# Patient Record
Sex: FEMALE | Race: BLACK OR AFRICAN AMERICAN | NOT HISPANIC OR LATINO | Employment: FULL TIME | URBAN - METROPOLITAN AREA
[De-identification: names, ages, dates, MRNs, and addresses within clinical notes are randomized per-mention and may not be internally consistent; named-entity substitution may affect disease eponyms.]

---

## 2017-06-01 ENCOUNTER — CONVERSION ENCOUNTER (OUTPATIENT)
Dept: OTHER | Facility: HOSPITAL | Age: 67
End: 2017-06-01

## 2018-01-12 ENCOUNTER — OFFICE VISIT CONVERTED (OUTPATIENT)
Dept: FAMILY MEDICINE CLINIC | Age: 68
End: 2018-01-12
Attending: FAMILY MEDICINE

## 2018-01-13 LAB
ALBUMIN SERPL-MCNC: 4.4 G/DL
ALBUMIN/GLOB SERPL: 1.5 {RATIO}
ALP SERPL-CCNC: 120 IU/L
ALT SERPL-CCNC: 22 IU/L
AST SERPL-CCNC: 24 IU/L
BILIRUB SERPL-MCNC: 0.4 MG/DL
BUN SERPL-MCNC: 18 MG/DL
BUN/CREAT SERPL: 18
CALCIUM SERPL-MCNC: 10 MG/DL
CHLORIDE SERPL-SCNC: 104 MMOL/L
CHOLEST SERPL-MCNC: 196 MG/DL
CO2 SERPL-SCNC: 25 MMOL/L
CONV TOTAL PROTEIN: 7.3 G/DL
CREAT UR-MCNC: 0.98 MG/DL
ERYTHROCYTE [DISTWIDTH] IN BLOOD BY AUTOMATED COUNT: 17.3 %
GLOBULIN UR ELPH-MCNC: 2.9 G/DL
GLUCOSE SERPL-MCNC: 98 MG/DL
HCT VFR BLD AUTO: 43.3 %
HDLC SERPL-MCNC: 79 MG/DL
HGB BLD-MCNC: 14.4 G/DL
LDLC SERPL CALC-MCNC: 98 MG/DL
MCH RBC QN AUTO: 29.8 PG
MCHC RBC AUTO-ENTMCNC: 33.3 G/DL
MCV RBC AUTO: 90 FL
PLATELET # BLD AUTO: 287 X10E3/UL
POTASSIUM SERPL-SCNC: 4.1 MMOL/L
RBC # BLD AUTO: 4.84 X10E6/UL
SODIUM SERPL-SCNC: 146 MMOL/L
TRIGL SERPL-MCNC: 93 MG/DL
TSH SERPL-ACNC: 1.31 UIU/ML
VIT B12 SERPL-MCNC: 743 PG/ML
VLDLC SERPL-MCNC: 19 MG/DL
WBC # BLD AUTO: 9.9 X10E3/UL

## 2018-02-05 ENCOUNTER — OFFICE VISIT CONVERTED (OUTPATIENT)
Dept: FAMILY MEDICINE CLINIC | Age: 68
End: 2018-02-05
Attending: FAMILY MEDICINE

## 2018-03-29 ENCOUNTER — OFFICE VISIT CONVERTED (OUTPATIENT)
Dept: FAMILY MEDICINE CLINIC | Age: 68
End: 2018-03-29
Attending: NURSE PRACTITIONER

## 2018-06-02 ENCOUNTER — OFFICE VISIT CONVERTED (OUTPATIENT)
Dept: FAMILY MEDICINE CLINIC | Age: 68
End: 2018-06-02
Attending: NURSE PRACTITIONER

## 2018-06-12 ENCOUNTER — OFFICE VISIT CONVERTED (OUTPATIENT)
Dept: FAMILY MEDICINE CLINIC | Age: 68
End: 2018-06-12
Attending: NURSE PRACTITIONER

## 2018-06-19 ENCOUNTER — CONVERSION ENCOUNTER (OUTPATIENT)
Dept: OTHER | Facility: HOSPITAL | Age: 68
End: 2018-06-19

## 2018-07-05 ENCOUNTER — OFFICE VISIT CONVERTED (OUTPATIENT)
Dept: FAMILY MEDICINE CLINIC | Age: 68
End: 2018-07-05
Attending: NURSE PRACTITIONER

## 2018-08-07 ENCOUNTER — OFFICE VISIT CONVERTED (OUTPATIENT)
Dept: FAMILY MEDICINE CLINIC | Age: 68
End: 2018-08-07
Attending: NURSE PRACTITIONER

## 2018-08-24 ENCOUNTER — OFFICE VISIT CONVERTED (OUTPATIENT)
Dept: FAMILY MEDICINE CLINIC | Age: 68
End: 2018-08-24
Attending: FAMILY MEDICINE

## 2019-02-07 ENCOUNTER — OFFICE VISIT CONVERTED (OUTPATIENT)
Dept: FAMILY MEDICINE CLINIC | Age: 69
End: 2019-02-07
Attending: NURSE PRACTITIONER

## 2019-02-21 ENCOUNTER — OFFICE VISIT CONVERTED (OUTPATIENT)
Dept: FAMILY MEDICINE CLINIC | Age: 69
End: 2019-02-21
Attending: FAMILY MEDICINE

## 2019-02-21 ENCOUNTER — HOSPITAL ENCOUNTER (OUTPATIENT)
Dept: OTHER | Facility: HOSPITAL | Age: 69
Discharge: HOME | End: 2019-02-21
Attending: FAMILY MEDICINE

## 2019-02-21 LAB
ALBUMIN SERPL-MCNC: 4 G/DL (ref 3.5–5)
ALBUMIN/GLOB SERPL: 1.3 {RATIO} (ref 1.4–2.6)
ALP SERPL-CCNC: 143 U/L (ref 43–160)
ALT SERPL-CCNC: 7 U/L (ref 10–40)
ANION GAP SERPL CALC-SCNC: 17 MMOL/L (ref 8–19)
AST SERPL-CCNC: 12 U/L (ref 15–50)
BILIRUB SERPL-MCNC: 0.19 MG/DL (ref 0.2–1.3)
BUN SERPL-MCNC: 13 MG/DL (ref 5–25)
BUN/CREAT SERPL: 15 {RATIO} (ref 6–20)
CALCIUM SERPL-MCNC: 9.9 MG/DL (ref 8.7–10.4)
CHLORIDE SERPL-SCNC: 107 MMOL/L (ref 99–111)
CONV CO2: 25 MMOL/L (ref 22–32)
CONV TOTAL PROTEIN: 7.2 G/DL (ref 6.3–8.2)
CREAT UR-MCNC: 0.84 MG/DL (ref 0.5–0.9)
ERYTHROCYTE [DISTWIDTH] IN BLOOD BY AUTOMATED COUNT: 19 % (ref 11.5–14.5)
GFR SERPLBLD BASED ON 1.73 SQ M-ARVRAT: >60 ML/MIN/{1.73_M2}
GLOBULIN UR ELPH-MCNC: 3.2 G/DL (ref 2–3.5)
GLUCOSE SERPL-MCNC: 86 MG/DL (ref 65–99)
HBA1C MFR BLD: 10.6 G/DL (ref 12–16)
HCT VFR BLD AUTO: 34.3 % (ref 37–47)
MCH RBC QN AUTO: 24.3 PG (ref 27–31)
MCHC RBC AUTO-ENTMCNC: 30.9 G/DL (ref 33–37)
MCV RBC AUTO: 78.5 FL (ref 81–99)
OSMOLALITY SERPL CALC.SUM OF ELEC: 299 MOSM/KG (ref 273–304)
PLATELET # BLD AUTO: 428 10*3/UL (ref 130–400)
PMV BLD AUTO: 10.7 FL (ref 7.4–10.4)
POTASSIUM SERPL-SCNC: 3.6 MMOL/L (ref 3.5–5.3)
RBC # BLD AUTO: 4.37 10*6/UL (ref 4.2–5.4)
SODIUM SERPL-SCNC: 145 MMOL/L (ref 135–147)
TSH SERPL-ACNC: 0.67 M[IU]/L (ref 0.27–4.2)
WBC # BLD AUTO: 11.75 10*3/UL (ref 4.8–10.8)

## 2019-02-22 LAB
FERRITIN SERPL-MCNC: 22 NG/ML (ref 10–200)
FOLATE SERPL-MCNC: >20 NG/ML (ref 4.8–20)
IRON SERPL-MCNC: 17 UG/DL (ref 60–170)
VIT B12 SERPL-MCNC: 700 PG/ML (ref 211–911)

## 2019-05-01 ENCOUNTER — HOSPITAL ENCOUNTER (OUTPATIENT)
Dept: OTHER | Facility: HOSPITAL | Age: 69
Discharge: HOME | End: 2019-05-01
Attending: FAMILY MEDICINE

## 2019-05-01 LAB
ERYTHROCYTE [DISTWIDTH] IN BLOOD BY AUTOMATED COUNT: 22.3 % (ref 11.7–14.4)
HBA1C MFR BLD: 14 G/DL (ref 12–16)
HCT VFR BLD AUTO: 45.2 % (ref 37–47)
MCH RBC QN AUTO: 26.1 PG (ref 27–31)
MCHC RBC AUTO-ENTMCNC: 31 G/DL (ref 33–37)
MCV RBC AUTO: 84.3 FL (ref 81–99)
PLATELET # BLD AUTO: 298 10*3/UL (ref 130–400)
PMV BLD AUTO: 10.8 FL (ref 9.4–12.3)
RBC # BLD AUTO: 5.36 10*6/UL (ref 4.2–5.4)
WBC # BLD AUTO: 11.47 10*3/UL (ref 4.8–10.8)

## 2019-05-02 LAB
FERRITIN SERPL-MCNC: 39 NG/ML (ref 10–200)
IRON SERPL-MCNC: 135 UG/DL (ref 60–170)

## 2019-06-28 ENCOUNTER — HOSPITAL ENCOUNTER (OUTPATIENT)
Dept: OTHER | Facility: HOSPITAL | Age: 69
Discharge: HOME | End: 2019-06-28
Attending: FAMILY MEDICINE

## 2019-08-20 ENCOUNTER — OFFICE VISIT CONVERTED (OUTPATIENT)
Dept: FAMILY MEDICINE CLINIC | Age: 69
End: 2019-08-20
Attending: FAMILY MEDICINE

## 2019-10-25 ENCOUNTER — OFFICE VISIT CONVERTED (OUTPATIENT)
Dept: FAMILY MEDICINE CLINIC | Age: 69
End: 2019-10-25
Attending: FAMILY MEDICINE

## 2019-10-25 ENCOUNTER — HOSPITAL ENCOUNTER (OUTPATIENT)
Dept: OTHER | Facility: HOSPITAL | Age: 69
Discharge: HOME | End: 2019-10-25
Attending: FAMILY MEDICINE

## 2019-11-14 ENCOUNTER — HOSPITAL ENCOUNTER (OUTPATIENT)
Dept: OTHER | Facility: HOSPITAL | Age: 69
Discharge: HOME | End: 2019-11-14
Attending: FAMILY MEDICINE

## 2019-11-14 ENCOUNTER — OFFICE VISIT CONVERTED (OUTPATIENT)
Dept: FAMILY MEDICINE CLINIC | Age: 69
End: 2019-11-14
Attending: FAMILY MEDICINE

## 2019-11-14 LAB
ALBUMIN SERPL-MCNC: 4.1 G/DL (ref 3.5–5)
ALBUMIN/GLOB SERPL: 1.1 {RATIO} (ref 1.4–2.6)
ALP SERPL-CCNC: 122 U/L (ref 43–160)
ALT SERPL-CCNC: 12 U/L (ref 10–40)
ANION GAP SERPL CALC-SCNC: 17 MMOL/L (ref 8–19)
AST SERPL-CCNC: 17 U/L (ref 15–50)
BILIRUB SERPL-MCNC: 0.16 MG/DL (ref 0.2–1.3)
BUN SERPL-MCNC: 24 MG/DL (ref 5–25)
BUN/CREAT SERPL: 23 {RATIO} (ref 6–20)
CALCIUM SERPL-MCNC: 10.2 MG/DL (ref 8.7–10.4)
CHLORIDE SERPL-SCNC: 105 MMOL/L (ref 99–111)
CHOLEST SERPL-MCNC: 200 MG/DL (ref 107–200)
CHOLEST/HDLC SERPL: 4 {RATIO} (ref 3–6)
CONV CO2: 26 MMOL/L (ref 22–32)
CONV TOTAL PROTEIN: 7.7 G/DL (ref 6.3–8.2)
CREAT UR-MCNC: 1.03 MG/DL (ref 0.5–0.9)
ERYTHROCYTE [DISTWIDTH] IN BLOOD BY AUTOMATED COUNT: 16.6 % (ref 11.7–14.4)
GFR SERPLBLD BASED ON 1.73 SQ M-ARVRAT: >60 ML/MIN/{1.73_M2}
GLOBULIN UR ELPH-MCNC: 3.6 G/DL (ref 2–3.5)
GLUCOSE SERPL-MCNC: 78 MG/DL (ref 65–99)
HCT VFR BLD AUTO: 43.4 % (ref 37–47)
HDLC SERPL-MCNC: 50 MG/DL (ref 40–60)
HGB BLD-MCNC: 13.7 G/DL (ref 12–16)
LDLC SERPL CALC-MCNC: 116 MG/DL (ref 70–100)
MCH RBC QN AUTO: 27.8 PG (ref 27–31)
MCHC RBC AUTO-ENTMCNC: 31.6 G/DL (ref 33–37)
MCV RBC AUTO: 88 FL (ref 81–99)
OSMOLALITY SERPL CALC.SUM OF ELEC: 301 MOSM/KG (ref 273–304)
PLATELET # BLD AUTO: 337 10*3/UL (ref 130–400)
PMV BLD AUTO: 11.4 FL (ref 9.4–12.3)
POTASSIUM SERPL-SCNC: 4 MMOL/L (ref 3.5–5.3)
RBC # BLD AUTO: 4.93 10*6/UL (ref 4.2–5.4)
SODIUM SERPL-SCNC: 144 MMOL/L (ref 135–147)
TRIGL SERPL-MCNC: 170 MG/DL (ref 40–150)
TSH SERPL-ACNC: 1.49 M[IU]/L (ref 0.27–4.2)
VLDLC SERPL-MCNC: 34 MG/DL (ref 5–37)
WBC # BLD AUTO: 9.81 10*3/UL (ref 4.8–10.8)

## 2019-11-17 LAB
ASO AB SERPL-ACNC: 155 [IU]/ML (ref 0–200)
CONV RHEUMATOID FACTOR IGM: <10 [IU]/ML (ref 0–14)
CRP SERPL-MCNC: 11.8 MG/L (ref 0–5)
DSDNA AB SER-ACNC: NEGATIVE [IU]/ML
ENA AB SER IA-ACNC: NEGATIVE {RATIO}
ERYTHROCYTE [SEDIMENTATION RATE] IN BLOOD: 16 MM/H (ref 0–30)
PHOSPHATE SERPL-MCNC: 3.9 MG/DL (ref 2.4–4.5)
URATE SERPL-MCNC: 6.7 MG/DL (ref 2.5–7.5)

## 2019-11-18 LAB — CCP IGA+IGG SERPL IA-ACNC: 8 UNITS (ref 0–19)

## 2020-01-09 ENCOUNTER — OFFICE VISIT CONVERTED (OUTPATIENT)
Dept: PODIATRY | Facility: CLINIC | Age: 70
End: 2020-01-09
Attending: PODIATRIST

## 2020-01-29 ENCOUNTER — OFFICE VISIT CONVERTED (OUTPATIENT)
Dept: FAMILY MEDICINE CLINIC | Age: 70
End: 2020-01-29
Attending: NURSE PRACTITIONER

## 2020-03-31 ENCOUNTER — OFFICE VISIT CONVERTED (OUTPATIENT)
Dept: FAMILY MEDICINE CLINIC | Age: 70
End: 2020-03-31
Attending: FAMILY MEDICINE

## 2020-04-30 ENCOUNTER — OFFICE VISIT CONVERTED (OUTPATIENT)
Dept: FAMILY MEDICINE CLINIC | Age: 70
End: 2020-04-30
Attending: NURSE PRACTITIONER

## 2020-05-01 ENCOUNTER — OFFICE VISIT CONVERTED (OUTPATIENT)
Dept: FAMILY MEDICINE CLINIC | Age: 70
End: 2020-05-01
Attending: NURSE PRACTITIONER

## 2020-05-01 ENCOUNTER — HOSPITAL ENCOUNTER (OUTPATIENT)
Dept: OTHER | Facility: HOSPITAL | Age: 70
Discharge: HOME | End: 2020-05-01
Attending: NURSE PRACTITIONER

## 2020-05-01 LAB
ALBUMIN SERPL-MCNC: 4.4 G/DL (ref 3.5–5)
ALBUMIN/GLOB SERPL: 1.3 {RATIO} (ref 1.4–2.6)
ALP SERPL-CCNC: 137 U/L (ref 43–160)
ALT SERPL-CCNC: 10 U/L (ref 10–40)
ANION GAP SERPL CALC-SCNC: 16 MMOL/L (ref 8–19)
AST SERPL-CCNC: 14 U/L (ref 15–50)
BASOPHILS # BLD MANUAL: 0.05 10*3/UL (ref 0–0.2)
BASOPHILS NFR BLD MANUAL: 0.5 % (ref 0–3)
BILIRUB SERPL-MCNC: 0.31 MG/DL (ref 0.2–1.3)
BUN SERPL-MCNC: 14 MG/DL (ref 5–25)
BUN/CREAT SERPL: 16 {RATIO} (ref 6–20)
CALCIUM SERPL-MCNC: 10 MG/DL (ref 8.7–10.4)
CHLORIDE SERPL-SCNC: 104 MMOL/L (ref 99–111)
CONV CO2: 26 MMOL/L (ref 22–32)
CONV TOTAL PROTEIN: 7.7 G/DL (ref 6.3–8.2)
CREAT UR-MCNC: 0.86 MG/DL (ref 0.5–0.9)
DEPRECATED RDW RBC AUTO: 50.1 FL
EOSINOPHIL # BLD MANUAL: 0.12 10*3/UL (ref 0–0.7)
EOSINOPHIL NFR BLD MANUAL: 1.1 % (ref 0–7)
ERYTHROCYTE [DISTWIDTH] IN BLOOD BY AUTOMATED COUNT: 16.1 % (ref 11.5–14.5)
GFR SERPLBLD BASED ON 1.73 SQ M-ARVRAT: >60 ML/MIN/{1.73_M2}
GLOBULIN UR ELPH-MCNC: 3.3 G/DL (ref 2–3.5)
GLUCOSE SERPL-MCNC: 71 MG/DL (ref 65–99)
GRANS (ABSOLUTE): 6.8 10*3/UL (ref 2–8)
GRANS: 61.2 % (ref 30–85)
HBA1C MFR BLD: 14.5 G/DL (ref 12–16)
HCT VFR BLD AUTO: 45.2 % (ref 37–47)
IMM GRANULOCYTES # BLD: 0.01 10*3/UL (ref 0–0.54)
IMM GRANULOCYTES NFR BLD: 0.1 % (ref 0–0.43)
LYMPHOCYTES # BLD MANUAL: 3.2 10*3/UL (ref 1–5)
LYMPHOCYTES NFR BLD MANUAL: 8.3 % (ref 3–10)
MAGNESIUM SERPL-MCNC: 2.01 MG/DL (ref 1.6–2.3)
MCH RBC QN AUTO: 27.4 PG (ref 27–31)
MCHC RBC AUTO-ENTMCNC: 32.1 G/DL (ref 33–37)
MCV RBC AUTO: 85.3 FL (ref 81–99)
MONOCYTES # BLD AUTO: 0.92 10*3/UL (ref 0.2–1.2)
OSMOLALITY SERPL CALC.SUM OF ELEC: 293 MOSM/KG (ref 273–304)
PLATELET # BLD AUTO: 313 10*3/UL (ref 130–400)
PMV BLD AUTO: 10.2 FL (ref 7.4–10.4)
POTASSIUM SERPL-SCNC: 3.7 MMOL/L (ref 3.5–5.3)
RBC # BLD AUTO: 5.3 10*6/UL (ref 4.2–5.4)
SODIUM SERPL-SCNC: 142 MMOL/L (ref 135–147)
TSH SERPL-ACNC: 0.84 M[IU]/L (ref 0.27–4.2)
VARIANT LYMPHS NFR BLD MANUAL: 28.8 % (ref 20–45)
WBC # BLD AUTO: 11.1 10*3/UL (ref 4.8–10.8)

## 2020-05-06 ENCOUNTER — CONVERSION ENCOUNTER (OUTPATIENT)
Dept: CARDIOLOGY | Facility: CLINIC | Age: 70
End: 2020-05-06

## 2020-05-06 ENCOUNTER — OFFICE VISIT CONVERTED (OUTPATIENT)
Dept: CARDIOLOGY | Facility: CLINIC | Age: 70
End: 2020-05-06
Attending: INTERNAL MEDICINE

## 2020-05-20 ENCOUNTER — CONVERSION ENCOUNTER (OUTPATIENT)
Dept: CARDIOLOGY | Facility: CLINIC | Age: 70
End: 2020-05-20
Attending: INTERNAL MEDICINE

## 2020-05-20 ENCOUNTER — OFFICE VISIT CONVERTED (OUTPATIENT)
Dept: CARDIOLOGY | Facility: CLINIC | Age: 70
End: 2020-05-20
Attending: INTERNAL MEDICINE

## 2020-06-25 ENCOUNTER — OFFICE VISIT CONVERTED (OUTPATIENT)
Dept: FAMILY MEDICINE CLINIC | Age: 70
End: 2020-06-25
Attending: FAMILY MEDICINE

## 2020-07-06 ENCOUNTER — HOSPITAL ENCOUNTER (OUTPATIENT)
Dept: OTHER | Facility: HOSPITAL | Age: 70
Discharge: HOME | End: 2020-07-06
Attending: FAMILY MEDICINE

## 2020-07-06 ENCOUNTER — CONVERSION ENCOUNTER (OUTPATIENT)
Dept: FAMILY MEDICINE CLINIC | Age: 70
End: 2020-07-06

## 2020-07-09 LAB — SARS-COV-2 RNA SPEC QL NAA+PROBE: NOT DETECTED

## 2020-08-25 ENCOUNTER — HOSPITAL ENCOUNTER (OUTPATIENT)
Dept: OTHER | Facility: HOSPITAL | Age: 70
Discharge: HOME | End: 2020-08-25
Attending: FAMILY MEDICINE

## 2021-01-08 ENCOUNTER — OFFICE VISIT CONVERTED (OUTPATIENT)
Dept: FAMILY MEDICINE CLINIC | Age: 71
End: 2021-01-08
Attending: FAMILY MEDICINE

## 2021-01-08 ENCOUNTER — HOSPITAL ENCOUNTER (OUTPATIENT)
Dept: OTHER | Facility: HOSPITAL | Age: 71
Discharge: HOME | End: 2021-01-08
Attending: FAMILY MEDICINE

## 2021-01-08 LAB
BASOPHILS # BLD MANUAL: 0.04 10*3/UL (ref 0–0.2)
BASOPHILS NFR BLD MANUAL: 0.4 % (ref 0–3)
DEPRECATED RDW RBC AUTO: 53.4 FL
EOSINOPHIL # BLD MANUAL: 0.16 10*3/UL (ref 0–0.7)
EOSINOPHIL NFR BLD MANUAL: 1.6 % (ref 0–7)
ERYTHROCYTE [DISTWIDTH] IN BLOOD BY AUTOMATED COUNT: 17.6 % (ref 11.5–14.5)
FERRITIN SERPL-MCNC: 121 NG/ML (ref 10–200)
GRANS (ABSOLUTE): 5.76 10*3/UL (ref 2–8)
GRANS: 58.9 % (ref 30–85)
HBA1C MFR BLD: 16.2 G/DL (ref 12–16)
HCT VFR BLD AUTO: 50.2 % (ref 37–47)
IMM GRANULOCYTES # BLD: 0.04 10*3/UL (ref 0–0.54)
IMM GRANULOCYTES NFR BLD: 0.4 % (ref 0–0.43)
IRON SATN MFR SERPL: 30 % (ref 20–55)
IRON SERPL-MCNC: 109 UG/DL (ref 60–170)
LYMPHOCYTES # BLD MANUAL: 3.04 10*3/UL (ref 1–5)
LYMPHOCYTES NFR BLD MANUAL: 7.6 % (ref 3–10)
MCH RBC QN AUTO: 27.6 PG (ref 27–31)
MCHC RBC AUTO-ENTMCNC: 32.3 G/DL (ref 33–37)
MCV RBC AUTO: 85.7 FL (ref 81–99)
MONOCYTES # BLD AUTO: 0.74 10*3/UL (ref 0.2–1.2)
PLATELET # BLD AUTO: 301 10*3/UL (ref 130–400)
PMV BLD AUTO: 10 FL (ref 7.4–10.4)
RBC # BLD AUTO: 5.86 10*6/UL (ref 4.2–5.4)
TIBC SERPL-MCNC: 358 UG/DL (ref 245–450)
TRANSFERRIN SERPL-MCNC: 250 MG/DL (ref 250–380)
VARIANT LYMPHS NFR BLD MANUAL: 31.1 % (ref 20–45)
WBC # BLD AUTO: 9.78 10*3/UL (ref 4.8–10.8)

## 2021-01-09 LAB
ALBUMIN SERPL-MCNC: 4.5 G/DL (ref 3.5–5)
ALBUMIN/GLOB SERPL: 1.3 {RATIO} (ref 1.4–2.6)
ALP SERPL-CCNC: 151 U/L (ref 43–160)
ALT SERPL-CCNC: 10 U/L (ref 10–40)
ANION GAP SERPL CALC-SCNC: 17 MMOL/L (ref 8–19)
AST SERPL-CCNC: 17 U/L (ref 15–50)
BILIRUB SERPL-MCNC: 0.54 MG/DL (ref 0.2–1.3)
BUN SERPL-MCNC: 14 MG/DL (ref 5–25)
BUN/CREAT SERPL: 15 {RATIO} (ref 6–20)
CALCIUM SERPL-MCNC: 10.4 MG/DL (ref 8.7–10.4)
CHLORIDE SERPL-SCNC: 103 MMOL/L (ref 99–111)
CONV CO2: 25 MMOL/L (ref 22–32)
CONV TOTAL PROTEIN: 8 G/DL (ref 6.3–8.2)
CREAT UR-MCNC: 0.94 MG/DL (ref 0.5–0.9)
GFR SERPLBLD BASED ON 1.73 SQ M-ARVRAT: >60 ML/MIN/{1.73_M2}
GLOBULIN UR ELPH-MCNC: 3.5 G/DL (ref 2–3.5)
GLUCOSE SERPL-MCNC: 68 MG/DL (ref 65–99)
OSMOLALITY SERPL CALC.SUM OF ELEC: 291 MOSM/KG (ref 273–304)
POTASSIUM SERPL-SCNC: 4.2 MMOL/L (ref 3.5–5.3)
SODIUM SERPL-SCNC: 141 MMOL/L (ref 135–147)

## 2021-02-05 ENCOUNTER — OFFICE VISIT CONVERTED (OUTPATIENT)
Dept: FAMILY MEDICINE CLINIC | Age: 71
End: 2021-02-05
Attending: FAMILY MEDICINE

## 2021-05-10 NOTE — H&P
History and Physical      Patient Name: Eileen Kaufman   Patient ID: [id number]   Sex: Female   YOB: 1950    Primary Care Provider: Malaika JARA   Referring Provider: Malaika JARA    Visit Date: May 6, 2020    Provider: Simon Mao MD   Location: [facility]   Location Address: [address] [place], [place] [address]  [address] [place] [address]  [id number]   Location Phone: [phone]          Chief Complaint  · REASON FOR CONSULTATION: Dizziness       History Of Present Illness  Consult requested by: Malaika JARA   Eileen Kaufman is a 70-year-old female with hypertension, who was referred because of multiple complaints. 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 A week back, while she was winding up an issue, she suddenly felt a 'pop in the chest.' She denies having any palpitations but reported some minor chest pain at that time. She went back and her vitals were checked, and she was told to have high blood pressure. Since then, the patient is feeling very weak and tired. Her other problem is feeling dizzy at work and sometimes while sitting; this is going on for a few days to weeks. No syncopal episodes. She has no history of any cardiac illness, and because of the new symptoms, she was referred for further workup. Her anti-hypertensive medications were adjusted recently, and the dose of Amlodipine was increased.   PAST MEDICAL HISTORY: (1) Hypertension. (2) Negative for diabetes mellitus or coronary artery disease.   PSYCHOSOCIAL HISTORY: Current smoker who smokes 1/2 pack of cigarettes per day. She drinks alcohol rarely. No recreational drug usage. No mood changes or depression.   FAMILY HISTORY: was reviewed. No family history of premature coronary artery disease.   CURRENT MEDICATIONS: include Amlodipine 2.5 mg daily; HCTZ 25 mg daily; Metoprolol 50 mg daily; iron; Percocet; Pregabalin 75 mg b.i.d.;  "Mobic 15 mg p.r.n. The dosage and frequency of the medications were reviewed with the patient.   ALLERGIES: Sulfa and aspirin.       Review of Systems  · Constitutional  o Admits  o : fatigue, good general health lately  o Denies  o : recent weight changes   · Eyes  o Denies  o : double vision  · HENT  o Denies  o : hearing loss or ringing, chronic sinus problem, swollen glands in neck  · Cardiovascular  o Denies  o : chest pain, palpitations (fast, fluttering, or skipping beats), swelling (feet, ankles, hands), shortness of breath while walking or lying flat  · Respiratory  o Admits  o : asthma or wheezing, COPD  o Denies  o : chronic or frequent cough  · Gastrointestinal  o Denies  o : ulcers, nausea or vomiting  · Neurologic  o Denies  o : lightheaded or dizzy, stroke, headaches  · Musculoskeletal  o Admits  o : joint pain, back pain  · Endocrine  o Denies  o : thyroid disease, diabetes, heat or cold intolerance, excessive thirst or urination  · Heme-Lymph  o Denies  o : bleeding or bruising tendency, anemia      Vitals  Date Time BP Position Site L\R Cuff Size HR RR TEMP (F) WT  HT  BMI kg/m2 BSA m2 O2 Sat HC       05/06/2020 02:10 /90 Sitting    83 - R   165lbs 16oz 5'  4\" 28.49 1.84           Physical Examination  · Constitutional  o Appearance  o : Awake, alert, in no acute distress.  · Head and Face  o HEENT  o : No pallor, anicteric. Eyes normal. Moist mucous membranes.  · Neck  o Inspection/Palpation  o : Supple. No hepatosplenomegaly.  o Jugular Veins  o : No JVD. No carotid bruits.  · Respiratory  o Auscultation of Lungs  o : Clear to auscultation bilaterally. No crackles or wheezing.  · Cardiovascular  o Heart  o : S1, S2 is normally heard. No S3. No murmur, rubs, or gallops.  · Gastrointestinal  o Abdominal Examination  o : Soft, non-distended. No palpable hepatosplenomegaly. Bowel sounds heard in all four quadrants.  · Musculoskeletal  o General  o : Normal muscle tone and strength.  · Skin and " Subcutaneous Tissue  o General Inspection  o : No skin rashes.  · Extremities  o Extremities  o : Warm and well perfused. Distal pulses present. No pitting pedal edema.     Labs done on 05/01/2020 show hemoglobin of 14.5, hematocrit 45.2, WBC 11.1, platelet count 313.  TSH is 0.84.  Sodium 142, potassium 3.7, BUN 16, creatinine 0.86.  Magnesium is 2.01.  AST 14, ALT 10.    On 11/14/2019:  , , .    EKG done on 05/01/2020 was reviewed.  EKG showed normal sinus rhythm, normal axis, no significant ST-T changes.  Essentially normal EKG.           Assessment     ASSESSMENT AND PLAN:    1.  Dizziness/chest discomfort/palpitations:  Patient has multiple non-specific symptoms, recently noted to have       high blood pressure.  Significant arrhythmia needs to be ruled out.  Will also need evaluation of left        ventricular function and any valvular abnormalities.  Will proceed with a 24-hour Holter Monitor study to rule       out arrhythmias and an echocardiogram to assess the left ventricular function and valvular issues.  Exact       etiology of symptoms is unclear.  2.  Hypertension:  Well controlled.  Continue current regimen.  3.  Follow up with echocardiogram and Holter reports.    MD DANTE Méndez/michael           This note was transcribed by Lynette Durna.  michael/DANTE  The above service was transcribed by Lynette Duran, and I attest to the accuracy of the note.  DANTE               Electronically Signed by: Lynette Duran-, -Author on May 12, 2020 11:58:00 AM  Electronically Co-signed by: Simon Mao MD -Reviewer on May 14, 2020 10:40:42 AM

## 2021-05-10 NOTE — PROCEDURES
"   Procedure Note      Patient Name: Eileen Kaufman   Patient ID: [id number]   Sex: Female   YOB: 1950    Primary Care Provider: Malaika JARA   Referring Provider: Malaika JARA    Visit Date: May 20, 2020    Provider: Simon Mao MD   Location: [facility]   Location Address: [address] [place] [address]  [address]  [place], [place]  [id number]   Location Phone: [phone]          FINAL REPORT   TRANSTHORACIC ECHOCARDIOGRAM REPORT    Diagnosis: Palpitations, dizziness.   Height: 5'4\" Weight: 166 B/P: 132/90 BSA: 1.8   Tech: JTW   MEASUREMENTS:  RVID (Diastole) : RVID. (NORMAL: 0.7 to 2.4 cm max)   LVID (Systole): 2.5 cm (Diastole): 3.8 cm . (NORMAL: 3.7 - 5.4 cm)   Posterior Wall Thickness (Diastole): 1.0 cm. (NORMAL: 0.8 - 1.1 cm)   Septal Thickness (Diastole): 1.0 cm. (NORMAL: 0.7 - 1.2 cm)   LAID (Systole): 2.8 cm. (NORMAL: 1.9 - 3.8 cm)   Aortic Root Diameter (Diastole): 3.5 cm. (NORMAL: 2.0 - 3.7 cm)   COMMENTS:  The patient underwent 2-D, M-Mode, and Doppler examination, including pulse-wave, continuous-wave, and color-flow Doppler analysis; the study is technically adequate. The following findings were noted:   FINDINGS:  MITRAL VALVE: Normal in appearance, opens well. No evidence of mitral valve prolapse. No mitral stenosis. Trace mitral regurgitation.   AORTIC VALVE: Normal trileaflet aortic valve, opens well. No evidence of aortic stenosis or regurgitation on Doppler examination.   TRICUSPID VALVE: Normal in appearance, opens Mild tricuspid regurgitation. Calculated pulmonary artery systolic pressure is 36 to 51 mmHg. Findings are consistent with mid pulmonary artery hypertension.   PULMONIC VALVE: Grossly normal.   AORTIC ROOT: Normal in size with adequate motion.   LEFT ATRIUM: Normal in size. No intracavitary masses or clots seen. LA volume index is 14 mL/m2.   LEFT VENTRICLE: The left ventricular chamber size is normal. The left ventricular wall thickness " is normal. Left ventricular systolic function is normal. Estimated LV ejection fraction is 55 to 60%. There are no regional wall motion abnormalities. There is grade 1 diastolic dysfunction, impaired relaxation of the left ventricle.   RIGHT VENTRICLE: Normal size and function.   RIGHT ATRIUM: Normal in size.   PERICARDIUM: No evidence of pericardial effusion.   INFERIOR VENA CAVA: Measures 1.1 cm in diameter and there is more than 50% collapse during inspiration.   DOPPLER: E/A ratio is 0.7. DT= 225 msec. IVRT is 90 msec. E/E' is 10.   Faxed: 05/26/2020      CONCLUSION:  1.  Normal left ventricular systolic function with estimated LV ejection fraction of 55 to 60%.   2.  Grade 1 diastolic dysfunction of the left ventricle.   3.  Mild tricuspid regurgitation with evidence of mild pulmonary artery hypertension.       MD DANTE Méndez/dung    This note was transcribed by Malinda Rothman.  dung/dante  The above service was transcribed by Malinda Rothman, and I attest to the accuracy of the note.  DANTE                 Electronically Signed by: Preeti Rothman-, Other -Author on May 26, 2020 09:22:27 AM  Electronically Co-signed by: Simon Mao MD -Reviewer on May 26, 2020 09:44:56 AM

## 2021-05-15 VITALS
HEIGHT: 64 IN | HEART RATE: 83 BPM | DIASTOLIC BLOOD PRESSURE: 90 MMHG | WEIGHT: 166 LBS | SYSTOLIC BLOOD PRESSURE: 132 MMHG | BODY MASS INDEX: 28.34 KG/M2

## 2021-05-15 VITALS
HEIGHT: 64 IN | BODY MASS INDEX: 30.9 KG/M2 | OXYGEN SATURATION: 96 % | SYSTOLIC BLOOD PRESSURE: 140 MMHG | HEART RATE: 107 BPM | WEIGHT: 181 LBS | DIASTOLIC BLOOD PRESSURE: 90 MMHG

## 2021-05-18 NOTE — PROGRESS NOTES
MandeepEileen 1950     Office/Outpatient Visit    Visit Date: Thu, Feb 21, 2019 10:39 am    Provider: Artur Quevedo MD (Assistant: Kailey Gustafson MA)    Location: [facility]        Electronically signed by Artur Quevedo MD on  02/22/2019 07:03:04 PM                             SUBJECTIVE:        CC: pain, blood pressure, cholesterol, GERD         HPI:     Eileen Alvarez is in today for follow up on chronic lower back and neck pain from disc issues.  She sees pain mgmt and remains on narcotic therapy.  Her Kevyn is reviewed.  She also takes meloxicam for the back and does see some benefit from it.  She continues to take Lyrica for pain related to fibromyalgia and these issues.  She is not aware of side effects from Lyrica.  Kevyn is reviewed.         In regard to the hypercholesterolemia, current treatment includes Lipitor and diet.  Compliance with treatment has been good; she takes her medication as directed and follows up as directed.  She denies experiencing any hypercholesterolemia related symptoms.          In regard to the hypertension, her current cardiac medication regimen includes a diuretic ( Hydrodiuril ), a beta-blocker ( Toprol-XL ), and a calcium channel blocker ( Norvasc ).  She is tolerating the medication well without side effects.      ROS:     CONSTITUTIONAL:  Negative for chills and fever.      CARDIOVASCULAR:  Negative for chest pain and palpitations.      RESPIRATORY:  Negative for recent cough and dyspnea.      GASTROINTESTINAL:  Negative for abdominal pain, nausea and vomiting.          PMH/FMH/SH:     Last Reviewed on 2/21/2019 11:04 AM by Artur Quevedo    Past Medical History:             PREVENTIVE HEALTH MAINTENANCE             BONE DENSITY: was last done 7/16/18 with the following abnormality noted-- Osteopenia     COLORECTAL CANCER SCREENING: Up to date (colonoscopy q10y; sigmoidoscopy q5y; Cologuard q3y) was last done 08/2018, Results are in chart; colonoscopy  "with normal results     MAMMOGRAM: Done within last 2 years and results in are chart was last done 2018 with normal results         PAST MEDICAL HISTORY         Positive for    Coronary Artery Disease: mild; and    Hypertension;         GYNECOLOGICAL HISTORY:             CURRENT MEDICAL PROVIDERS:    Cardiologist: Dr. Virgen    Neurologist: Dr. Corbin    Pain Management: [facility]    Pulmonologist: Dr. Flores    Urologist: Dr. Ramires             ADVANCED DIRECTIVES: None - She says that her son, Mitch Guidry, would make decisions for her if needed.         Surgical History:         Arthroscopy: left knee;     Cholecystectomy    Hysterectomy: 80\"s Total;      CTS;    Neck 2015;    cardiac cath 12;         Family History:     Father:  at age 76; Cause of death was MI     Mother:  at age 60's; Cause of death was cerebral aneurysm         Social History:     Occupation: Disabled (due to depression)     Marital Status: Single     Children: 2 children         Tobacco/Alcohol/Supplements:     Last Reviewed on 2019 11:04 AM by Artur Quevedo    Tobacco: Current Smoker: She currently smokes every day, 1/2 pack per day.   She currently uses smokeless tobacco, vapor cans/pouches daily..          Alcohol: Frequency: Socially         Substance Abuse History:     Last Reviewed on 2019 11:04 AM by Artur Quevedo    None         Mental Health History:     Last Reviewed on 2019 11:04 AM by Artur Quevedo        Communicable Diseases (eg STDs):     Last Reviewed on 2019 11:04 AM by Artur Quevedo            Current Problems:     Last Reviewed on 2019 11:04 AM by Artur Quevedo    Unexplained weight loss     Leukocytosis, unspecified     Vitamin D deficiency, unspecified     Fatigue     Back pain     Bullous emphysema     Lung nodule     Clubbing of fingers     Moderate depression     Degenerative disc disease     Cervical " spinal stenosis     Depression     Hypercholesterolemia     CAD     GERD     Hypertension     Acute bronchitis     Pruritis         Immunizations:     Prevnar (Pneumococcal PCV 7) 7/5/2018     zzFluzone pf-quadrivalent 3 and up 11/5/2015     Fluarix pf 2/13/2017     Fluzone (3 + years dose) 1/5/2012     Fluzone pf (3+ years dose) 9/20/2013     Pneomovax 3/25/2013     Influenza Virus Vaccine pf (adult) 9/1/2017     PNEUMOVAX 23 (Pneumococcal PPV23) 2/13/2017         Allergies:     Last Reviewed on 2/21/2019 11:04 AM by Artur Quevedo    Aspirin:    Sulfas:        Current Medications:     Last Reviewed on 2/21/2019 11:04 AM by Artur Quevedo    Lyrica 75mg Capsules Take 1 capsule(s) by mouth bid     Remeron 30mg Tablet Take 1 tablet(s) by mouth at bedtime     Cetirizine HCl 10mg Tablet 1 tab daily     Ranitidine 150mg Tablet Take 1 tablet(s) by mouth bid     Amlodipine  5mg Tablet 1 tab daily     Atorvastatin Calcium 20mg Tablet 1 tab daily     Hydrochlorothiazide (HCTZ) 25mg Tablet Take 1 tablet(s) by mouth daily     Breo Ellipta 100mcg/25mcg Inhalation Powder Take 1 inhalation(s) by mouth daily     Fluticasone Propionate 50mcg/1actuation Nasal Spray 1 spray each nostil daily     Meloxicam 15mg Tablet Take 1 tablet(s) by mouth daily     Toprol XL 50mg Tablets, Extended Release Take 1 tablet(s) by mouth daily     Ventolin HFA 90mcg/1actuation Oral Inhaler Inhale 2 puff(s) by mouth q 4 to 6 hr prn     Percocet  7.5mg/325mg Tablet One PO TID PRN     Multivitamins     Cyclobenzaprine HCl 10mg Tablet 1 tablet AT HS prn         OBJECTIVE:        Vitals:         Current: 2/21/2019 10:43:57 AM    Ht:  5 ft, 4.5 in;  Wt: 170.2 lbs;  BMI: 28.8    T: 98.2 F (oral);  BP: 150/87 mm Hg (left arm, sitting);  P: 103 bpm (left arm (BP Cuff), sitting);  sCr: 0.82 mg/dL;  GFR: 71.31        Exams:     PHYSICAL EXAM:     GENERAL: vital signs recorded - well developed, well nourished;  no apparent distress;     NECK: range  of motion is normal; thyroid is non-palpable;     RESPIRATORY: normal respiratory rate and pattern with no distress; normal breath sounds with no rales, rhonchi, wheezes or rubs;     CARDIOVASCULAR: normal rate; rhythm is regular;  no systolic murmur; no edema;     GASTROINTESTINAL: nontender; normal bowel sounds; no organomegaly;     LYMPHATIC: no enlargement of cervical or facial nodes; no supraclavicular nodes;     BREAST/INTEGUMENT: SKIN: no significant rashes or lesions; no suspicious moles;     NEUROLOGIC: mental status: alert and oriented x 3; cranial nerves II-XII grossly intact;     PSYCHIATRIC: appropriate affect and demeanor; normal psychomotor function;         Procedures:     Influenza vaccination     1. Influenza high dose 0.5 ml unit dose, AS given IM in the left upper arm; administered by AS;  lot number [id number]; expires 06/09/2019 Regarding contraindications to an Influenza vaccine: Denies moderate/severe illness with/without fever; serious reaction to eggs, egg proteins, gentamicin, gelatin, arginine, neomycin or polymixin; serious reaction after recieving previous influenza vaccines; and history of Guillain-Durango Syndrome.              ASSESSMENT           722.6   M51.36  Degenerative disc disease              DDx:     272.0   E78.2  Hypercholesterolemia              DDx:     401.1   I10  Hypertension              DDx:     V58.69   Z79.899  Use of high risk medications              DDx:     V04.81   Z23  Influenza vaccination              DDx:         ORDERS:         Meds Prescribed:       Refill of: Meloxicam 15mg Tablet Take 1 tablet(s) by mouth daily  #90 (Ninety) tablet(s) Refills: 1         Radiology/Test Orders:       3014F  Screening mammography results documented and reviewed (PV)1  (In-House)         3017F  Colorectal CA screen results documented and reviewed (PV)  (In-House)           Lab Orders:       56879  COMP - [facility] Comp. Metabolic Panel  (Send-Out)         64766  TSH - [facility] TSH   (Send-Out)         32311  [facility] - [facility] CBC w/o diff  (Send-Out)           Procedures Ordered:       44884  Fluzone High Dose  (In-House)           Other Orders:       4004F  Pt scrnd tobacco use rcvd tobacco cessation talk  (In-House)           Calculated BMI above the upper parameter and a follow-up plan was documented in the medical record  (In-House)           Administration of influenza virus vaccine (x1)                 PLAN:          Degenerative disc disease         RECOMMENDATIONS given include: Today, we have reviewed Eileen Alvarez's care.  She is doing well overall.  I'm going to recommend no changes in her medications for now.  The Lyrica does help her and will be continued..  MIPS Smoking cessation encouraged. Counseling for less than 3 minutes.      MAMMOGRAM: Done within last 2 years and results in are chart     COLORECTAL CANCER SCREENING: Results are in chart     BMI Elevated - Follow-Up Plan: She was provided education on weight loss strategies           Prescriptions:       Refill of: Meloxicam 15mg Tablet Take 1 tablet(s) by mouth daily  #90 (Ninety) tablet(s) Refills: 1           Orders:       4004F  Pt scrnd tobacco use rcvd tobacco cessation talk  (In-House)         3014F  Screening mammography results documented and reviewed (PV)1  (In-House)         3017F  Colorectal CA screen results documented and reviewed (PV)  (In-House)           Calculated BMI above the upper parameter and a follow-up plan was documented in the medical record  (In-House)             Patient Education Handouts:       [facility] Medication Compliance           Hypercholesterolemia     LABORATORY:  Labs ordered to be performed today include Comprehensive metabolic panel and TSH.            Orders:       17321  COMP - [facility] Comp. Metabolic Panel  (Send-Out)         68109  TSH - [facility] TSH  (Send-Out)            Hypertension As above.          Use of high risk medications     LABORATORY:  Labs ordered to be performed today  include CBC W/O DIFF.            Orders:       81339  BDCB2 - [facility] CBC w/o diff  (Send-Out)            Influenza vaccination           Orders:       79957  Fluzone High Dose  (In-House)                     Administration of influenza virus vaccine (x1)             CHARGE CAPTURE           **Please note: ICD descriptions below are intended for billing purposes only and may not represent clinical diagnoses**        Primary Diagnosis:         722.6 Degenerative disc disease            M51.36    Other intervertebral disc degeneration, lumbar region              Orders:          48014   Office/outpatient visit; established patient, level 4  (In-House)             4004F   Pt scrnd tobacco use rcvd tobacco cessation talk  (In-House)             3014F   Screening mammography results documented and reviewed (PV)1  (In-House)             3017F   Colorectal CA screen results documented and reviewed (PV)  (In-House)                Calculated BMI above the upper parameter and a follow-up plan was documented in the medical record  (In-House)           272.0 Hypercholesterolemia            E78.2    Mixed hyperlipidemia    401.1 Hypertension            I10    Essential (primary) hypertension    V58.69 Use of high risk medications            Z79.899    Other long term (current) drug therapy    V04.81 Influenza vaccination            Z23    Encounter for immunization              Orders:          78672   Fluzone High Dose  (In-House)                                           Administration of influenza virus vaccine (x1)

## 2021-05-18 NOTE — PROGRESS NOTES
Eileen Kaufman Ann  1950     Office/Outpatient Visit    Visit Date: Fri, May 1, 2020 01:28 pm    Provider: Malaika Goodrich N.P. (Assistant: Murray Meyer, )    Location: [facility]        Electronically signed by Malaika Goodrich N.P. on  05/01/2020 04:14:46 PM                             Subjective:        CC: Ms. Kaufman is a 70 year old  female.  heart beating irregularly, making her feel weak;         HPI: 70 year old female presenting to clinic f/u from yesterday's telehealth visit with ESTEFANI Nguyen. Pt states she was working as a  yesterday at [employer], was bending over and when she stood up, she felt pressure in her chest and became very dizzy. She says that she has to wear a mask while at work. She has had a cardiac cath several years ago that did not require stents. No hx of MI or chf. Pt has smoked since she was a teenager. Today she does not have any chest pain/pressure, soa, dizziness. She states that her main complaint today is fatigue. She was told to go to ED yesterday for further evaluation, but pt declined.    ROS:     CONSTITUTIONAL:  Negative for chills, fatigue, fever and night sweats.      EYES:  Positive for blurred vision ( when dizzy yesterday, but has subsided ).      E/N/T:  Negative for nasal congestion and sinus pressure.      CARDIOVASCULAR:  Negative for palpitations, pedal edema and tachycardia.      RESPIRATORY:  Negative for recent cough and dyspnea.      GASTROINTESTINAL:  Negative for abdominal pain, diarrhea, nausea and vomiting.      MUSCULOSKELETAL:  Negative for myalgias.      INTEGUMENTARY/BREAST:  Negative for rash.      NEUROLOGICAL:  Negative for headaches, paresthesias, vertigo and weakness.          Past Medical History / Family History / Social History:         Last Reviewed on 5/01/2020 01:44 PM by Malaika Goodrich    Past Medical History:             PREVENTIVE HEALTH MAINTENANCE             BONE DENSITY: was last done  "18 with the following abnormality noted-- Osteopenia     COLORECTAL CANCER SCREENING: Up to date (colonoscopy q10y; sigmoidoscopy q5y; Cologuard q3y) was last done 2018, Results are in chart; colonoscopy with normal results     MAMMOGRAM: Done within last 2 years and results in are chart was last done 19 with normal results         PAST MEDICAL HISTORY         Positive for    Coronary Artery Disease: mild; and    Hypertension;         GYNECOLOGICAL HISTORY:             CURRENT MEDICAL PROVIDERS:    Cardiologist: Dr. Virgen    Neurologist: Dr. Corbin    Pain Management: [facility] pain [facility]    Pulmonologist: Dr. Flores    Urologist: Dr. Ramires             ADVANCED DIRECTIVES: None - She says that her son, Mitch Guidry, would make decisions for her if needed.         Surgical History:         Arthroscopy: left knee;     Cholecystectomy    Hysterectomy: 80\"s Total;     CTS;    Neck 2015;    cardiac cath 12;         Family History:     Father:  at age 76; Cause of death was MI     Mother:  at age 60's; Cause of death was cerebral aneurysm         Social History:     Occupation: Disabled (due to depression)     Marital Status: Single     Children: 2 children         Tobacco/Alcohol/Supplements: Eileen Alvarez was 23 when she started smoking.  She did stop smoking for some time - about 13 years.  She has been smoking a total of approximately 33 years.  She has smoked less than a pack daily during this time.     Last Reviewed on 2020 01:45 PM by Malaika Goodrich    Tobacco: Current Smoker: She currently smokes every day, 1/2 pack per day.   She currently uses smokeless tobacco, vapor cans/pouches daily..          Alcohol: Frequency: Socially         Substance Abuse History:     Last Reviewed on 2020 01:45 PM by Malaika Goodrich         Mental Health History:     Last Reviewed on 2019 03:15 PM by Artur Quevedo        Communicable Diseases (eg STDs):    "  Last Reviewed on 11/14/2019 03:15 PM by Artur Quevedo        Immunizations:     Influenza, high dose seasonal 11/5/2019    Prevnar (Pneumococcal PCV 7) 7/5/2018    zzFluzone pf-quadrivalent 3 and up 11/5/2015    Fluarix pf 2/13/2017    Fluzone (3 + years dose) 1/5/2012    Fluzone pf (3+ years dose) 9/20/2013    Pneomovax 3/25/2013    Fluzone High-Dose pf (>=65 yr) 2/21/2019    Influenza Virus Vaccine pf (adult) 9/1/2017    PNEUMOVAX 23 (Pneumococcal PPV23) 2/13/2017        Allergies:     Last Reviewed on 5/01/2020 01:44 PM by Malaika Goodrich    Aspirin:      Sulfas:          Current Medications:     Last Reviewed on 5/01/2020 01:44 PM by Malaika Goodrich    Remeron 30mg Tablet [Take 1 tablet(s) by mouth at bedtime]    hydroCHLOROthiazide 25 mg oral tablet [Take 1 tablet(s) by mouth daily]    meloxicam 15 mg oral tablet [TAKE 1 TABLET BY MOUTH DAILY]    Toprol XL 50mg Tablets, Extended Release [Take 1 tablet(s) by mouth daily]    amLODIPine 2.5 mg oral tablet [TAKE 1 TABLET BY MOUTH DAILY]    amLODIPine 5 mg oral tablet [TAKE 1 TABLET BY MOUTH DAILY]    pregabalin 75 mg oral capsule [take 1 capsule (75 mg) by oral route 2 times per day]    Multivitamins     Percocet  7.5-325 mg oral tablet [One PO TID PRN ]    atorvastatin 20 mg oral tablet [1 tab daily]    Ventolin HFA 90mcg/1actuation Oral Inhaler [Inhale 2 puff(s) by mouth q 4 to 6 hr prn]    Fluticasone Propionate 50mcg/1actuation Nasal Spray [1 spray each nostil daily]    cetirizine 10 mg oral tablet [1 tab daily]    Breo Ellipta 100mcg/25mcg Inhalation Powder [Take 1 inhalation(s) by mouth daily]    cyclobenzaprine 10 mg oral tablet [ 1 tablet AT HS prn]    ferrous sulfate 325 mg (65 mg iron) oral tablet [TAKE 1 TABLET BY MOUTH EVERY DAY]    Singulair 10 mg oral tablet [Take 1 tablet(s) by mouth each evening]        Objective:        Vitals:         Current: 5/1/2020 1:30:50 PM    Ht:  5 ft, 4.5 in;  Wt: 167.8 lbs;  BMI: 28.4T: 99.7 F (oral);  BP:  "136/80 mm Hg (right arm, sitting);  P: 92 bpm (right arm (BP Cuff), sitting);  sCr: 1.03 mg/dL;  GFR: 54.96O2 Sat: 97 % (room air)        Repeat:     2:14:16 PM  BP:   127/81mm Hg (left arm, lying, pulse-83) 2:16:4 PM  BP:   132/84mm Hg (left arm, sitting, pulse-84) 2:18:15 PM  BP:   118/85mm Hg (left arm, standing, pulse-88 \"felt a little weak upon standing\")     Exams:     PHYSICAL EXAM:     GENERAL: vital signs recorded - well developed, well nourished;  well groomed;  no apparent distress;     EYES: extraocular movements intact; conjunctiva and cornea are normal; PERRLA;     E/N/T: OROPHARYNX:  normal mucosa, dentition, gingiva, and posterior pharynx;     NECK: range of motion is normal; thyroid exam reveals not enlarged;     RESPIRATORY: normal respiratory rate and pattern with no distress; normal breath sounds with no rales, rhonchi, wheezes or rubs;     CARDIOVASCULAR: normal rate; rhythm is regular;  no systolic murmur; no edema;     LYMPHATIC: no enlargement of cervical or facial nodes;     MUSCULOSKELETAL: normal gait; normal overall tone     NEUROLOGIC: mental status: alert and oriented x 3;         Lab/Test Results:         LABORATORY RESULTS: EKG performed by tls         Assessment:         I95.1   Orthostatic hypotension       R42   Dizziness and giddiness           ORDERS:         Radiology/Test Orders:       [id number]  Electrocardiogram, routine with at least 12 leads; with interpretation and report  (In-House)              Lab Orders:       [id number]  [facility] with 3 part diff  (Send-Out)            73907  COMP - [facility] Comp. Metabolic Panel  (Send-Out)            36175  TSH - [facility] TSH  (Send-Out)            ORTHO  Orthostatic blood pressure  (In-House)            91445  MG - [facility] Magnesium, Serum  (Send-Out)              Procedures Ordered:       REFER  Referral to Specialist or Other Facility  (Send-Out)              Other Orders:       [id number]  Noninvasive ear or pulse oximetry for oxygen saturation; " single determination  (In-House)                      Plan:         Orthostatic hypotensionIncrease fluid intake. It is hard to remember to drink as much as you normally do when wearing a mask. Pt to notify clinic with any acute concerns or issues. she is to go to ED with any chest pain, soa, etc. She will be notified of lab results. EKG was normal sinus. Discussed case with Dr. Peace. Will refer to cardio for stress test.         Dizziness and giddinesssee above    LABORATORY:  Labs ordered to be performed today include CBC, Comprehensive metabolic panel, Magnesium level, and TSH.      TESTS/PROCEDURES:  Will proceed with an ECG to be performed/scheduled now.      REFERRALS:  Referral initiated to a cardiologist ( Dr. Simon Mao, [facility] ).            Orders:       [id number]  BDCBC - [facility] CBC with 3 part diff  (Send-Out)            64209  COMP - [facility] Comp. Metabolic Panel  (Send-Out)            16113  TSH - [facility] TSH  (Send-Out)            ORTHO  Orthostatic blood pressure  (In-House)            19582  Noninvasive ear or pulse oximetry for oxygen saturation; single determination  (In-House)            29804  Electrocardiogram, routine with at least 12 leads; with interpretation and report  (In-House)            02227  MG - [facility] Magnesium, Serum  (Send-Out)            REFER  Referral to Specialist or Other Facility  (Send-Out)                  Charge Capture:         Primary Diagnosis:     I95.1  Orthostatic hypotension           Orders:      26008  Office/outpatient visit; established patient, level 4  (In-House)              R42  Dizziness and giddiness           Orders:      ORTHO  Orthostatic blood pressure  (In-House)            93161  Noninvasive ear or pulse oximetry for oxygen saturation; single determination  (In-House)            77697  Electrocardiogram, routine with at least 12 leads; with interpretation and report  (In-House)

## 2021-05-18 NOTE — PROGRESS NOTES
Eileen Kaufman. 1950     Office/Outpatient Visit    Visit Date: Fri, Aug 24, 2018 08:19 am    Provider: Artur Quevedo MD (Assistant: Linda Chris MA)    Location: [facility]        Electronically signed by Artur Quevedo MD on  08/24/2018 05:45:48 PM                             SUBJECTIVE:        CC: physical exam, follow up on pain         HPI:         Ms. Kaufman is here for a Medicare wellness visit.  Individual and family medical history was reviewed and updated A list of current providers and suppliers reviewed and updated A current height, weight, BMI, blood pressure, and pulse were recorded in the vitals section of the note and have been reviewed A review of cognitive impairment was performed and was negative.  A review of functional ability and level of safety was performed and was negative She denies having trouble hearing the TV/radio when others do not, having to strain to hear or understand conversations and wearing hearing aid(s).  Concerning home safety, she reports that at home she DOES have throw rugs, grab bars in the bath and functioning smoke alarms, but not poor lighting or a slippery bath or shower.      Physical Activity: Exercises at least 3 times per week; Has not had any falls or only one fall without injury in the past year.  Advance Care Directive updated today in PMH         Eileen Alvarez also has chronic lower back and neck pain from disc issues.  She sees pain mgmt and remains on narcotic therapy.  Her Kevyn is reviewed.  She also takes meloxicam for the back and does see some benefit from it.  She also takes Lyrica for pain related to fibromyalgia and these issues.  She is not aware of side effects from Lyrica.  She has been off Lyrica for the last bit due to running out of this.          Eileen Alvarez expressed some concern about weight loss.  This has been an ongoing issue for her.  She is not sure what has caused this.  She did have recent colonoscopy.  She also is working and  is busy there - always moving.  She denies swallowing difficulty.  She is not having pain with eating, but she does feel full fairly quickly.  Recent blood work has been noted.         Concerning hypercholesterolemia, current treatment includes Lipitor and diet.  Compliance with treatment has been good; she takes her medication as directed and follows up as directed.  She denies experiencing any hypercholesterolemia related symptoms.          Additionally, she presents with history of hypertension.  her current cardiac medication regimen includes a diuretic ( Hydrodiuril ), a beta-blocker ( Toprol-XL ), and a calcium channel blocker ( Norvasc ).  She is tolerating the medication well without side effects.      ROS:     CONSTITUTIONAL:  Negative for chills and fever.      CARDIOVASCULAR:  Negative for chest pain and palpitations.      RESPIRATORY:  Negative for recent cough and dyspnea.      GASTROINTESTINAL:  Negative for abdominal pain, nausea and vomiting.          PMH/FMH/SH:     Last Reviewed on 2018 08:37 AM by Artur Quevedo    Past Medical History:             PREVENTIVE HEALTH MAINTENANCE             BONE DENSITY: was last done 18 with the following abnormality noted-- Osteopenia     COLORECTAL CANCER SCREENING: Up to date (colonoscopy q10y; sigmoidoscopy q5y; Cologuard q3y) was last done 2018, Results are in chart; colonoscopy with normal results     MAMMOGRAM: Done within last 2 years and results in are chart was last done 2018 with normal results         PAST MEDICAL HISTORY         Positive for    Coronary Artery Disease: mild; and    Hypertension;         GYNECOLOGICAL HISTORY:             CURRENT MEDICAL PROVIDERS:    Cardiologist: Dr. Virgen    Neurologist: Dr. Corbin    Pain Management: [facility] pain [facility]    Pulmonologist: Dr. Flores    Urologist: Dr. Ramires             ADVANCED DIRECTIVES: None - She says that her son, Mitch Guidry, would make decisions for her  "if needed.         Surgical History:         Arthroscopy: left knee;     Cholecystectomy    Hysterectomy: 80\"s Total;      CTS;    Neck 2015;    cardiac cath 12;         Family History:     Father:  at age 76; Cause of death was MI     Mother:  at age 60's; Cause of death was cerebral aneurysm         Social History:     Occupation: Disabled (due to depression)     Marital Status: Single     Children: 2 children         Tobacco/Alcohol/Supplements:     Last Reviewed on 2018 08:37 AM by Artur Quevedo    Tobacco: She has a past history of cigarette smoking; quit date:  2018.   She currently uses smokeless tobacco, vapor cans/pouches daily..          Alcohol: Frequency: Socially         Substance Abuse History:     Last Reviewed on 2018 08:37 AM by Artur Quevedo    None         Mental Health History:     Last Reviewed on 2018 08:37 AM by Artur Quevedo        Communicable Diseases (eg STDs):     Last Reviewed on 2018 08:37 AM by Artur Quevedo            Current Problems:     Last Reviewed on 2018 08:37 AM by Artur Quevedo    Leukocytosis, unspecified     Vitamin D deficiency, unspecified     Fatigue     Back pain     Bullous emphysema     Lung nodule     Clubbing of fingers     Moderate depression     Degenerative disc disease     Cervical spinal stenosis     Depression     Hypercholesterolemia     CAD     GERD     Hypertension     Cerumen impaction     Acute sinusitis, other     Screening for hypothyroidism     Screening for osteoporosis     Screening test for viral diseases - other     Tobacco use disorder     Screening for colon cancer         Immunizations:     Prevnar (Pneumococcal PCV 7) 2018     zzFluzone pf-quadrivalent 3 and up 2015     Fluarix pf 2017     Fluzone (3 + years dose) 2012     Fluzone pf (3+ years dose) 2013     Pneomovax 3/25/2013     Influenza Virus Vaccine pf (adult) 2017     " PNEUMOVAX 23 (Pneumococcal PPV23) 2/13/2017         Allergies:     Last Reviewed on 8/24/2018 08:37 AM by Artur Quevedo    Aspirin:    Sulfas:        Current Medications:     Last Reviewed on 8/24/2018 08:37 AM by Artur Quevedo    Robaxin 500mg Tablets 1 tab HS PRN     Breo Ellipta 100mcg/25mcg Inhalation Powder Take 1 inhalation(s) by mouth daily     Amlodipine  5mg Tablet 1 tab daily     Atorvastatin Calcium 20mg Tablet 1 tab daily     Cetirizine HCl 10mg Tablet 1 tab daily     Fluticasone Propionate 50mcg/1actuation Nasal Spray 1 spray each nostil daily     Hydrochlorothiazide (HCTZ) 25mg Tablet Take 1 tablet(s) by mouth daily     Lyrica 75mg Capsules Take 1 capsule(s) by mouth bid     Meloxicam 15mg Tablet Take 1 tablet(s) by mouth daily     Remeron 30mg Tablet Take 1 tablet(s) by mouth at bedtime     Toprol XL 50mg Tablets, Extended Release Take 1 tablet(s) by mouth daily     Ventolin HFA 90mcg/1actuation Oral Inhaler Inhale 2 puff(s) by mouth q 4 to 6 hr prn     Percocet  7.5mg/325mg Tablet One PO TID PRN     Multivitamins     Augmentin 875mg/125mg Tablet 1 tab bid X 10 days     Bromfed-DM 2mg/10mg/30mg per 5ml Syrup 1  to 2  tsp po every 4-6 hrs prn cough/congestion.     Cyclobenzaprine HCl 10mg Tablet 1 tablet AT HS prn         OBJECTIVE:        Vitals:         Current: 8/24/2018 8:23:45 AM    Ht:  5 ft, 4.5 in;  Wt: 154.4 lbs;  BMI: 26.1    T: 99 F (oral);  BP: 142/81 mm Hg (left arm, sitting);  P: 107 bpm (left arm (BP Cuff), sitting);  sCr: 0.82 mg/dL;  GFR: 68.42        Exams:     PHYSICAL EXAM:     GENERAL: vital signs recorded - well developed, well nourished;  no apparent distress;     EYES: extraocular movements intact; conjunctiva and cornea are normal; PERRLA; limited screening of vision/hearing is intact     E/N/T:  normal EACs, TMs, nasal/oral mucosa, teeth, gingiva, and oropharynx;     NECK: range of motion is normal; thyroid is non-palpable;     RESPIRATORY: normal respiratory  rate and pattern with no distress; normal breath sounds with no rales, rhonchi, wheezes or rubs;     CARDIOVASCULAR: normal rate; rhythm is regular;  no systolic murmur; no edema;     GASTROINTESTINAL: nontender; normal bowel sounds; no organomegaly;     LYMPHATIC: no enlargement of cervical or facial nodes; no supraclavicular nodes;     BREAST/INTEGUMENT: SKIN: no significant rashes or lesions; no suspicious moles;     MUSCULOSKELETAL: there is no focal acute finding in the neck or lower back - no focal tenderness is noted;     NEUROLOGIC: mental status: alert and oriented x 3; cranial nerves II-XII grossly intact;     PSYCHIATRIC:  appropriate affect and demeanor; normal speech pattern; grossly normal memory;         ASSESSMENT           V70.0   Z00.01  Yearly physical exam              DDx:     722.6   M51.36  Degenerative disc disease              DDx:     783.21   R63.4  Unexplained weight loss              DDx:     296.22   F32.1  Moderate depression              DDx:     272.0   E78.4  Hypercholesterolemia              DDx:     401.1   I10  Hypertension              DDx:         ORDERS:         Meds Prescribed:       Refill of: Amlodipine  5mg Tablet 1 tab daily  #90 (Ninety) tablet(s) Refills: 1       Refill of: Atorvastatin Calcium 20mg Tablet 1 tab daily  #90 (Ninety) tablet(s) Refills: 1       Refill of: Hydrochlorothiazide (HCTZ) 25mg Tablet Take 1 tablet(s) by mouth daily  #90 (Ninety) tablet(s) Refills: 1       Refill of: Lyrica (Pregabalin) 75mg Capsules Take 1 capsule(s) by mouth bid  #180 (One [place] and Eighty) capsule(s) Refills: 1       Refill of: Remeron (Mirtazapine) 30mg Tablet Take 1 tablet(s) by mouth at bedtime  #90 (Ninety) tablet(s) Refills: 1       Ranitidine 150mg Tablet Take 1 tablet(s) by mouth bid  #60 (Sixty) tablet(s) Refills: 2         Radiology/Test Orders:       3014F  Screening mammography results documented and reviewed (PV)1  (In-House)         3017F  Colorectal CA screen  results documented and reviewed (PV)  (In-House)           Other Orders:         Depression screen negative  (In-House)         4004F  Pt scrnd tobacco use rcvd tobacco cessation talk  (In-House)           Negative EtOH screen  (In-House)           Calculated BMI above the upper parameter and a follow-up plan was documented in the medical record  (In-House)           Subsequent Annual Well Visit Medicare (x1)                 PLAN:          Yearly physical exam         RECOMMENDATIONS given include: Today, we have reviewed Eileen Alvarez's care.  See attached Medicare paperwork.  I'm going to refill her medications as noted.  Otherwise, her recent care has been updated.  We will continue to follow closely..  MIPS PHQ-9 Depression Screening: Completed form scanned and in chart; Total Score 10   Smoking cessation encouraged. Counseling for less than 3 minutes.  Negative alcohol screen     MAMMOGRAM: Done within last 2 years and results in are chart     COLORECTAL CANCER SCREENING: Results are in chart     BMI Elevated - Follow-Up Plan: She was provided education on weight loss strategies           Orders:         Depression screen negative  (In-House)         4004F  Pt scrnd tobacco use rcvd tobacco cessation talk  (In-House)           Negative EtOH screen  (In-House)         3014F  Screening mammography results documented and reviewed (PV)1  (In-House)         3017F  Colorectal CA screen results documented and reviewed (PV)  (In-House)           Calculated BMI above the upper parameter and a follow-up plan was documented in the medical record  (In-House)             Patient Education Handouts:       Physical Exam 60+ year, Female           Degenerative disc disease As above.           Prescriptions:       Refill of: Lyrica (Pregabalin) 75mg Capsules Take 1 capsule(s) by mouth bid  #180 (One [place] and Eighty) capsule(s) Refills: 1          Unexplained weight loss As above.  No obviously  concerning issue.  Giving some ranitidine as a precaution.  Consider repeat EGD if not improving.           Prescriptions:       Ranitidine 150mg Tablet Take 1 tablet(s) by mouth bid  #60 (Sixty) tablet(s) Refills: 2          Moderate depression           Prescriptions:       Refill of: Remeron (Mirtazapine) 30mg Tablet Take 1 tablet(s) by mouth at bedtime  #90 (Ninety) tablet(s) Refills: 1          Hypercholesterolemia           Prescriptions:       Refill of: Atorvastatin Calcium 20mg Tablet 1 tab daily  #90 (Ninety) tablet(s) Refills: 1          Hypertension           Prescriptions:       Refill of: Amlodipine  5mg Tablet 1 tab daily  #90 (Ninety) tablet(s) Refills: 1       Refill of: Hydrochlorothiazide (HCTZ) 25mg Tablet Take 1 tablet(s) by mouth daily  #90 (Ninety) tablet(s) Refills: 1             CHARGE CAPTURE           **Please note: ICD descriptions below are intended for billing purposes only and may not represent clinical diagnoses**        Primary Diagnosis:         V70.0 Yearly physical exam            Z00.01    Encounter for general adult medical exam w abnormal findings              Orders:          10973   Preventive medicine, established patient, age 65+ years  (In-House)                                           Subsequent Annual Well Visit Medicare (x1)              Depression screen negative  (In-House)             4004F   Pt scrnd tobacco use rcvd tobacco cessation talk  (In-House)                Negative EtOH screen  (In-House)             3014F   Screening mammography results documented and reviewed (PV)1  (In-House)             3017F   Colorectal CA screen results documented and reviewed (PV)  (In-House)                Calculated BMI above the upper parameter and a follow-up plan was documented in the medical record  (In-House)           722.6 Degenerative disc disease            M51.36    Other intervertebral disc degeneration, lumbar region              Orders:           51845 -25  Office/outpatient visit; established patient, level 4  (In-House)           783.21 Unexplained weight loss            R63.4    Abnormal weight loss    296.22 Moderate depression            F32.1    Major depressive disorder, single episode, moderate    272.0 Hypercholesterolemia            E78.4    Other hyperlipidemia    401.1 Hypertension            I10    Essential (primary) hypertension

## 2021-05-18 NOTE — PROGRESS NOTES
"Eileen Kaufman 1950     Office/Outpatient Visit    Visit Date: Thu, Feb 7, 2019 03:35 pm    Provider: Heena Aldana N.P. (Assistant: Alba Quinn)    Location: [facility]        Electronically signed by Heena Aldana N.P. on  02/08/2019 12:33:39 PM                             SUBJECTIVE:        CC:     Ms. Kaufman is a 68 year old  female.  cough, congestion, yellow sputum x 1 week, sinus pressure, itching all over, \"feels like something is under the skin\";         HPI:         Patient to be evaluated for uRI.  These have been present for the past 4 weeks.  The symptoms include productive cough, nasal congestion and purulent nasal discharge.  She denies fever.  She denies exposure to ill contacts.  She has already tried to relieve the symptoms with mixed cold/sinus preparations.          generalized itching without rash started at same time as URI symptoms.  denies new soap, detergent, medication.      ROS:     CONSTITUTIONAL:  Positive for fatigue.   Negative for chills or fever.      EYES:  Negative for blurred vision, eye pain, and photophobia.      E/N/T:  Positive for nasal congestion and frequent rhinorrhea.   Negative for ear pain or sore throat.      CARDIOVASCULAR:  Negative for chest pain, palpitations, tachycardia, orthopnea, and edema.      RESPIRATORY:  Positive for persistent cough ( with scant amounts of purulent sputum ).      GASTROINTESTINAL:  Negative for abdominal pain, heartburn, constipation, diarrhea, and stool changes.      GENITOURINARY:  Negative for genital lesions, hematuria, menstrual problems, polyuria, abnormal vaginal bleeding, and vaginal discharge.      INTEGUMENTARY/BREAST:  Positive for pruritis.   Negative for rash.      NEUROLOGICAL:  Positive for headaches ( \"sinus\" ).          PMH/FMH/SH:     Last Reviewed on 8/24/2018 08:37 AM by Artur Quevedo    Past Medical History:             PREVENTIVE HEALTH MAINTENANCE             BONE DENSITY: " "was last done 18 with the following abnormality noted-- Osteopenia     COLORECTAL CANCER SCREENING: Up to date (colonoscopy q10y; sigmoidoscopy q5y; Cologuard q3y) was last done 2018, Results are in chart; colonoscopy with normal results     MAMMOGRAM: Done within last 2 years and results in are chart was last done 2018 with normal results         PAST MEDICAL HISTORY         Positive for    Coronary Artery Disease: mild; and    Hypertension;         GYNECOLOGICAL HISTORY:             CURRENT MEDICAL PROVIDERS:    Cardiologist: Dr. Virgen    Neurologist: Dr. Corbin    Pain Management: [facility] pain management    Pulmonologist: Dr. Flores    Urologist: Dr. Ramires             ADVANCED DIRECTIVES: None - She says that her son, Mitch Guidry, would make decisions for her if needed.         Surgical History:         Arthroscopy: left knee;     Cholecystectomy    Hysterectomy: 80\"s Total;      CTS;    Neck 2015;    cardiac cath 12;         Family History:     Father:  at age 76; Cause of death was MI     Mother:  at age 60's; Cause of death was cerebral aneurysm         Social History:     Occupation: Disabled (due to depression)     Marital Status: Single     Children: 2 children         Tobacco/Alcohol/Supplements:     Last Reviewed on 2018 08:37 AM by Artur Quevedo    Tobacco: She has a past history of cigarette smoking; quit date:  2018.   She currently uses smokeless tobacco, vapor cans/pouches daily..          Alcohol: Frequency: Socially         Substance Abuse History:     Last Reviewed on 2018 08:37 AM by Artur Quevedo    None         Mental Health History:     Last Reviewed on 2018 08:37 AM by Artur Quevedo        Communicable Diseases (eg STDs):     Last Reviewed on 2018 08:37 AM by Artur Quevedo            Current Problems:     Last Reviewed on 2018 08:37 AM by Artur Quevedo    Unexplained weight loss "     Leukocytosis, unspecified     Vitamin D deficiency, unspecified     Fatigue     Back pain     Bullous emphysema     Lung nodule     Clubbing of fingers     Moderate depression     Degenerative disc disease     Cervical spinal stenosis     Depression     Hypercholesterolemia     CAD     GERD     Hypertension         Immunizations:     Prevnar (Pneumococcal PCV 7) 7/5/2018     zzFluzone pf-quadrivalent 3 and up 11/5/2015     Fluarix pf 2/13/2017     Fluzone (3 + years dose) 1/5/2012     Fluzone pf (3+ years dose) 9/20/2013     Pneomovax 3/25/2013     Influenza Virus Vaccine pf (adult) 9/1/2017     PNEUMOVAX 23 (Pneumococcal PPV23) 2/13/2017         Allergies:     Last Reviewed on 8/24/2018 08:37 AM by Artur Quevedo    Aspirin:    Sulfas:        Current Medications:     Last Reviewed on 2/07/2019 03:36 PM by lAba Quinn    Ranitidine 150mg Tablet Take 1 tablet(s) by mouth bid     Amlodipine  5mg Tablet 1 tab daily     Atorvastatin Calcium 20mg Tablet 1 tab daily     Hydrochlorothiazide (HCTZ) 25mg Tablet Take 1 tablet(s) by mouth daily     Lyrica 75mg Capsules Take 1 capsule(s) by mouth bid     Remeron 30mg Tablet Take 1 tablet(s) by mouth at bedtime     Breo Ellipta 100mcg/25mcg Inhalation Powder Take 1 inhalation(s) by mouth daily     Cetirizine HCl 10mg Tablet 1 tab daily     Fluticasone Propionate 50mcg/1actuation Nasal Spray 1 spray each nostil daily     Meloxicam 15mg Tablet Take 1 tablet(s) by mouth daily     Toprol XL 50mg Tablets, Extended Release Take 1 tablet(s) by mouth daily     Ventolin HFA 90mcg/1actuation Oral Inhaler Inhale 2 puff(s) by mouth q 4 to 6 hr prn     Percocet  7.5mg/325mg Tablet One PO TID PRN     Multivitamins     Cyclobenzaprine HCl 10mg Tablet 1 tablet AT HS prn         OBJECTIVE:        Vitals:         Historical:     08/24/2018  BP:   142/81 mm Hg ( (left arm, , sitting, );)     08/24/2018  Wt:   154.4lbs    08/07/2018  Wt:   160.4lbs    07/05/2018  Wt:   168.1lbs         Current: 2/7/2019 3:43:54 PM    Ht:  5 ft, 4.5 in;  Wt: 174.6 lbs;  BMI: 29.5    T: 98.1 F (oral);  BP: 97/69 mm Hg (left arm, sitting);  P: 85 bpm (left arm (BP Cuff), sitting);  sCr: 0.82 mg/dL;  GFR: 72.09        Exams:     PHYSICAL EXAM:     GENERAL: tired-appearing;     E/N/T: EARS: both TMs are have fluid behind them;  NOSE: nasal mucosa is erythematous;  OROPHARYNX: posterior pharynx, including tonsils, tongue, and uvula are normal;     RESPIRATORY: normal respiratory rate and pattern with no distress; expiratory wheezes in the faint, diffuse;     CARDIOVASCULAR: normal rate; rhythm is regular;     LYMPHATIC: no enlargement of cervical or facial nodes;     BREAST/INTEGUMENT: SKIN: no significant rashes or lesions; no suspicious moles;     MUSCULOSKELETAL:  Normal range of motion, strength and tone;     NEUROLOGIC: mental status: alert and oriented x 3; GROSSLY INTACT     PSYCHIATRIC:  appropriate affect and demeanor; normal speech pattern; grossly normal memory;         ASSESSMENT           466.0   J20.9  Acute bronchitis              DDx:     698.9   L29.9  Pruritis              DDx:         ORDERS:         Meds Prescribed:       Doxycycline Monohydrate 100mg Tablet Take 1 tablet(s) by mouth bid  #14 (Fourteen) tablet(s) Refills: 0       Medrol (Methylprednisolone) 4mg Dosepak Take as directed  #21 (Twenty One) tablet(s) Refills: 0       Refill of: Cetirizine HCl 10mg Tablet 1 tab daily  #90 (Ninety) tablet(s) Refills: 0                 PLAN:          Acute bronchitis         RECOMMENDATIONS given include: rest, increase oral fluid intake, and follow up if not improving in 3-5 days.  use inhaled medications as rx..            Prescriptions:       Doxycycline Monohydrate 100mg Tablet Take 1 tablet(s) by mouth bid  #14 (Fourteen) tablet(s) Refills: 0       Medrol (Methylprednisolone) 4mg Dosepak Take as directed  #21 (Twenty One) tablet(s) Refills: 0          Pruritis         RECOMMENDATIONS given include:  follow up if not improving.  medrol as above should help.  continue to observe for contributing factors..            Prescriptions:       Refill of: Cetirizine HCl 10mg Tablet 1 tab daily  #90 (Ninety) tablet(s) Refills: 0             CHARGE CAPTURE           **Please note: ICD descriptions below are intended for billing purposes only and may not represent clinical diagnoses**        Primary Diagnosis:         466.0 Acute bronchitis            J20.9    Acute bronchitis, unspecified              Orders:          [id number]   Office/outpatient visit; established patient, level 4  (In-House)           698.9 Pruritis            L29.9    Pruritus, unspecified

## 2021-05-18 NOTE — PROGRESS NOTES
Eileen Kaufman 1950     Office/Outpatient Visit    Visit Date: Tue, Aug 7, 2018 03:46 pm    Provider: Preeti Bunn N.P. (Assistant: Linda Chris MA)    Location: [facility]        Electronically signed by Preeti Bunn N.P. on  2018 01:41:34 PM                             SUBJECTIVE:        CC:     Ms. Kaufman is a 68 year old  female.  Patient is here for sore throat, headache and swollen neck/throat.;         HPI:         She complains of a sore throat.  States sore throat x approx 5 days. Symptoms have progressed to headache, sinus pressure, and ear pain. Taking otc meds without much relief.      ROS:     CONSTITUTIONAL:  Negative for chills, fatigue, fever, and weight change.      EYES:  Negative for blurred vision.      CARDIOVASCULAR:  Negative for chest pain, orthopnea, paroxysmal nocturnal dyspnea and pedal edema.      RESPIRATORY:  Negative for dyspnea.      GASTROINTESTINAL:  Negative for abdominal pain, constipation, diarrhea, nausea and vomiting.      NEUROLOGICAL:  Negative for dizziness, headaches, paresthesias, and weakness.      PSYCHIATRIC:  Negative for anxiety, depression, and sleep disturbances.          PMH/FMH/SH:     Last Reviewed on 2018 04:24 PM by Preeti Bunn    Past Medical History:             PREVENTIVE HEALTH MAINTENANCE             BONE DENSITY: was last done 18 with the following abnormality noted-- Osteopenia     COLORECTAL CANCER SCREENING: Up to date (colonoscopy q10y; sigmoidoscopy q5y; Cologuard q3y) was last done 2018, Results are in chart; colonoscopy with normal results     MAMMOGRAM: Done within last 2 years and results in are chart was last done 2018 with normal results         PAST MEDICAL HISTORY         Positive for    Coronary Artery Disease: mild; and    Hypertension;         GYNECOLOGICAL HISTORY:             CURRENT MEDICAL PROVIDERS:    Cardiologist: Dr. Virgen    Neurologist: Dr. Corbin     "Pain Management: [facility] pain management    Pulmonologist: Dr. Flores    Urologist: Dr. Ramires             ADVANCED DIRECTIVES: None         Surgical History:         Arthroscopy: left knee;     Cholecystectomy    Hysterectomy: 80\"s Total;      CTS;    Neck 2015;    cardiac cath 12;         Family History:     Father:  at age 76; Cause of death was MI     Mother:  at age 60's; Cause of death was cerebral aneurysm         Social History:     Occupation: Disabled (due to depression)     Marital Status: Single     Children: 2 children         Tobacco/Alcohol/Supplements:     Last Reviewed on 2018 04:24 PM by Preeti Bunn    Tobacco: She has a past history of cigarette smoking; quit date:  2018.   She currently uses smokeless tobacco, vapor cans/pouches daily..          Substance Abuse History:     Last Reviewed on 2018 04:24 PM by Pretei Bunn         Mental Health History:     Last Reviewed on 2018 04:24 PM by Preeit Bunn        Communicable Diseases (eg STDs):     Last Reviewed on 2018 04:24 PM by Preeti Bunn            Current Problems:     Last Reviewed on 2018 04:24 PM by Preeti Bunn    Leukocytosis, unspecified     Vitamin D deficiency, unspecified     Fatigue     Back pain     Bullous emphysema     Lung nodule     Clubbing of fingers     Moderate depression     Degenerative disc disease     Cervical spinal stenosis     Depression     Hypercholesterolemia     CAD     GERD     Hypertension     Sore throat     Screening for hypothyroidism     Screening for osteoporosis     Screening test for viral diseases - other     Tobacco use disorder     Screening for colon cancer         Immunizations:     Prevnar (Pneumococcal PCV 7) 2018     zzFluzone pf-quadrivalent 3 and up 2015     Fluarix pf 2017     Fluzone (3 + years dose) 2012     Fluzone pf (3+ years dose) 2013     Pneomovax 3/25/2013     Influenza Virus " Vaccine pf (adult) 9/1/2017     PNEUMOVAX 23 (Pneumococcal PPV23) 2/13/2017         Allergies:     Last Reviewed on 8/07/2018 04:24 PM by Preeti Bunn    Aspirin:    Sulfas:        Current Medications:     Last Reviewed on 8/07/2018 04:24 PM by Preeti Bunn    Robaxin 500mg Tablets 1 tab HS PRN     Breo Ellipta 100mcg/25mcg Inhalation Powder Take 1 inhalation(s) by mouth daily     Amlodipine  5mg Tablet 1 tab daily     Atorvastatin Calcium 20mg Tablet 1 tab daily     Cetirizine HCl 10mg Tablet 1 tab daily     Fluticasone Propionate 50mcg/1actuation Nasal Spray 1 spray each nostil daily     Hydrochlorothiazide (HCTZ) 25mg Tablet Take 1 tablet(s) by mouth daily     Lyrica 75mg Capsules Take 1 capsule(s) by mouth bid     Meloxicam 15mg Tablet Take 1 tablet(s) by mouth daily     Remeron 30mg Tablet Take 1 tablet(s) by mouth at bedtime     Toprol XL 50mg Tablets, Extended Release Take 1 tablet(s) by mouth daily     Ventolin HFA 90mcg/1actuation Oral Inhaler Inhale 2 puff(s) by mouth q 4 to 6 hr prn     Percocet  7.5mg/325mg Tablet One PO TID PRN     Multivitamins     Cyclobenzaprine HCl 10mg Tablet 1 tablet AT HS prn         OBJECTIVE:        Vitals:         Current: 8/7/2018 3:50:20 PM    Ht:  5 ft, 4.5 in;  Wt: 160.4 lbs;  BMI: 27.1    T: 99 F (oral);  BP: 150/96 mm Hg (left arm, sitting);  P: 112 bpm (left arm (BP Cuff), sitting);  sCr: 0.82 mg/dL;  GFR: 69.54        Exams:     PHYSICAL EXAM:     GENERAL: vital signs recorded - well developed, well nourished;  no apparent distress;     EYES: extraocular movements intact; conjunctiva and cornea are normal; PERRLA;     E/N/T: EARS: external auditory canal occluded by cerumen bilaterally and;  EAC's clear after irrigation.; NOSE: bilateral maxillary and bilateral frontal sinus tenderness present; OROPHARYNX: oral mucosa reveals erythema;     NECK: range of motion is normal; thyroid is non-palpable;     RESPIRATORY: normal respiratory rate and pattern with no  distress; normal breath sounds with no rales, rhonchi, wheezes or rubs;     CARDIOVASCULAR: normal rate; rhythm is regular;  no systolic murmur; no edema;     NEUROLOGICAL:  cranial nerves, motor and sensory function, reflexes, gait and coordination are all intact;     PSYCHIATRIC:  appropriate affect and demeanor; normal speech pattern; grossly normal memory;         Lab/Test Results:             Rapid Strep Screen:  Negative (08/07/2018),     Performed by::  marquita (08/07/2018),             ASSESSMENT:           461.8   J01.90  Acute sinusitis, other              DDx:     380.4   H61.23  Cerumen impaction              DDx:         ORDERS:         Meds Prescribed:       Augmentin (Amoxicillin/Clavulanate) 875mg/125mg Tablet 1 tab bid X 10 days  #20 (Twenty) tablet(s) Refills: 0       Bromfed-DM (Brompheniramine/Dextromethorphan/Pseudoephedrine) Syrup 1  to 2  tsp po every 4-6 hrs prn cough/congestion.  #240 ([employer]) ml Refills: 0         Lab Orders:       [id number]  Group A Streptococcus detection by immunoassay with direct optical observation  (In-House)         [id number]  [facility] Throat culture, strep  (Send-Out)           Procedures Ordered:       [id number]  Removal impacted cerumen, one or both ears  (In-House)                   PLAN:          Acute sinusitis, other           Prescriptions:       Augmentin (Amoxicillin/Clavulanate) 875mg/125mg Tablet 1 tab bid X 10 days  #20 (Twenty) tablet(s) Refills: 0       Bromfed-DM (Brompheniramine/Dextromethorphan/Pseudoephedrine) Syrup 1  to 2  tsp po every 4-6 hrs prn cough/congestion.  #240 ([employer]) ml Refills: 0           Orders:       [id number]  Group A Streptococcus detection by immunoassay with direct optical observation  (In-House)         [id number]  [facility] Throat culture, strep  (Send-Out)            Cerumen impaction         TESTS/PROCEDURES:  Will proceed with Cerumen Removal--by Provider: Both ears, to be performed/scheduled now.             Orders:       [id number]  Removal impacted cerumen, one or both ears  (In-House)               CHARGE CAPTURE:           Primary Diagnosis:     461.8 Acute sinusitis, other            J01.90    Acute sinusitis, unspecified              Orders:          [id number]   Office/outpatient visit; established patient, level 3  (In-House)             [id number]   Group A Streptococcus detection by immunoassay with direct optical observation  (In-House)           380.4 Cerumen impaction            H61.23    Impacted cerumen, bilateral              Orders:          [id number]   Removal impacted cerumen, one or both ears  (In-House)

## 2021-05-18 NOTE — PROGRESS NOTES
Eileen Kaufman  1950     Office/Outpatient Visit    Visit Date: Tue, Mar 31, 2020 12:02 pm    Provider: Atrur Quevedo MD (Assistant: Bettina Steele, )    Location: [facility]        Electronically signed by Artur Quevedo MD on  04/01/2020 11:31:10 AM                             Subjective:        CC: sinus issues        TELEHEALTH VISIT:    - Eileen Alvarez consented to telehealth visit.    - Persons on the call include:  Eileen Alvarez - patient, Dr. Quevedo    - This visit is being conducted over [employer] with audio and video.    HPI:           Ms. Kaufman presents with acute maxillary sinusitis, unspecified.  This has been a problem for the past 4 days.  This is an acute problem without chronic or recurrent episodes.  Her primary symptoms include facial pressure, nasal congestion and post-nasal drip.  She denies fever or headache.  She has already tried a decongestant.  Eileen Alvarez did have some mild cough this morning with some overnight drainage.  She does continue to smoke.  She does work at [employer].  She did call in today because of not feeling well.    ROS:     CONSTITUTIONAL:  Negative for chills and fever.      CARDIOVASCULAR:  Negative for chest pain and palpitations.      RESPIRATORY:  Positive for recent cough.   Negative for dyspnea.      GASTROINTESTINAL:  Negative for abdominal pain, nausea and vomiting.      INTEGUMENTARY/BREAST:  Negative for atypical mole(s) and rash.          Past Medical History / Family History / Social History:         Last Reviewed on 11/14/2019 03:15 PM by Artur Quevedo    Past Medical History:             PREVENTIVE HEALTH MAINTENANCE             BONE DENSITY: was last done 7/16/18 with the following abnormality noted-- Osteopenia     COLORECTAL CANCER SCREENING: Up to date (colonoscopy q10y; sigmoidoscopy q5y; Cologuard q3y) was last done 08/2018, Results are in chart; colonoscopy with normal results     MAMMOGRAM: Done within  "last 2 years and results in are chart was last done 19 with normal results         PAST MEDICAL HISTORY         Positive for    Coronary Artery Disease: mild; and    Hypertension;         GYNECOLOGICAL HISTORY:             CURRENT MEDICAL PROVIDERS:    Cardiologist: Dr. Virgen    Neurologist: Dr. Corbin    Pain Management: [facility] pain management    Pulmonologist: Dr. Flores    Urologist: Dr. Ramires             ADVANCED DIRECTIVES: None - She says that her son, Mitch Guidry, would make decisions for her if needed.         Surgical History:         Arthroscopy: left knee;     Cholecystectomy    Hysterectomy: 80\"s Total;     CTS;    Neck 2015;    cardiac cath 12;         Family History:     Father:  at age 76; Cause of death was MI     Mother:  at age 60's; Cause of death was cerebral aneurysm         Social History:     Occupation: Disabled (due to depression)     Marital Status: Single     Children: 2 children         Tobacco/Alcohol/Supplements: Eileen Alvarez was 23 when she started smoking.  She did stop smoking for some time - about 13 years.  She has been smoking a total of approximately 33 years.  She has smoked less than a pack daily during this time.     Last Reviewed on 3/31/2020 11:35 AM by Bettina Steele    Tobacco: Current Smoker: She currently smokes every day, 1/2 pack per day.   She currently uses smokeless tobacco, vapor cans/pouches daily..          Alcohol: Frequency: Socially         Substance Abuse History:     Last Reviewed on 2019 03:15 PM by Artur Quevedo    None         Mental Health History:     Last Reviewed on 2019 03:15 PM by Artur Quevedo        Communicable Diseases (eg STDs):     Last Reviewed on 2019 03:15 PM by Artur Quevedo        Current Problems:     Last Reviewed on 2019 03:15 PM by Artur Quevedo    Mixed hyperlipidemia    Other intervertebral disc degeneration, lumbar region    Major " depressive disorder, single episode, moderate    Clubbing of fingers    Solitary pulmonary nodule    Other emphysema    Other fatigue    Dorsalgia, unspecified    Vitamin D deficiency, unspecified    Elevated white blood cell count, unspecified    Abnormal weight loss    Other long term (current) drug therapy    Anemia, unspecified    Chronic obstructive pulmonary disease with (acute) exacerbation    Pain in right foot    Acute sinusitis, unspecified    Encounter for screening for depression        Immunizations:     Influenza, high dose seasonal 11/5/2019    Prevnar (Pneumococcal PCV 7) 7/5/2018    zzFluzone pf-quadrivalent 3 and up 11/5/2015    Fluarix pf 2/13/2017    Fluzone (3 + years dose) 1/5/2012    Fluzone pf (3+ years dose) 9/20/2013    Pneomovax 3/25/2013    Fluzone High-Dose pf (>=65 yr) 2/21/2019    Influenza Virus Vaccine pf (adult) 9/1/2017    PNEUMOVAX 23 (Pneumococcal PPV23) 2/13/2017        Allergies:     Last Reviewed on 3/31/2020 11:35 AM by Bettina Steele    Aspirin:      Sulfas:          Current Medications:     Last Reviewed on 3/31/2020 11:35 AM by Bettina Steele    Remeron 30mg Tablet [Take 1 tablet(s) by mouth at bedtime]    hydroCHLOROthiazide 25 mg oral tablet [Take 1 tablet(s) by mouth daily]    raNITIdine HCl 150 mg oral tablet [Take 1 tablet(s) by mouth bid]    meloxicam 15 mg oral tablet [TAKE 1 TABLET BY MOUTH DAILY]    Toprol XL 50mg Tablets, Extended Release [Take 1 tablet(s) by mouth daily]    amLODIPine 2.5 mg oral tablet [TAKE 1 TABLET BY MOUTH DAILY]    amLODIPine 5 mg oral tablet [TAKE 1 TABLET BY MOUTH DAILY]    pregabalin 75 mg oral capsule [take 1 capsule (75 mg) by oral route 2 times per day]    Multivitamins     Percocet  7.5-325 mg oral tablet [One PO TID PRN ]    atorvastatin 20 mg oral tablet [1 tab daily]    Ventolin HFA 90mcg/1actuation Oral Inhaler [Inhale 2 puff(s) by mouth q 4 to 6 hr prn]    Fluticasone Propionate 50mcg/1actuation Nasal Spray [1 spray  each nostil daily]    cetirizine 10 mg oral tablet [1 tab daily]    Breo Ellipta 100mcg/25mcg Inhalation Powder [Take 1 inhalation(s) by mouth daily]    cyclobenzaprine 10 mg oral tablet [ 1 tablet AT HS prn]    ferrous sulfate 325 mg (65 mg iron) oral tablet [TAKE 1 TABLET BY MOUTH EVERY DAY]    Zithromax 250 mg oral tablet [take 2 tablets (500 mg) by oral route once daily for 1 day then 1 tablet (250 mg) by oral route once daily for 4 days]        Objective:        Exams:     PHYSICAL EXAM:     GENERAL: vital signs recorded - well developed, well nourished;  no apparent distress;     EYES: extraocular movements intact; conjunctiva and cornea are normal;     RESPIRATORY: normal respiratory rate and pattern with no distress;     NEUROLOGIC: mental status: alert and oriented x 3;     PSYCHIATRIC: appropriate affect and demeanor; normal psychomotor function;         Assessment:         J01.00   Acute maxillary sinusitis, unspecified           ORDERS:         Meds Prescribed:       [New Rx] Singulair 10 mg oral tablet [Take 1 tablet(s) by mouth each evening], #30 (thirty) tablets, Refills: 1 (one)       [New Rx] amoxicillin 500 mg oral tablet [take 1 tablet (500 mg) by oral route 3 times per day], #30 (thirty) tablets, Refills: 0 (zero)                 Plan:         Acute maxillary sinusitis, unspecified        RECOMMENDATIONS given include: Today, we have reviewed Eileen Alvarez's care.  I'm going to cover her for presumed allergy and sinus issues.  We will go ahead and advise she not work this week with tentative return to work on Monday, April 4, 2020.  We will contact her on Friday to see how she is feeling.  No other changes for now..            Prescriptions:       [New Rx] Singulair 10 mg oral tablet [Take 1 tablet(s) by mouth each evening], #30 (thirty) tablets, Refills: 1 (one)       [New Rx] amoxicillin 500 mg oral tablet [take 1 tablet (500 mg) by oral route 3 times per day], #30 (thirty) tablets, Refills: 0 (zero)              Charge Capture:         Primary Diagnosis:     J01.00  Acute maxillary sinusitis, unspecified           Orders:      [id number]  Office/outpatient visit; established patient, level 3  (In-House)

## 2021-05-18 NOTE — PROGRESS NOTES
Eileen Kaufman 1950     Office/Outpatient Visit    Visit Date: Fri, Oct 25, 2019 11:04 am    Provider: Tyler Ring MD (Assistant: Martine Walsh LPN)    Location: [facility]        Electronically signed by Tyler Ring MD on  10/30/2019 03:17:28 PM                             SUBJECTIVE:        CC:     Ms. Kaufman is a 69 year old  female.  Possible URI;         HPI:         Ms. Kaufman presents with uRI.  These have been present since yesterday.  The symptoms include chest congestion, chills, productive cough, nasal congestion, sinus pain/pressure and wheezing.  She denies hemoptysis or fever.  She denies exposure to ill contacts.  Patient has a known history of COPD for which she take flonase, zyrtec, bero ellipta and albuterol prn.     ROS:     CONSTITUTIONAL:  Positive for chills.   Negative for fatigue or fever.      EYES:  Negative for blurred vision.      E/N/T:  Positive for nasal congestion and sinus pressure.   Negative for ear pain, tinnitus, frequent rhinorrhea or sore throat.      CARDIOVASCULAR:  Negative for chest pain, dizziness, palpitations and edema.      RESPIRATORY:  Positive for frequent wheezing and cough.   Negative for dyspnea.      GASTROINTESTINAL:  Negative for diarrhea, nausea and vomiting.      NEUROLOGICAL:  Negative for headaches, paresthesias and weakness.          PMH/FMH/SH:     Last Reviewed on 10/30/2019 03:17 PM by Tyler Ring    Past Medical History:             PREVENTIVE HEALTH MAINTENANCE             BONE DENSITY: was last done 7/16/18 with the following abnormality noted-- Osteopenia     COLORECTAL CANCER SCREENING: Up to date (colonoscopy q10y; sigmoidoscopy q5y; Cologuard q3y) was last done 08/2018, Results are in chart; colonoscopy with normal results     MAMMOGRAM: Done within last 2 years and results in are chart was last done 6/28/19 with normal results         PAST MEDICAL HISTORY         Positive for    Coronary Artery Disease:  "mild; and    Hypertension;         GYNECOLOGICAL HISTORY:             CURRENT MEDICAL PROVIDERS:    Cardiologist: Dr. Virgen    Neurologist: Dr. Corbin    Pain Management: [facility] pain [facility]    Pulmonologist: Dr. Flores    Urologist: Dr. Ramires             ADVANCED DIRECTIVES: None - She says that her son, Mitch Guidry, would make decisions for her if needed.         Surgical History:         Arthroscopy: left knee;     Cholecystectomy    Hysterectomy: 80\"s Total;      CTS;    Neck 2015;    cardiac cath 12;         Family History:     Father:  at age 76; Cause of death was MI     Mother:  at age 60's; Cause of death was cerebral aneurysm         Social History:     Occupation: Disabled (due to depression)     Marital Status: Single     Children: 2 children         Tobacco/Alcohol/Supplements: Eileen Alvarez was 23 when she started smoking.  She did stop smoking for some time - about 13 years.  She has been smoking a total of approximately 33 years.  She has smoked less than a pack daily during this time.     Last Reviewed on 10/30/2019 03:17 PM by Tyler Ring    Tobacco: Current Smoker: She currently smokes every day, 1/2 pack per day.   She currently uses smokeless tobacco, vapor cans/pouches daily..          Alcohol: Frequency: Socially         Substance Abuse History:     Last Reviewed on 10/30/2019 03:17 PM by Tyler Ring         Mental Health History:     Last Reviewed on 10/30/2019 03:17 PM by Tyler Ring        Communicable Diseases (eg STDs):     Last Reviewed on 10/30/2019 03:17 PM by Tyler Ring            Current Problems:     Last Reviewed on 10/30/2019 03:17 PM by Tyler Ring    Acute exacerbation of chronic obstructive pulmonary disease (COPD)     Unspecified anemia     Use of high risk medications     Unexplained weight loss     Leukocytosis, unspecified     Vitamin D deficiency, unspecified     Fatigue     Back pain     Bullous emphysema     Lung nodule    "  Clubbing of fingers     Moderate depression     Degenerative disc disease     Cervical spinal stenosis     Depression     Hypercholesterolemia     CAD     GERD     Hypertension         Immunizations:     Prevnar (Pneumococcal PCV 7) 7/5/2018     zzFluzone pf-quadrivalent 3 and up 11/5/2015     Fluarix pf 2/13/2017     Fluzone (3 + years dose) 1/5/2012     Fluzone pf (3+ years dose) 9/20/2013     Pneomovax 3/25/2013     Fluzone High-Dose pf (>=65 yr) 2/21/2019     Influenza Virus Vaccine pf (adult) 9/1/2017     PNEUMOVAX 23 (Pneumococcal PPV23) 2/13/2017         Allergies:     Last Reviewed on 10/30/2019 03:17 PM by Tyler Ring    Aspirin:    Sulfas:        Current Medications:     Last Reviewed on 10/30/2019 03:17 PM by Tyler Ring    Ferrous Sulfate 325mg Tablets Take 1 tablet(s) by mouth daily     Amlodipine  2.5mg Tablet Take 1 tablet(s) by mouth daily     Amlodipine  5mg Tablet Take 1 tablet(s) by mouth daily     Atorvastatin Calcium 20mg Tablet 1 tab daily     Cetirizine HCl 10mg Tablet 1 tab daily     Hydrochlorothiazide (HCTZ) 25mg Tablet Take 1 tablet(s) by mouth daily     Lyrica 75mg Capsules Take 1 capsule(s) by mouth bid     Meloxicam 15mg Tablet Take 1 tablet(s) by mouth daily     Ranitidine 150mg Tablet Take 1 tablet(s) by mouth bid     Remeron 30mg Tablet Take 1 tablet(s) by mouth at bedtime     Toprol XL 50mg Tablets, Extended Release Take 1 tablet(s) by mouth daily     Breo Ellipta 100mcg/25mcg Inhalation Powder Take 1 inhalation(s) by mouth daily     Fluticasone Propionate 50mcg/1actuation Nasal Spray 1 spray each nostil daily     Ventolin HFA 90mcg/1actuation Oral Inhaler Inhale 2 puff(s) by mouth q 4 to 6 hr prn     Percocet  7.5mg/325mg Tablet One PO TID PRN     Multivitamins     Cyclobenzaprine HCl 10mg Tablet 1 tablet AT HS prn         OBJECTIVE:        Vitals:         Current: 10/25/2019 11:10:54 AM    Ht:  5 ft, 4.5 in;  Wt: 177.4 lbs;  BMI: 30.0    T: 99.5 F (oral);  BP: 133/80 mm Hg  "(right arm, sitting);  P: 94 bpm (right arm (BP Cuff), sitting);  sCr: 0.84 mg/dL;  GFR: 69.93    O2 Sat: 93 %        Exams:     PHYSICAL EXAM:     GENERAL: vital signs recorded - Chronically ill-appearing;  no apparent distress;     EYES: conjunctiva and cornea are normal;     E/N/T:  normal EACs, TMs, nasal/oral mucosa, teeth, gingiva, and oropharynx;     NECK: trachea is midline; thyroid is non-palpable;     RESPIRATORY: Coarse lung sounds bilaterally with diffuse expiratory wheezing; no rales (\"crackles\") present; no rhonchi;     CARDIOVASCULAR: normal rate; rhythm is regular;  No murmurs. clicks, gallops or rubs appreciated; no edema;     LYMPHATIC: no enlargement of cervical or facial nodes; no supraclavicular nodes;     BREAST/INTEGUMENT: No significant rashes, lesions or suspicious moles within limits of examination;     NEUROLOGIC: Grossly intact; mental status: alert and oriented x 3;     PSYCHIATRIC: appropriate affect and demeanor; normal speech pattern; Normal behavior;         ASSESSMENT           491.21   J44.1  Acute exacerbation of chronic obstructive pulmonary disease (COPD)              DDx:         ORDERS:         Meds Prescribed:       Prednisone 20mg Tablet 2 PO daily x 5 days  #10 (Ten) tablet(s) Refills: 0       Doxycycline  Hyclate 100mg Tablet 1 tab PO BID x 7 days  #14 (Fourteen) tablet(s) Refills: 0         Radiology/Test Orders:       [id number]  Radiologic exam chest 2 views  (Send-Out)                   PLAN:          Acute exacerbation of chronic obstructive pulmonary disease (COPD) - Will order chest x-ray to rule out community acquired pneumonia.  Will start doxycycline 100 mg twice daily x7 days and prednisone 40 mg daily x5 days for COPD exacerbation. Continue Brio Ellipta daily, Flonase daily and albuterol prn.  ED/return precautions given.         RADIOLOGY:  I have ordered a chest x-ray (PA and lateral) to be done today.            Prescriptions:       Prednisone 20mg Tablet 2 PO " daily x 5 days  #10 (Ten) tablet(s) Refills: 0       Doxycycline  Hyclate 100mg Tablet 1 tab PO BID x 7 days  #14 (Fourteen) tablet(s) Refills: 0           Orders:       [id number]  Radiologic exam chest 2 views  (Send-Out)               CHARGE CAPTURE           **Please note: ICD descriptions below are intended for billing purposes only and may not represent clinical diagnoses**        Primary Diagnosis:         491.21 Acute exacerbation of chronic obstructive pulmonary disease (COPD)            J44.1    Chronic obstructive pulmonary disease with (acute) exacerbation              Orders:          [id number]   Office/outpatient visit; established patient, level 3  (In-House)

## 2021-05-18 NOTE — PROGRESS NOTES
Eileen Kaufman ANDREA 1950     Office/Outpatient Visit    Visit Date: Tue, Aug 20, 2019 03:44 pm    Provider: Artur Quevedo MD (Assistant: Letty Alberts RN)    Location: [facility]        Electronically signed by Artur Quevedo MD on  08/22/2019 08:45:34 AM                             SUBJECTIVE:        CC: blood pressure, cholesterol, pain issues         HPI:         Patient to be evaluated for hypercholesterolemia.  Current treatment includes Lipitor and diet.  Compliance with treatment has been fair.  She denies experiencing any hypercholesterolemia related symptoms.          With regard to the hypertension, her current cardiac medication regimen includes a diuretic ( Hydrodiuril ), a beta-blocker ( Toprol-XL ), and a calcium channel blocker ( Norvasc ).  She is tolerating the medication well without side effects.          Eileen Alvarez is also in today for follow up on chronic lower back and neck pain from disc issues.  She sees pain mgmt and remains on narcotic therapy.  Kevyn is consistent.  She takes meloxicam for the back and does see some benefit from it.  She continues to take Lyrica for pain related to fibromyalgia and these issues.  She is not aware of side effects from Lyrica.     ROS:     CONSTITUTIONAL:  Negative for chills and fever.      CARDIOVASCULAR:  Negative for chest pain and palpitations.      RESPIRATORY:  Negative for recent cough and dyspnea.      GASTROINTESTINAL:  Negative for abdominal pain, nausea and vomiting.      INTEGUMENTARY/BREAST:  Negative for atypical mole(s) and rash.          PMH/FMH/SH:     Last Reviewed on 8/20/2019 04:08 PM by Artur Quevedo    Past Medical History:             PREVENTIVE HEALTH MAINTENANCE             BONE DENSITY: was last done 7/16/18 with the following abnormality noted-- Osteopenia     COLORECTAL CANCER SCREENING: Up to date (colonoscopy q10y; sigmoidoscopy q5y; Cologuard q3y) was last done 08/2018, Results are in chart; colonoscopy  "with normal results     MAMMOGRAM: Done within last 2 years and results in are chart was last done 19 with normal results         PAST MEDICAL HISTORY         Positive for    Coronary Artery Disease: mild; and    Hypertension;         GYNECOLOGICAL HISTORY:             CURRENT MEDICAL PROVIDERS:    Cardiologist: Dr. Virgen    Neurologist: Dr. Corbin    Pain Management: [facility]    Pulmonologist: Dr. Flores    Urologist: Dr. Ramires             ADVANCED DIRECTIVES: None - She says that her son, Mitch Guidry, would make decisions for her if needed.         Surgical History:         Arthroscopy: left knee;     Cholecystectomy    Hysterectomy: 80\"s Total;      CTS;    Neck 2015;    cardiac cath 12;         Family History:     Father:  at age 76; Cause of death was MI     Mother:  at age 60's; Cause of death was cerebral aneurysm         Social History:     Occupation: Disabled (due to depression)     Marital Status: Single     Children: 2 children         Tobacco/Alcohol/Supplements:     Last Reviewed on 2019 04:08 PM by Artur Quevedo    Tobacco: Current Smoker: She currently smokes every day, 1/2 pack per day.   She currently uses smokeless tobacco, vapor cans/pouches daily..          Alcohol: Frequency: Socially         Substance Abuse History:     Last Reviewed on 2019 04:08 PM by Artur Quevedo    None         Mental Health History:     Last Reviewed on 2019 04:08 PM by Artur Quevedo        Communicable Diseases (eg STDs):     Last Reviewed on 2019 04:08 PM by Artur Quevedo            Current Problems:     Last Reviewed on 2019 04:08 PM by Artur Quevedo    Unspecified anemia     Use of high risk medications     Unexplained weight loss     Leukocytosis, unspecified     Vitamin D deficiency, unspecified     Fatigue     Back pain     Bullous emphysema     Lung nodule     Clubbing of fingers     Moderate " depression     Degenerative disc disease     Cervical spinal stenosis     Depression     Hypercholesterolemia     CAD     GERD     Hypertension         Immunizations:     Prevnar (Pneumococcal PCV 7) 7/5/2018     zzFluzone pf-quadrivalent 3 and up 11/5/2015     Fluarix pf 2/13/2017     Fluzone (3 + years dose) 1/5/2012     Fluzone pf (3+ years dose) 9/20/2013     Pneomovax 3/25/2013     Fluzone High-Dose pf (>=65 yr) 2/21/2019     Influenza Virus Vaccine pf (adult) 9/1/2017     PNEUMOVAX 23 (Pneumococcal PPV23) 2/13/2017         Allergies:     Last Reviewed on 8/20/2019 04:08 PM by Artur Quevedo    Aspirin:    Sulfas:        Current Medications:     Last Reviewed on 8/20/2019 04:08 PM by Artur Quevedo    Amlodipine  2.5mg Tablet Take 1 tablet(s) by mouth daily     Amlodipine  5mg Tablet 1 tab daily     Ferrous Sulfate 325mg Tablets Take 1 tablet(s) by mouth daily     Meloxicam 15mg Tablet Take 1 tablet(s) by mouth daily     Ranitidine 150mg Tablet Take 1 tablet(s) by mouth bid     Lyrica 75mg Capsules Take 1 capsule(s) by mouth bid     Remeron 30mg Tablet Take 1 tablet(s) by mouth at bedtime     Cetirizine HCl 10mg Tablet 1 tab daily     Atorvastatin Calcium 20mg Tablet 1 tab daily     Hydrochlorothiazide (HCTZ) 25mg Tablet Take 1 tablet(s) by mouth daily     Breo Ellipta 100mcg/25mcg Inhalation Powder Take 1 inhalation(s) by mouth daily     Fluticasone Propionate 50mcg/1actuation Nasal Spray 1 spray each nostil daily     Toprol XL 50mg Tablets, Extended Release Take 1 tablet(s) by mouth daily     Ventolin HFA 90mcg/1actuation Oral Inhaler Inhale 2 puff(s) by mouth q 4 to 6 hr prn     Percocet  7.5mg/325mg Tablet One PO TID PRN     Multivitamins     Cyclobenzaprine HCl 10mg Tablet 1 tablet AT HS prn         OBJECTIVE:        Vitals:         Current: 8/20/2019 3:49:06 PM    Ht:  5 ft, 4.5 in;  Wt: 177.4 lbs;  BMI: 30.0    T: 98.5 F (oral);  BP: 126/75 mm Hg (right arm, sitting);  P: 83 bpm (right  arm (BP Cuff), sitting);  sCr: 0.84 mg/dL;  GFR: 69.93        Exams:     PHYSICAL EXAM:     GENERAL: vital signs recorded - well developed, well nourished;  no apparent distress;     EYES: extraocular movements intact; conjunctiva and cornea are normal; PERRL;     E/N/T:  normal EACs, TMs, nasal/oral mucosa, teeth, gingiva, and oropharynx;     NECK: range of motion is normal; thyroid is non-palpable;     RESPIRATORY: normal respiratory rate and pattern with no distress; decreased breath sounds;     CARDIOVASCULAR: normal rate; rhythm is regular;  no systolic murmur; no edema;     GASTROINTESTINAL: nontender; normal bowel sounds; no organomegaly;     LYMPHATIC: no enlargement of cervical or facial nodes; no supraclavicular nodes;     BREAST/INTEGUMENT: SKIN: no significant rashes or lesions; no suspicious moles;     NEUROLOGIC: mental status: alert and oriented x 3; cranial nerves II-XII grossly intact;     PSYCHIATRIC: appropriate affect and demeanor; normal psychomotor function;         ASSESSMENT           272.0   E78.2  Hypercholesterolemia              DDx:     401.1   I10  Hypertension              DDx:     V58.69   Z79.899  Use of high risk medications              DDx:     722.6   M51.36  Degenerative disc disease              DDx:     285.9   D64.9  Unspecified anemia              DDx:         ORDERS:         Meds Prescribed:       Refill of: Amlodipine  2.5mg Tablet Take 1 tablet(s) by mouth daily  #90 (Ninety) tablet(s) Refills: 1       Refill of: Amlodipine  5mg Tablet Take 1 tablet(s) by mouth daily  #90 (Ninety) tablet(s) Refills: 1       Refill of: Meloxicam 15mg Tablet Take 1 tablet(s) by mouth daily  #90 (Ninety) tablet(s) Refills: 1       Refill of: Ranitidine 150mg Tablet Take 1 tablet(s) by mouth bid  #180 (One [place] and Eighty) tablet(s) Refills: 1       Refill of: Lyrica (Pregabalin) 75mg Capsules Take 1 capsule(s) by mouth bid  #180 (One [place] and Eighty) capsule(s) Refills: 1       Refill  of: Remeron (Mirtazapine) 30mg Tablet Take 1 tablet(s) by mouth at bedtime  #90 (Ninety) tablet(s) Refills: 1       Refill of: Cetirizine HCl 10mg Tablet 1 tab daily  #90 (Ninety) tablet(s) Refills: 1       Refill of: Atorvastatin Calcium 20mg Tablet 1 tab daily  #90 (Ninety) tablet(s) Refills: 1       Refill of: Hydrochlorothiazide (HCTZ) 25mg Tablet Take 1 tablet(s) by mouth daily  #90 (Ninety) tablet(s) Refills: 1       Refill of: Toprol XL (Metoprolol Succinate) 50mg Tablets, Extended Release Take 1 tablet(s) by mouth daily  #90 (Ninety) tablet(s) Refills: 1         Radiology/Test Orders:       3014F  Screening mammography results documented and reviewed (PV)1  (In-House)         3017F  Colorectal CA screen results documented and reviewed (PV)  (In-House)           Lab Orders:       [id number]  HTNLP - [facility] CMP AND LIPID: [id number], [id number]  (Send-Out)         09581  TSH - [facility] TSH  (Send-Out)         92993  BDCB2 - [facility] CBC w/o diff  (Send-Out)         49508  IRON - [facility] Iron, serum  (Send-Out)           Other Orders:         Depression screen negative  (In-House)         1101F  Pt screen for fall risk; document no falls in past year or only 1 fall w/o injury in past year (DAVID)  (In-House)           Negative EtOH screen  (In-House)                   PLAN:          Hypercholesterolemia     We will refill needed medications for Lisa and go from there.  No changes are anticipated.  We will arrange follow up labs.     LABORATORY:  Labs ordered to be performed today include HTN/Lipid Panel: CMP, Lipid and TSH.  MIPS PHQ-9 Depression Screening: Completed form scanned and in chart; Total Score 9; Negative Depression Screen   Smoking cessation encouraged. Counseling for less than 3 minutes.  Negative alcohol screen           Prescriptions:       Refill of: Atorvastatin Calcium 20mg Tablet 1 tab daily  #90 (Ninety) tablet(s) Refills: 1           Orders:       [id number]  HTNLP - [facility] CMP AND LIPID: [id number], [id number]  (Send-Out)          89801  TSH - [facility] TSH  (Send-Out)           Depression screen negative  (In-House)         1101F  Pt screen for fall risk; document no falls in past year or only 1 fall w/o injury in past year (DAVID)  (In-House)           Negative EtOH screen  (In-House)         3014F  Screening mammography results documented and reviewed (PV)1  (In-House)         3017F  Colorectal CA screen results documented and reviewed (PV)  (In-House)             Patient Education Handouts:       Hyperlipidemia (Hyperlipoproteinemia)           Hypertension           Prescriptions:       Refill of: Amlodipine  2.5mg Tablet Take 1 tablet(s) by mouth daily  #90 (Ninety) tablet(s) Refills: 1       Refill of: Amlodipine  5mg Tablet Take 1 tablet(s) by mouth daily  #90 (Ninety) tablet(s) Refills: 1       Refill of: Hydrochlorothiazide (HCTZ) 25mg Tablet Take 1 tablet(s) by mouth daily  #90 (Ninety) tablet(s) Refills: 1       Refill of: Toprol XL (Metoprolol Succinate) 50mg Tablets, Extended Release Take 1 tablet(s) by mouth daily  #90 (Ninety) tablet(s) Refills: 1          Use of high risk medications As above.          Degenerative disc disease           Prescriptions:       Refill of: Meloxicam 15mg Tablet Take 1 tablet(s) by mouth daily  #90 (Ninety) tablet(s) Refills: 1       Refill of: Lyrica (Pregabalin) 75mg Capsules Take 1 capsule(s) by mouth bid  #180 (One [place] and Eighty) capsule(s) Refills: 1          Unspecified anemia     LABORATORY:  Labs ordered to be performed today include CBC W/O DIFF and Iron Serum.            Orders:       [id number]  BDCB2 - [facility] CBC w/o diff  (Send-Out)         09401  IRON - [facility] Iron, serum  (Send-Out)               Other Prescriptions:       Refill of: Ranitidine 150mg Tablet Take 1 tablet(s) by mouth bid  #180 (One [place] and Eighty) tablet(s) Refills: 1       Refill of: Remeron (Mirtazapine) 30mg Tablet Take 1 tablet(s) by mouth at bedtime  #90 (Ninety) tablet(s) Refills: 1       Refill of: Cetirizine  HCl 10mg Tablet 1 tab daily  #90 (Ninety) tablet(s) Refills: 1         CHARGE CAPTURE           **Please note: ICD descriptions below are intended for billing purposes only and may not represent clinical diagnoses**        Primary Diagnosis:         272.0 Hypercholesterolemia            E78.2    Mixed hyperlipidemia              Orders:          [id number]   Office/outpatient visit; established patient, level 4  (In-House)                Depression screen negative  (In-House)             1101F   Pt screen for fall risk; document no falls in past year or only 1 fall w/o injury in past year (DAVID)  (In-House)                Negative EtOH screen  (In-House)             3014F   Screening mammography results documented and reviewed (PV)1  (In-House)             3017F   Colorectal CA screen results documented and reviewed (PV)  (In-House)           401.1 Hypertension            I10    Essential (primary) hypertension    V58.69 Use of high risk medications            Z79.899    Other long term (current) drug therapy    722.6 Degenerative disc disease            M51.36    Other intervertebral disc degeneration, lumbar region    285.9 Unspecified anemia            D64.9    Anemia, unspecified

## 2021-05-18 NOTE — PROGRESS NOTES
"Eileen Kaufman  1950     Office/Outpatient Visit    Visit Date: Thu, Apr 30, 2020 03:12 pm    Provider: Ana Corrales N.P. (Assistant: Letty Alberts, RN)    Location: [facility]        Electronically signed by Ana Corrales N.P. on  04/30/2020 04:38:56 PM                             Subjective:        CC: Ms. Kaufman is a 70 year old  female.  \"I felt my heart race and then i had to sit down on floor because i felt weak\";         HPI: Eileen Alvarez consented to this telemedicine consultation. Consent obtained by Letty Alberts RN and Ana Corrales APRN. Visit conducted by Assembla rodrigo audio and video.     Eileen Alvarez reports that earlier today around lunch time which was about 4 hours prior to this appointment, she was at work. She works at a  at [employer]. 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 She sat down and had a nurse check her vitals. Reports 140/90, HR 92, and oxygen saturations were initially in the 80s and went up to 94. She notes that this lasted a short time. She is feeling better now. Denies chest pain or shortness of breath. She had cardiac work up with cardiac cath in 2012 with mild CAD. Denies history of irregular heartbeat.    ROS:     CONSTITUTIONAL:  Negative for chills and fever.      EYES:  Negative for eye drainage and eye pain.      E/N/T:  Positive for sinus congestion.   Negative for ear pain or sore throat.      CARDIOVASCULAR:  Positive for dizziness and palpitations.   Negative for chest pain.      RESPIRATORY:  Negative for dyspnea and frequent wheezing.      NEUROLOGICAL:  Positive for headaches and weakness.   Negative for seizures.      PSYCHIATRIC:  Negative for depression and suicidal thoughts.          Past Medical History / Family History / Social History:         Last Reviewed on 11/14/2019 03:15 PM by Artur Quevedo    " "Past Medical History:             PREVENTIVE HEALTH MAINTENANCE             BONE DENSITY: was last done 18 with the following abnormality noted-- Osteopenia     COLORECTAL CANCER SCREENING: Up to date (colonoscopy q10y; sigmoidoscopy q5y; Cologuard q3y) was last done 2018, Results are in chart; colonoscopy with normal results     MAMMOGRAM: Done within last 2 years and results in are chart was last done 19 with normal results         PAST MEDICAL HISTORY         Positive for    Coronary Artery Disease: mild; and    Hypertension;         GYNECOLOGICAL HISTORY:             CURRENT MEDICAL PROVIDERS:    Cardiologist: Dr. Virgen    Neurologist: Dr. Corbin    Pain Management: [facility] pain [facility]    Pulmonologist: Dr. Flores    Urologist: Dr. Ramires             ADVANCED DIRECTIVES: None - She says that her son, Mitch Guidry, would make decisions for her if needed.         Surgical History:         Arthroscopy: left knee;     Cholecystectomy    Hysterectomy: 80\"s Total;     CTS;    Neck 2015;    cardiac cath 12;         Family History:     Father:  at age 76; Cause of death was MI     Mother:  at age 60's; Cause of death was cerebral aneurysm         Social History:     Occupation: Disabled (due to depression)     Marital Status: Single     Children: 2 children         Tobacco/Alcohol/Supplements: Eileen Alvarez was 23 when she started smoking.  She did stop smoking for some time - about 13 years.  She has been smoking a total of approximately 33 years.  She has smoked less than a pack daily during this time.     Last Reviewed on 3/31/2020 11:35 AM by Bettina Steele    Tobacco: Current Smoker: She currently smokes every day, 1/2 pack per day.   She currently uses smokeless tobacco, vapor cans/pouches daily..          Alcohol: Frequency: Socially         Substance Abuse History:     Last Reviewed on 2019 03:15 PM by Artur Quevedo    [facility]         Mental Health " History:     Last Reviewed on 11/14/2019 03:15 PM by Artur Quevedo        Communicable Diseases (eg STDs):     Last Reviewed on 11/14/2019 03:15 PM by Artur Quevedo        Current Problems:     Last Reviewed on 11/14/2019 03:15 PM by Artur Quevedo    Mixed hyperlipidemia    Other intervertebral disc degeneration, lumbar region    Clubbing of fingers    Major depressive disorder, single episode, moderate    Solitary pulmonary nodule    Other emphysema    Other fatigue    Dorsalgia, unspecified    Vitamin D deficiency, unspecified    Elevated white blood cell count, unspecified    Abnormal weight loss    Other long term (current) drug therapy    Anemia, unspecified    Chronic obstructive pulmonary disease with (acute) exacerbation    Pain in right foot    Encounter for screening for depression    Acute sinusitis, unspecified    Acute maxillary sinusitis, unspecified        Immunizations:     Influenza, high dose seasonal 11/5/2019    Prevnar (Pneumococcal PCV 7) 7/5/2018    zzFluzone pf-quadrivalent 3 and up 11/5/2015    Fluarix pf 2/13/2017    Fluzone (3 + years dose) 1/5/2012    Fluzone pf (3+ years dose) 9/20/2013    Pneomovax 3/25/2013    Fluzone High-Dose pf (>=65 yr) 2/21/2019    Influenza Virus Vaccine pf (adult) 9/1/2017    PNEUMOVAX 23 (Pneumococcal PPV23) 2/13/2017        Allergies:     Last Reviewed on 4/30/2020 03:13 PM by Letty Alberts    Aspirin:      Sulfas:          Current Medications:     Last Reviewed on 4/30/2020 03:14 PM by Letty Alberts    Remeron 30mg Tablet [Take 1 tablet(s) by mouth at bedtime]    hydroCHLOROthiazide 25 mg oral tablet [Take 1 tablet(s) by mouth daily]    meloxicam 15 mg oral tablet [TAKE 1 TABLET BY MOUTH DAILY]    Toprol XL 50mg Tablets, Extended Release [Take 1 tablet(s) by mouth daily]    amLODIPine 2.5 mg oral tablet [TAKE 1 TABLET BY MOUTH DAILY]    amLODIPine 5 mg oral tablet [TAKE 1 TABLET BY MOUTH DAILY]    pregabalin 75 mg oral capsule  [take 1 capsule (75 mg) by oral route 2 times per day]    Multivitamins     Percocet  7.5-325 mg oral tablet [One PO TID PRN ]    atorvastatin 20 mg oral tablet [1 tab daily]    Ventolin HFA 90mcg/1actuation Oral Inhaler [Inhale 2 puff(s) by mouth q 4 to 6 hr prn]    Fluticasone Propionate 50mcg/1actuation Nasal Spray [1 spray each nostil daily]    cetirizine 10 mg oral tablet [1 tab daily]    Breo Ellipta 100mcg/25mcg Inhalation Powder [Take 1 inhalation(s) by mouth daily]    cyclobenzaprine 10 mg oral tablet [ 1 tablet AT HS prn]    ferrous sulfate 325 mg (65 mg iron) oral tablet [TAKE 1 TABLET BY MOUTH EVERY DAY]    Singulair 10 mg oral tablet [Take 1 tablet(s) by mouth each evening]        Objective:        Vitals:         Current: 4/30/2020 3:14:51 PM    Ht:  5 ft, 4.5 inBP: 140/90 mm Hg (left arm, sitting);  P: 92 bpm (left arm (BP Cuff), sitting);  sCr: 1.03 mg/dL;  GFR: 52.08O2 Sat: 94 % (room air)        Exams:     PHYSICAL EXAM:     GENERAL: well developed, well nourished;  no apparent distress;     EYES: PERRL, EOMI     NECK: range of motion is normal; trachea is midline;     RESPIRATORY: normal appearance and symmetric expansion of chest wall; normal respiratory rate and pattern with no distress;     NEUROLOGIC: mental status: alert and oriented x 3;     PSYCHIATRIC: appropriate affect and demeanor; normal speech pattern;         Assessment:         R00.2   Palpitations           Plan:         PalpitationsDiscussed with Betzaida that with her symptoms and history, I recommend that she be evaluated in person. Since office is closing shortly, recommend that she be seen in the ER this evening. She declines to go to ER as she is scared of the coronavirus. I then recommended that she be seen in the office tomorrow and she has been scheduled. I again instructed her that if she experiences reoccurence with chest pain, palpitations, shortness of breath, that she should proceed to the ER this evening to be  evaluated.             Charge Capture:         Primary Diagnosis:     R00.2  Palpitations           Orders:      [id number]  Office/outpatient visit; established patient, level 3  (In-House)

## 2021-05-18 NOTE — PROGRESS NOTES
"Eileen Kaufman 1950     Office/Outpatient Visit    Visit Date: Thu, Jul 5, 2018 10:04 am    Provider: Preeti Bunn N.P. (Assistant: Johana Antonio MA)    Location: [facility]        Electronically signed by Preeti Bunn N.P. on  07/09/2018 08:13:22 AM                             SUBJECTIVE:        CC:     Ms. Kaufman is a 68 year old  female.  follow up on blood work;         HPI:         Ms. Kaufman presents with health checkup.  Her last physical exam was 1 year ago.  Her last menstrual period was 80\"s.  She is not currently using any form of contraception.  She performs breast self-exams monthly.    Her last Pap smear was 3 years ago and was normal.   Her last mammogram was in 6-2018.   She's had vision screening done <1 year ago.   She has never had an ECG. She has never had a chest x-ray. Preventative Health updated today.  She is current with her pneumococcal immunization.  Ms. Kaufman denies any history of abnormal Pap smears.  Tobacco: She has a past history of cigarette smoking; quit date:  6/2018.          Ms. Kaufman desires help with smoking cessation.  Pt has recently stopped smoking 2 weeks ago. She is having productive coughing episodes. States she is committed and ready to be quit.      ROS:     CONSTITUTIONAL:  Negative for chills, fatigue, fever, and weight change.      EYES:  Negative for blurred vision.      CARDIOVASCULAR:  Negative for chest pain, orthopnea, paroxysmal nocturnal dyspnea and pedal edema.      RESPIRATORY:  Positive for recent cough.   Negative for dyspnea.      GASTROINTESTINAL:  Negative for abdominal pain, constipation, diarrhea, nausea and vomiting.      NEUROLOGICAL:  Negative for dizziness, headaches, paresthesias, and weakness.      PSYCHIATRIC:  Negative for anxiety, depression, and sleep disturbances.          PMH/FMH/SH:     Last Reviewed on 7/05/2018 10:31 AM by Preeti Bunn    Past Medical History:             PREVENTIVE " HEALTH MAINTENANCE             BONE DENSITY: was last done 10/2/13 with the following abnormality noted-- Osteopenia     COLORECTAL CANCER SCREENING: Up to date (colonoscopy q10y; sigmoidoscopy q5y; Cologuard q3y) was last done 2013, Results are in chart; colonoscopy with normal results     MAMMOGRAM: Done within last 2 years and results in are chart was last done 2018 with normal results         PAST MEDICAL HISTORY         Positive for    Coronary Artery Disease: mild; and    Hypertension;         GYNECOLOGICAL HISTORY:             CURRENT MEDICAL PROVIDERS:    Cardiologist: Dr. Virgen    Neurologist: Dr. Corbin    Pain Management: [facility] pain management    Pulmonologist: Dr. Flores    Urologist: Dr. Ramires             ADVANCED DIRECTIVES: None         Surgical History:         Arthroscopy: left knee;     Cholecystectomy    Hysterectomy      CTS;    Neck 2015;    cardiac cath 12;         Family History:     Father:  at age 76; Cause of death was MI     Mother:  at age 60's; Cause of death was cerebral aneurysm         Social History:     Occupation: Disabled (due to depression)     Marital Status: Single     Children: 2 children         Tobacco/Alcohol/Supplements:     Last Reviewed on 2018 10:31 AM by Preeti Bunn    Tobacco: She has a past history of cigarette smoking; quit date:  2018.   She currently uses smokeless tobacco, vapor cans/pouches daily..          Substance Abuse History:     Last Reviewed on 2018 10:31 AM by Preeti Bunn         Mental Health History:     Last Reviewed on 2018 10:31 AM by Preeti Bunn        Communicable Diseases (eg STDs):     Last Reviewed on 2018 10:31 AM by Preeti Bunn            Current Problems:     Last Reviewed on 2018 10:31 AM by Preeti Bunn    Leukocytosis, unspecified     Vitamin D deficiency, unspecified     Fatigue     Back pain     Bullous emphysema     Lung nodule      Clubbing of fingers     Moderate depression     Degenerative disc disease     Cervical spinal stenosis     Depression     Hypercholesterolemia     CAD     GERD     Hypertension         Immunizations:     zzFluzone pf-quadrivalent 3 and up 11/5/2015     Fluarix pf 2/13/2017     Fluzone (3 + years dose) 1/5/2012     Fluzone pf (3+ years dose) 9/20/2013     Pneomovax 3/25/2013     Influenza Virus Vaccine pf (adult) 9/1/2017     PNEUMOVAX 23 (Pneumococcal PPV23) 2/13/2017         Allergies:     Last Reviewed on 7/05/2018 10:31 AM by Preeti Bunn    Aspirin:    Sulfas:        Current Medications:     Last Reviewed on 7/05/2018 10:31 AM by Preeti Bunn    Robaxin 500mg Tablets 1 tab HS PRN     Breo Ellipta 100mcg/25mcg Inhalation Powder Take 1 inhalation(s) by mouth daily     Amlodipine  5mg Tablet 1 tab daily     Atorvastatin Calcium 20mg Tablet 1 tab daily     Cetirizine HCl 10mg Tablet 1 tab daily     Fluticasone Propionate 50mcg/1actuation Nasal Spray 1 spray each nostil daily     Hydrochlorothiazide (HCTZ) 25mg Tablet Take 1 tablet(s) by mouth daily     Lyrica 75mg Capsules Take 1 capsule(s) by mouth bid     Meloxicam 15mg Tablet Take 1 tablet(s) by mouth daily     Remeron 30mg Tablet Take 1 tablet(s) by mouth at bedtime     Toprol XL 50mg Tablets, Extended Release Take 1 tablet(s) by mouth daily     Ventolin HFA 90mcg/1actuation Oral Inhaler Inhale 2 puff(s) by mouth q 4 to 6 hr prn     Percocet  7.5mg/325mg Tablet One PO TID PRN     Multivitamins     Cyclobenzaprine HCl 10mg Tablet 1 tablet AT HS prn         OBJECTIVE:        Vitals:         Current: 7/5/2018 10:07:36 AM    Ht:  5 ft, 4.5 in;  Wt: 168.1 lbs;  BMI: 28.4    T: 98.2 F (oral);  BP: 158/100 mm Hg (left arm, sitting);  P: 89 bpm (left arm (BP Cuff), sitting);  sCr: 0.72 mg/dL;  GFR: 80.79        Repeat:     10:13:37 AM     BP:   156/88mm Hg (left arm, sitting)         Exams:     PHYSICAL EXAM:     GENERAL: vital signs recorded - well developed,  well nourished;  no apparent distress;     EYES: extraocular movements intact; conjunctiva and cornea are normal; PERRLA;     E/N/T:  normal EACs, TMs, nasal/oral mucosa, teeth, gingiva, and oropharynx;     NECK: range of motion is normal; thyroid is non-palpable;     RESPIRATORY: normal respiratory rate and pattern with no distress; normal breath sounds with no rales, rhonchi, wheezes or rubs;     CARDIOVASCULAR: normal rate; rhythm is regular;  no systolic murmur; no edema;     GASTROINTESTINAL: nontender; normal bowel sounds; no organomegaly;     NEUROLOGICAL:  cranial nerves, motor and sensory function, reflexes, gait and coordination are all intact;     PSYCHIATRIC:  appropriate affect and demeanor; normal speech pattern; grossly normal memory;         Procedures:     Pneumococcal immunization     1. Prevnar 13 (pneumococcal PCV13): 0.5 ml unit dose given IM in the left upper arm; administered by mnp;  lot number [id number]; expires 02/20             ASSESSMENT:           V70.0   Z00.00  Health checkup              DDx:     V76.49   Z12.11  Screening for colon cancer              DDx:     V05.9   Z23  Pneumococcal immunization              DDx:     305.1   F17.200  Tobacco use disorder              DDx:     V73.89   Z11.59  Screening test for viral diseases - other              DDx:     V82.81   Z13.820  Screening for osteoporosis              DDx:     272.0   E78.4  Hypercholesterolemia              DDx:     401.1   I10  Hypertension              DDx:     V77.0   Z13.29  Screening for hypothyroidism              DDx:     288.60   D72.829  Leukocytosis, unspecified              DDx:         ORDERS:         Meds Prescribed:       Medrol (Methylprednisolone) 4mg Dosepak Take as directed with food  #1 (One) dose pack Refills: 0         Radiology/Test Orders:       3014F  Screening mammography results documented and reviewed (PV)1  (In-House)         3017F  Colorectal CA screen results documented and reviewed (PV)   (In-House)         15075  DXA, bone density study, 1 or more sites; axial skeleton (eg hips, pelvis, spine)  (Send-Out)           Lab Orders:       38989  COMP - [facility] Comp. Metabolic Panel  (Send-Out)         71207  LPDP [facility] Lipid Panel  (Send-Out)         11216  RIBBA - [facility] Hepatitis C antibody with reflex  (Send-Out)         03162  BDCBC [facility] CBC with 3 part diff  (Send-Out)         06806  TSH - [facility] TSH  (Send-Out)         03273  RAPII [facility] Arthritis Profile  (Send-Out)           Procedures Ordered:       REFER  Referral to Specialist or Other Facility  (Send-Out)         43149  Pneumococcal conjugate vaccine, 13 valent, for intramuscular use  (In-House)         50067  Immunization administration; one vaccine  (In-House)           Other Orders:         Smoking and Tobacco Cessation 3 to 10 minutes  (In-House)         1101F  Pt screen for fall risk; document no falls in past year or only 1 fall w/o injury in past year (DAVID)  (In-House)         4004F  Pt scrnd tobacco use rcvd tobacco cessation talk  (In-House)           Negative EtOH screen  (In-House)           Calculated BMI above the upper parameter and a follow-up plan was documented in the medical record  (In-House)                   PLAN:          Health checkup     LABORATORY:  Labs ordered to be performed today include Comprehensive metabolic panel and lipid panel.            Orders:       50951  COMP - [facility] Comp. Metabolic Panel  (Send-Out)         46234  LPDP [facility] Lipid Panel  (Send-Out)            Screening for colon cancer         REFERRALS:  Referral initiated to a general surgeon ( Dr. Shaji Hayes; a colonoscopy ).            Orders:       REFER  Referral to Specialist or Other Facility  (Send-Out)            Pneumococcal immunization         IMMUNIZATIONS given today: Prevnar 13.            Orders:       29331  Pneumococcal conjugate vaccine, 13 valent, for intramuscular use  (In-House)         08874  Immunization  administration; one vaccine  (In-House)            Tobacco use disorder         RECOMMENDATIONS given include: Counseled on smoking cessation and advised of the benefits to patient's health if she were to stop smoking. Counseling for 3 to 10 minutes.  MIPS PHQ-9 Depression Screening: Completed form scanned and in chart; Total Score 0   Smoking cessation encouraged. Counseling for less than 3 minutes.  Negative alcohol screen     MAMMOGRAM: Done within last 2 years and results in are chart     COLORECTAL CANCER SCREENING: Results are in chart     BMI Elevated - Follow-Up Plan: She was provided education on weight loss strategies           Prescriptions:       Medrol (Methylprednisolone) 4mg Dosepak Take as directed with food  #1 (One) dose pack Refills: 0           Orders:         Smoking and Tobacco Cessation 3 to 10 minutes  (In-House)         1101F  Pt screen for fall risk; document no falls in past year or only 1 fall w/o injury in past year (DAVID)  (In-House)         4004F  Pt scrnd tobacco use rcvd tobacco cessation talk  (In-House)           Negative EtOH screen  (In-House)         3014F  Screening mammography results documented and reviewed (PV)1  (In-House)         3017F  Colorectal CA screen results documented and reviewed (PV)  (In-House)           Calculated BMI above the upper parameter and a follow-up plan was documented in the medical record  (In-House)             Patient Education Handouts:       Smoking Cessation           Screening test for viral diseases - other     LABORATORY:  Labs ordered to be performed today include Hepatitis C antibody with reflex.            Orders:       39060  RIBBA - [facility] Hepatitis C antibody with reflex  (Send-Out)            Screening for osteoporosis         FOLLOW-UP:.   for change 8-24 Sae feldman to [facility]     FOLLOW-UP TESTING #1:    RADIOLOGY:  I have ordered Dexa Scan to be done today.            Orders:       35518  DXA, bone density study, 1 or more  sites; axial skeleton (eg hips, pelvis, spine)  (Send-Out)            Hypertension     LABORATORY:  Labs ordered to be performed today include CBC.            Orders:       25874  BDCBC - [facility] CBC with 3 part diff  (Send-Out)            Screening for hypothyroidism           Orders:       82635  TSH - [facility] TSH  (Send-Out)            Leukocytosis, unspecified     LABORATORY:  Labs ordered to be performed today include Arthritis Profile.            Orders:       95013  RAPII - [facility] Arthritis Profile  (Send-Out)               Patient Recommendations:        For  Tobacco use disorder:         Stop smoking.          For  Screening for osteoporosis:                     APPOINTMENT INFORMATION:        Monday Tuesday Wednesday Thursday Friday Saturday Sunday            Time:___________________AM  PM   Date:_____________________         For  Leukocytosis, unspecified:     I also recommend ^.              CHARGE CAPTURE:           Primary Diagnosis:     V70.0 Health checkup            Z00.00    Encntr for general adult medical exam w/o abnormal findings    V76.49 Screening for colon cancer            Z12.11    Encounter for screening for malignant neoplasm of colon              Orders:          55209 -25  Office/outpatient visit; established patient, level 3  (In-House)           V05.9 Pneumococcal immunization            Z23    Encounter for immunization              Orders:          76153   Pneumococcal conjugate vaccine, 13 valent, for intramuscular use  (In-House)             79906   Immunization administration; one vaccine  (In-House)           305.1 Tobacco use disorder            F17.200    Nicotine dependence, unspecified, uncomplicated              Orders:             Smoking and Tobacco Cessation 3 to 10 minutes  (In-House)             1101F   Pt screen for fall risk; document no falls in past year or only 1 fall w/o injury in past year (DAVID)  (In-House)             4004F   Pt scrnd tobacco use rcvd tobacco  cessation talk  (In-House)                Negative EtOH screen  (In-House)             3014F   Screening mammography results documented and reviewed (PV)1  (In-House)             3017F   Colorectal CA screen results documented and reviewed (PV)  (In-House)                Calculated BMI above the upper parameter and a follow-up plan was documented in the medical record  (In-House)           V73.89 Screening test for viral diseases - other            Z11.59    Encounter for screening for other viral diseases    V82.81 Screening for osteoporosis            Z13.820    Encounter for screening for osteoporosis    272.0 Hypercholesterolemia            E78.4    Other hyperlipidemia    401.1 Hypertension            I10    Essential (primary) hypertension    V77.0 Screening for hypothyroidism            Z13.29    Encounter for screening for other suspected endocrine disorder    288.60 Leukocytosis, unspecified            D72.829    Elevated white blood cell count, unspecified        ADDENDUMS:      ____________________________________    Date: 07/09/2018 10:08 AM    Author: Beulah Tomlin         Visit Note Faxed to:        Shaji Hayes  (General Surgery); Number [phone]            Addendum: 07/10/2018 01:54 PM - Preeti Bunn V70.0

## 2021-05-18 NOTE — PROGRESS NOTES
Eileen Kaufman  1950     Office/Outpatient Visit    Visit Date: Thu, Jun 25, 2020 04:03 pm    Provider: Artur Quevedo MD (Assistant: Letty Alberts RN)    Location: [facility]        Electronically signed by Artur Quevedo MD on  06/25/2020 09:25:23 PM                             Subjective:        CC: pain follow up, allergies, cholesterol, blood pressure    HPI:       Eileen Alvarez is in today for follow up.  She has chronic pain from disc and low back issues.  She does see pain mgmt regularly and takes oxycodone for this.  She also takes Lyrica that we are prescribing for her.  It helps her with some nerve damage that she has.  She denies obvious side effects.  She denies abuse, diversion, or doctor shopping.  Kevyn is okay.          With regard to the mixed hyperlipidemia, current treatment includes Lipitor and diet.  Compliance with treatment has been poor; she has not been taking her medications due to side effects.  She denies experiencing any hypercholesterolemia related symptoms.            In regard to the essential (primary) hypertension, her current cardiac medication regimen includes a diuretic ( Hydrodiuril ), a beta-blocker ( Toprol-XL ), and a calcium channel blocker ( Norvasc ).  She is tolerating the medication well without side effects.      ROS:     CONSTITUTIONAL:  Negative for chills and fever.      CARDIOVASCULAR:  Negative for chest pain and palpitations.      RESPIRATORY:  Negative for recent cough and dyspnea.      GASTROINTESTINAL:  Negative for abdominal pain, nausea and vomiting.      INTEGUMENTARY/BREAST:  Negative for atypical mole(s) and rash.          Past Medical History / Family History / Social History:         Last Reviewed on 6/25/2020 04:37 PM by Artur Quevedo    Past Medical History:             PREVENTIVE HEALTH MAINTENANCE             BONE DENSITY: was last done 7/16/18 with the following abnormality noted-- Osteopenia     COLORECTAL CANCER  "SCREENING: Up to date (colonoscopy q10y; sigmoidoscopy q5y; Cologuard q3y) was last done 2018, Results are in chart; colonoscopy with normal results     MAMMOGRAM: Done within last 2 years and results in are chart was last done 19 with normal results         PAST MEDICAL HISTORY         Positive for    Coronary Artery Disease: mild; and    Hypertension;         GYNECOLOGICAL HISTORY:             CURRENT MEDICAL PROVIDERS:    Cardiologist: Dr. Virgen    Neurologist: Dr. Corbin    Pain Management: [facility]    Pulmonologist: Dr. Flores    Urologist: Dr. Ramires             ADVANCED DIRECTIVES: None - She says that her son, Mitch Guidry, would make decisions for her if needed.         Surgical History:         Arthroscopy: left knee;     Cholecystectomy    Hysterectomy: 80\"s Total;     CTS;    Neck 2015;    cardiac cath 12;         Family History:     Father:  at age 76; Cause of death was MI     Mother:  at age 60's; Cause of death was cerebral aneurysm         Social History:     Occupation: Disabled (due to depression)     Marital Status: Single     Children: 2 children         Tobacco/Alcohol/Supplements: Eileen Alvarez was 23 when she started smoking.  She did stop smoking for some time - about 13 years.  She has been smoking a total of approximately 33 years.  She has smoked less than a pack daily during this time.     Last Reviewed on 2020 04:37 PM by Artur Quevedo    Tobacco: Current Smoker: She currently smokes every day, 1/2 pack per day.   She currently uses smokeless tobacco, vapor cans/pouches daily..          Alcohol: Frequency: Socially         Substance Abuse History:     Last Reviewed on 2020 04:37 PM by Artur Quevedo    None         Mental Health History:     Last Reviewed on 2020 04:37 PM by Artur Quevedo        Communicable Diseases (eg STDs):     Last Reviewed on 2020 04:37 PM by Artur Quevedo        " Current Problems:     Last Reviewed on 6/25/2020 04:37 PM by rAtur Quevedo    Mixed hyperlipidemia    Other intervertebral disc degeneration, lumbar region    Major depressive disorder, single episode, moderate    Clubbing of fingers    Solitary pulmonary nodule    Other emphysema    Other fatigue    Dorsalgia, unspecified    Vitamin D deficiency, unspecified    Elevated white blood cell count, unspecified    Abnormal weight loss    Other long term (current) drug therapy    Anemia, unspecified    Chronic obstructive pulmonary disease with (acute) exacerbation    Pain in right foot    Encounter for screening for depression    Acute sinusitis, unspecified    Acute maxillary sinusitis, unspecified    Palpitations    Dizziness and giddiness    Orthostatic hypotension    Essential (primary) hypertension        Immunizations:     Influenza, high dose seasonal 11/5/2019    Prevnar (Pneumococcal PCV 7) 7/5/2018    zzFluzone pf-quadrivalent 3 and up 11/5/2015    Fluarix pf 2/13/2017    Fluzone (3 + years dose) 1/5/2012    Fluzone pf (3+ years dose) 9/20/2013    Pneomovax 3/25/2013    Fluzone High-Dose pf (>=65 yr) 2/21/2019    Influenza Virus Vaccine pf (adult) 9/1/2017    PNEUMOVAX 23 (Pneumococcal PPV23) 2/13/2017        Allergies:     Last Reviewed on 6/25/2020 04:37 PM by Artur Quevedo    Aspirin:      Sulfas:          Current Medications:     Last Reviewed on 6/25/2020 04:37 PM by Artur Quevedo    mirtazapine 30 mg oral tablet [TAKE 1 TABLET BY MOUTH AT BEDTIME]    hydroCHLOROthiazide 25 mg oral tablet [Take 1 tablet(s) by mouth daily]    meloxicam 15 mg oral tablet [TAKE 1 TABLET BY MOUTH DAILY]    Toprol XL 50mg Tablets, Extended Release [Take 1 tablet(s) by mouth daily]    amLODIPine 2.5 mg oral tablet [TAKE 1 TABLET BY MOUTH DAILY]    amLODIPine 5 mg oral tablet [TAKE 1 TABLET BY MOUTH DAILY]    pregabalin 75 mg oral capsule [take 1 capsule (75 mg) by oral route 2 times per day]     Multivitamins     Percocet  7.5-325 mg oral tablet [One PO TID PRN ]    atorvastatin 20 mg oral tablet [1 tab daily]    Ventolin HFA 90mcg/1actuation Oral Inhaler [Inhale 2 puff(s) by mouth q 4 to 6 hr prn]    Fluticasone Propionate 50mcg/1actuation Nasal Spray [1 spray each nostil daily]    cetirizine 10 mg oral tablet [1 tab daily]    Breo Ellipta 100mcg/25mcg Inhalation Powder [Take 1 inhalation(s) by mouth daily]    cyclobenzaprine 10 mg oral tablet [ 1 tablet AT HS prn]    ferrous sulfate 325 mg (65 mg iron) oral tablet [TAKE 1 TABLET BY MOUTH EVERY DAY]    Singulair 10 mg oral tablet [Take 1 tablet(s) by mouth each evening]        Objective:        Vitals:         Current: 6/25/2020 4:07:49 PM    Ht:  5 ft, 4.5 in;  Wt: 176.2 lbs;  BMI: 29.8T: 98 F (temporal);  BP: 132/86 mm Hg (left arm, standing);  P: 105 bpm (right arm (BP Cuff), sitting);  sCr: 0.86 mg/dL;  GFR: 67.20        Exams:     PHYSICAL EXAM:     GENERAL: vital signs recorded - well developed, well nourished;  no apparent distress;     EYES: extraocular movements intact; conjunctiva and cornea are normal; PERRL;     NECK: range of motion is normal; thyroid is non-palpable;     RESPIRATORY: normal respiratory rate and pattern with no distress; normal breath sounds with no rales, rhonchi, wheezes or rubs;     CARDIOVASCULAR: normal rate; rhythm is regular;  no systolic murmur; no edema;     GASTROINTESTINAL: nontender; normal bowel sounds; no organomegaly;     LYMPHATIC: no enlargement of cervical or facial nodes; no supraclavicular nodes;     BREAST/INTEGUMENT: SKIN: no significant rashes or lesions; no suspicious moles;     MUSCULOSKELETAL: there is no acute finding on exam;     NEUROLOGIC: mental status: alert and oriented x 3; cranial nerves II-XII grossly intact;     PSYCHIATRIC: appropriate affect and demeanor; normal psychomotor function;         Assessment:         M51.36   Other intervertebral disc degeneration, lumbar region       E78.2    Mixed hyperlipidemia       F32.1   Major depressive disorder, single episode, moderate       I10   Essential (primary) hypertension           ORDERS:         Meds Prescribed:       [Refilled] Ventolin HFA 90 mcg/actuation Inhalation HFA Aerosol Inhaler [Inhale 2 puff(s) by mouth q 4 to 6 hr prn], #1 (one) applicator, Refills: 11 (eleven)       [Refilled] cetirizine 10 mg oral tablet [1 tab daily], #90 (ninety) tablets, Refills: 1 (one)       [Refilled] hydroCHLOROthiazide 25 mg oral tablet [Take 1 tablet(s) by mouth daily], #90 (ninety) tablets, Refills: 1 (one)       [Refilled] Toprol XL 50 mg oral Tablet, Extended Release 24 hr [Take 1 tablet(s) by mouth daily], #90 (ninety) tablets, Refills: 1 (one)       [Refilled] pregabalin 75 mg oral capsule [take 1 capsule (75 mg) by oral route 2 times per day], #60 (sixty) capsules, Refills: 2 (two)       [Refilled] Singulair 10 mg oral tablet [take 1 tablet (10 mg) by oral route once daily in the evening], #90 (ninety) tablets, Refills: 1 (one)       [Refilled] ferrous sulfate 325 mg (65 mg iron) oral tablet [TAKE 1 TABLET BY MOUTH EVERY DAY], #90 (ninety) tablets, Refills: 1 (one)       [Refilled] mirtazapine 30 mg oral tablet [TAKE 1 TABLET BY MOUTH AT BEDTIME], #90 (ninety) tablets, Refills: 1 (one)       [Refilled] amLODIPine 2.5 mg oral tablet [TAKE 1 TABLET BY MOUTH DAILY], #90 (ninety) tablets, Refills: 1 (one)       [Refilled] amLODIPine 5 mg oral tablet [TAKE 1 TABLET BY MOUTH DAILY], #90 (ninety) tablets, Refills: 1 (one)       [Refilled] meloxicam 15 mg oral tablet [TAKE 1 TABLET BY MOUTH DAILY], #90 (ninety) tablets, Refills: 1 (one)         Radiology/Test Orders:       3017F  Colorectal CA screen results documented and reviewed (PV)  (In-House)              Other Orders:       1101F  Pt screen for fall risk; document no falls in past year or only 1 fall w/o injury in past year (DAVID)  (In-House)              Screening mammogram results documented   (Send-Out)            1124F  Advance Care Planning discussed and doc in MR; no surrogate named or advance care plan provided  (Send-Out)                      Plan:         Other intervertebral disc degeneration, lumbar region        RECOMMENDATIONS given include: Today, we have reviewed Eileen Alvarez's care.  I'm going to refill her medications as noted.  She did have recent labs that looked good.  We may consider another trial of cholesterol lowering medication at some future point, but she is resistant to that..  JANESSA Has had no falls or only one fall without injury in the past year Vaccines Flu and Pneumonia updated in Shot record Screening mammomgram done within last 2 years and results in are chart Colorectal Cancer Screening is up to date and the results are in the chart Advance Directive/Surrogate Decision Maker discussed and pt declines to complete today  Smoking cessation encouraged. Counseling for less than 3 minutes.            Prescriptions:       [Refilled] Ventolin HFA 90 mcg/actuation Inhalation HFA Aerosol Inhaler [Inhale 2 puff(s) by mouth q 4 to 6 hr prn], #1 (one) applicator, Refills: 11 (eleven)       [Refilled] cetirizine 10 mg oral tablet [1 tab daily], #90 (ninety) tablets, Refills: 1 (one)       [Refilled] hydroCHLOROthiazide 25 mg oral tablet [Take 1 tablet(s) by mouth daily], #90 (ninety) tablets, Refills: 1 (one)       [Refilled] Toprol XL 50 mg oral Tablet, Extended Release 24 hr [Take 1 tablet(s) by mouth daily], #90 (ninety) tablets, Refills: 1 (one)       [Refilled] pregabalin 75 mg oral capsule [take 1 capsule (75 mg) by oral route 2 times per day], #60 (sixty) capsules, Refills: 2 (two)       [Refilled] Singulair 10 mg oral tablet [take 1 tablet (10 mg) by oral route once daily in the evening], #90 (ninety) tablets, Refills: 1 (one)       [Refilled] ferrous sulfate 325 mg (65 mg iron) oral tablet [TAKE 1 TABLET BY MOUTH EVERY DAY], #90 (ninety) tablets, Refills: 1 (one)       [Refilled]  mirtazapine 30 mg oral tablet [TAKE 1 TABLET BY MOUTH AT BEDTIME], #90 (ninety) tablets, Refills: 1 (one)       [Refilled] amLODIPine 2.5 mg oral tablet [TAKE 1 TABLET BY MOUTH DAILY], #90 (ninety) tablets, Refills: 1 (one)       [Refilled] amLODIPine 5 mg oral tablet [TAKE 1 TABLET BY MOUTH DAILY], #90 (ninety) tablets, Refills: 1 (one)       [Refilled] meloxicam 15 mg oral tablet [TAKE 1 TABLET BY MOUTH DAILY], #90 (ninety) tablets, Refills: 1 (one)           Orders:       1101F  Pt screen for fall risk; document no falls in past year or only 1 fall w/o injury in past year (DAVID)  (In-House)              Screening mammogram results documented  (Send-Out)            3017F  Colorectal CA screen results documented and reviewed (PV)  (In-House)            1124F  Advance Care Planning discussed and doc in MR; no surrogate named or advance care plan provided  (Send-Out)              Mixed hyperlipidemiaAs above.        Major depressive disorder, single episode, moderateAs above.        Essential (primary) hypertensionAs above.            Charge Capture:         Primary Diagnosis:     M51.36  Other intervertebral disc degeneration, lumbar region           Orders:      48295  Office/outpatient visit; established patient, level 4  (In-House)            1101F  Pt screen for fall risk; document no falls in past year or only 1 fall w/o injury in past year (DAVID)  (In-House)            3017F  Colorectal CA screen results documented and reviewed (PV)  (In-House)              E78.2  Mixed hyperlipidemia     F32.1  Major depressive disorder, single episode, moderate     I10  Essential (primary) hypertension

## 2021-05-18 NOTE — PROGRESS NOTES
Eileen Kaufman  1950     Office/Outpatient Visit    Visit Date: Thu, Nov 14, 2019 03:09 pm    Provider: Artur Quevedo MD     Location: [facility]        Electronically signed by Artur Quevedo MD on  11/16/2019 09:18:56 AM                             Subjective:        CC: 'my foot'    HPI:       Eileen Alvarez is in today for follow up on pain in the R foot that has been present for the last month.  She is not aware of any particular injury to the foot.  She says her pain is close to the ankle.  She has had some swelling of the foot and ankle.  She has not seen redness with this.  She is not aware of any recent change in her medications.  She is not aware of any gout history.  She has not had recent blood work.  She uses some alcohol.          Dx with essential (primary) hypertension; her current cardiac medication regimen includes a diuretic ( Hydrodiuril ), a beta-blocker ( Toprol-XL ), and a calcium channel blocker ( Norvasc ).  She is tolerating the medication well without side effects.            With regard to the mixed hyperlipidemia, current treatment includes Lipitor and diet.  Compliance with treatment has been fair.  She denies experiencing any hypercholesterolemia related symptoms.      ROS:     CONSTITUTIONAL:  Negative for chills and fever.      CARDIOVASCULAR:  Negative for chest pain and palpitations.      RESPIRATORY:  Negative for recent cough and dyspnea.      GASTROINTESTINAL:  Negative for abdominal pain, nausea and vomiting.      INTEGUMENTARY/BREAST:  Negative for atypical mole(s) and rash.          Past Medical History / Family History / Social History:         Last Reviewed on 11/14/2019 03:14 PM by Artur Quevedo    Past Medical History:             PREVENTIVE HEALTH MAINTENANCE             BONE DENSITY: was last done 7/16/18 with the following abnormality noted-- Osteopenia     COLORECTAL CANCER SCREENING: Up to date (colonoscopy q10y; sigmoidoscopy q5y;  "Cologuard q3y) was last done 2018, Results are in chart; colonoscopy with normal results     MAMMOGRAM: Done within last 2 years and results in are chart was last done 19 with normal results         PAST MEDICAL HISTORY         Positive for    Coronary Artery Disease: mild; and    Hypertension;         GYNECOLOGICAL HISTORY:             CURRENT MEDICAL PROVIDERS:    Cardiologist: Dr. Virgen    Neurologist: Dr. Corbin    Pain Management: [facility] pain management    Pulmonologist: Dr. Flores    Urologist: Dr. Ramires             ADVANCED DIRECTIVES: None - She says that her son, Mitch Guidry, would make decisions for her if needed.         Surgical History:         Arthroscopy: left knee;     Cholecystectomy    Hysterectomy: 80\"s Total;     CTS;    Neck 2015;    cardiac cath 12;         Family History:     Father:  at age 76; Cause of death was MI     Mother:  at age 60's; Cause of death was cerebral aneurysm         Social History:     Occupation: Disabled (due to depression)     Marital Status: Single     Children: 2 children         Tobacco/Alcohol/Supplements: Eileen Alvarez was 23 when she started smoking.  She did stop smoking for some time - about 13 years.  She has been smoking a total of approximately 33 years.  She has smoked less than a pack daily during this time.     Last Reviewed on 2019 03:14 PM by Artur Quevedo    Tobacco: Current Smoker: She currently smokes every day, 1/2 pack per day.   She currently uses smokeless tobacco, vapor cans/pouches daily..          Alcohol: Frequency: Socially         Substance Abuse History:     Last Reviewed on 2019 03:14 PM by Artur Quevedo    None         Mental Health History:     Last Reviewed on 2019 03:14 PM by Artur Quevedo        Communicable Diseases (eg STDs):     Last Reviewed on 2019 03:14 PM by Artur Quevedo        Current Problems:     Last Reviewed on 2019 03:14 " PM by Artur Quevedo    Mixed hyperlipidemia    Other intervertebral disc degeneration, lumbar region    Clubbing of fingers    Major depressive disorder, single episode, moderate    Solitary pulmonary nodule    Other emphysema    Other fatigue    Dorsalgia, unspecified    Vitamin D deficiency, unspecified    Elevated white blood cell count, unspecified    Abnormal weight loss    Other long term (current) drug therapy    Anemia, unspecified    Chronic obstructive pulmonary disease with (acute) exacerbation    Pain in right foot        Immunizations:     Prevnar (Pneumococcal PCV 7) 7/5/2018    zzFluzone pf-quadrivalent 3 and up 11/5/2015    Fluarix pf 2/13/2017    Fluzone (3 + years dose) 1/5/2012    Fluzone pf (3+ years dose) 9/20/2013    Pneomovax 3/25/2013    Fluzone High-Dose pf (>=65 yr) 2/21/2019    Influenza Virus Vaccine pf (adult) 9/1/2017    PNEUMOVAX 23 (Pneumococcal PPV23) 2/13/2017        Allergies:     Last Reviewed on 11/14/2019 03:14 PM by Artur Quevedo    Aspirin:      Sulfas:          Current Medications:     Last Reviewed on 11/14/2019 03:14 PM by Artur Quevedo    Remeron 30mg Tablet [Take 1 tablet(s) by mouth at bedtime]    hydroCHLOROthiazide 25 mg oral tablet [Take 1 tablet(s) by mouth daily]    Prednisone 20 mg oral tablet [2 PO daily x 5 days]    raNITIdine HCl 150 mg oral tablet [Take 1 tablet(s) by mouth bid]    Meloxicam 15 mg oral tablet [Take 1 tablet(s) by mouth daily  ]    Toprol XL 50mg Tablets, Extended Release [Take 1 tablet(s) by mouth daily]    Amlodipine  2.5 mg oral tablet [Take 1 tablet(s) by mouth daily]    Amlodipine  5 mg oral tablet [Take 1 tablet(s) by mouth daily]    Lyrica 75mg Capsules [Take 1 capsule(s) by mouth bid]    Multivitamins     Percocet  7.5-325 mg oral tablet [One PO TID PRN ]    atorvastatin 20 mg oral tablet [1 tab daily]    doxycycline hyclate 100 mg oral tablet [1 tab PO BID x 7 days]    Ventolin HFA 90mcg/1actuation Oral  Inhaler [Inhale 2 puff(s) by mouth q 4 to 6 hr prn]    Fluticasone Propionate 50mcg/1actuation Nasal Spray [1 spray each nostil daily]    cetirizine 10 mg oral tablet [1 tab daily]    Breo Ellipta 100mcg/25mcg Inhalation Powder [Take 1 inhalation(s) by mouth daily]    cyclobenzaprine 10 mg oral tablet [ 1 tablet AT HS prn]    ferrous sulfate 325 mg (65 mg iron) oral tablet [TAKE 1 TABLET BY MOUTH EVERY DAY]        Objective:        Vitals:         Current: 11/14/2019 3:39:49 PM    Ht:  5 ft, 4.5 in;  Wt: 175.4 lbs;  BMI: 29.6T: 99.1 F (oral);  BP: 147/85 mm Hg (right arm, sitting);  P: 95 bpm (right arm (BP Cuff), sitting);  sCr: 0.84 mg/dL;  GFR: 69.59        Exams:     PHYSICAL EXAM:     GENERAL: vital signs recorded - well developed, well nourished;  no apparent distress;     NECK: range of motion is normal; thyroid is non-palpable;     RESPIRATORY: normal respiratory rate and pattern with no distress; normal breath sounds with no rales, rhonchi, wheezes or rubs;     CARDIOVASCULAR: normal rate; rhythm is regular;  no systolic murmur; no edema;     GASTROINTESTINAL: nontender; normal bowel sounds; no organomegaly;     LYMPHATIC: no enlargement of cervical or facial nodes; no supraclavicular nodes;     BREAST/INTEGUMENT: SKIN: no significant rashes or lesions; no suspicious moles;     MUSCULOSKELETAL: there is some swelling and perhaps mild redness of the R ankle - there is no calf or lower leg tenderness noted - no palpable cord;     NEUROLOGIC: mental status: alert and oriented x 3; cranial nerves II-XII grossly intact;     PSYCHIATRIC: appropriate affect and demeanor; normal psychomotor function;         Assessment:         M79.671   Pain in right foot       I10   Essential (primary) hypertension       D64.9   Anemia, unspecified       E78.2   Mixed hyperlipidemia           ORDERS:         Radiology/Test Orders:       [id number]  Radiologic examination, right ankle complete minimum 3 views  (Send-Out)             [id number]  Right radiologic examination, foot; complete, minimum of three views  (Send-Out)            3014F  Screening mammography results documented and reviewed (PV)1  (In-House)            3017F  Colorectal CA screen results documented and reviewed (PV)  (In-House)              Lab Orders:       [id number]  HTNLP - H CMP AND LIPID: [id number], [id number]  (Send-Out)            57229  TSH - H TSH  (Send-Out)            63061  RAPII - [facility] Arthritis Profile  (Send-Out)            33727  BDCB2 - [facility] CBC w/o diff  (Send-Out)            38957  CCPA - [facility]- CCP Antibody  (Send-Out)              Other Orders:       1100F  Pt screen for fall risk; document 2+ falls in the past yr or any fall w/injury in past year (DAVID)  (In-House)                      Plan:         Pain in right foot    LABORATORY:  Labs ordered to be performed today include Arthritis Profile, CBC W/O DIFF, and CCP Antibody.      RADIOLOGY:  I have ordered a right ankle xray and a right foot x-ray to be done today.      RECOMMENDATIONS given include: Today, we have reviewed her care.  I am concerned there could be an inflammatory issue present.  We will evaluate her as noted below given the ongoing nature of the pain.  We will follow closely.  Labs for her other issues to be obtained..  MIPS Has fallen 2+ times in the past year or had one fall with an injury in the past year Vaccines Flu and Pneumonia updated in Shot record Screening mammomgram done within last 2 years and results in are chart Colorectal Cancer Screening is up to date and the results are in the chart  Smoking cessation encouraged. Counseling for less than 3 minutes.            Orders:       [id number]  RAPII - H Arthritis Profile  (Send-Out)            39222  BDCB2 - H CBC w/o diff  (Send-Out)            [id number]  Radiologic examination, right ankle complete minimum 3 views  (Send-Out)            [id number]  Right radiologic examination, foot; complete, minimum of three views  (Send-Out)            1100F   Pt screen for fall risk; document 2+ falls in the past yr or any fall w/injury in past year (DAVID)  (In-House)            3014F  Screening mammography results documented and reviewed (PV)1  (In-House)            3017F  Colorectal CA screen results documented and reviewed (PV)  (In-House)            05660  CCPA - [facility]- CCP Antibody  (Send-Out)              Essential (primary) hypertensionAs above.        Anemia, unspecifiedAs above.        Mixed hyperlipidemia    LABORATORY:  Labs ordered to be performed today include HTN/Lipid Panel: CMP, Lipid and TSH.            Orders:       [id number]  HTNLP - [facility] CMP AND LIPID: [id number], [id number]  (Send-Out)            68544  TSH - H TSH  (Send-Out)                  Patient Recommendations:        For  Pain in right foot:    I also recommend ^.  right ankle x-ray             Charge Capture:         Primary Diagnosis:     M79.671  Pain in right foot           Orders:      [id number]  Office/outpatient visit; established patient, level 4  (In-House)            1100F  Pt screen for fall risk; document 2+ falls in the past yr or any fall w/injury in past year (DAVID)  (In-House)            3014F  Screening mammography results documented and reviewed (PV)1  (In-House)            3017F  Colorectal CA screen results documented and reviewed (PV)  (In-House)              I10  Essential (primary) hypertension     D64.9  Anemia, unspecified     E78.2  Mixed hyperlipidemia         ADDENDUMS:      ____________________________________    Date: 11/29/2019 10:20 AM    Author: Viji Cruz         Visit Note Faxed to:        Chase Rogers  (Podiatry); Number [phone]            Date: 11/29/2019 10:20 AM    Author: Viji Cruz         Visit Note Faxed to:        Ch[facility] Rogers  (Podiatry); Number [phone]

## 2021-05-18 NOTE — PROGRESS NOTES
"Eileen Kaufman  1950     Office/Outpatient Visit    Visit Date: Fri, Feb 5, 2021 01:06 pm    Provider: Melissa Landry MD (Assistant: Letty Alberts RN)    Location: [facility]        Electronically signed by Melissa Landry MD on  02/05/2021 01:48:44 PM                             Subjective:        CC: Eileen Alvarez is a 70 year old  female.  \"my pain started in my right shoulder a few days ago and now my arm and hand are swollen and painful\";         HPI: Eileen Alvarez is here today with complaint of pain in the R hand.  Pain actually started in the R shoulder a few days ago.  Pain then started to move down the arm gradually all the way down to the hand and then across the upper R chest.  Starting on Wednesday (2 days ago), her lower arm swelled.  The arm feels warm to touch.        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  No other work on the RUE.  She is a current smoker.  No h/o blood clots.  Not on any blood thinners.    ROS:     CONSTITUTIONAL:  Negative for chills and fever.      CARDIOVASCULAR:  Negative for chest pain.      RESPIRATORY:  Negative for recent cough and dyspnea.      GASTROINTESTINAL:  Negative for nausea and vomiting.      MUSCULOSKELETAL:  Positive for arthralgias and (R shoulder, R hand) limb pain ( right upper extremity pain ).   Negative for myalgias.      INTEGUMENTARY/BREAST:  Negative for rash.      NEUROLOGICAL:  Negative for paresthesias and weakness.          Past Medical History / Family History / Social History:         Last Reviewed on 2/05/2021 01:20 PM by Melissa Landry    Past Medical History:             PREVENTIVE HEALTH MAINTENANCE             BONE DENSITY: was last done 8/25/20 with the following abnormality noted-- Osteopenia     COLORECTAL CANCER SCREENING: Up to date (colonoscopy q10y; sigmoidoscopy q5y; Cologuard q3y) was last done 08/2018, Results are in chart; colonoscopy with normal results     " "MAMMOGRAM: Done within last 2 years and results in are chart was last done 20 with normal results         PAST MEDICAL HISTORY         Positive for    Coronary Artery Disease: mild; and    Hypertension;         GYNECOLOGICAL HISTORY:             CURRENT MEDICAL PROVIDERS:    Cardiologist: Dr. Virgen    Neurologist: Dr. Corbin    Pain Management: [facility] pain management    Pulmonologist: Dr. Flores    Urologist: Dr. Ramires             ADVANCED DIRECTIVES: None - She says that her son, Mitch Guidry, would make decisions for her if needed.         Surgical History:         Arthroscopy: left knee;     Cholecystectomy    Hysterectomy: 80\"s Total;     CTS;    Neck 2015;    cardiac cath 12;         Family History:     Father:  at age 76; Cause of death was MI     Mother:  at age 60's; Cause of death was cerebral aneurysm         Social History:     Occupation: Disabled (due to depression)     Marital Status: Single     Children: 2 children         Tobacco/Alcohol/Supplements: Eileen Alvarez was 23 when she started smoking.  She did stop smoking for some time - about 13 years.  She has been smoking a total of approximately 33 years.  She has smoked less than a pack daily during this time.     Last Reviewed on 2021 01:20 PM by Melissa Landry    Tobacco: Current Smoker: She currently smokes every day, 1/2 pack per day.   She currently uses smokeless tobacco, vapor cans/pouches daily..          Alcohol: Frequency: Socially         Substance Abuse History:     Last Reviewed on 2021 01:20 PM by Melissa Landry    None         Mental Health History:     Last Reviewed on 2021 01:20 PM by Melissa Landry        Communicable Diseases (eg STDs):     Last Reviewed on 2021 01:20 PM by Melissa Landry        Current Problems:     Last Reviewed on 2021 01:58 PM by Artur Quevedo    Mixed hyperlipidemia    Other intervertebral disc degeneration, lumbar region    Major depressive " disorder, single episode, moderate    Clubbing of fingers    Solitary pulmonary nodule    Other emphysema    Other fatigue    Dorsalgia, unspecified    Vitamin D deficiency, unspecified    Elevated white blood cell count, unspecified    Abnormal weight loss    Other long term (current) drug therapy    Anemia, unspecified    Chronic obstructive pulmonary disease with (acute) exacerbation    Pain in right foot    Acute sinusitis, unspecified    Encounter for screening for depression    Acute maxillary sinusitis, unspecified    Palpitations    Dizziness and giddiness    Orthostatic hypotension    Essential (primary) hypertension    Contact with and (suspected) exposure to other viral communicable diseases    Encounter for other screening for malignant neoplasm of breast    Asymptomatic menopausal state    Nicotine dependence, unspecified, uncomplicated    Iron deficiency anemia, unspecified        Immunizations:     Influenza, high dose seasonal 11/5/2019    influenza, injectable, quadrivalent, preservative free 10/1/2020    Prevnar (Pneumococcal PCV 7) 7/5/2018    zzFluzone pf-quadrivalent 3 and up 11/5/2015    Fluarix pf 2/13/2017    Fluzone (3 + years dose) 1/5/2012    Fluzone pf (3+ years dose) 9/20/2013    Pneomovax 3/25/2013    Fluzone High-Dose pf (>=65 yr) 2/21/2019    Influenza Virus Vaccine pf (adult) 9/1/2017    PNEUMOVAX 23 (Pneumococcal PPV23) 2/13/2017        Allergies:     Last Reviewed on 2/05/2021 01:07 PM by Letty Alberts    Aspirin:      Sulfas:          Current Medications:     Last Reviewed on 2/05/2021 01:08 PM by Letty Alberts    mirtazapine 30 mg oral tablet [TAKE 1 TABLET BY MOUTH AT BEDTIME]    hydroCHLOROthiazide 25 mg oral tablet [Take 1 tablet(s) by mouth daily]    meloxicam 15 mg oral tablet [TAKE 1 TABLET BY MOUTH DAILY]    metoprolol succinate 50 mg oral Tablet, Extended Release 24 hr [TAKE 1 TABLET BY MOUTH DAILY]    amLODIPine 5 mg oral tablet [TAKE 1 TABLET BY MOUTH DAILY]     amLODIPine 2.5 mg oral tablet [TAKE 1 TABLET BY MOUTH DAILY]    pregabalin 75 mg oral capsule [TAKE 1 CAPSULE BY MOUTH TWICE DAILY]    Multivitamins     Percocet  7.5-325 mg oral tablet [One PO TID PRN ]    Ventolin HFA 90 mcg/actuation Inhalation HFA Aerosol Inhaler [Inhale 2 puff(s) by mouth q 4 to 6 hr prn]    Fluticasone Propionate 50mcg/1actuation Nasal Spray [1 spray each nostil daily]    cetirizine 10 mg oral tablet [TAKE 1 TABLET BY MOUTH EVERY DAY]    Breo Ellipta 100mcg/25mcg Inhalation Powder [Take 1 inhalation(s) by mouth daily]    cyclobenzaprine 10 mg oral tablet [ 1 tablet AT HS prn]    ferrous sulfate 325 mg (65 mg iron) oral tablet [TAKE 1 TABLET BY MOUTH EVERY DAY]    montelukast 10 mg oral tablet [TAKE 1 TABLET BY MOUTH EVERY EVENING]        Objective:        Vitals:         Current: 2/5/2021 1:13:09 PM    Ht:  5 ft, 4.5 in;  Wt: 182 lbs;  BMI: 30.8T: 96 F (temporal);  BP: 146/96 mm Hg (left arm, sitting);  P: 102 bpm (left arm (BP Cuff), sitting);  sCr: 0.94 mg/dL;  GFR: 62.33        Exams:     PHYSICAL EXAM:     GENERAL: vital signs recorded - well developed, well nourished;  well groomed;  no apparent distress;     EYES: extraocular movements intact; conjunctiva and cornea are normal; PERRLA;     RESPIRATORY: normal respiratory rate and pattern with no distress; normal breath sounds with no rales, rhonchi, wheezes or rubs;     CARDIOVASCULAR: normal rate; rhythm is regular;  no systolic murmur; no edema;     MUSCULOSKELETAL: normal gait; decreased range of motion noted in: right shoulder extension and abduction;  pain with range of motion in: right shoulder flexion;  normal overall tone RUE -- moderate edema of the R forearm and hand, no erythema or warmth currently;         Assessment:         R60.0   Localized edema       M25.511   Pain in right shoulder           ORDERS:         Meds Prescribed:       [New Rx] Voltaren 1 % Topical Gel [apply 2 gram to the affected area(s) by topical route  BID prn], #100 (one hundred) grams, Refills: 0 (zero)       [New Rx] Lidoderm 5 % Topical Adhesive Patch, Medicated [apply 1 patch by topical route once daily (May wear up to 12hours.)], #30 (thirty) patches, Refills: 0 (zero)         Radiology/Test Orders:       [id number]  Venous doppler right extremity  (Send-Out; Stat)    right UPPER extremity        [id number]  Right Radiologic exam, shoulder; comp, 2 views  (Send-Out)                      Plan:         Localized edemaHer swelling in the RUE is quite pronounced and very asymmetric (no swelling in the LUE at all).  She does not have any obvious risk factors for UE DVT other than that she does still smoke, but given the clinical presentation, I think we need to rule out DVT with a RUE Doppler.  She has a lot of pain in the R shoulder radiating down into the arm and the chest.  Does have some remote trauma to that shoulder, but that was years ago and it has not troubled her since.  Will check an XR of the shoulder with the Doppler.  Continue meloxicam and Lyrica that she takes at baseline.  Sending in Voltaren gel to use and she also requests Lidoderm patches as she has been using some given to her by a friend.  Apply ice to the area BID for 10-20 min.  Will contact her with all results once they are back.          Orders:       [id number]  Venous doppler right extremity  (Send-Out; Stat)    right UPPER extremity          Pain in right shoulder        RADIOLOGY:  I have ordered Shoulder x-ray: right shoulder x-ray to be done today.            Prescriptions:       [New Rx] Voltaren 1 % Topical Gel [apply 2 gram to the affected area(s) by topical route BID prn], #100 (one hundred) grams, Refills: 0 (zero)       [New Rx] Lidoderm 5 % Topical Adhesive Patch, Medicated [apply 1 patch by topical route once daily (May wear up to 12hours.)], #30 (thirty) patches, Refills: 0 (zero)           Orders:       [id number]  Right Radiologic exam, shoulder; comp, 2 views  (Send-Out)                   Charge Capture:         Primary Diagnosis:     R60.0  Localized edema           Orders:      [id number]  Office/outpatient visit; established patient, level 4  (In-House)              M25.511  Pain in right shoulder

## 2021-05-18 NOTE — PROGRESS NOTES
Eileen Kaufman. 1950     Office/Outpatient Visit    Visit Date: Fri, Jan 12, 2018 07:41 am    Provider: Artur Quevedo MD (Assistant: Maria Isabel Montoya MA)    Location: [facility]        Electronically signed by Artur Quevedo MD on  01/13/2018 08:06:56 AM                             SUBJECTIVE:        CC: follow up on pain and other issues         HPI:         Patient presents with hypercholesterolemia.  Current treatment includes Lipitor and diet.  Compliance with treatment has been good; she takes her medication as directed and follows up as directed.  She denies experiencing any hypercholesterolemia related symptoms.          Hypertension details; her current cardiac medication regimen includes a diuretic ( Hydrodiuril ), a beta-blocker ( Toprol-XL ), and a calcium channel blocker ( Norvasc ).  She is tolerating the medication well without side effects.  Her blood pressure is moderately elevated today.  However, it was in a better range on her most recent visit here.         Eileen Alvarez also has chronic lower back and neck pain from disc issues.  She does see pain mgmt locally and remains on narcotic therapy.  Her Kevyn is reviewed.  She also takes meloxicam for the back and does see some benefit from it.  She also takes Lyrica for pain related to fibromyalgia.          Eileen Alvarez says that she has been under a lot of stress lately.  She remains on Remeron for depression and for sleep and feels that it is of benefit.         ROS:     CONSTITUTIONAL:  Negative for chills and fever.      CARDIOVASCULAR:  Negative for chest pain and palpitations.      RESPIRATORY:  Positive for recent cough.   Negative for dyspnea.      GASTROINTESTINAL:  Negative for abdominal pain, nausea and vomiting.          PMH/FMH/SH:     Last Reviewed on 1/12/2018 08:00 AM by Artur Quevedo    Past Medical History:             PREVENTIVE HEALTH MAINTENANCE             BONE DENSITY: was last done 10/2/13 with the following  abnormality noted-- Osteopenia     COLONOSCOPY: Done within the last 10 years was last done 13 with normal results     MAMMOGRAM: was last done 16 with normal results     PNEUMOCOCCAL 23 VACCINE: was last done 3/25/13         PAST MEDICAL HISTORY         Positive for    Coronary Artery Disease: mild; and    Hypertension;         GYNECOLOGICAL HISTORY:             CURRENT MEDICAL PROVIDERS:    Cardiologist: Dr. Virgen    Neurologist: Dr. Corbin    Pain Management: [facility]    Pulmonologist: Dr. Flores    Urologist: Dr. Ramires             ADVANCED DIRECTIVES: None         Surgical History:         Arthroscopy: left knee;     Cholecystectomy    Hysterectomy      CTS;    Neck 2015;    cardiac cath 12;         Family History:     Father:  at age 76; Cause of death was MI     Mother:  at age 60's; Cause of death was cerebral aneurysm         Social History:     Occupation: Disabled (due to depression)     Marital Status: Single     Children: 2 children         Tobacco/Alcohol/Supplements:     Last Reviewed on 2018 08:00 AM by Artur Quevedo    Tobacco: Current Smoker: She currently smokes every day, 1/2 pack per day.          Substance Abuse History:     Last Reviewed on 2018 08:00 AM by Artur Quevedo    None         Mental Health History:     Last Reviewed on 2018 08:00 AM by Artur Quevedo        Communicable Diseases (eg STDs):     Last Reviewed on 2018 08:00 AM by Artur Quevedo            Current Problems:     Last Reviewed on 2018 08:00 AM by Artur Quevedo    Bullous emphysema     Lung nodule     Clubbing of fingers     Moderate depression     Degenerative disc disease     Cervical spinal stenosis     Depression     Hypercholesterolemia     CAD     GERD     Hypertension         Immunizations:     Fluzone pf-quadrivalent 3 and up 2015     Fluarix pf 2017     Fluzone (3 + years dose)  1/5/2012     Fluzone pf (3+ years dose) 9/20/2013     Pneomovax 3/25/2013     Influenza Virus Vaccine pf (adult) 9/1/2017     PNEUMOVAX 23 (Pneumococcal PPV23) 2/13/2017         Allergies:     Last Reviewed on 1/12/2018 08:00 AM by Artur Quevedo    Aspirin:    Sulfas:        Current Medications:     Last Reviewed on 1/12/2018 08:00 AM by Artur Quevedo    Fluticasone Propionate 50mcg/1actuation Nasal Spray 1 spray each nostil daily     Meloxicam 15mg Tablet Take 1 tablet(s) by mouth daily     Lyrica 75mg Capsules Take 1 capsule(s) by mouth bid     Amlodipine  2.5mg Tablet Take 1 tablet(s) by mouth daily     Atorvastatin Calcium 20mg Tablet 1 tab daily     Cetirizine HCl 10mg Tablet 1 tab daily     Hydrochlorothiazide (HCTZ) 25mg Tablet Take 1 tablet(s) by mouth daily     Remeron 30mg Tablet Take 1 tablet(s) by mouth at bedtime     Toprol XL 50mg Tablets, Extended Release Take 1 tablet(s) by mouth daily     Breo Ellipta 100mcg/25mcg Inhalation Powder Take 1 inhalation(s) by mouth daily     Ventolin HFA 90mcg/1actuation Oral Inhaler Inhale 2 puff(s) by mouth q 4 to 6 hr prn     Percocet  7.5mg/325mg Tablet One PO TID PRN     Multivitamins     Medrol 4mg Dosepak Take as directed         OBJECTIVE:        Vitals:         Current: 1/12/2018 7:43:56 AM    Ht:  5 ft, 4.5 in;  Wt: 171 lbs;  BMI: 28.9    T: 99.3 F (oral);  BP: 154/103 mm Hg (left arm, sitting);  P: 105 bpm (left arm (BP Cuff), sitting);  sCr: 0.78 mg/dL;  GFR: 76.10        Repeat:     8:14:29 AM     BP:   150/98mm Hg (left arm, sitting)         Exams:     PHYSICAL EXAM:     GENERAL: vital signs recorded - well developed, well nourished;  no apparent distress;     EYES: extraocular movements intact; conjunctiva and cornea are normal; PERRLA;     E/N/T:  normal EACs, TMs, nasal/oral mucosa, teeth, gingiva, and oropharynx;     NECK: range of motion is normal; thyroid is non-palpable;     RESPIRATORY: normal respiratory rate and pattern with no  distress; normal breath sounds with no rales, rhonchi, wheezes or rubs;     CARDIOVASCULAR: normal rate; rhythm is regular;  no systolic murmur; no edema;     GASTROINTESTINAL: nontender; normal bowel sounds; no organomegaly;     BREAST/INTEGUMENT: SKIN: no significant rashes or lesions; no suspicious moles;         ASSESSMENT           272.0   E78.4  Hypercholesterolemia              DDx:     401.1   I10  Hypertension              DDx:     722.6   M51.36  Degenerative disc disease              DDx:     296.22   F32.1  Moderate depression              DDx:     780.79   R53.83  Fatigue              DDx:         ORDERS:         Meds Prescribed:       Refill of: Fluticasone Propionate 50mcg/1actuation Nasal Spray 1 spray each nostil daily  #16 (Sixteen) gm Refills: 11       Refill of: Meloxicam 15mg Tablet Take 1 tablet(s) by mouth daily  #90 (Ninety) tablet(s) Refills: 1       Refill of: Lyrica (Pregabalin) 75mg Capsules Take 1 capsule(s) by mouth bid  #180 (One [place] and [place]) capsule(s) Refills: 1       Refill of: Amlodipine  2.5mg Tablet Take 1 tablet(s) by mouth daily  #90 (Ninety) tablet(s) Refills: 1       Refill of: Atorvastatin Calcium 20mg Tablet 1 tab daily  #90 (Ninety) tablet(s) Refills: 1       Refill of: Cetirizine HCl 10mg Tablet 1 tab daily  #90 (Ninety) tablet(s) Refills: 1       Refill of: Hydrochlorothiazide (HCTZ) 25mg Tablet Take 1 tablet(s) by mouth daily  #90 (Ninety) tablet(s) Refills: 1       Refill of: Remeron (Mirtazapine) 30mg Tablet Take 1 tablet(s) by mouth at bedtime  #90 (Ninety) tablet(s) Refills: 1       Refill of: Toprol XL (Metoprolol Succinate) 50mg Tablets, Extended Release Take 1 tablet(s) by mouth daily  #90 (Ninety) tablet(s) Refills: 1       Refill of: Ventolin HFA (Albuterol) 90mcg/1actuation Oral Inhaler Inhale 2 puff(s) by mouth q 4 to 6 hr prn  #1 (One) inhaler(s) Refills: 11         Lab Orders:       [id number]  [id number] [facility] CBC without diff  (Send-Out)         [id number]   [id number] [facility] TSH  (Send-Out)         [id number]  [id number] [facility] Vitamin B12  (Send-Out)         [id number]  [id number] [facility] Creatine Kinase, Total, Serum  (Send-Out)         [id number]  [id number] [facility] Comp Metabolic Panel (14)  (Send-Out)         [id number]  [id number] [facility] Lipid Panel (Excludes LDL/HDL ratio)  (Send-Out)           Other Orders:       4004F  Pt scrnd tobacco use rcvd tobacco cessation talk  (In-House)                   PLAN:          Hypercholesterolemia     LABORATORY:  Labs ordered to be performed today at [facility] include CK, total.  CMP Lipid panel     RECOMMENDATIONS given include: There is not concerning finding on exam today.  We will recheck her blood pressure and refill needed medications.  I may push up a little further on the amlodipine at this time.  Otherwise, we will continue current care.  I do think the Lyrica is of benefit to her, and it will be continued..  MIPS Smoking cessation encouraged. Counseling for less than 3 minutes.            Prescriptions:       Refill of: Atorvastatin Calcium 20mg Tablet 1 tab daily  #90 (Ninety) tablet(s) Refills: 1           Orders:       4004F  Pt scrnd tobacco use rcvd tobacco cessation talk  (In-House)         [id number]  [id number] [facility] Creatine Kinase, Total, Serum  (Send-Out)         [id number]  [id number] [facility] Comp Metabolic Panel (14)  (Send-Out)         [id number]  [id number] [facility] Lipid Panel (Excludes LDL/HDL ratio)  (Send-Out)             Patient Education Handouts:       [facility] Medication Compliance           Hypertension As above.           Prescriptions:       Refill of: Amlodipine  2.5mg Tablet Take 1 tablet(s) by mouth daily  #90 (Ninety) tablet(s) Refills: 1       Refill of: Hydrochlorothiazide (HCTZ) 25mg Tablet Take 1 tablet(s) by mouth daily  #90 (Ninety) tablet(s) Refills: 1       Refill of: Toprol XL (Metoprolol Succinate) 50mg Tablets, Extended Release Take 1 tablet(s) by mouth daily  #90 (Ninety) tablet(s) Refills: 1          Degenerative disc disease As above.            Prescriptions:       Refill of: Meloxicam 15mg Tablet Take 1 tablet(s) by mouth daily  #90 (Ninety) tablet(s) Refills: 1       Refill of: Lyrica (Pregabalin) 75mg Capsules Take 1 capsule(s) by mouth bid  #180 (One [place] and Eighty) capsule(s) Refills: 1          Moderate depression As above.           Prescriptions:       Refill of: Remeron (Mirtazapine) 30mg Tablet Take 1 tablet(s) by mouth at bedtime  #90 (Ninety) tablet(s) Refills: 1          Fatigue     LABORATORY:  Labs ordered to be performed today at [facility] include CBC W/O DIFF, and Vitamin B12.  TSH           Orders:       [id number]  [id number] [facility] without diff  (Send-Out)         [id number]  [id number] [facility] TSH  (Send-Out)         [id number]  [id number] [facility] Vitamin B12  (Send-Out)               Other Prescriptions:       Refill of: Fluticasone Propionate 50mcg/1actuation Nasal Spray 1 spray each nostil daily  #16 (Sixteen) gm Refills: 11       Refill of: Cetirizine HCl 10mg Tablet 1 tab daily  #90 (Ninety) tablet(s) Refills: 1       Refill of: Ventolin HFA (Albuterol) 90mcg/1actuation Oral Inhaler Inhale 2 puff(s) by mouth q 4 to 6 hr prn  #1 (One) inhaler(s) Refills: 11         CHARGE CAPTURE           **Please note: ICD descriptions below are intended for billing purposes only and may not represent clinical diagnoses**        Primary Diagnosis:         272.0 Hypercholesterolemia            E78.4    Other hyperlipidemia              Orders:          [id number]   Office/outpatient visit; established patient, level 4  (In-House)             4004F   Pt scrnd tobacco use rcvd tobacco cessation talk  (In-House)           401.1 Hypertension            I10    Essential (primary) hypertension    722.6 Degenerative disc disease            M51.36    Other intervertebral disc degeneration, lumbar region    296.22 Moderate depression            F32.1    Major depressive disorder, single episode, moderate    780.79 Fatigue            R53.83    Other fatigue

## 2021-05-18 NOTE — PROGRESS NOTES
Eileen Kaufman Ann  1950     Office/Outpatient Visit    Visit Date: Wed, Jan 29, 2020 12:39 pm    Provider: nAa Corrales N.P. (Assistant: Letty Alberts RN)    Location: [facility]        Electronically signed by Ana Corrales N.P. on  01/29/2020 01:04:20 PM                             Subjective:        CC: Ms. Kaufman is a 69 year old  female.  Sinus pressure;         HPI:           Patient to be evaluated for acute upper respiratory infection, unspecified.  These have been present for the past one week.  The symptoms include cough, headache, sinus pain/pressure and post nasal drip.  She denies fever.  She has already tried to relieve the symptoms with mixed cold/sinus preparations.            PHQ-9 Depression Screening: Completed form scanned and in chart; Total Score 0     ROS:     CONSTITUTIONAL:  Negative for chills and fever.      EYES:  Negative for eye drainage and eye pain.      E/N/T:  Positive for sinus pressure and post  nasal drip.   Negative for ear pain or sore throat.      CARDIOVASCULAR:  Negative for chest pain and palpitations.      RESPIRATORY:  Negative for dyspnea and frequent wheezing.      PSYCHIATRIC:  Negative for depression and suicidal thoughts.          Past Medical History / Family History / Social History:         Last Reviewed on 11/14/2019 03:15 PM by Artur Quevedo    Past Medical History:             PREVENTIVE HEALTH MAINTENANCE             BONE DENSITY: was last done 7/16/18 with the following abnormality noted-- Osteopenia     COLORECTAL CANCER SCREENING: Up to date (colonoscopy q10y; sigmoidoscopy q5y; Cologuard q3y) was last done 08/2018, Results are in chart; colonoscopy with normal results     MAMMOGRAM: Done within last 2 years and results in are chart was last done 6/28/19 with normal results         PAST MEDICAL HISTORY         Positive for    Coronary Artery Disease: mild; and    Hypertension;         GYNECOLOGICAL HISTORY:    G2  "P2         CURRENT MEDICAL PROVIDERS:    Cardiologist: Dr. Virgen    Neurologist: Dr. Corbin    Pain Management: [facility] pain management    Pulmonologist: Dr. Flores    Urologist: Dr. Ramires             ADVANCED DIRECTIVES: None - She says that her son, Mitch Guidry, would make decisions for her if needed.         Surgical History:         Arthroscopy: left knee;     Cholecystectomy    Hysterectomy: 80\"s Total;     CTS;    Neck 2015;    cardiac cath 12;         Family History:     Father:  at age 76; Cause of death was MI     Mother:  at age 60's; Cause of death was cerebral aneurysm         Social History:     Occupation: Disabled (due to depression)     Marital Status: Single     Children: 2 children         Tobacco/Alcohol/Supplements: Eileen Alvarez was 23 when she started smoking.  She did stop smoking for some time - about 13 years.  She has been smoking a total of approximately 33 years.  She has smoked less than a pack daily during this time.     Last Reviewed on 2019 03:15 PM by Artur Quevedo    Tobacco: Current Smoker: She currently smokes every day, 1/2 pack per day.   She currently uses smokeless tobacco, vapor cans/pouches daily..          Alcohol: Frequency: Socially         Substance Abuse History:     Last Reviewed on 2019 03:15 PM by Artur Quevedo    None         Mental Health History:     Last Reviewed on 2019 03:15 PM by Artur Quevedo        Communicable Diseases (eg STDs):     Last Reviewed on 2019 03:15 PM by Artur Quevedo        Current Problems:     Last Reviewed on 2019 03:15 PM by Artur Quevedo    Mixed hyperlipidemia    Other intervertebral disc degeneration, lumbar region    Major depressive disorder, single episode, moderate    Clubbing of fingers    Solitary pulmonary nodule    Other emphysema    Other fatigue    Dorsalgia, unspecified    Vitamin D deficiency, unspecified    Elevated white blood " cell count, unspecified    Abnormal weight loss    Other long term (current) drug therapy    Anemia, unspecified    Chronic obstructive pulmonary disease with (acute) exacerbation    Pain in right foot        Immunizations:     Influenza, high dose seasonal 11/5/2019    Prevnar (Pneumococcal PCV 7) 7/5/2018    zzFluzone pf-quadrivalent 3 and up 11/5/2015    Fluarix pf 2/13/2017    Fluzone (3 + years dose) 1/5/2012    Fluzone pf (3+ years dose) 9/20/2013    Pneomovax 3/25/2013    Fluzone High-Dose pf (>=65 yr) 2/21/2019    Influenza Virus Vaccine pf (adult) 9/1/2017    PNEUMOVAX 23 (Pneumococcal PPV23) 2/13/2017        Allergies:     Last Reviewed on 11/14/2019 03:15 PM by Artur Quevedo    Aspirin:      Sulfas:          Current Medications:     Last Reviewed on 11/14/2019 03:15 PM by Artur Quevedo    Remeron 30mg Tablet [Take 1 tablet(s) by mouth at bedtime]    hydroCHLOROthiazide 25 mg oral tablet [Take 1 tablet(s) by mouth daily]    raNITIdine HCl 150 mg oral tablet [Take 1 tablet(s) by mouth bid]    Meloxicam 15 mg oral tablet [Take 1 tablet(s) by mouth daily  ]    Toprol XL 50mg Tablets, Extended Release [Take 1 tablet(s) by mouth daily]    Amlodipine  2.5 mg oral tablet [Take 1 tablet(s) by mouth daily]    Amlodipine  5 mg oral tablet [Take 1 tablet(s) by mouth daily]    Lyrica 75mg Capsules [Take 1 capsule(s) by mouth bid]    Multivitamins     Percocet  7.5-325 mg oral tablet [One PO TID PRN ]    atorvastatin 20 mg oral tablet [1 tab daily]    Ventolin HFA 90mcg/1actuation Oral Inhaler [Inhale 2 puff(s) by mouth q 4 to 6 hr prn]    Fluticasone Propionate 50mcg/1actuation Nasal Spray [1 spray each nostil daily]    cetirizine 10 mg oral tablet [1 tab daily]    Breo Ellipta 100mcg/25mcg Inhalation Powder [Take 1 inhalation(s) by mouth daily]    cyclobenzaprine 10 mg oral tablet [ 1 tablet AT HS prn]    ferrous sulfate 325 mg (65 mg iron) oral tablet [TAKE 1 TABLET BY MOUTH EVERY DAY]         Objective:        Vitals:         Historical:     11/14/2019  BP:   147/85 mm Hg ( (right arm, , sitting, );) 11/14/2019  P:   95bpm ( (right arm (BP Cuff), , sitting, );) 11/14/2019  Wt:   175.4lbs    Current: 1/29/2020 12:48:55 PM    Ht:  5 ft, 4.5 in;  Wt: 167.4 lbs;  BMI: 28.3T: 97.6 F (oral);  BP: 149/90 mm Hg (left arm, sitting);  P: 105 bpm (left arm (BP Cuff), sitting);  sCr: 1.03 mg/dL;  GFR: 55.64        Repeat:     12:49:38 PM  BP:   141/83mm Hg (left arm, sitting)     Exams:     PHYSICAL EXAM:     GENERAL: well developed, well nourished;  no apparent distress;     EYES: PERRL, EOMI     E/N/T: EARS: external auditory canal normal;  both TMs are dull;  NOSE: normal turbinates; bilateral maxillary and bilateral frontal sinus tenderness present; OROPHARYNX: oral mucosa is normal; posterior pharynx shows no exudate and post nasal drip;     NECK: range of motion is normal; trachea is midline;     RESPIRATORY: normal respiratory rate and pattern with no distress; normal breath sounds with no rales, rhonchi, wheezes or rubs;     CARDIOVASCULAR: normal rate; rhythm is regular;     NEUROLOGIC: mental status: alert and oriented x 3; GROSSLY INTACT     PSYCHIATRIC: appropriate affect and demeanor;         Assessment:         J01.90   Acute sinusitis, unspecified       Z13.31   Encounter for screening for depression           ORDERS:         Meds Prescribed:       [New Rx] Zithromax 250 mg oral tablet [take 2 tablets (500 mg) by oral route once daily for 1 day then 1 tablet (250 mg) by oral route once daily for 4 days], #6 (six) tablets, Refills: 0 (zero)         Other Orders:         Depression screen negative  (In-House)                      Plan:         Acute sinusitis, unspecified        RECOMMENDATIONS given include: Push Fluids, Rest, Follow up if no improvement or worsening symptoms like high fevers, vomiting, weakness, or increasing shortness of air.     and Warm showers. Humidifiers. Mucinex per  package instructions. Continue Fluticasone..  MIPS Vaccines Flu and Pneumonia updated in Shot record     FOLLOW-UP: Chronic visit follow up           Prescriptions:       [New Rx] Zithromax 250 mg oral tablet [take 2 tablets (500 mg) by oral route once daily for 1 day then 1 tablet (250 mg) by oral route once daily for 4 days], #6 (six) tablets, Refills: 0 (zero)         Encounter for screening for depression    [facility] PHQ-9 Depression Screening: Completed form scanned and in chart; Total Score 0; Negative Depression Screen           Orders:         Depression screen negative  (In-House)                  Charge Capture:         Primary Diagnosis:     J01.90  Acute sinusitis, unspecified           Orders:      [id number]  Office/outpatient visit; established patient, level 3  (In-House)              Z13.31  Encounter for screening for depression           Orders:        Depression screen negative  (In-House)

## 2021-05-18 NOTE — PROGRESS NOTES
Eileen Kaufman 1950     Office/Outpatient Visit    Visit Date: Sat, Jun 2, 2018 09:32 am    Provider: Preeti Bunn N.P. (Assistant: Maria Isabel Montoya MA)    Location: [facility]        Electronically signed by Preeti Bunn N.P. on  06/05/2018 09:12:22 AM                             SUBJECTIVE:        CC:     Ms. Kaufman is a 68 year old  female.  Discuss Weight/Fatigued;         HPI:         Ms. Kaufman presents with fatigue.  Other details: States daily fatigue. She has recently lost weight and feels this has caused her fatigue. She will not eat one day and then eat a lot another day. She is trying to be consistent. States she had stress in her house and she had to get rid of the person causing the stress. It is better now for her and she is trying to get back on track..  She is taking a multivitamin daily.         Vitamin D: Not currently supplementing, checking level today.      ROS:     CONSTITUTIONAL:  Positive for fatigue.      EYES:  Negative for blurred vision.      CARDIOVASCULAR:  Negative for chest pain, orthopnea, paroxysmal nocturnal dyspnea and pedal edema.      RESPIRATORY:  Negative for dyspnea.      GASTROINTESTINAL:  Negative for abdominal pain, constipation, diarrhea, nausea and vomiting.      NEUROLOGICAL:  Negative for dizziness, headaches, paresthesias, and weakness.      PSYCHIATRIC:  Negative for anxiety, depression, and sleep disturbances.          PMH/FMH/SH:     Last Reviewed on 3/29/2018 06:25 PM by Preeti Bunn    Past Medical History:             PREVENTIVE HEALTH MAINTENANCE             BONE DENSITY: was last done 10/2/13 with the following abnormality noted-- Osteopenia     COLORECTAL CANCER SCREENING: Up to date (colonoscopy q10y; sigmoidoscopy q5y; Cologuard q3y) was last done 04/2013, Results are in chart; colonoscopy with normal results     MAMMOGRAM: Done within last 2 years and results in are chart was last done 06/2017 with normal  results         PAST MEDICAL HISTORY         Positive for    Coronary Artery Disease: mild; and    Hypertension;         GYNECOLOGICAL HISTORY:             CURRENT MEDICAL PROVIDERS:    Cardiologist: Dr. Virgen    Neurologist: Dr. Corbin    Pain Management: [facility] pain management    Pulmonologist: Dr. Flores    Urologist: Dr. Ramires             ADVANCED DIRECTIVES: None         Surgical History:         Arthroscopy: left knee;     Cholecystectomy    Hysterectomy      CTS;    Neck 2015;    cardiac cath 12;         Family History:     Father:  at age 76; Cause of death was MI     Mother:  at age 60's; Cause of death was cerebral aneurysm         Social History:     Occupation: Disabled (due to depression)     Marital Status: Single     Children: 2 children         Tobacco/Alcohol/Supplements:     Last Reviewed on 3/29/2018 06:25 PM by Preeti Bunn    Tobacco: Current Smoker: She currently smokes every day, 1/2 pack per day.          Substance Abuse History:     Last Reviewed on 3/29/2018 06:25 PM by Preeti Bunn    None         Mental Health History:     Last Reviewed on 3/29/2018 06:25 PM by Preeti Bunn        Communicable Diseases (eg STDs):     Last Reviewed on 3/29/2018 06:25 PM by Preeti Bunn            Current Problems:     Last Reviewed on 3/29/2018 06:25 PM by Preeti Bunn    Back pain     Fatigue     Bullous emphysema     Lung nodule     Clubbing of fingers     Moderate depression     Degenerative disc disease     Cervical spinal stenosis     Depression     Hypercholesterolemia     CAD     GERD     Hypertension         Immunizations:     zzFluzone pf-quadrivalent 3 and up 2015     Fluarix pf 2017     Fluzone (3 + years dose) 2012     Fluzone pf (3+ years dose) 2013     Pneomovax 3/25/2013     Influenza Virus Vaccine pf (adult) 2017     PNEUMOVAX 23 (Pneumococcal PPV23) 2017         Allergies:     Last Reviewed on 3/29/2018  06:25 PM by Preeti Bunn    Aspirin:    Sulfas:        Current Medications:     Last Reviewed on 3/29/2018 06:25 PM by Preeti Bunn    Breo Ellipta 100mcg/25mcg Inhalation Powder Take 1 inhalation(s) by mouth daily     Amlodipine  5mg Tablet 1 tab daily     Atorvastatin Calcium 20mg Tablet 1 tab daily     Cetirizine HCl 10mg Tablet 1 tab daily     Fluticasone Propionate 50mcg/1actuation Nasal Spray 1 spray each nostil daily     Hydrochlorothiazide (HCTZ) 25mg Tablet Take 1 tablet(s) by mouth daily     Lyrica 75mg Capsules Take 1 capsule(s) by mouth bid     Meloxicam 15mg Tablet Take 1 tablet(s) by mouth daily     Remeron 30mg Tablet Take 1 tablet(s) by mouth at bedtime     Toprol XL 50mg Tablets, Extended Release Take 1 tablet(s) by mouth daily     Ventolin HFA 90mcg/1actuation Oral Inhaler Inhale 2 puff(s) by mouth q 4 to 6 hr prn     Percocet  7.5mg/325mg Tablet One PO TID PRN     Multivitamins         OBJECTIVE:        Vitals:         Current: 6/2/2018 9:38:57 AM    Ht:  5 ft, 4.5 in;  Wt: 161 lbs;  BMI: 27.2    T: 98.9 F (oral);  BP: 151/101 mm Hg (left arm, sitting);  P: 101 bpm (left arm (BP Cuff), sitting);  sCr: 0.98 mg/dL;  GFR: 58.28        Exams:     PHYSICAL EXAM:     GENERAL: vital signs recorded - well developed, well nourished;  no apparent distress;     EYES: extraocular movements intact; conjunctiva and cornea are normal; PERRLA;     NECK: range of motion is normal; thyroid is non-palpable;     RESPIRATORY: normal respiratory rate and pattern with no distress; normal breath sounds with no rales, rhonchi, wheezes or rubs;     CARDIOVASCULAR: normal rate; rhythm is regular;  no systolic murmur; no edema;     GASTROINTESTINAL: nontender; normal bowel sounds; no organomegaly;     NEUROLOGICAL:  cranial nerves, motor and sensory function, reflexes, gait and coordination are all intact;     PSYCHIATRIC:  appropriate affect and demeanor; normal speech pattern; grossly normal memory;          ASSESSMENT:           780.79   R53.83  Fatigue              DDx:     268.9   E55.9  Vitamin D deficiency, unspecified              DDx:         ORDERS:         Lab Orders:       11316  B12FO - [facility] Vitamin B12 with Folate  (Send-Out)         61454  BDCB2 - [facility] CBC w/o diff  (Send-Out)         48200  COMP - [facility] Comp. Metabolic Panel  (Send-Out)         33452  LPDP - [facility] Lipid Panel  (Send-Out)         95281  THYII - [facility] Thyroid panel with TSH (08580, 05585)  (Send-Out)         38754  VITD - [facility] Vitamin D, 25 Hydroxy  (Send-Out)         APPTO  Appointment need  (In-House)                   PLAN:          Fatigue     LABORATORY:  Labs ordered to be performed today include B12 with Folate, CBC W/O DIFF, Comprehensive metabolic panel, lipid panel, and Thyroid Panel.      FOLLOW-UP: Advised to call if there is no improvement. Schedule a follow-up visit in 3 months..   [facility]           Orders:       44013  B12FO - [facility] Vitamin B12 with Folate  (Send-Out)         72488  BDCB2 - [facility] CBC w/o diff  (Send-Out)         85657  COMP - [facility] Comp. Metabolic Panel  (Send-Out)         97220  LPDP - [facility] Lipid Panel  (Send-Out)         11859  THYII - [facility] Thyroid panel with TSH (17091, 50906)  (Send-Out)         APPTO  Appointment need  (In-House)            Vitamin D deficiency, unspecified           Orders:       84682  VITD - [facility] Vitamin D, 25 Hydroxy  (Send-Out)               Patient Recommendations:        For  Fatigue:     Schedule a follow-up visit in 3 months.                APPOINTMENT INFORMATION:        Monday Tuesday Wednesday Thursday Friday Saturday Sunday            Time:___________________AM  PM   Date:_____________________             CHARGE CAPTURE:           Primary Diagnosis:     780.79 Fatigue            R53.83    Other fatigue              Orders:          41699   Office/outpatient visit; established patient, level 3  (In-House)             APPTO    Appointment need  (In-House)           268.9 Vitamin D deficiency, unspecified            E55.9    Vitamin D deficiency, unspecified

## 2021-05-18 NOTE — PROGRESS NOTES
Eileen Kaufman  1950     Office/Outpatient Visit    Visit Date: Fri, Jan 8, 2021 01:58 pm    Provider: Artur Quevedo MD (Assistant: Letty Alberts RN)    Location: [facility]        Electronically signed by Artur Quevedo MD on  01/11/2021 12:51:39 PM                             Subjective:        CC: blood pressure, anemia, chronic pain    HPI:           Patient to be evaluated for essential (primary) hypertension.  Her current cardiac medication regimen includes a diuretic ( Hydrodiuril ), a beta-blocker ( Toprol-XL ), and a calcium channel blocker ( Norvasc ).  She is tolerating the medication well without side effects.            Eileen Alvarez also has some history of an elevated white blood count and some mild anemia.  She is due for repeat labs for this.          Eileen Alvarez also has chronic pain from disc and low back issues.  She continues to see pain mgmt regularly and takes oxycodone for this.  She also takes Lyrica that we prescribe.  It helps her with some nerve pain.  She has significant pain related to the nerves when she does not take it.  She denies obvious side effects.  She denies abuse, diversion, or doctor shopping.  Kevyn is okay.    ROS:     CONSTITUTIONAL:  Negative for chills and fever.      CARDIOVASCULAR:  Negative for chest pain and palpitations.      RESPIRATORY:  Negative for recent cough and dyspnea.      GASTROINTESTINAL:  Negative for abdominal pain, nausea and vomiting.      INTEGUMENTARY/BREAST:  Negative for atypical mole(s) and rash.          Past Medical History / Family History / Social History:         Last Reviewed on 1/08/2021 01:58 PM by Artur Quevedo    Past Medical History:             PREVENTIVE HEALTH MAINTENANCE             BONE DENSITY: was last done 8/25/20 with the following abnormality noted-- Osteopenia     COLORECTAL CANCER SCREENING: Up to date (colonoscopy q10y; sigmoidoscopy q5y; Cologuard q3y) was last done 08/2018, Results are  "in chart; colonoscopy with normal results     MAMMOGRAM: Done within last 2 years and results in are chart was last done 20 with normal results         PAST MEDICAL HISTORY         Positive for    Coronary Artery Disease: mild; and    Hypertension;         GYNECOLOGICAL HISTORY:             CURRENT MEDICAL PROVIDERS:    Cardiologist: Dr. Virgen    Neurologist: Dr. Corbin    Pain Management: [facility] pain management    Pulmonologist: Dr. Flores    Urologist: Dr. Ramires             ADVANCED DIRECTIVES: None - She says that her son, Mitch Guidry, would make decisions for her if needed.         Surgical History:         Arthroscopy: left knee;     Cholecystectomy    Hysterectomy: 80\"s Total;     CTS;    Neck 2015;    cardiac cath 12;         Family History:     Father:  at age 76; Cause of death was MI     Mother:  at age 60's; Cause of death was cerebral aneurysm         Social History:     Occupation: Disabled (due to depression)     Marital Status: Single     Children: 2 children         Tobacco/Alcohol/Supplements: Eileen Alvarez was 23 when she started smoking.  She did stop smoking for some time - about 13 years.  She has been smoking a total of approximately 33 years.  She has smoked less than a pack daily during this time.     Last Reviewed on 2021 01:58 PM by Artur Quevedo    Tobacco: Current Smoker: She currently smokes every day, 1/2 pack per day.   She currently uses smokeless tobacco, vapor cans/pouches daily..          Alcohol: Frequency: Socially         Substance Abuse History:     Last Reviewed on 2021 01:58 PM by Artur Quevedo    None         Mental Health History:     Last Reviewed on 2021 01:58 PM by Artur Quevedo        Communicable Diseases (eg STDs):     Last Reviewed on 2021 01:58 PM by Artur Quevedo        Current Problems:     Last Reviewed on 2021 01:58 PM by Artur Quevedo    Mixed hyperlipidemia    " Other intervertebral disc degeneration, lumbar region    Major depressive disorder, single episode, moderate    Clubbing of fingers    Solitary pulmonary nodule    Other emphysema    Other fatigue    Dorsalgia, unspecified    Vitamin D deficiency, unspecified    Elevated white blood cell count, unspecified    Abnormal weight loss    Other long term (current) drug therapy    Anemia, unspecified    Chronic obstructive pulmonary disease with (acute) exacerbation    Pain in right foot    Acute sinusitis, unspecified    Encounter for screening for depression    Acute maxillary sinusitis, unspecified    Palpitations    Dizziness and giddiness    Orthostatic hypotension    Essential (primary) hypertension    Contact with and (suspected) exposure to other viral communicable diseases    Encounter for other screening for malignant neoplasm of breast    Asymptomatic menopausal state        Immunizations:     Influenza, high dose seasonal 11/5/2019    Prevnar (Pneumococcal PCV 7) 7/5/2018    zzFluzone pf-quadrivalent 3 and up 11/5/2015    Fluarix pf 2/13/2017    Fluzone (3 + years dose) 1/5/2012    Fluzone pf (3+ years dose) 9/20/2013    Pneomovax 3/25/2013    Fluzone High-Dose pf (>=65 yr) 2/21/2019    Influenza Virus Vaccine pf (adult) 9/1/2017    PNEUMOVAX 23 (Pneumococcal PPV23) 2/13/2017        Allergies:     Last Reviewed on 1/08/2021 01:58 PM by Artur Quevedo    Aspirin:      Sulfas:          Current Medications:     Last Reviewed on 1/08/2021 01:58 PM by Artur Quevedo    mirtazapine 30 mg oral tablet [TAKE 1 TABLET BY MOUTH AT BEDTIME]    hydroCHLOROthiazide 25 mg oral tablet [Take 1 tablet(s) by mouth daily]    meloxicam 15 mg oral tablet [TAKE 1 TABLET BY MOUTH DAILY]    metoprolol succinate 50 mg oral Tablet, Extended Release 24 hr [TAKE 1 TABLET BY MOUTH DAILY]    amLODIPine 2.5 mg oral tablet [TAKE 1 TABLET BY MOUTH DAILY]    amLODIPine 5 mg oral tablet [TAKE 1 TABLET BY MOUTH DAILY]    pregabalin  75 mg oral capsule [TAKE 1 CAPSULE BY MOUTH TWICE DAILY]    Multivitamins     Percocet  7.5-325 mg oral tablet [One PO TID PRN ]    Ventolin HFA 90 mcg/actuation Inhalation HFA Aerosol Inhaler [Inhale 2 puff(s) by mouth q 4 to 6 hr prn]    Fluticasone Propionate 50mcg/1actuation Nasal Spray [1 spray each nostil daily]    cetirizine 10 mg oral tablet [TAKE 1 TABLET BY MOUTH EVERY DAY]    Breo Ellipta 100mcg/25mcg Inhalation Powder [Take 1 inhalation(s) by mouth daily]    cyclobenzaprine 10 mg oral tablet [ 1 tablet AT HS prn]    ferrous sulfate 325 mg (65 mg iron) oral tablet [TAKE 1 TABLET BY MOUTH EVERY DAY]    montelukast 10 mg oral tablet [TAKE 1 TABLET BY MOUTH EVERY EVENING]        Objective:        Vitals:         Current: 1/8/2021 2:18:47 PM    Ht:  5 ft, 4.5 in;  Wt: 180 lbs (Estimated);  BMI: 30.4T: 96.2 F (temporal);  BP: 125/85 mm Hg (right arm, sitting);  P: 87 bpm (right arm (BP Cuff), sitting);  sCr: 0.86 mg/dL;  GFR: 67.81        Exams:     PHYSICAL EXAM:     GENERAL: vital signs recorded - well developed, well nourished;  no apparent distress;     EYES: extraocular movements intact; conjunctiva and cornea are normal; PERRL;     NECK: range of motion is normal; thyroid is non-palpable;     RESPIRATORY: normal respiratory rate and pattern with no distress; decreased breath sounds;     CARDIOVASCULAR: normal rate; rhythm is regular;  no systolic murmur; no edema;     GASTROINTESTINAL: nontender; normal bowel sounds; no organomegaly;     LYMPHATIC: no enlargement of cervical or facial nodes; no supraclavicular nodes;     BREAST/INTEGUMENT: SKIN: no significant rashes or lesions; no suspicious moles;     NEUROLOGIC: mental status: alert and oriented x 3; cranial nerves II-XII grossly intact;     PSYCHIATRIC: appropriate affect and demeanor; normal psychomotor function;         Assessment:         I10   Essential (primary) hypertension       D72.829   Elevated white blood cell count, unspecified        M51.36   Other intervertebral disc degeneration, lumbar region       Z79.899   Other long term (current) drug therapy       D50.9   Iron deficiency anemia, unspecified       F17.200   Nicotine dependence, unspecified, uncomplicated           ORDERS:         Meds Prescribed:       [Refilled] Ventolin HFA 90 mcg/actuation Inhalation HFA Aerosol Inhaler [Inhale 2 puff(s) by mouth q 4 to 6 hr prn], #1 (one) applicator, Refills: 11 (eleven)       [Refilled] hydroCHLOROthiazide 25 mg oral tablet [Take 1 tablet(s) by mouth daily], #90 (ninety) tablets, Refills: 1 (one)       [Refilled] mirtazapine 30 mg oral tablet [TAKE 1 TABLET BY MOUTH AT BEDTIME], #90 (ninety) tablets, Refills: 1 (one)       [Refilled] amLODIPine 5 mg oral tablet [TAKE 1 TABLET BY MOUTH DAILY], #90 (ninety) tablets, Refills: 1 (one)       [Refilled] meloxicam 15 mg oral tablet [TAKE 1 TABLET BY MOUTH DAILY], #90 (ninety) tablets, Refills: 1 (one)       [Refilled] pregabalin 75 mg oral capsule [TAKE 1 CAPSULE BY MOUTH TWICE DAILY], #60 (sixty) capsules, Refills: 2 (two)       [Refilled] cetirizine 10 mg oral tablet [TAKE 1 TABLET BY MOUTH EVERY DAY], #90 (ninety) tablets, Refills: 1 (one)       [Refilled] metoprolol succinate 50 mg oral Tablet, Extended Release 24 hr [TAKE 1 TABLET BY MOUTH DAILY], #90 (ninety) tablets, Refills: 1 (one)       [Refilled] montelukast 10 mg oral tablet [TAKE 1 TABLET BY MOUTH EVERY EVENING], #90 (ninety) tablets, Refills: 1 (one)         Radiology/Test Orders:       3017F  Colorectal CA screen results documented and reviewed (PV)  (In-House)              Lab Orders:       06221  COMP - [facility] Comp. Metabolic Panel  (Send-Out)            28070  BDCBC - [facility] CBC with 3 part diff  (Send-Out)            24703  FERR - [facility] Ferritin Serum  (Send-Out)            64873  IRONP - [facility] Iron and TIBC  (Send-Out)              Procedures Ordered:         Low Dose CT scan (LDCT) for lung cancer screening  (Send-Out)              Other  Orders:       1101F  Pt screen for fall risk; document no falls in past year or only 1 fall w/o injury in past year (DAVDI)  (In-House)              Screening mammogram results documented  (Send-Out)            1124F  Advance Care Planning discussed and doc in MR; no surrogate named or advance care plan provided  (Send-Out)              Counseling visit to discuss lung cancer screening using low dose CT scan  (In-House)                      Plan:         Essential (primary) hypertension    LABORATORY:  Labs ordered to be performed today include Comprehensive metabolic panel.      RECOMMENDATIONS given include: Today, we have reviewed Eileen Alvarez's care.  She seems well overall.  I am going to update labs as noted below and follow from there.  Lyrica is helpful for her and will be continued..  JANESSA Has had no falls or only one fall without injury in the past year Vaccines Flu and Pneumonia updated in Shot record Screening mammomgram done within last 2 years and results in are chart Colorectal Cancer Screening is up to date and the results are in the chart Advance Directive/Surrogate Decision Maker discussed and pt declines to complete today  Smoking cessation encouraged. Counseling for less than 3 minutes.            Prescriptions:       [Refilled] Ventolin HFA 90 mcg/actuation Inhalation HFA Aerosol Inhaler [Inhale 2 puff(s) by mouth q 4 to 6 hr prn], #1 (one) applicator, Refills: 11 (eleven)       [Refilled] hydroCHLOROthiazide 25 mg oral tablet [Take 1 tablet(s) by mouth daily], #90 (ninety) tablets, Refills: 1 (one)       [Refilled] mirtazapine 30 mg oral tablet [TAKE 1 TABLET BY MOUTH AT BEDTIME], #90 (ninety) tablets, Refills: 1 (one)       [Refilled] amLODIPine 5 mg oral tablet [TAKE 1 TABLET BY MOUTH DAILY], #90 (ninety) tablets, Refills: 1 (one)       [Refilled] meloxicam 15 mg oral tablet [TAKE 1 TABLET BY MOUTH DAILY], #90 (ninety) tablets, Refills: 1 (one)       [Refilled] pregabalin 75 mg oral capsule  [TAKE 1 CAPSULE BY MOUTH TWICE DAILY], #60 (sixty) capsules, Refills: 2 (two)       [Refilled] cetirizine 10 mg oral tablet [TAKE 1 TABLET BY MOUTH EVERY DAY], #90 (ninety) tablets, Refills: 1 (one)       [Refilled] metoprolol succinate 50 mg oral Tablet, Extended Release 24 hr [TAKE 1 TABLET BY MOUTH DAILY], #90 (ninety) tablets, Refills: 1 (one)       [Refilled] montelukast 10 mg oral tablet [TAKE 1 TABLET BY MOUTH EVERY EVENING], #90 (ninety) tablets, Refills: 1 (one)           Orders:       26535  COMP - [facility] Comp. Metabolic Panel  (Send-Out)            1101F  Pt screen for fall risk; document no falls in past year or only 1 fall w/o injury in past year (DAVID)  (In-House)              Screening mammogram results documented  (Send-Out)            3017F  Colorectal CA screen results documented and reviewed (PV)  (In-House)            1124F  Advance Care Planning discussed and doc in MR; no surrogate named or advance care plan provided  (Send-Out)              Elevated white blood cell count, unspecified    LABORATORY:  Labs ordered to be performed today include CBC.            Orders:       14012  BDCBC - [facility] CBC with 3 part diff  (Send-Out)              Other intervertebral disc degeneration, lumbar regionAs above.        Other long term (current) drug therapyAs above.        Iron deficiency anemia, unspecified    LABORATORY:  Labs ordered to be performed today include Anemia profile ferritin Serum iron.            Orders:       60305  FERR - [facility] Ferritin Serum  (Send-Out)            01040  IRONP - [facility] Iron and TIBC  (Send-Out)              Nicotine dependence, unspecified, uncomplicated    LDCT Counseling: Discussed benefits/harms of screening, follow-up diagnostic testing, over-diagnosis, false positive rate, and total radiation exposure. Counseled on importance of adherence to annual LDCT screenings, impact of co-morbidities, and ability/willingness to undergo diagnosis and treatment. Counseled on the  importance of smoking cessation. I will order the Low Dose CT today.            Orders:         Counseling visit to discuss lung cancer screening using low dose CT scan  (In-House)              Low Dose CT scan (LDCT) for lung cancer screening  (Send-Out)                  Charge Capture:         Primary Diagnosis:     I10  Essential (primary) hypertension           Orders:      64873  Office/outpatient visit; established patient, level 4  (In-House)            1101F  Pt screen for fall risk; document no falls in past year or only 1 fall w/o injury in past year (DAVID)  (In-House)            3017F  Colorectal CA screen results documented and reviewed (PV)  (In-House)              D72.829  Elevated white blood cell count, unspecified     M51.36  Other intervertebral disc degeneration, lumbar region     Z79.899  Other long term (current) drug therapy     D50.9  Iron deficiency anemia, unspecified     F17.200  Nicotine dependence, unspecified, uncomplicated           Orders:        Counseling visit to discuss lung cancer screening using low dose CT scan  (In-House)

## 2021-05-18 NOTE — PROGRESS NOTES
Eileen Kaufman 1950     Office/Outpatient Visit    Visit Date: Thu, Mar 29, 2018 06:08 pm    Provider: Preeti Bunn N.P. (Assistant: Soumya Benavidez MA)    Location: [facility]        Electronically signed by Preeti Bunn N.P. on  2018 07:09:38 PM                             SUBJECTIVE:        HPI:         Patient to be evaluated for back pain.  Pt states low back pain x 1 weeks. States it has been getting worse with pain going down L leg. States getting in hot water and soak to relax. States having back massages in the past.      ROS:     CONSTITUTIONAL:  Negative for chills, fatigue, fever, and weight change.      EYES:  Negative for blurred vision.      CARDIOVASCULAR:  Negative for chest pain, orthopnea, paroxysmal nocturnal dyspnea and pedal edema.      RESPIRATORY:  Negative for dyspnea.      GASTROINTESTINAL:  Negative for abdominal pain, constipation, diarrhea, nausea and vomiting.      MUSCULOSKELETAL:  Positive for back pain.      NEUROLOGICAL:  Negative for dizziness, headaches, paresthesias, and weakness.      PSYCHIATRIC:  Negative for anxiety, depression, and sleep disturbances.          PMH/FMH/SH:     Last Reviewed on 3/29/2018 06:25 PM by Preeti Bunn    Past Medical History:             PREVENTIVE HEALTH MAINTENANCE             BONE DENSITY: was last done 10/2/13 with the following abnormality noted-- Osteopenia     COLORECTAL CANCER SCREENING: Up to date (colonoscopy q10y; sigmoidoscopy q5y; Cologuard q3y) was last done 2013, Results are in chart; colonoscopy with normal results     MAMMOGRAM: Done within last 2 years and results in are chart was last done 2017 with normal results         PAST MEDICAL HISTORY         Positive for    Coronary Artery Disease: mild; and    Hypertension;         GYNECOLOGICAL HISTORY:             CURRENT MEDICAL PROVIDERS:    Cardiologist: Dr. Virgen    Neurologist: Dr. Corbin    Pain Management: [facility]  management    Pulmonologist: Dr. Flores    Urologist: Dr. Ramires             ADVANCED DIRECTIVES: None         Surgical History:         Arthroscopy: left knee;     Cholecystectomy    Hysterectomy      CTS;    Neck 2015;    cardiac cath 12;         Family History:     Father:  at age 76; Cause of death was MI     Mother:  at age 60's; Cause of death was cerebral aneurysm         Social History:     Occupation: Disabled (due to depression)     Marital Status: Single     Children: 2 children         Tobacco/Alcohol/Supplements:     Last Reviewed on 3/29/2018 06:25 PM by Preeti Bunn    Tobacco: Current Smoker: She currently smokes every day, 1/2 pack per day.          Substance Abuse History:     Last Reviewed on 3/29/2018 06:25 PM by Preeti Bunn    None         Mental Health History:     Last Reviewed on 3/29/2018 06:25 PM by Preeti Bunn        Communicable Diseases (eg STDs):     Last Reviewed on 3/29/2018 06:25 PM by Preeti Bunn            Current Problems:     Last Reviewed on 3/29/2018 06:25 PM by Preeti Bunn    Fatigue     Bullous emphysema     Lung nodule     Clubbing of fingers     Moderate depression     Degenerative disc disease     Cervical spinal stenosis     Depression     Hypercholesterolemia     CAD     GERD     Hypertension     Cough         Immunizations:     zzFluzone pf-quadrivalent 3 and up 2015     Fluarix pf 2017     Fluzone (3 + years dose) 2012     Fluzone pf (3+ years dose) 2013     Pneomovax 3/25/2013     Influenza Virus Vaccine pf (adult) 2017     PNEUMOVAX 23 (Pneumococcal PPV23) 2017         Allergies:     Last Reviewed on 3/29/2018 06:25 PM by Preeti Bunn    Aspirin:    Sulfas:        Current Medications:     Last Reviewed on 3/29/2018 06:25 PM by Preeti Bunn    Breo Ellipta 100mcg/25mcg Inhalation Powder Take 1 inhalation(s) by mouth daily     Amlodipine  5mg Tablet 1 tab daily     Atorvastatin  Calcium 20mg Tablet 1 tab daily     Cetirizine HCl 10mg Tablet 1 tab daily     Fluticasone Propionate 50mcg/1actuation Nasal Spray 1 spray each nostil daily     Hydrochlorothiazide (HCTZ) 25mg Tablet Take 1 tablet(s) by mouth daily     Lyrica 75mg Capsules Take 1 capsule(s) by mouth bid     Meloxicam 15mg Tablet Take 1 tablet(s) by mouth daily     Remeron 30mg Tablet Take 1 tablet(s) by mouth at bedtime     Toprol XL 50mg Tablets, Extended Release Take 1 tablet(s) by mouth daily     Ventolin HFA 90mcg/1actuation Oral Inhaler Inhale 2 puff(s) by mouth q 4 to 6 hr prn     Percocet  7.5mg/325mg Tablet One PO TID PRN     Multivitamins         OBJECTIVE:        Vitals:         Current: 3/29/2018 6:13:00 PM    Ht:  5 ft, 4.5 in;  Wt: 176 lbs;  BMI: 29.7    T: 98.9 F (oral);  BP: 134/88 mm Hg (left arm, sitting);  P: 78 bpm (left arm (BP Cuff), sitting);  sCr: 0.98 mg/dL;  GFR: 60.53        Exams:     PHYSICAL EXAM:     GENERAL: vital signs recorded - well developed, well nourished;  no apparent distress;     EYES: extraocular movements intact; conjunctiva and cornea are normal; PERRLA;     NECK: range of motion is normal; thyroid is non-palpable;     RESPIRATORY: normal respiratory rate and pattern with no distress; normal breath sounds with no rales, rhonchi, wheezes or rubs;     CARDIOVASCULAR: normal rate; rhythm is regular;  no systolic murmur; no edema;     MUSCULOSKELETAL: low back tenderness,  L SLR pos at 10 degrees.;     NEUROLOGICAL:  cranial nerves, motor and sensory function, reflexes, gait and coordination are all intact;     PSYCHIATRIC:  appropriate affect and demeanor; normal speech pattern; grossly normal memory;         Procedures:     Back pain     1. Kenalog 60 mg given IM in the left hip; administered by AW;  lot number [id number]; expires 8/2019             ASSESSMENT:           724.5   M54.9  Back pain              DDx:         ORDERS:         Meds Prescribed:       Robaxin (Methocarbamol) 500mg Tablet  1 tab HS PRN  #20 (Twenty) tablet(s) Refills: 1         Other Orders:       [id number]  Therapeutic injection  (In-House)           Kenalog, per 10 mg (x6)                 PLAN:          Back pain     Steroids Kenalog 60 mg 1.5 ml     FOLLOW-UP: Advised to call if there is no improvement..   Chronic visit follow up           Prescriptions:       Robaxin (Methocarbamol) 500mg Tablet 1 tab HS PRN  #20 (Twenty) tablet(s) Refills: 1           Orders:       [id number]  Therapeutic injection  (In-House)                     Kenalog, per 10 mg (x6)           Patient Education Handouts:       Sciatica              Patient Recommendations:        For  Back pain:                     APPOINTMENT INFORMATION:        Monday Tuesday Wednesday Thursday Friday Saturday Sunday            Time:___________________AM  PM   Date:_____________________             CHARGE CAPTURE:           Primary Diagnosis:     724.5 Back pain            M54.9    Dorsalgia, unspecified              Orders:          [id number]   Office/outpatient visit; established patient, level 3  (In-House)             [id number]   Therapeutic injection  (In-House)                                           Kenalog, per 10 mg (x6)

## 2021-05-18 NOTE — PROGRESS NOTES
Eileen KaufmanAshley 1950     Office/Outpatient Visit    Visit Date: Tue, Jun 12, 2018 03:44 pm    Provider: Preeti Bunn N.P. (Assistant: Johana Antonio MA)    Location: [facility]        Electronically signed by Preeti Bunn N.P. on  06/15/2018 11:47:48 PM                             SUBJECTIVE:        CC:     Ms. Kaufman is a 68 year old  female.  joint pain & unable to move aroung well, NKI;         HPI:         Patient to be evaluated for back pain.  Other details: Pt states back, neck, hips, legs. States chronic issues and now with a flare. She can't turn to get out of her car. She has to turn her whole body. States having to stay in her bed. She has muscle relaxers but just called for a refill. She will pick that up today..  She has been to PT in the past. States it did not help her. It caused other areas of her body to flare. She will also be picking up her mobic today too. Instructed to take after today d/t injection.     ROS:     CONSTITUTIONAL:  Negative for chills, fatigue, fever, and weight change.      EYES:  Negative for blurred vision.      CARDIOVASCULAR:  Negative for chest pain, orthopnea, paroxysmal nocturnal dyspnea and pedal edema.      RESPIRATORY:  Negative for dyspnea.      GASTROINTESTINAL:  Negative for abdominal pain, constipation, diarrhea, nausea and vomiting.      MUSCULOSKELETAL:  Positive for back pain.      NEUROLOGICAL:  Negative for dizziness, headaches, paresthesias, and weakness.      PSYCHIATRIC:  Negative for anxiety, depression, and sleep disturbances.          PM/FMH/:     Last Reviewed on 6/02/2018 09:48 AM by Preeti Bunn    Past Medical History:             PREVENTIVE HEALTH MAINTENANCE             BONE DENSITY: was last done 10/2/13 with the following abnormality noted-- Osteopenia     COLORECTAL CANCER SCREENING: Up to date (colonoscopy q10y; sigmoidoscopy q5y; Cologuard q3y) was last done 04/2013, Results are in chart;  colonoscopy with normal results     MAMMOGRAM: Done within last 2 years and results in are chart was last done 2017 with normal results         PAST MEDICAL HISTORY         Positive for    Coronary Artery Disease: mild; and    Hypertension;         GYNECOLOGICAL HISTORY:             CURRENT MEDICAL PROVIDERS:    Cardiologist: Dr. Virgen    Neurologist: Dr. Corbin    [facility]    Pulmonologist: Dr. Flores    Urologist: Dr. Ramires             ADVANCED DIRECTIVES: None         Surgical History:         Arthroscopy: left knee;     Cholecystectomy    Hysterectomy      CTS;    Neck 2015;    cardiac cath 12;         Family History:     Father:  at age 76; Cause of death was MI     Mother:  at age 60's; Cause of death was cerebral aneurysm         Social History:     Occupation: Disabled (due to depression)     Marital Status: Single     Children: 2 children         Tobacco/Alcohol/Supplements:     Last Reviewed on 2018 09:48 AM by Preeti Bunn    Tobacco: Current Smoker: She currently smokes every day, 1/2 pack per day.          Substance Abuse History:     Last Reviewed on 2018 09:48 AM by Preeti Bunn    None         Mental Health History:     Last Reviewed on 2018 09:48 AM by Preeti Bunn        Communicable Diseases (eg STDs):     Last Reviewed on 2018 09:48 AM by Preeti Bunn            Current Problems:     Last Reviewed on 2018 09:48 AM by Preeti Bunn    Leukocytosis, unspecified     Vitamin D deficiency, unspecified     Fatigue     Back pain     Bullous emphysema     Lung nodule     Clubbing of fingers     Moderate depression     Degenerative disc disease     Cervical spinal stenosis     Depression     Hypercholesterolemia     CAD     GERD     Hypertension         Immunizations:     zzFluzone pf-quadrivalent 3 and up 2015     Fluarix pf 2017     Fluzone (3 + years dose) 2012     Fluzone  pf (3+ years dose) 9/20/2013     Pneomovax 3/25/2013     Influenza Virus Vaccine pf (adult) 9/1/2017     PNEUMOVAX 23 (Pneumococcal PPV23) 2/13/2017         Allergies:     Last Reviewed on 6/02/2018 09:48 AM by Preeti Bunn    Aspirin:    Sulfas:        Current Medications:     Last Reviewed on 6/02/2018 09:48 AM by Preeti Bunn    Robaxin 500mg Tablets 1 tab HS PRN     Breo Ellipta 100mcg/25mcg Inhalation Powder Take 1 inhalation(s) by mouth daily     Amlodipine  5mg Tablet 1 tab daily     Atorvastatin Calcium 20mg Tablet 1 tab daily     Cetirizine HCl 10mg Tablet 1 tab daily     Fluticasone Propionate 50mcg/1actuation Nasal Spray 1 spray each nostil daily     Hydrochlorothiazide (HCTZ) 25mg Tablet Take 1 tablet(s) by mouth daily     Lyrica 75mg Capsules Take 1 capsule(s) by mouth bid     Meloxicam 15mg Tablet Take 1 tablet(s) by mouth daily     Remeron 30mg Tablet Take 1 tablet(s) by mouth at bedtime     Toprol XL 50mg Tablets, Extended Release Take 1 tablet(s) by mouth daily     Ventolin HFA 90mcg/1actuation Oral Inhaler Inhale 2 puff(s) by mouth q 4 to 6 hr prn     Percocet  7.5mg/325mg Tablet One PO TID PRN     Multivitamins     Cyclobenzaprine HCl 10mg Tablet 1 tablet AT HS prn         OBJECTIVE:        Vitals:         Current: 6/12/2018 3:46:50 PM    Ht:  5 ft, 4.5 in;  Wt: 164 lbs;  BMI: 27.7    T: 98 F (oral);  BP: 131/87 mm Hg (left arm, sitting);  P: 99 bpm (left arm (BP Cuff), sitting);  sCr: 0.72 mg/dL;  GFR: 79.95        Exams:     PHYSICAL EXAM:     GENERAL: vital signs recorded - well developed, well nourished;  no apparent distress;     EYES: extraocular movements intact; conjunctiva and cornea are normal; PERRLA;     NECK: range of motion is normal; thyroid is non-palpable;     RESPIRATORY: normal respiratory rate and pattern with no distress; normal breath sounds with no rales, rhonchi, wheezes or rubs;     CARDIOVASCULAR: normal rate; rhythm is regular;  no systolic murmur; no edema;      MUSCULOSKELETAL: Low back tenderness     NEUROLOGICAL:  cranial nerves, motor and sensory function, reflexes, gait and coordination are all intact;     PSYCHIATRIC:  appropriate affect and demeanor; normal speech pattern; grossly normal memory;         Procedures:     Back pain     1. Toradol 30 mg given IM in the right hip; administered by kendra;  lot number [id number]; expires 06/19             ASSESSMENT:           724.5   M54.9  Back pain              DDx:         ORDERS:         Lab Orders:       APPTO  Appointment need  (In-House)           Other Orders:       [id number]  Therapeutic injection  (In-House)           Toradol, per 15 mg (x1)                 PLAN:          Back pain     Narcotic or pain medication Toradol 60 mg     FOLLOW-UP: Schedule a follow-up visit in 2 months..   appointment to be scheduled with [facility] visit follow up           Orders:       [id number]  Therapeutic injection  (In-House)                     Toradol, per 15 mg (x1)       APPTO  Appointment need  (In-House)             Patient Education Handouts:       Acute Low Back Pain              Patient Recommendations:        For  Back pain:     Schedule a follow-up visit in 2 months.                APPOINTMENT INFORMATION:        Monday Tuesday Wednesday Thursday Friday Saturday Sunday            Time:___________________AM  PM   Date:_____________________             CHARGE CAPTURE:           Primary Diagnosis:     724.5 Back pain            M54.9    Dorsalgia, unspecified              Orders:          [id number]   Office/outpatient visit; established patient, level 3  (In-House)             81712   Therapeutic injection  (In-House)                                           Toradol, per 15 mg (x1)           APPTO   Appointment need  (In-House)

## 2021-05-18 NOTE — PROGRESS NOTES
Eileen Kaufman 1950     Office/Outpatient Visit    Visit Date:  03:51 pm    Provider: Artur Quevedo MD (Assistant: Maria Isabel Montoya MA)    Location: [facility]        Electronically signed by Artur Quevedo MD on  2018 05:15:57 PM                             SUBJECTIVE:        CC: cough         HPI:         Ms. Kaufman presents with cough.  It has been present for the past one week.  Respiratory symptoms include chest congestion, cough, chest tightness and shortness of breath.  Other symptoms include subjective fever,  nasal congestion and sinus pain/pressure.  Sputum is described as scant.  She denies exposure to ill contacts.      ROS:         CARDIOVASCULAR:  Negative for chest pain and palpitations.      GASTROINTESTINAL:  Negative for abdominal pain, nausea and vomiting.      INTEGUMENTARY/BREAST:  Negative for atypical mole(s) and rash.          [facility]/:     Last Reviewed on 2018 08:00 AM by Artur Quevedo    Past Medical History:             PREVENTIVE HEALTH MAINTENANCE             BONE DENSITY: was last done 10/2/13 with the following abnormality noted-- Osteopenia     COLORECTAL CANCER SCREENING: Up to date (colonoscopy q10y; sigmoidoscopy q5y; Cologuard q3y) was last done 2013, Results are in chart; colonoscopy with normal results     MAMMOGRAM: Done within last 2 years and results in are chart was last done 2017 with normal results         PAST MEDICAL HISTORY         Positive for    Coronary Artery Disease: mild; and    Hypertension;         GYNECOLOGICAL HISTORY:             CURRENT MEDICAL PROVIDERS:    Cardiologist: Dr. Virgen    Neurologist: Dr. Corbin    Pain Management: [facility] pain management    Pulmonologist: Dr. Flores    Urologist: Dr. Ramires             ADVANCED DIRECTIVES: None         Surgical History:         Arthroscopy: left knee;     Cholecystectomy    Hysterectomy      CTS;    Neck 2015;    cardiac cath  12;         Family History:     Father:  at age 76; Cause of death was MI     Mother:  at age 60's; Cause of death was cerebral aneurysm         Social History:     Occupation: Disabled (due to depression)     Marital Status: Single     Children: 2 children         Tobacco/Alcohol/Supplements:     Last Reviewed on 2018 08:00 AM by Artur Quevedo    Tobacco: Current Smoker: She currently smokes every day, 1/2 pack per day.          Substance Abuse History:     Last Reviewed on 2018 08:00 AM by Artur Quevedo    None         Mental Health History:     Last Reviewed on 2018 08:00 AM by Artur Quevedo        Communicable Diseases (eg STDs):     Last Reviewed on 2018 08:00 AM by Artur Quevedo            Current Problems:     Last Reviewed on 2018 08:00 AM by Artur Quevedo    Fatigue     Bullous emphysema     Lung nodule     Clubbing of fingers     Moderate depression     Degenerative disc disease     Cervical spinal stenosis     Depression     Hypercholesterolemia     CAD     GERD     Hypertension         Immunizations:     Fluzone pf-quadrivalent 3 and up 2015     Fluarix pf 2017     Fluzone (3 + years dose) 2012     Fluzone pf (3+ years dose) 2013     Pneomovax 3/25/2013     Influenza Virus Vaccine pf (adult) 2017     PNEUMOVAX 23 (Pneumococcal PPV23) 2017         Allergies:     Last Reviewed on 2018 08:00 AM by Artur Quevedo    Aspirin:    Sulfas:        Current Medications:     Last Reviewed on 2018 08:00 AM by Artur Quevedo    Breo Ellipta 100mcg/25mcg Inhalation Powder Take 1 inhalation(s) by mouth daily     Amlodipine  5mg Tablet 1 tab daily     Atorvastatin Calcium 20mg Tablet 1 tab daily     Cetirizine HCl 10mg Tablet 1 tab daily     Fluticasone Propionate 50mcg/1actuation Nasal Spray 1 spray each nostil daily     Hydrochlorothiazide (HCTZ) 25mg Tablet Take 1 tablet(s) by mouth  daily     Lyrica 75mg Capsules Take 1 capsule(s) by mouth bid     Meloxicam 15mg Tablet Take 1 tablet(s) by mouth daily     Remeron 30mg Tablet Take 1 tablet(s) by mouth at bedtime     Toprol XL 50mg Tablets, Extended Release Take 1 tablet(s) by mouth daily     Ventolin HFA 90mcg/1actuation Oral Inhaler Inhale 2 puff(s) by mouth q 4 to 6 hr prn     Percocet  7.5mg/325mg Tablet One PO TID PRN     Multivitamins     Medrol 4mg Dosepak Take as directed         OBJECTIVE:        Vitals:         Current: 2/5/2018 3:53:32 PM    Ht:  5 ft, 4.5 in;  Wt: 171.2 lbs;  BMI: 28.9    T: 97.7 F (oral);  BP: 131/83 mm Hg (right arm, sitting);  P: 92 bpm (right arm (BP Cuff), sitting);  sCr: 0.98 mg/dL;  GFR: 60.60        Exams:     PHYSICAL EXAM:     GENERAL: vital signs recorded - well developed, well nourished;  no apparent distress;     EYES: extraocular movements intact; conjunctiva and cornea are normal; PERRLA;     E/N/T:  normal EACs, TMs, nasal/oral mucosa, teeth, gingiva, and oropharynx;     NECK: range of motion is normal; thyroid is non-palpable;     RESPIRATORY: normal respiratory rate and pattern with no distress; decreased breath sounds;     CARDIOVASCULAR: normal rate; rhythm is regular;  no systolic murmur; no edema;     GASTROINTESTINAL: nontender; normal bowel sounds; no organomegaly;     BREAST/INTEGUMENT: SKIN: no significant rashes or lesions; no suspicious moles;         Lab/Test Results:             Influenza A and B:  Negative (02/05/2018),     Performed by::  tls (02/05/2018),             ASSESSMENT           786.2   R05  Cough              DDx:         ORDERS:         Meds Prescribed:       Cefdinir 300mg Capsules Take 1 capsule(s) by mouth q12h for 10 days  #20 (Twenty) capsule(s) Refills: 0       Prednisone 20mg Tablet Take  2 tablet(s) by mouth qd  #10 (Ten) tablet(s) Refills: 0         Lab Orders:       [id number]  Infectious agent antigen detection by immunoassay; Influenza  (In-House)         88366-68   Infectious agent antigen detection by immunoassay; Influenza  (In-House)           Other Orders:       [id number]  Pt scrnd tobacco use rcvd tobacco cessation talk  (In-House)                   PLAN:          Cough     LABORATORY:  Labs ordered to be performed today include Flu A&B Flu A Flu B.      RECOMMENDATIONS given include: We will cover Eileen Alvarez as noted below for some lingering bronchitis.  If she does not improve, she will let us know.  We will contact her if the flu test comes back positive..  She missed work today and will return when she is due to. MIPS Smoking cessation encouraged. Counseling for less than 3 minutes.            Prescriptions:       Cefdinir 300mg Capsules Take 1 capsule(s) by mouth q12h for 10 days  #20 (Twenty) capsule(s) Refills: 0       Prednisone 20mg Tablet Take  2 tablet(s) by mouth qd  #10 (Ten) tablet(s) Refills: 0           Orders:       [id number]  Infectious agent antigen detection by immunoassay; Influenza  (In-House)         43894-63  Infectious agent antigen detection by immunoassay; Influenza  (In-House)         4004F  Pt scrnd tobacco use rcvd tobacco cessation talk  (In-House)             Patient Education Handouts:       [facility] [place] Medication Compliance              CHARGE CAPTURE           **Please note: ICD descriptions below are intended for billing purposes only and may not represent clinical diagnoses**        Primary Diagnosis:         786.2 Cough            R05    Cough              Orders:          [id number]   Office/outpatient visit; established patient, level 3  (In-House)             17138   Infectious agent antigen detection by immunoassay; Influenza  (In-House)             57805 -59  Infectious agent antigen detection by immunoassay; Influenza  (In-House)             4004F   Pt scrnd tobacco use rcvd tobacco cessation talk  (In-House)

## 2021-06-17 ENCOUNTER — TELEPHONE (OUTPATIENT)
Dept: FAMILY MEDICINE CLINIC | Age: 71
End: 2021-06-17

## 2021-06-17 ENCOUNTER — OFFICE VISIT (OUTPATIENT)
Dept: FAMILY MEDICINE CLINIC | Age: 71
End: 2021-06-17

## 2021-06-17 VITALS
HEART RATE: 91 BPM | DIASTOLIC BLOOD PRESSURE: 98 MMHG | TEMPERATURE: 97.9 F | SYSTOLIC BLOOD PRESSURE: 122 MMHG | BODY MASS INDEX: 30.39 KG/M2 | HEIGHT: 64 IN | WEIGHT: 178 LBS

## 2021-06-17 DIAGNOSIS — R51.9 CHRONIC NONINTRACTABLE HEADACHE, UNSPECIFIED HEADACHE TYPE: Primary | ICD-10-CM

## 2021-06-17 DIAGNOSIS — G89.29 CHRONIC NONINTRACTABLE HEADACHE, UNSPECIFIED HEADACHE TYPE: Primary | ICD-10-CM

## 2021-06-17 DIAGNOSIS — R35.0 FREQUENCY OF MICTURITION: ICD-10-CM

## 2021-06-17 PROBLEM — M47.816 LUMBAR SPONDYLOSIS: Status: ACTIVE | Noted: 2019-07-19

## 2021-06-17 PROBLEM — I10 HIGH BLOOD PRESSURE: Status: ACTIVE | Noted: 2021-06-17

## 2021-06-17 PROBLEM — J44.9 CHRONIC OBSTRUCTIVE PULMONARY DISEASE: Status: ACTIVE | Noted: 2021-06-17

## 2021-06-17 PROBLEM — J30.9 ALLERGIC RHINITIS: Status: ACTIVE | Noted: 2018-07-25

## 2021-06-17 PROBLEM — E83.52 HYPERCALCEMIA: Status: ACTIVE | Noted: 2021-06-17

## 2021-06-17 PROBLEM — I10 HIGH BLOOD PRESSURE: Status: RESOLVED | Noted: 2021-06-17 | Resolved: 2021-06-17

## 2021-06-17 PROBLEM — M79.7 FIBROMYALGIA: Status: ACTIVE | Noted: 2021-06-17

## 2021-06-17 PROBLEM — E78.00 HIGH CHOLESTEROL: Status: ACTIVE | Noted: 2021-06-17

## 2021-06-17 PROBLEM — I25.10 CORONARY ARTERY DISEASE: Status: ACTIVE | Noted: 2021-06-17

## 2021-06-17 PROBLEM — G47.30 SLEEP APNEA: Status: ACTIVE | Noted: 2018-07-25

## 2021-06-17 PROBLEM — F41.9 ANXIETY: Status: ACTIVE | Noted: 2018-07-25

## 2021-06-17 PROBLEM — I10 HYPERTENSION: Status: ACTIVE | Noted: 2021-06-17

## 2021-06-17 PROBLEM — I50.9 CONGESTIVE HEART FAILURE: Status: ACTIVE | Noted: 2021-06-17

## 2021-06-17 PROCEDURE — 99213 OFFICE O/P EST LOW 20 MIN: CPT | Performed by: NURSE PRACTITIONER

## 2021-06-17 RX ORDER — GABAPENTIN 300 MG/1
CAPSULE ORAL
COMMUNITY
End: 2021-08-13

## 2021-06-17 RX ORDER — CETIRIZINE HYDROCHLORIDE 10 MG/1
TABLET ORAL
COMMUNITY
Start: 2021-01-08 | End: 2021-06-25 | Stop reason: SDUPTHER

## 2021-06-17 RX ORDER — CYCLOBENZAPRINE HCL 10 MG
10 TABLET ORAL 2 TIMES DAILY PRN
COMMUNITY
End: 2023-02-24

## 2021-06-17 RX ORDER — ALBUTEROL SULFATE 90 UG/1
AEROSOL, METERED RESPIRATORY (INHALATION)
COMMUNITY
End: 2021-08-13 | Stop reason: SDUPTHER

## 2021-06-17 RX ORDER — ESOMEPRAZOLE MAGNESIUM 40 MG/1
CAPSULE, DELAYED RELEASE ORAL
COMMUNITY
End: 2021-08-13

## 2021-06-17 RX ORDER — MIRTAZAPINE 45 MG/1
TABLET, FILM COATED ORAL
COMMUNITY
End: 2021-06-25

## 2021-06-17 RX ORDER — METHOCARBAMOL 500 MG/1
500 TABLET, FILM COATED ORAL
COMMUNITY
End: 2021-08-13

## 2021-06-17 RX ORDER — FLUTICASONE PROPIONATE 50 MCG
2 SPRAY, SUSPENSION (ML) NASAL DAILY
COMMUNITY
End: 2023-02-24

## 2021-06-17 RX ORDER — HYDROCHLOROTHIAZIDE 25 MG/1
TABLET ORAL
COMMUNITY
End: 2021-06-25 | Stop reason: SDUPTHER

## 2021-06-17 RX ORDER — PROMETHAZINE HYDROCHLORIDE 25 MG/1
25 TABLET ORAL EVERY 6 HOURS PRN
Qty: 20 TABLET | Refills: 0 | Status: SHIPPED | OUTPATIENT
Start: 2021-06-17 | End: 2022-04-08

## 2021-06-17 RX ORDER — OXYCODONE AND ACETAMINOPHEN 7.5; 325 MG/1; MG/1
TABLET ORAL
COMMUNITY
Start: 2021-04-13 | End: 2021-08-13

## 2021-06-17 RX ORDER — METOPROLOL SUCCINATE 50 MG/1
TABLET, EXTENDED RELEASE ORAL
COMMUNITY
End: 2021-06-25 | Stop reason: SDUPTHER

## 2021-06-17 RX ORDER — MONTELUKAST SODIUM 10 MG/1
10 TABLET ORAL
COMMUNITY
Start: 2020-12-23 | End: 2021-06-25 | Stop reason: SDUPTHER

## 2021-06-17 RX ORDER — AMLODIPINE BESYLATE 5 MG/1
TABLET ORAL
COMMUNITY
End: 2021-06-21

## 2021-06-17 RX ORDER — FERROUS SULFATE 325(65) MG
TABLET ORAL
COMMUNITY
Start: 2021-03-20 | End: 2022-06-30

## 2021-06-17 RX ORDER — ATORVASTATIN CALCIUM 20 MG/1
TABLET, FILM COATED ORAL
COMMUNITY
End: 2022-02-14

## 2021-06-17 RX ORDER — MELOXICAM 15 MG/1
TABLET ORAL
COMMUNITY
End: 2021-06-25 | Stop reason: SDUPTHER

## 2021-06-17 RX ORDER — MULTIPLE VITAMINS W/ MINERALS TAB 9MG-400MCG
1 TAB ORAL DAILY
COMMUNITY

## 2021-06-17 NOTE — PROGRESS NOTES
"Chief Complaint  Headache (x3 weeks, worse today) and Urinary Frequency    Subjective          Eilene Kaufman presents to [facility]    Review of Systems   Constitutional: Positive for fatigue. Negative for fever.   HENT: Positive for congestion. Negative for rhinorrhea and sinus pressure.    Eyes: Negative.    Respiratory: Negative.    Cardiovascular: Negative.    Gastrointestinal: Negative.    Genitourinary: Positive for frequency. Negative for dysuria and urgency.   Musculoskeletal: Negative for neck pain and neck stiffness.   Neurological: Negative for dizziness, tremors, seizures, syncope, facial asymmetry, speech difficulty, weakness, light-headedness and numbness.   Madelin is a 71-year-old female in today for complaint of headache x3 weeks.  Pain is 9 out of 10 on the pain scale today and located at the base of her skull and radiates up to her temples bilaterally.  She does take Tylenol or ibuprofen as needed and denies any current meloxicam dosages have been taken.  History of migraine headaches but she feels as if this headache is of a different type in character.  She denies slurred speech, blurred vision, unilateral weakness or numbness.  She states Dr. Corbin neurosurgeon had some head imaging ordered on her years ago that showed a spot on the top of her brain of unknown detail.  This concerns her for her current headache now.  She does not check her blood pressure at home.  Reports being compliant with blood pressure medication with last doses taken at 6 AM today.     Objective   Vital Signs:   Vitals:    06/17/21 1154 06/17/21 1201   BP: (!) 171/107 122/98   BP Location: Right arm Right arm   Patient Position: Sitting Sitting   Pulse: 94 91   Temp: 97.9 °F (36.6 °C)    TempSrc: Oral    Weight: 80.7 kg (178 lb)    Height: 163.8 cm (64.49\")    PainSc:   9    PainLoc: Head       Physical Exam  Vitals reviewed.   Constitutional:       General: She is not in acute " distress.     Appearance: Normal appearance. She is well-developed.   HENT:      Right Ear: Tympanic membrane normal.      Left Ear: Tympanic membrane normal.      Mouth/Throat:      Mouth: Mucous membranes are moist.      Pharynx: Oropharynx is clear. No oropharyngeal exudate or posterior oropharyngeal erythema.   Eyes:      Extraocular Movements: Extraocular movements intact.      Pupils: Pupils are equal, round, and reactive to light.   Cardiovascular:      Rate and Rhythm: Normal rate and regular rhythm.      Heart sounds: Normal heart sounds.   Pulmonary:      Effort: Pulmonary effort is normal.      Breath sounds: Normal breath sounds.   Musculoskeletal:      Right lower leg: No edema.      Left lower leg: No edema.   Skin:     General: Skin is warm and dry.   Neurological:      General: No focal deficit present.      Mental Status: She is alert.      Sensory: No sensory deficit.      Motor: No weakness.      Coordination: Coordination normal.      Gait: Gait normal.   Psychiatric:         Attention and Perception: Attention normal.         Mood and Affect: Mood and affect normal.         Behavior: Behavior normal.          Result Review :              Assessment and Plan    Diagnoses and all orders for this visit:    1. Chronic nonintractable headache, unspecified headache type (Primary)  -     CT Head Without Contrast; Future  -     promethazine (PHENERGAN) 25 MG tablet; Take 1 tablet by mouth Every 6 (Six) Hours As Needed for Nausea or Vomiting.  Dispense: 20 tablet; Refill: 0    2. Frequency of micturition        Follow Up    No follow-ups on file.  Patient was given instructions and counseling regarding her condition or for health maintenance advice. Please see specific information pulled into the AVS if appropriate.
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
## 2021-06-17 NOTE — ASSESSMENT & PLAN NOTE
Unable to get insurance approval on a CT of the head without contrast.  Was able to get approval of an MRI of the brain with and without contrast.  Therefore we will get the MRI of the brain with and without contrast.  If she is to experience any worsening of symptoms she is to go to the nearest ER or call 911.  She is also to monitor her blood pressure at home and keep a blood pressure log.  Goal for her blood pressure to be is 130s over 80s or lower.  Follow-up for any persisting concerns.

## 2021-06-21 ENCOUNTER — TELEPHONE (OUTPATIENT)
Dept: FAMILY MEDICINE CLINIC | Age: 71
End: 2021-06-21

## 2021-06-21 RX ORDER — AMLODIPINE BESYLATE 5 MG/1
5 TABLET ORAL 2 TIMES DAILY
Qty: 180 TABLET | Refills: 1 | Status: SHIPPED | OUTPATIENT
Start: 2021-06-21 | End: 2021-06-25 | Stop reason: SDUPTHER

## 2021-06-21 NOTE — TELEPHONE ENCOUNTER
Pt states bp is elevated again today 151/120, she denies headache or other symptoms, states ER doctor gave her an addtl amlodipine 5mg last night and bp came down, but did not give her a rx for the increased dose only instructed her to f/u with pcp. Please advise.

## 2021-06-21 NOTE — TELEPHONE ENCOUNTER
I have gone ahead and sent the 5 mg dose to her pharmacy for twice daily dosing.  I would recommend she take it that way for now.  It would be best to see her later this week and recheck.  Thanks.

## 2021-06-21 NOTE — TELEPHONE ENCOUNTER
Caller: Eileen Kaufman    Relationship: Self    Best call back number: [phone]    What medications are you currently taking:   Current Outpatient Medications on File Prior to Visit   Medication Sig Dispense Refill   • albuterol sulfate HFA (Ventolin HFA) 108 (90 Base) MCG/ACT inhaler Ventolin HFA 90 mcg/actuation inhalation HFA aerosol inhaler inhale 1 puff by inhalation route every 6 hours as needed   Suspended     • amLODIPine (NORVASC) 5 MG tablet amlodipine 5 mg oral tablet take 1 tablet (5 mg) by oral route once daily   Active     • atorvastatin (Lipitor) 20 MG tablet Lipitor 20 mg oral tablet take 1 tablet (20 mg) by oral route once daily   Suspended     • Calcium Carbonate 1500 (600 Ca) MG tablet Calcium 600 600 mg (1,500 mg) oral tablet take 1 tablet by oral route daily   Suspended     • Cannabidiol 100 MG/ML solution cannabidiol (CBD) extract     • cetirizine (zyrTEC) 10 MG tablet cetirizine 10 mg oral tablet take 1 tablet (10 mg) by oral route once daily   Active     • cyclobenzaprine (FLEXERIL) 10 MG tablet cyclobenzaprine 10 mg oral tablet take 1 tablet by oral route once a day (at bedtime) as needed   Active     • Diclofenac Sodium (VOLTAREN) 1 % gel gel APPLY 2 GRAMS EXTERNALLY TO THE AFFECTED AREA TWICE DAILY AS NEEDED     • enoxaparin (LOVENOX) 30 MG/0.3ML solution syringe Inject  under the skin into the appropriate area as directed.     • esomeprazole (nexIUM) 40 MG capsule Nexium 40 mg oral capsule,delayed release(DR/EC) take 1 capsule (40 mg) by oral route once daily   Suspended     • ferrous sulfate 325 (65 FE) MG tablet ferrous sulfate 325 mg (65 mg iron) oral tablet take 1 tablet (325 mg) by oral route once daily   Active     • fluticasone (FLONASE) 50 MCG/ACT nasal spray fluticasone propionate 50 mcg/actuation nasal spray,suspension spray 1 spray (50 mcg) in each nostril by intranasal route once daily   Active     • Fluticasone Furoate-Vilanterol (Breo Ellipta) 100-25 MCG/INH inhaler  Breo Ellipta 100-25 mcg/dose inhalation blister with device inhale 1 puff by inhalation route once daily at the same time each day   Active     • gabapentin (NEURONTIN) 300 MG capsule gabapentin 300 mg oral capsule take 2 capsules (600 mg) by oral route 3 times per day   Suspended     • hydroCHLOROthiazide (HYDRODIURIL) 25 MG tablet hydrochlorothiazide 25 mg oral tablet take 1 tablet (25 mg) by oral route once daily   Active     • meloxicam (MOBIC) 15 MG tablet meloxicam 15 mg oral tablet take 1 tablet (15 mg) by oral route once daily   Active     • methocarbamol (ROBAXIN) 500 MG tablet Take  by mouth.     • metoprolol succinate XL (Toprol XL) 50 MG 24 hr tablet Toprol XL 50 mg oral tablet extended release 24 hr take 1 tablet (50 mg) by oral route once daily   Active     • mirtazapine (REMERON) 45 MG tablet mirtazapine 45 mg oral tablet take 1 tablet (45 mg) by oral route once daily before bedtime   Suspended     • montelukast (SINGULAIR) 10 MG tablet Take 10 mg by mouth.     • multivitamin with minerals (MULTIVITAMIN ADULT PO) multivitamin oral tablet take 1 tablet by oral route daily   Active     • oxyCODONE-acetaminophen (PERCOCET) 7.5-325 MG per tablet      • promethazine (PHENERGAN) 25 MG tablet Take 1 tablet by mouth Every 6 (Six) Hours As Needed for Nausea or Vomiting. 20 tablet 0     No current facility-administered medications on file prior to visit.        When did you start taking these medications:     Which medication are you concerned about: amLODIPine (NORVASC) 5 MG tablet    Who prescribed you this medication: KAYCEE SIBLEY    What are your concerns: PATIENT STATED THAT SHE WENT TO EMERGENCY ROOM 06/20/2021 FOR HIGH BLOOD PRESSURE, THEY GAVE HER AMLODIPINE FOR 10 MG AND IT LOWER HER BLOOD PRESSURE. IT IS CURRENTLY 171/112 AFTER TAKING THE 10 MG MEDICATION. PATIENT WANTS TO SPEAK ABOUT THE MEDICATION DOSAGE.    How long have you been taking these medications:     How long have you had these concerns:  06/20/2021

## 2021-06-21 NOTE — TELEPHONE ENCOUNTER
Attempted to call pt, unable to leave . HUB please advise pt to call scheduling to reschedule the MRI at [phone] option 3.

## 2021-06-25 ENCOUNTER — HOSPITAL ENCOUNTER (OUTPATIENT)
Dept: GENERAL RADIOLOGY | Facility: HOSPITAL | Age: 71
Discharge: HOME | End: 2021-06-25
Admitting: FAMILY MEDICINE

## 2021-06-25 ENCOUNTER — OFFICE VISIT (OUTPATIENT)
Dept: FAMILY MEDICINE CLINIC | Age: 71
End: 2021-06-25

## 2021-06-25 VITALS
SYSTOLIC BLOOD PRESSURE: 113 MMHG | TEMPERATURE: 97.8 F | DIASTOLIC BLOOD PRESSURE: 77 MMHG | BODY MASS INDEX: 29.81 KG/M2 | HEIGHT: 64 IN | WEIGHT: 174.6 LBS | HEART RATE: 106 BPM

## 2021-06-25 DIAGNOSIS — R06.89 ABNORMAL RESPIRATORY SOUNDS: ICD-10-CM

## 2021-06-25 DIAGNOSIS — E78.00 HIGH CHOLESTEROL: ICD-10-CM

## 2021-06-25 DIAGNOSIS — I10 ESSENTIAL HYPERTENSION: Primary | ICD-10-CM

## 2021-06-25 PROCEDURE — 71046 X-RAY EXAM CHEST 2 VIEWS: CPT

## 2021-06-25 PROCEDURE — 99214 OFFICE O/P EST MOD 30 MIN: CPT | Performed by: FAMILY MEDICINE

## 2021-06-25 RX ORDER — CETIRIZINE HYDROCHLORIDE 10 MG/1
10 TABLET ORAL DAILY
Qty: 90 TABLET | Refills: 1 | Status: SHIPPED | OUTPATIENT
Start: 2021-06-25 | End: 2022-02-14 | Stop reason: SDUPTHER

## 2021-06-25 RX ORDER — MIRTAZAPINE 30 MG/1
30 TABLET, FILM COATED ORAL NIGHTLY
Qty: 90 TABLET | Refills: 1 | Status: SHIPPED | OUTPATIENT
Start: 2021-06-25 | End: 2022-02-14

## 2021-06-25 RX ORDER — HYDROCHLOROTHIAZIDE 25 MG/1
25 TABLET ORAL DAILY
Qty: 90 TABLET | Refills: 1 | Status: SHIPPED | OUTPATIENT
Start: 2021-06-25 | End: 2022-01-10

## 2021-06-25 RX ORDER — IBUPROFEN 800 MG/1
TABLET ORAL
COMMUNITY
Start: 2021-05-06 | End: 2021-06-25

## 2021-06-25 RX ORDER — PREGABALIN 75 MG/1
75 CAPSULE ORAL 2 TIMES DAILY
COMMUNITY
Start: 2021-05-25 | End: 2021-08-13 | Stop reason: SDUPTHER

## 2021-06-25 RX ORDER — MIRTAZAPINE 30 MG/1
TABLET, FILM COATED ORAL
COMMUNITY
End: 2021-06-25

## 2021-06-25 RX ORDER — MELOXICAM 15 MG/1
15 TABLET ORAL DAILY
Qty: 90 TABLET | Refills: 1 | Status: SHIPPED | OUTPATIENT
Start: 2021-06-25 | End: 2021-11-11

## 2021-06-25 RX ORDER — AMLODIPINE BESYLATE 5 MG/1
5 TABLET ORAL 2 TIMES DAILY
Qty: 180 TABLET | Refills: 1 | Status: SHIPPED | OUTPATIENT
Start: 2021-06-25 | End: 2022-02-14 | Stop reason: SDUPTHER

## 2021-06-25 RX ORDER — METOPROLOL SUCCINATE 50 MG/1
50 TABLET, EXTENDED RELEASE ORAL DAILY
Qty: 90 TABLET | Refills: 1 | Status: SHIPPED | OUTPATIENT
Start: 2021-06-25 | End: 2022-02-14 | Stop reason: SDUPTHER

## 2021-06-25 RX ORDER — MONTELUKAST SODIUM 10 MG/1
10 TABLET ORAL NIGHTLY
Qty: 90 TABLET | Refills: 1 | Status: SHIPPED | OUTPATIENT
Start: 2021-06-25 | End: 2022-01-10

## 2021-06-25 NOTE — PROGRESS NOTES
Eileen Kaufman presents to [facility].    Chief Complaint:  Blood pressure, malaise    Subjective       History of Present Illness:  Eileen Alvarez is in today for follow-up.  She was seen here about 8 days ago.  She had a pretty bad headache and her blood pressure was elevated.  It looks like she was referred to [facility] for possible advanced imaging.  However she was not able to get there, and she ended up going to [facility] for evaluation.  She says she was treated and released there.  The history is somewhat unclear.    She also has a history of significant hypertension for which she remains on treatment as noted below.  The most recent change has been moving the amlodipine to 5 mg twice daily.  Her pressure is well controlled today.  She still does not feel quite normal though and is concerned about this.      Review of Systems:  Review of Systems   Constitutional: Negative for chills and fever.   Respiratory: Negative for cough and shortness of breath.    Cardiovascular: Negative for chest pain and palpitations.   Gastrointestinal: Negative for abdominal pain, nausea and vomiting.        Objective   Medical History:  Past Medical History:   • Anemia, unspecified   • Asymptomatic menopausal state   • CAD (coronary artery disease)    mild   • Chronic obstructive pulmonary disease with (acute) exacerbation (CMS/HCC)   • Clubbing of fingers   • Elevated white blood cell count, unspecified   • Essential (primary) hypertension   • Major depressive disorder, single episode, moderate (CMS/HCC)   • Mixed hyperlipidemia   • Nicotine dependence, unspecified, uncomplicated   • Orthostatic hypotension   • Osteopenia   • Other intervertebral disc degeneration, lumbar region   • Other long term (current) drug therapy   • Solitary pulmonary nodule   • Vitamin D deficiency, unspecified     Past Surgical History:   • CARDIAC CATHETERIZATION   • CARPAL TUNNEL RELEASE   • CHOLECYSTECTOMY   • COLONOSCOPY    normal    • HYSTERECTOMY    Total   • KNEE ARTHROSCOPY   • NECK SURGERY      Family History   Problem Relation Age of Onset   • Cerebral aneurysm Mother         cause of death   • Heart attack Father         cause of death   • Esophageal cancer Brother      Social History     Tobacco Use   • Smoking status: Current Every Day Smoker     Packs/day: 0.50     Types: Cigarettes   • Smokeless tobacco: Current User   • Tobacco comment: Eileen Alvarez was 23 when she started smoking.  She did stop smoking for some time - about 13 years.  She has been smoking a total of approximately 33 years.  She has smoked less than a pack daily during this time. She currently uses smokless, vapor can/pouches   Substance Use Topics   • Alcohol use: Yes     Comment: Socially       Health Maintenance Due   Topic Date Due   • TDAP/TD VACCINES (1 - Tdap) Never done   • ZOSTER VACCINE (1 of 2) Never done   • HEPATITIS C SCREENING  Never done   • ANNUAL WELLNESS VISIT  06/17/2021   • LIPID PANEL  06/17/2021        Immunization History   Administered Date(s) Administered   • COVID-19 (PFIZER) 12/29/2020, 01/29/2021   • Hepatitis A 07/07/2018   • Influenza, Unspecified 10/01/2020   • PEDS-Pneumococcal Conjugate (PCV7) 07/05/2018   • Pneumococcal Polysaccharide (PPSV23) 03/25/2013, 02/13/2017       Allergies   Allergen Reactions   • Sulfa Antibiotics Unknown - Low Severity   • Aspirin Rash        Medications:  Current Outpatient Medications on File Prior to Visit   Medication Sig   • albuterol sulfate HFA (Ventolin HFA) 108 (90 Base) MCG/ACT inhaler Ventolin HFA 90 mcg/actuation inhalation HFA aerosol inhaler inhale 1 puff by inhalation route every 6 hours as needed   Suspended   • atorvastatin (Lipitor) 20 MG tablet Lipitor 20 mg oral tablet take 1 tablet (20 mg) by oral route once daily   Suspended   • Calcium Carbonate 1500 (600 Ca) MG tablet Calcium 600 600 mg (1,500 mg) oral tablet take 1 tablet by oral route daily   Suspended   • cyclobenzaprine (FLEXERIL)  10 MG tablet cyclobenzaprine 10 mg oral tablet take 1 tablet by oral route once a day (at bedtime) as needed   Active   • Diclofenac Sodium (VOLTAREN) 1 % gel gel APPLY 2 GRAMS EXTERNALLY TO THE AFFECTED AREA TWICE DAILY AS NEEDED   • ferrous sulfate 325 (65 FE) MG tablet ferrous sulfate 325 mg (65 mg iron) oral tablet take 1 tablet (325 mg) by oral route once daily   Active   • fluticasone (FLONASE) 50 MCG/ACT nasal spray fluticasone propionate 50 mcg/actuation nasal spray,suspension spray 1 spray (50 mcg) in each nostril by intranasal route once daily   Active   • Fluticasone Furoate-Vilanterol (Breo Ellipta) 100-25 MCG/INH inhaler Breo Ellipta 100-25 mcg/dose inhalation blister with device inhale 1 puff by inhalation route once daily at the same time each day   Active   • gabapentin (NEURONTIN) 300 MG capsule gabapentin 300 mg oral capsule take 2 capsules (600 mg) by oral route 3 times per day   Suspended   • multivitamin with minerals (MULTIVITAMIN ADULT PO) multivitamin oral tablet take 1 tablet by oral route daily   Active   • oxyCODONE-acetaminophen (PERCOCET) 7.5-325 MG per tablet    • promethazine (PHENERGAN) 25 MG tablet Take 1 tablet by mouth Every 6 (Six) Hours As Needed for Nausea or Vomiting.   • [DISCONTINUED] amLODIPine (NORVASC) 5 MG tablet Take 1 tablet by mouth 2 (Two) Times a Day.   • [DISCONTINUED] cetirizine (zyrTEC) 10 MG tablet cetirizine 10 mg oral tablet take 1 tablet (10 mg) by oral route once daily   Active   • [DISCONTINUED] enoxaparin (LOVENOX) 30 MG/0.3ML solution syringe Inject  under the skin into the appropriate area as directed.   • [DISCONTINUED] hydroCHLOROthiazide (HYDRODIURIL) 25 MG tablet hydrochlorothiazide 25 mg oral tablet take 1 tablet (25 mg) by oral route once daily   Active   • [DISCONTINUED] ibuprofen (ADVIL,MOTRIN) 800 MG tablet    • [DISCONTINUED] meloxicam (MOBIC) 15 MG tablet meloxicam 15 mg oral tablet take 1 tablet (15 mg) by oral route once daily   Active   •  "[DISCONTINUED] metoprolol succinate XL (Toprol XL) 50 MG 24 hr tablet Toprol XL 50 mg oral tablet extended release 24 hr take 1 tablet (50 mg) by oral route once daily   Active   • [DISCONTINUED] mirtazapine (REMERON) 30 MG tablet mirtazapine 30 mg tablet   TAKE 1 TABLET BY MOUTH EVERY NIGHT AT BEDTIME   • [DISCONTINUED] montelukast (SINGULAIR) 10 MG tablet Take 10 mg by mouth.   • Cannabidiol 100 MG/ML solution cannabidiol (CBD) extract   • esomeprazole (nexIUM) 40 MG capsule Nexium 40 mg oral capsule,delayed release(DR/EC) take 1 capsule (40 mg) by oral route once daily   Suspended   • methocarbamol (ROBAXIN) 500 MG tablet Take 500 mg by mouth.   • pregabalin (LYRICA) 75 MG capsule Take 75 mg by mouth 2 (Two) Times a Day.   • [DISCONTINUED] mirtazapine (REMERON) 45 MG tablet mirtazapine 45 mg oral tablet take 1 tablet (45 mg) by oral route once daily before bedtime   Suspended     No current facility-administered medications on file prior to visit.       Vital Signs:   /77 (BP Location: Left arm, Patient Position: Sitting)   Pulse 106   Temp 97.8 °F (36.6 °C) (Oral)   Ht 163.8 cm (64.49\")   Wt 79.2 kg (174 lb 9.6 oz)   BMI 29.52 kg/m²       Physical Exam:  Physical Exam  Vitals reviewed.   Constitutional:       General: She is not in acute distress.     Appearance: She is not ill-appearing.   Eyes:      Pupils: Pupils are equal, round, and reactive to light.   Neck:      Comments: No thyromegaly  Cardiovascular:      Rate and Rhythm: Normal rate and regular rhythm.   Pulmonary:      Effort: Pulmonary effort is normal.      Breath sounds: Normal breath sounds.      Comments: There are a few crackles at the lung bases.  Abdominal:      General: There is no distension.      Palpations: Abdomen is soft.      Tenderness: There is no abdominal tenderness.   Musculoskeletal:      Cervical back: Neck supple.   Lymphadenopathy:      Cervical: No cervical adenopathy.   Skin:     Findings: No lesion or rash. "   Neurological:      Mental Status: She is alert.         Result Review      The following data was reviewed by Artur Quevedo MD on 06/25/2021.  Lab Results   Component Value Date    WBC 9.78 01/08/2021    HGB 16.20 (H) 01/08/2021    HCT 50.2 (H) 01/08/2021    MCV 85.7 01/08/2021    .00 01/08/2021     Lab Results   Component Value Date    GLUCOSE 98 01/12/2018    BUN 14 01/08/2021    CREATININE 0.94 (H) 01/08/2021    EGFRIFNONA 61 01/12/2018    EGFRIFAFRI 70 01/12/2018    BCR 15 01/08/2021    K 4.2 01/08/2021    CO2 25 01/08/2021    CALCIUM 10.4 01/08/2021    ALBUMIN 4.5 01/08/2021    LABIL2 1.3 (L) 01/08/2021    AST 17 01/08/2021    ALT 10 01/08/2021     Lab Results   Component Value Date    CHLPL 200 11/14/2019    TRIG 170 (H) 11/14/2019    HDL 50 11/14/2019     (H) 11/14/2019     Lab Results   Component Value Date    TSH 0.840 05/01/2020     No results found for: HGBA1C             Assessment and Plan:   Today, we have again reviewed her care.  It sounds like her blood pressure has been up and down some in the last couple of weeks.  She remains concerned about how she has felt.  I am going to arrange follow-up testing today as noted below.  We will reach out to [facility] for a copy of the recent blood work she had done.  It is unclear whether the finding on lung exam is of significance, but I am going to recommend a chest x-ray as a precaution.  We will intend to refill her medications today.  Again, the prognosis remains a little bit uncertain as I do not have adequate data at this time.       Diagnoses and all orders for this visit:    1. Essential hypertension (Primary)  -     hydroCHLOROthiazide (HYDRODIURIL) 25 MG tablet; Take 1 tablet by mouth Daily.  Dispense: 90 tablet; Refill: 1  -     amLODIPine (NORVASC) 5 MG tablet; Take 1 tablet by mouth 2 (Two) Times a Day.  Dispense: 180 tablet; Refill: 1  -     metoprolol succinate XL (Toprol XL) 50 MG 24 hr tablet; Take 1 tablet by mouth  Daily.  Dispense: 90 tablet; Refill: 1    2. High cholesterol    3. Abnormal respiratory sounds  -     XR Chest 2 View; Future    Other orders  -     mirtazapine (REMERON) 30 MG tablet; Take 1 tablet by mouth Every Night.  Dispense: 90 tablet; Refill: 1  -     meloxicam (MOBIC) 15 MG tablet; Take 1 tablet by mouth Daily.  Dispense: 90 tablet; Refill: 1  -     cetirizine (zyrTEC) 10 MG tablet; Take 1 tablet by mouth Daily.  Dispense: 90 tablet; Refill: 1  -     montelukast (SINGULAIR) 10 MG tablet; Take 1 tablet by mouth Every Night.  Dispense: 90 tablet; Refill: 1          Follow Up   No follow-ups on file.  Patient was given instructions and counseling regarding her condition or for health maintenance advice. Please see specific information pulled into the AVS if appropriate.

## 2021-06-25 NOTE — PROGRESS NOTES
Pt has not had her UA done that you ordered on 6/17 at her ov.  She was having frequency.  She followed up with JV today and didn't complain of that.  Can I cancel order?

## 2021-07-01 VITALS
BODY MASS INDEX: 29.56 KG/M2 | OXYGEN SATURATION: 93 % | HEIGHT: 65 IN | DIASTOLIC BLOOD PRESSURE: 80 MMHG | SYSTOLIC BLOOD PRESSURE: 133 MMHG | WEIGHT: 177.4 LBS | TEMPERATURE: 99.5 F | HEART RATE: 94 BPM

## 2021-07-01 VITALS
SYSTOLIC BLOOD PRESSURE: 131 MMHG | DIASTOLIC BLOOD PRESSURE: 83 MMHG | HEART RATE: 92 BPM | WEIGHT: 171.2 LBS | TEMPERATURE: 97.7 F | HEIGHT: 65 IN | BODY MASS INDEX: 28.52 KG/M2

## 2021-07-01 VITALS
TEMPERATURE: 98 F | WEIGHT: 164 LBS | HEIGHT: 65 IN | SYSTOLIC BLOOD PRESSURE: 131 MMHG | BODY MASS INDEX: 27.32 KG/M2 | HEART RATE: 99 BPM | DIASTOLIC BLOOD PRESSURE: 87 MMHG

## 2021-07-01 VITALS
TEMPERATURE: 98.1 F | SYSTOLIC BLOOD PRESSURE: 97 MMHG | HEIGHT: 65 IN | BODY MASS INDEX: 29.09 KG/M2 | DIASTOLIC BLOOD PRESSURE: 69 MMHG | WEIGHT: 174.6 LBS | HEART RATE: 85 BPM

## 2021-07-01 VITALS
DIASTOLIC BLOOD PRESSURE: 75 MMHG | WEIGHT: 177.4 LBS | HEIGHT: 65 IN | BODY MASS INDEX: 29.56 KG/M2 | HEART RATE: 83 BPM | TEMPERATURE: 98.5 F | SYSTOLIC BLOOD PRESSURE: 126 MMHG

## 2021-07-01 VITALS
DIASTOLIC BLOOD PRESSURE: 81 MMHG | TEMPERATURE: 99 F | HEIGHT: 65 IN | WEIGHT: 154.4 LBS | BODY MASS INDEX: 25.72 KG/M2 | SYSTOLIC BLOOD PRESSURE: 142 MMHG | HEART RATE: 107 BPM

## 2021-07-01 VITALS
WEIGHT: 161 LBS | TEMPERATURE: 98.9 F | DIASTOLIC BLOOD PRESSURE: 101 MMHG | HEIGHT: 65 IN | HEART RATE: 101 BPM | BODY MASS INDEX: 26.82 KG/M2 | SYSTOLIC BLOOD PRESSURE: 151 MMHG

## 2021-07-01 VITALS
TEMPERATURE: 99 F | DIASTOLIC BLOOD PRESSURE: 96 MMHG | SYSTOLIC BLOOD PRESSURE: 150 MMHG | WEIGHT: 160.4 LBS | HEART RATE: 112 BPM | HEIGHT: 65 IN | BODY MASS INDEX: 26.73 KG/M2

## 2021-07-01 VITALS
BODY MASS INDEX: 28.49 KG/M2 | WEIGHT: 171 LBS | DIASTOLIC BLOOD PRESSURE: 98 MMHG | SYSTOLIC BLOOD PRESSURE: 150 MMHG | HEIGHT: 65 IN | HEART RATE: 105 BPM | TEMPERATURE: 99.3 F

## 2021-07-01 VITALS
WEIGHT: 170.2 LBS | TEMPERATURE: 98.2 F | BODY MASS INDEX: 28.36 KG/M2 | HEART RATE: 103 BPM | HEIGHT: 65 IN | SYSTOLIC BLOOD PRESSURE: 150 MMHG | DIASTOLIC BLOOD PRESSURE: 87 MMHG

## 2021-07-01 VITALS
TEMPERATURE: 98.2 F | HEART RATE: 89 BPM | DIASTOLIC BLOOD PRESSURE: 88 MMHG | WEIGHT: 168.1 LBS | SYSTOLIC BLOOD PRESSURE: 156 MMHG | BODY MASS INDEX: 28.01 KG/M2 | HEIGHT: 65 IN

## 2021-07-01 VITALS
WEIGHT: 175.4 LBS | HEIGHT: 65 IN | DIASTOLIC BLOOD PRESSURE: 85 MMHG | SYSTOLIC BLOOD PRESSURE: 147 MMHG | HEART RATE: 95 BPM | TEMPERATURE: 99.1 F | BODY MASS INDEX: 29.22 KG/M2

## 2021-07-01 VITALS
BODY MASS INDEX: 29.32 KG/M2 | HEART RATE: 78 BPM | HEIGHT: 65 IN | DIASTOLIC BLOOD PRESSURE: 88 MMHG | SYSTOLIC BLOOD PRESSURE: 134 MMHG | WEIGHT: 176 LBS | TEMPERATURE: 98.9 F

## 2021-07-02 VITALS
HEART RATE: 102 BPM | TEMPERATURE: 96 F | SYSTOLIC BLOOD PRESSURE: 146 MMHG | BODY MASS INDEX: 30.32 KG/M2 | DIASTOLIC BLOOD PRESSURE: 96 MMHG | WEIGHT: 182 LBS | HEIGHT: 65 IN

## 2021-07-02 VITALS
OXYGEN SATURATION: 97 % | HEIGHT: 65 IN | DIASTOLIC BLOOD PRESSURE: 85 MMHG | HEART RATE: 92 BPM | HEART RATE: 92 BPM | TEMPERATURE: 99.7 F | SYSTOLIC BLOOD PRESSURE: 140 MMHG | HEIGHT: 65 IN | OXYGEN SATURATION: 94 % | BODY MASS INDEX: 28.29 KG/M2 | WEIGHT: 167.8 LBS | BODY MASS INDEX: 27.96 KG/M2 | DIASTOLIC BLOOD PRESSURE: 90 MMHG | SYSTOLIC BLOOD PRESSURE: 118 MMHG

## 2021-07-02 VITALS
BODY MASS INDEX: 27.89 KG/M2 | HEART RATE: 105 BPM | WEIGHT: 167.4 LBS | HEIGHT: 65 IN | SYSTOLIC BLOOD PRESSURE: 141 MMHG | DIASTOLIC BLOOD PRESSURE: 83 MMHG | TEMPERATURE: 97.6 F

## 2021-07-02 VITALS
BODY MASS INDEX: 29.99 KG/M2 | HEART RATE: 87 BPM | SYSTOLIC BLOOD PRESSURE: 125 MMHG | HEIGHT: 65 IN | WEIGHT: 180 LBS | DIASTOLIC BLOOD PRESSURE: 85 MMHG | TEMPERATURE: 96.2 F

## 2021-07-02 VITALS
SYSTOLIC BLOOD PRESSURE: 132 MMHG | WEIGHT: 176.2 LBS | TEMPERATURE: 98 F | HEIGHT: 65 IN | HEART RATE: 105 BPM | DIASTOLIC BLOOD PRESSURE: 86 MMHG | BODY MASS INDEX: 29.36 KG/M2

## 2021-07-02 VITALS — BODY MASS INDEX: 29.78 KG/M2 | HEIGHT: 65 IN | TEMPERATURE: 97.4 F

## 2021-07-08 ENCOUNTER — TELEPHONE (OUTPATIENT)
Dept: FAMILY MEDICINE CLINIC | Age: 71
End: 2021-07-08

## 2021-08-05 ENCOUNTER — TELEPHONE (OUTPATIENT)
Dept: FAMILY MEDICINE CLINIC | Age: 71
End: 2021-08-05

## 2021-08-05 NOTE — TELEPHONE ENCOUNTER
Caller: Eileen Kaufman    Relationship: Self    Best call back number: [phone]    What is the best time to reach you: ANY    Who are you requesting to speak with (clinical staff, provider,  specific staff member): DR SIBLEY    What was the call regarding: PATIENT STATED THAT SHE WENT TO URGENT CARE AND THEY SENT HER TO THE ER. PATIENT STATED THAT WHOEVER SHE SAW RECOMMENDED SHE SEE HER PCP, DR SIBLEY, WITHIN A WEEK. PLEASE CALL AND ADVISE, THANK YOU    Do you require a callback: YES

## 2021-08-10 ENCOUNTER — TELEPHONE (OUTPATIENT)
Dept: FAMILY MEDICINE CLINIC | Age: 71
End: 2021-08-10

## 2021-08-10 NOTE — TELEPHONE ENCOUNTER
TRIED CALLING PT TO SCHEDULE HER MEDICARE WELLNESS VISIT. PT. DID NOT ANSWER  AND NO VOICEMAIL PICKED UP.

## 2021-08-13 ENCOUNTER — OFFICE VISIT (OUTPATIENT)
Dept: FAMILY MEDICINE CLINIC | Age: 71
End: 2021-08-13

## 2021-08-13 VITALS
WEIGHT: 175.8 LBS | DIASTOLIC BLOOD PRESSURE: 74 MMHG | SYSTOLIC BLOOD PRESSURE: 121 MMHG | BODY MASS INDEX: 29.72 KG/M2 | TEMPERATURE: 98.8 F | HEART RATE: 89 BPM

## 2021-08-13 DIAGNOSIS — J44.9 CHRONIC OBSTRUCTIVE PULMONARY DISEASE, UNSPECIFIED COPD TYPE (HCC): Primary | ICD-10-CM

## 2021-08-13 DIAGNOSIS — F17.210 CIGARETTE NICOTINE DEPENDENCE, UNCOMPLICATED: ICD-10-CM

## 2021-08-13 DIAGNOSIS — I10 ESSENTIAL HYPERTENSION: ICD-10-CM

## 2021-08-13 DIAGNOSIS — Z79.899 OTHER LONG TERM (CURRENT) DRUG THERAPY: ICD-10-CM

## 2021-08-13 DIAGNOSIS — M47.816 LUMBAR SPONDYLOSIS: ICD-10-CM

## 2021-08-13 DIAGNOSIS — Z12.31 BREAST CANCER SCREENING BY MAMMOGRAM: ICD-10-CM

## 2021-08-13 PROCEDURE — 99214 OFFICE O/P EST MOD 30 MIN: CPT | Performed by: FAMILY MEDICINE

## 2021-08-13 RX ORDER — PREGABALIN 75 MG/1
75 CAPSULE ORAL 2 TIMES DAILY
Qty: 60 CAPSULE | Refills: 5 | Status: SHIPPED | OUTPATIENT
Start: 2021-08-13 | End: 2022-02-14 | Stop reason: SDUPTHER

## 2021-08-13 RX ORDER — FERROUS SULFATE 325(65) MG
TABLET ORAL
Qty: 90 TABLET | OUTPATIENT
Start: 2021-08-13

## 2021-08-13 RX ORDER — ALBUTEROL SULFATE 90 UG/1
2 AEROSOL, METERED RESPIRATORY (INHALATION) EVERY 6 HOURS PRN
Qty: 8 G | Refills: 11 | Status: SHIPPED | OUTPATIENT
Start: 2021-08-13 | End: 2023-02-24 | Stop reason: SDUPTHER

## 2021-08-13 NOTE — TELEPHONE ENCOUNTER
I have denied this after looking at her most recent labs.  We will discuss at follow-up today.  Thanks.

## 2021-08-13 NOTE — PROGRESS NOTES
Eileen aKufman presents to [facility].    Chief Complaint:  Follow up on presumed bronchitis, blood pressure, cholesterol, other issues    Subjective       History of Present Illness:  Eileen Alvarez is in today for follow-up of.  She was in the emergency room at [facility] recently for upper respiratory symptoms.  She had initially gone to urgent care, and they referred her out to the ER.  Betzaida did have significant evaluation at the hospital including Covid test, labs, and chest x-ray.  Those did not point to a worrisome issue being present.  She ended up being given a steroid pack and released.  She has done somewhat better since discharge.  She is not quite back to normal yet though.    In addition to the above, she is also due for follow-up for her routine care.  She has a history of hypertension and remains on treatment for this.  Her blood pressure is well controlled today.    Eileen Alvarez also has chronic pain from disc and low back issues.  She continues to see pain mgmt regularly and remains on oxycodone under their care.  She also takes Lyrica that we prescribe.  This does help her with some nerve pain.  She has significant pain related to the nerves when she does not take it.  She denies obvious side effects.  She denies abuse, diversion, or doctor shopping.  Kevyn is okay.        Review of Systems:  Review of Systems   Constitutional: Negative for chills and fever.   Respiratory: Positive for cough and shortness of breath.    Cardiovascular: Negative for chest pain and palpitations.   Gastrointestinal: Negative for abdominal pain, nausea and vomiting.        Objective   Medical History:  Past Medical History:   • Anemia, unspecified   • Asymptomatic menopausal state   • Chronic obstructive pulmonary disease with (acute) exacerbation (CMS/HCC)   • Essential (primary) hypertension   • Major depressive disorder, single episode, moderate (CMS/HCC)   • Mixed hyperlipidemia   • Nicotine  dependence, unspecified, uncomplicated   • Osteopenia   • Other intervertebral disc degeneration, lumbar region   • Other long term (current) drug therapy   • Solitary pulmonary nodule   • Vitamin D deficiency, unspecified     Past Surgical History:   • CARDIAC CATHETERIZATION   • CARPAL TUNNEL RELEASE   • CHOLECYSTECTOMY   • COLONOSCOPY    normal   • HYSTERECTOMY    Total   • KNEE ARTHROSCOPY   • NECK SURGERY      Family History   Problem Relation Age of Onset   • Cerebral aneurysm Mother         cause of death   • Heart attack Father         cause of death   • Esophageal cancer Brother      Social History     Tobacco Use   • Smoking status: Current Every Day Smoker     Packs/day: 0.50     Types: Cigarettes   • Smokeless tobacco: Current User   • Tobacco comment: Eileen Alvarez was 23 when she started smoking.  She did stop smoking for some time - about 13 years.  She has been smoking a total of approximately 33 years.  She has smoked less than a pack daily during this time. She currently uses smokless, vapor can/pouches   Substance Use Topics   • Alcohol use: Yes     Comment: Socially       Health Maintenance Due   Topic Date Due   • TDAP/TD VACCINES (1 - Tdap) Never done   • ZOSTER VACCINE (1 of 2) Never done   • HEPATITIS C SCREENING  Never done   • ANNUAL WELLNESS VISIT  06/17/2021   • LIPID PANEL  06/17/2021        Immunization History   Administered Date(s) Administered   • COVID-19 (PFIZER) 12/29/2020, 01/29/2021   • Hepatitis A 07/07/2018   • Influenza, Unspecified 10/01/2020   • PEDS-Pneumococcal Conjugate (PCV7) 07/05/2018   • Pneumococcal Polysaccharide (PPSV23) 03/25/2013, 02/13/2017       Allergies   Allergen Reactions   • Sulfa Antibiotics Unknown - Low Severity   • Aspirin Rash        Medications:  Current Outpatient Medications on File Prior to Visit   Medication Sig   • amLODIPine (NORVASC) 5 MG tablet Take 1 tablet by mouth 2 (Two) Times a Day.   • atorvastatin (Lipitor) 20 MG tablet Lipitor 20 mg oral  tablet take 1 tablet (20 mg) by oral route once daily   Suspended   • Calcium Carbonate 1500 (600 Ca) MG tablet Calcium 600 600 mg (1,500 mg) oral tablet take 1 tablet by oral route daily   Suspended   • cetirizine (zyrTEC) 10 MG tablet Take 1 tablet by mouth Daily.   • cyclobenzaprine (FLEXERIL) 10 MG tablet cyclobenzaprine 10 mg oral tablet take 1 tablet by oral route once a day (at bedtime) as needed   Active   • Diclofenac Sodium (VOLTAREN) 1 % gel gel APPLY 2 GRAMS EXTERNALLY TO THE AFFECTED AREA TWICE DAILY AS NEEDED   • ferrous sulfate 325 (65 FE) MG tablet ferrous sulfate 325 mg (65 mg iron) oral tablet take 1 tablet (325 mg) by oral route once daily   Active   • fluticasone (FLONASE) 50 MCG/ACT nasal spray fluticasone propionate 50 mcg/actuation nasal spray,suspension spray 1 spray (50 mcg) in each nostril by intranasal route once daily   Active   • Fluticasone Furoate-Vilanterol (Breo Ellipta) 100-25 MCG/INH inhaler Breo Ellipta 100-25 mcg/dose inhalation blister with device inhale 1 puff by inhalation route once daily at the same time each day   Active   • hydroCHLOROthiazide (HYDRODIURIL) 25 MG tablet Take 1 tablet by mouth Daily.   • meloxicam (MOBIC) 15 MG tablet Take 1 tablet by mouth Daily.   • metoprolol succinate XL (Toprol XL) 50 MG 24 hr tablet Take 1 tablet by mouth Daily.   • mirtazapine (REMERON) 30 MG tablet Take 1 tablet by mouth Every Night.   • montelukast (SINGULAIR) 10 MG tablet Take 1 tablet by mouth Every Night.   • multivitamin with minerals (MULTIVITAMIN ADULT PO) multivitamin oral tablet take 1 tablet by oral route daily   Active   • promethazine (PHENERGAN) 25 MG tablet Take 1 tablet by mouth Every 6 (Six) Hours As Needed for Nausea or Vomiting.   • [DISCONTINUED] albuterol sulfate HFA (Ventolin HFA) 108 (90 Base) MCG/ACT inhaler Ventolin HFA 90 mcg/actuation inhalation HFA aerosol inhaler inhale 1 puff by inhalation route every 6 hours as needed   Suspended   • [DISCONTINUED]  Cannabidiol 100 MG/ML solution cannabidiol (CBD) extract   • [DISCONTINUED] esomeprazole (nexIUM) 40 MG capsule Nexium 40 mg oral capsule,delayed release(DR/EC) take 1 capsule (40 mg) by oral route once daily   Suspended   • [DISCONTINUED] gabapentin (NEURONTIN) 300 MG capsule gabapentin 300 mg oral capsule take 2 capsules (600 mg) by oral route 3 times per day   Suspended   • [DISCONTINUED] methocarbamol (ROBAXIN) 500 MG tablet Take 500 mg by mouth.   • [DISCONTINUED] oxyCODONE-acetaminophen (PERCOCET) 7.5-325 MG per tablet    • [DISCONTINUED] pregabalin (LYRICA) 75 MG capsule Take 75 mg by mouth 2 (Two) Times a Day.     No current facility-administered medications on file prior to visit.       Vital Signs:   /74 (BP Location: Left arm, Patient Position: Sitting, Cuff Size: Adult)   Pulse 89   Temp 98.8 °F (37.1 °C) (Oral)   Wt 79.7 kg (175 lb 12.8 oz)   BMI 29.72 kg/m²       Physical Exam:  Physical Exam  Vitals reviewed.   Constitutional:       General: She is not in acute distress.     Appearance: She is obese. She is not ill-appearing.   Eyes:      Pupils: Pupils are equal, round, and reactive to light.   Neck:      Comments: No thyromegaly  Cardiovascular:      Rate and Rhythm: Normal rate and regular rhythm.   Pulmonary:      Effort: Pulmonary effort is normal.      Comments: Decreased breath sounds are noted.  Abdominal:      General: There is no distension.      Palpations: Abdomen is soft.      Tenderness: There is no abdominal tenderness.   Musculoskeletal:      Cervical back: Neck supple.   Lymphadenopathy:      Cervical: No cervical adenopathy.   Skin:     Findings: No lesion or rash.   Neurological:      Mental Status: She is alert.         Result Review      The following data was reviewed by Artur Quevedo MD on 08/13/2021.  Lab Results   Component Value Date    WBC 9.78 01/08/2021    HGB 16.20 (H) 01/08/2021    HCT 50.2 (H) 01/08/2021    MCV 85.7 01/08/2021    .00 01/08/2021      Lab Results   Component Value Date    GLUCOSE 98 01/12/2018    BUN 14 01/08/2021    CREATININE 0.94 (H) 01/08/2021    EGFRIFNONA 61 01/12/2018    EGFRIFAFRI 70 01/12/2018    BCR 15 01/08/2021    K 4.2 01/08/2021    CO2 25 01/08/2021    CALCIUM 10.4 01/08/2021    ALBUMIN 4.5 01/08/2021    LABIL2 1.3 (L) 01/08/2021    AST 17 01/08/2021    ALT 10 01/08/2021     Lab Results   Component Value Date    CHLPL 200 11/14/2019    TRIG 170 (H) 11/14/2019    HDL 50 11/14/2019     (H) 11/14/2019     Lab Results   Component Value Date    TSH 0.840 05/01/2020     No results found for: HGBA1C    In addition to the above, testing from [facility] dated 8/4/2021 has been reviewed.  This includes laboratory evaluation and chest x-ray.           Assessment and Plan:   Today, we have reviewed her care.  She seems to be somewhat better from the recent flaring of her COPD.  I have reviewed her care at [facility] in detail.  I am not recommending any new specific treatment.  We also reviewed her other problems and medications.  We will refill needed medications for Eileen Alvarez.  She is due for mammogram and CT chest which will be updated as noted below.       Diagnoses and all orders for this visit:    1. Chronic obstructive pulmonary disease, unspecified COPD type (CMS/[facility]) (Primary)  -     albuterol sulfate HFA (Ventolin HFA) 108 (90 Base) MCG/ACT inhaler; Inhale 2 puffs Every 6 (Six) Hours As Needed for Wheezing.  Dispense: 8 g; Refill: 11    2. Essential hypertension    3. Lumbar spondylosis  -     pregabalin (LYRICA) 75 MG capsule; Take 1 capsule by mouth 2 (Two) Times a Day.  Dispense: 60 capsule; Refill: 5    4. Other long term (current) drug therapy  -     pregabalin (LYRICA) 75 MG capsule; Take 1 capsule by mouth 2 (Two) Times a Day.  Dispense: 60 capsule; Refill: 5    5. Breast cancer screening by mammogram  -     Mammo Screening Digital Tomosynthesis Bilateral With CAD; Future    6. Cigarette nicotine dependence,  uncomplicated  -      CT Chest Low Dose Cancer Screening WO; Future          Follow Up   Return in about 6 months (around 2/13/2022).  Patient was given instructions and counseling regarding her condition or for health maintenance advice. Please see specific information pulled into the AVS if appropriate.

## 2021-08-24 ENCOUNTER — HOSPITAL ENCOUNTER (OUTPATIENT)
Dept: CT IMAGING | Facility: HOSPITAL | Age: 71
Discharge: HOME | End: 2021-08-24

## 2021-08-24 ENCOUNTER — HOSPITAL ENCOUNTER (OUTPATIENT)
Dept: MAMMOGRAPHY | Facility: HOSPITAL | Age: 71
Discharge: HOME | End: 2021-08-24

## 2021-08-24 DIAGNOSIS — Z12.31 BREAST CANCER SCREENING BY MAMMOGRAM: ICD-10-CM

## 2021-08-24 DIAGNOSIS — F17.210 CIGARETTE NICOTINE DEPENDENCE, UNCOMPLICATED: ICD-10-CM

## 2021-08-24 PROCEDURE — 71271 CT THORAX LUNG CANCER SCR C-: CPT

## 2021-08-24 PROCEDURE — 77067 SCR MAMMO BI INCL CAD: CPT

## 2021-08-24 PROCEDURE — 77063 BREAST TOMOSYNTHESIS BI: CPT

## 2021-08-30 ENCOUNTER — TELEPHONE (OUTPATIENT)
Dept: FAMILY MEDICINE CLINIC | Age: 71
End: 2021-08-30

## 2021-08-30 NOTE — TELEPHONE ENCOUNTER
Pt states she is still having congestion/feels tight, c/o dry cough, states its hard to get anything up, she did complete steroid that was prescribed, would like to know if you can send her in an atbx. Please advise.

## 2021-08-30 NOTE — TELEPHONE ENCOUNTER
Hub staff attempted to follow warm transfer process and was unsuccessful     Caller: Eileen Kaufman    Relationship to patient: Self    Best call back number: [phone] AND INFORMS VOICE MAIL IS ALRIGHT    Patient is needing: PATIENT STATED: SHE WAS SPEAKING TO A CLINICAL TEAM MEMBER AND CALL DROPPED, NEED FOR CALL BACK TO DISCUSS ADDITIONAL MEDICATION FOR CONGESTION THAT PATIENT WAS RECENTLY SEEN FOR

## 2021-08-31 RX ORDER — AZITHROMYCIN 250 MG/1
TABLET, FILM COATED ORAL
Qty: 6 TABLET | Refills: 0 | Status: SHIPPED | OUTPATIENT
Start: 2021-08-31 | End: 2022-01-31

## 2021-08-31 NOTE — TELEPHONE ENCOUNTER
Caller: Eileen Kaufman    Relationship: Self    Best call back number: [phone]    What is the best time to reach you: ANYTIME    Who are you requesting to speak with (clinical staff, provider,  specific staff member): CLINICAL    What was the call regarding: THE PATIENT WANTED TO LET PCP AND NURSE KNOW THAT SHE FORGOT SHE WAS PRESCRIBED PREDNISONE FROM THE HOSPITAL SEVERAL WEEKS AGO FOR HER MUCUS. SHE ALSO STATED [facility] HAS NOT BEEN HELPING. SHE STATED SHE WOULD LIKE A CALL BACK ASAP TO DISCUSS AND A DETAILED VOICEMAIL IS OKAY TO LEAVE.    Do you require a callback: YES

## 2021-10-14 ENCOUNTER — TRANSCRIBE ORDERS (OUTPATIENT)
Dept: ADMINISTRATIVE | Facility: HOSPITAL | Age: 71
End: 2021-10-14

## 2021-10-14 DIAGNOSIS — M51.36 DISC DEGENERATION, LUMBAR: Primary | ICD-10-CM

## 2021-11-03 ENCOUNTER — HOSPITAL ENCOUNTER (OUTPATIENT)
Dept: MRI IMAGING | Facility: HOSPITAL | Age: 71
Discharge: HOME | End: 2021-11-03
Admitting: NURSE PRACTITIONER

## 2021-11-03 DIAGNOSIS — M51.36 DISC DEGENERATION, LUMBAR: ICD-10-CM

## 2021-11-03 PROCEDURE — 72148 MRI LUMBAR SPINE W/O DYE: CPT

## 2021-11-11 RX ORDER — MELOXICAM 15 MG/1
15 TABLET ORAL DAILY
Qty: 90 TABLET | Refills: 0 | Status: SHIPPED | OUTPATIENT
Start: 2021-11-11 | End: 2022-01-31

## 2021-11-24 ENCOUNTER — OFFICE VISIT (OUTPATIENT)
Dept: NEUROSURGERY | Facility: CLINIC | Age: 71
End: 2021-11-24

## 2021-11-24 VITALS
WEIGHT: 170 LBS | DIASTOLIC BLOOD PRESSURE: 112 MMHG | HEART RATE: 88 BPM | HEIGHT: 64 IN | SYSTOLIC BLOOD PRESSURE: 173 MMHG | BODY MASS INDEX: 29.02 KG/M2

## 2021-11-24 DIAGNOSIS — M51.36 DDD (DEGENERATIVE DISC DISEASE), LUMBAR: Primary | ICD-10-CM

## 2021-11-24 DIAGNOSIS — M47.816 FACET ARTHROPATHY, LUMBAR: ICD-10-CM

## 2021-11-24 DIAGNOSIS — M43.16 SPONDYLOLISTHESIS AT L3-L4 LEVEL: ICD-10-CM

## 2021-11-24 PROCEDURE — 99204 OFFICE O/P NEW MOD 45 MIN: CPT | Performed by: PHYSICIAN ASSISTANT

## 2021-11-24 NOTE — PROGRESS NOTES
"Chief Complaint  Back Pain    Subjective          Eileen Kaufman who is a 71 y.o. year old female who presents to [facility] for Evaluation of the Spine.     The patient complains of pain located in the Lumbar Spine.  Patients states the pain has been present for 1 year.  The pain came on gradually.  The pain scaled level is 9.  The pain does radiate. Dermatomes are located on right Lumbar at: L5.  The pain is constant and waxing/waning and described as sharp and burning.  The pain is worse at no particular time of day. Patient states nothing improves the pain.  Patient states Prolonged Standing and Prolonged Sitting makes the pain worse.    Associated Symptoms Include: Weakness, subjective in the right ankle, denies loss of bowel or bladder function.  Conservative Interventions Include: NSAIDs that were not very effective., Muscle Relaxants that were somewhat effective. and Lyrica that is somewhat effective. She had lumbar epidural steroid injection several days ago, which she reports was not helpful.    Was this the result of an injury or accident?: No    History of Previous Spinal Surgery?: No     reports that she has been smoking cigarettes. She has been smoking about 0.50 packs per day. She uses smokeless tobacco.    Review of Systems   Musculoskeletal: Positive for back pain, gait problem and myalgias.   Neurological: Positive for weakness and numbness.        Objective   Vital Signs:   BP (!) 173/112   Pulse 88   Ht 163.8 cm (64.49\")   Wt 77.1 kg (170 lb)   BMI 28.74 kg/m²       Physical Exam  Constitutional:       Appearance: Normal appearance. She is normal weight.   Pulmonary:      Effort: Pulmonary effort is normal.   Musculoskeletal:         General: Tenderness (TTP midline lumbar spine) present.      Comments: SLR on right causes pain in posterior lower extremity   Neurological:      General: No focal deficit present.      Mental Status: She is alert and " oriented to person, place, and time.      Sensory: No sensory deficit.      Motor: No weakness.      Deep Tendon Reflexes: Reflexes normal.   Psychiatric:         Mood and Affect: Mood normal.         Behavior: Behavior normal.        Neurologic Exam     Mental Status   Oriented to person, place, and time.        Result Review     I have personally reviewed the MRI of lumbar spine without contrast from 11/3/2021 which shows multilevel degenerative disc disease and facet arthropathy worse at L3-L4 and L4-L5 where there is severe central canal narrowing, there is foraminal stenosis worse on the right at L4-L5 and L5-S1 encroaching on the exiting nerve roots at these levels.  There is mild anterolisthesis of L3 on L4.         Assessment and Plan    Diagnoses and all orders for this visit:    1. DDD (degenerative disc disease), lumbar (Primary)    2. Facet arthropathy, lumbar    3. Spondylolisthesis at L3-L4 level    She has radicular pain into the big toe on the right in more of an L5 pattern, she may benefit from lateral recess decompression or foraminotomy on the right at L5-S1.    She may consider LESB to assess benefit for her pain. She is already attending pain management at [facility], but has only undergone one epidural injection reportedly - she may consider completing the full trial.    She may consider physical therapy, especially to help strengthen her core. She would like to defer PT.    We discussed the importance of smoking/nicotine cessation. Smoking/nicotine use has multiple health risks. In particular related to the spine, nicotine increases the incidence of lower back pain, speeds up the progression of degenerative disc disease and dramatically reduces healing after spine surgery (particularly a fusion operation).     We discussed her high blood pressure reading today, with me recommending that she follow-up with her PCP for appropriate management, she did verbalize understanding of  this.    The patient was counseled on basic recommendations for the reduction and prevention of back, neck, or spine pain in association with spinal disorders, including: cessation/avoidance of nicotine use, maintenance of a healthy BMI and weight, focusing on building/maintaining core strength through core exercise, and avoidance of activities which worsen the pain. The patient will monitor for changes in symptoms and notify our clinic of these changes as needed.    She will follow-up here in about 6 weeks to reassess after possibly completing injection trial and trying to discontinue nicotine.      Follow Up   Return in about 6 weeks (around 1/5/2022).  Patient was given instructions and counseling regarding her condition or for health maintenance advice. Please see specific information pulled into the AVS if appropriate.

## 2021-12-10 ENCOUNTER — TELEPHONE (OUTPATIENT)
Dept: NEUROLOGY | Facility: OTHER | Age: 71
End: 2021-12-10

## 2022-01-06 DIAGNOSIS — I10 ESSENTIAL HYPERTENSION: ICD-10-CM

## 2022-01-10 RX ORDER — HYDROCHLOROTHIAZIDE 25 MG/1
25 TABLET ORAL DAILY
Qty: 90 TABLET | Refills: 1 | Status: SHIPPED | OUTPATIENT
Start: 2022-01-10 | End: 2022-07-05 | Stop reason: SDUPTHER

## 2022-01-10 RX ORDER — MONTELUKAST SODIUM 10 MG/1
10 TABLET ORAL NIGHTLY
Qty: 90 TABLET | Refills: 1 | Status: SHIPPED | OUTPATIENT
Start: 2022-01-10 | End: 2022-07-05 | Stop reason: SDUPTHER

## 2022-01-11 ENCOUNTER — TELEPHONE (OUTPATIENT)
Dept: NEUROLOGY | Facility: OTHER | Age: 72
End: 2022-01-11

## 2022-01-11 NOTE — TELEPHONE ENCOUNTER
Patient called to reschedule appointment with Vishnu Villegas in neurosurgery. I was able to transfer call to Ana in that department.

## 2022-01-13 ENCOUNTER — OFFICE VISIT (OUTPATIENT)
Dept: NEUROSURGERY | Facility: CLINIC | Age: 72
End: 2022-01-13

## 2022-01-13 VITALS
WEIGHT: 176 LBS | HEIGHT: 64 IN | BODY MASS INDEX: 30.05 KG/M2 | SYSTOLIC BLOOD PRESSURE: 124 MMHG | DIASTOLIC BLOOD PRESSURE: 86 MMHG | HEART RATE: 99 BPM

## 2022-01-13 DIAGNOSIS — M43.16 SPONDYLOLISTHESIS AT L3-L4 LEVEL: ICD-10-CM

## 2022-01-13 DIAGNOSIS — M51.36 DDD (DEGENERATIVE DISC DISEASE), LUMBAR: Primary | ICD-10-CM

## 2022-01-13 DIAGNOSIS — M47.816 FACET ARTHROPATHY, LUMBAR: ICD-10-CM

## 2022-01-13 PROCEDURE — 99213 OFFICE O/P EST LOW 20 MIN: CPT | Performed by: PHYSICIAN ASSISTANT

## 2022-01-13 RX ORDER — HYDROCODONE BITARTRATE AND ACETAMINOPHEN 10; 325 MG/1; MG/1
TABLET ORAL EVERY 6 HOURS
COMMUNITY
End: 2022-04-08

## 2022-01-13 NOTE — PROGRESS NOTES
Patient being seen for today for Back Pain  .    Subjective    Eileen Kaufman is a 71 y.o. female that presents with Back Pain  .    HPI  Previously: Last seen on 11/24/2021 for complaints of low back pain extending into the right lower extremity to the foot in the L5 dermatome, she was found to have severe central stenosis at L4-L5 and bilateral foraminal stenosis at L4-L5 and L5-S1, she had completed 1 lumbar epidural steroid block at the time of her last visit, the recommendation was to continue with injection trial at that time and to work on discontinuing nicotine use.    Today: She reports her pain complaints are somewhat worse. She reports more pain into the right leg, into the right foot, mostly the big toe. Again, this is somewhat worse than before. She denies any other new complaints. She has not completed any more injections after her intitial lumbar epidural block, but is scheduled for her 2nd injection next week.     reports that she has been smoking cigarettes. She has been smoking about 0.50 packs per day. She uses smokeless tobacco.    Review of Systems   Musculoskeletal: Positive for back pain and myalgias.       Objective   Vitals:    01/13/22 1300   BP: 124/86   Pulse: 99        Physical Exam  Constitutional:       Appearance: Normal appearance. She is normal weight.   Pulmonary:      Effort: Pulmonary effort is normal.   Musculoskeletal:         General: Tenderness (midline lumbar spine, bilateral lumbar paraspinals) present.      Comments: SLR negative bilaterally   Neurological:      General: No focal deficit present.      Mental Status: She is alert and oriented to person, place, and time.      Sensory: No sensory deficit.      Motor: No weakness.      Deep Tendon Reflexes: Reflexes normal.   Psychiatric:         Mood and Affect: Mood normal.         Behavior: Behavior normal.          Result Review   None.     Assessment and Plan {CC Problem List  Visit Diagnosis  ROS  Review (Popup)   [facility]  BestPractice  Medications  SmartSets  SnapShot Encounters  Media :23}   Diagnoses and all orders for this visit:    1. DDD (degenerative disc disease), lumbar (Primary)    2. Facet arthropathy, lumbar    3. Spondylolisthesis at L3-L4 level    Surgery may be helpful, but I would recommend more conservative treatment first, as she has changes possibly affecting the L4 nerve and the L5 nerve on the right. Her pain is to the big toe, which realistically could represent radicular pain of either of these nerve.    It may be helpful to have NCV/EMG testing prior to considering surgery to assess radiculopathy.    The plan today is still the same. She is scheduled to have a 2nd epidural block next week, reportedly. I do recommend she continue with the injection trial.    Again, she could consider adding physical therapy to assess benefit. She is not interested in a referral for this today.    We discussed the importance of smoking/nicotine cessation. Smoking/nicotine use has multiple health risks. In particular related to the spine, nicotine increases the incidence of lower back pain, speeds up the progression of degenerative disc disease and dramatically reduces healing after spine surgery (particularly a fusion operation).     The patient was counseled on basic recommendations for the reduction and prevention of back, neck, or spine pain in association with spinal disorders, including: cessation/avoidance of nicotine use, maintenance of a healthy BMI and weight, focusing on building/maintaining core strength through core exercise, and avoidance of activities which worsen the pain. The patient will monitor for changes in symptoms and notify our clinic of these changes as needed.    She will follow-up here PRN for failure to improve or worsening pain.  Follow Up {Instructions Charge Capture  Follow-up Communications :23}   Return if symptoms worsen or fail to improve.

## 2022-01-21 ENCOUNTER — TELEPHONE (OUTPATIENT)
Dept: FAMILY MEDICINE CLINIC | Age: 72
End: 2022-01-21

## 2022-01-21 NOTE — TELEPHONE ENCOUNTER
Caller: Eileen Kaufman    Relationship: Self    Best call back number: [phone]    What medication are you requesting: UNKNOWN, AS MD SUGGESTS     What are your current symptoms: CONGESTION THAT HAS EFFECTED HER HEARING     How long have you been experiencing symptoms: 3 DAYS    Have you had these symptoms before:    [x] Yes  [] No    Have you been treated for these symptoms before:   [x] Yes  [] No    If a prescription is needed, what is your preferred pharmacy and phone number:  [id number] [place], [place] - [address] AT [facility] [address]  - [phone]  - [phone] FX    Additional notes:REQUESTING CALL TO DISCUSS OVER THE COUNTER OPTIONS AS WELL

## 2022-01-21 NOTE — TELEPHONE ENCOUNTER
With her history of hypertension, Coricidin HBP or similar generic medication would be the safest option.  It is certainly possible her symptoms represent another COVID-19 infection.  This is not something we can evaluate for at this point.  However, if she does not see improvement over the weekend, she may want to be seen next week as a precaution.  Thanks.

## 2022-01-21 NOTE — TELEPHONE ENCOUNTER
Caller: Eileen Kaufman    Relationship: Self    Best call back number: [phone]    What is the best time to reach you: ANY    Who are you requesting to speak with (clinical staff, provider,  specific staff member): CLINICAL    Do you know the name of the person who called: GOMEZ    What was the call regarding: MEDICATION    Do you require a callback: YES

## 2022-01-26 ENCOUNTER — TELEPHONE (OUTPATIENT)
Dept: FAMILY MEDICINE CLINIC | Age: 72
End: 2022-01-26

## 2022-01-26 NOTE — TELEPHONE ENCOUNTER
Caller: Eileen Kaufman    Relationship: Self    Best call back number: [phone]    What is the best time to reach you: ANYTIME     Who are you requesting to speak with (clinical staff, provider,  specific staff member): CLINICAL STAFF    What was the call regarding: PATIENT CALLED WANTING TO SCHEDULE A FOLLOW UP APPT WITH DR. SIBLEY FROM THE MESSAGE PUT BACK ON JAN 21ST. PATIENT STATES SHE IS NOT ANY BETTER AND HER EARS ARE SO STOPPED UP THAT SHE CAN'T HEAR. PATIENT ONLY WANTED TO SEE DR. SIBLEY.     Do you require a callback: PLEASE CALL BACK TO ADVISE

## 2022-01-31 ENCOUNTER — OFFICE VISIT (OUTPATIENT)
Dept: FAMILY MEDICINE CLINIC | Age: 72
End: 2022-01-31

## 2022-01-31 VITALS
TEMPERATURE: 98 F | BODY MASS INDEX: 29.4 KG/M2 | DIASTOLIC BLOOD PRESSURE: 90 MMHG | HEART RATE: 83 BPM | HEIGHT: 64 IN | SYSTOLIC BLOOD PRESSURE: 141 MMHG | WEIGHT: 172.2 LBS

## 2022-01-31 DIAGNOSIS — H61.23 BILATERAL IMPACTED CERUMEN: Primary | ICD-10-CM

## 2022-01-31 PROCEDURE — 99212 OFFICE O/P EST SF 10 MIN: CPT | Performed by: FAMILY MEDICINE

## 2022-01-31 PROCEDURE — 69209 REMOVE IMPACTED EAR WAX UNI: CPT | Performed by: FAMILY MEDICINE

## 2022-01-31 NOTE — PROGRESS NOTES
Eileen Kaufman presents to [facility].    Chief Complaint:  'to check my ears'    Subjective       History of Present Illness:  Eileen Alvarez is in today for follow up on her ears.  She says that she slept under a fan last week and got congested.  She had quite a bit of sinus pressure and congestion.  She then developed pressure and decreased hearing in her ears.  She says she could not hear.  She did pick some wax out of her ears, and that has been somewhat helpful.      Review of Systems:  Review of Systems   Constitutional: Negative for chills and fever.   HENT: Positive for congestion.    Respiratory: Negative for cough and shortness of breath.    Cardiovascular: Negative for chest pain and palpitations.   Gastrointestinal: Negative for abdominal pain, nausea and vomiting.    Ear Cerumen Removal    Date/Time: 1/31/2022 1:17 PM  Performed by: Jasmina Boyer MA  Authorized by: Artur Quevedo MD     Anesthesia:  Local Anesthetic: none  Location details: left ear and right ear  Patient tolerance: patient tolerated the procedure well with no immediate complications  Procedure type: irrigation   Sedation:  Patient sedated: no            Objective   Medical History:  Past Medical History:   • Anemia, unspecified   • Asymptomatic menopausal state   • Chronic obstructive pulmonary disease with (acute) exacerbation (HCC)   • Essential (primary) hypertension   • Low back pain   • Major depressive disorder, single episode, moderate (HCC)   • Mixed hyperlipidemia   • Nicotine dependence, unspecified, uncomplicated   • Osteopenia   • Other intervertebral disc degeneration, lumbar region   • Other long term (current) drug therapy   • Solitary pulmonary nodule   • Vitamin D deficiency, unspecified     Past Surgical History:   • CARDIAC CATHETERIZATION   • CARPAL TUNNEL RELEASE   • CHOLECYSTECTOMY   • COLONOSCOPY    normal   • HYSTERECTOMY    Total   • KNEE ARTHROSCOPY   • NECK SURGERY       Family History   Problem Relation Age of Onset   • Cerebral aneurysm Mother         cause of death   • Heart attack Father         cause of death   • Esophageal cancer Brother      Social History     Tobacco Use   • Smoking status: Current Every Day Smoker     Packs/day: 0.50     Types: Cigarettes   • Smokeless tobacco: Current User   • Tobacco comment: Eileen Alvarez was 23 when she started smoking.  She did stop smoking for some time - about 13 years.  She has been smoking a total of approximately 33 years.  She has smoked less than a pack daily during this time. She currently uses smokless, vapor can/pouches   Substance Use Topics   • Alcohol use: Yes     Comment: Socially       Health Maintenance Due   Topic Date Due   • TDAP/TD VACCINES (1 - Tdap) Never done   • ZOSTER VACCINE (1 of 2) Never done   • HEPATITIS C SCREENING  Never done   • ANNUAL WELLNESS VISIT  06/17/2021   • LIPID PANEL  06/17/2021        Immunization History   Administered Date(s) Administered   • COVID-19 (PFIZER) PURPLE CAP 12/29/2020, 01/29/2021, 09/30/2021   • Fluzone High Dose =>65 Years ([employer] ONLY) 10/01/2021   • Hepatitis A 07/07/2018   • Influenza, Unspecified 10/01/2020   • PEDS-Pneumococcal Conjugate (PCV7) 07/05/2018   • Pneumococcal Polysaccharide (PPSV23) 03/25/2013, 02/13/2017       Allergies   Allergen Reactions   • Sulfa Antibiotics Unknown - Low Severity   • Aspirin Rash        Medications:  Current Outpatient Medications on File Prior to Visit   Medication Sig   • albuterol sulfate HFA (Ventolin HFA) 108 (90 Base) MCG/ACT inhaler Inhale 2 puffs Every 6 (Six) Hours As Needed for Wheezing.   • amLODIPine (NORVASC) 5 MG tablet Take 1 tablet by mouth 2 (Two) Times a Day.   • atorvastatin (Lipitor) 20 MG tablet Lipitor 20 mg oral tablet take 1 tablet (20 mg) by oral route once daily   Suspended   • Calcium Carbonate 1500 (600 Ca) MG tablet Calcium 600 600 mg (1,500 mg) oral tablet take 1 tablet by oral route daily   Suspended   •  cetirizine (zyrTEC) 10 MG tablet Take 1 tablet by mouth Daily.   • cyclobenzaprine (FLEXERIL) 10 MG tablet cyclobenzaprine 10 mg oral tablet take 1 tablet by oral route once a day (at bedtime) as needed   Active   • Diclofenac Sodium (VOLTAREN) 1 % gel gel APPLY 2 GRAMS EXTERNALLY TO THE AFFECTED AREA TWICE DAILY AS NEEDED   • ferrous sulfate 325 (65 FE) MG tablet ferrous sulfate 325 mg (65 mg iron) oral tablet take 1 tablet (325 mg) by oral route once daily   Active   • fluticasone (FLONASE) 50 MCG/ACT nasal spray fluticasone propionate 50 mcg/actuation nasal spray,suspension spray 1 spray (50 mcg) in each nostril by intranasal route once daily   Active   • Fluticasone Furoate-Vilanterol (Breo Ellipta) 100-25 MCG/INH inhaler Breo Ellipta 100-25 mcg/dose inhalation blister with device inhale 1 puff by inhalation route once daily at the same time each day   Active   • hydroCHLOROthiazide (HYDRODIURIL) 25 MG tablet TAKE 1 TABLET BY MOUTH DAILY   • HYDROcodone-acetaminophen (NORCO)  MG per tablet Every 6 (Six) Hours.   • metoprolol succinate XL (Toprol XL) 50 MG 24 hr tablet Take 1 tablet by mouth Daily.   • mirtazapine (REMERON) 30 MG tablet Take 1 tablet by mouth Every Night.   • montelukast (SINGULAIR) 10 MG tablet TAKE 1 TABLET BY MOUTH EVERY NIGHT   • multivitamin with minerals (MULTIVITAMIN ADULT PO) multivitamin oral tablet take 1 tablet by oral route daily   Active   • pregabalin (LYRICA) 75 MG capsule Take 1 capsule by mouth 2 (Two) Times a Day.   • promethazine (PHENERGAN) 25 MG tablet Take 1 tablet by mouth Every 6 (Six) Hours As Needed for Nausea or Vomiting.   • [DISCONTINUED] azithromycin (Zithromax Z-Raghav) 250 MG tablet Take 2 tablets by mouth on day 1, then 1 tablet daily on days 2-5   • [DISCONTINUED] meloxicam (MOBIC) 15 MG tablet TAKE 1 TABLET BY MOUTH DAILY     No current facility-administered medications on file prior to visit.       Vital Signs:   /90   Pulse 83   Temp 98 °F (36.7 °C)  "(Oral)   Ht 163.8 cm (64.49\")   Wt 78.1 kg (172 lb 3.2 oz)   BMI 29.11 kg/m²       Physical Exam:  Physical Exam  Vitals and nursing note reviewed.   Constitutional:       General: She is not in acute distress.     Appearance: She is not ill-appearing.   HENT:      Right Ear: There is impacted cerumen.      Left Ear: There is impacted cerumen.      Mouth/Throat:      Mouth: Mucous membranes are moist.      Comments: Pharynx appears normal  Eyes:      Extraocular Movements: Extraocular movements intact.      Pupils: Pupils are equal, round, and reactive to light.   Neck:      Thyroid: No thyromegaly.   Cardiovascular:      Rate and Rhythm: Normal rate and regular rhythm.      Heart sounds: No murmur heard.      Pulmonary:      Effort: Pulmonary effort is normal.      Breath sounds: Normal breath sounds.   Abdominal:      General: There is no distension.      Palpations: Abdomen is soft. There is no mass.      Tenderness: There is no abdominal tenderness.   Musculoskeletal:      Cervical back: Normal range of motion.   Skin:     Findings: No lesion or rash.   Neurological:      General: No focal deficit present.      Mental Status: She is oriented to person, place, and time.      Cranial Nerves: No cranial nerve deficit.   Psychiatric:         Mood and Affect: Mood normal.         Result Review      The following data was reviewed by Artur Quevedo MD on 01/31/2022.  Lab Results   Component Value Date    WBC 9.78 01/08/2021    HGB 16.20 (H) 01/08/2021    HCT 50.2 (H) 01/08/2021    MCV 85.7 01/08/2021    .00 01/08/2021     Lab Results   Component Value Date    GLUCOSE 68 01/08/2021    BUN 14 01/08/2021    CREATININE 0.94 (H) 01/08/2021     01/08/2021    K 4.2 01/08/2021     01/08/2021    CO2 25 01/08/2021    CALCIUM 10.4 01/08/2021    PROTEINTOT 8.0 01/08/2021    ALBUMIN 4.5 01/08/2021    ALT 10 01/08/2021    AST 17 01/08/2021    ALKPHOS 151 01/08/2021    BILITOT 0.54 01/08/2021    " EGFRIFNONA 61 01/12/2018    GLOB 3.5 01/08/2021    BCR 15 01/08/2021    ANIONGAP 17 01/08/2021      Lab Results   Component Value Date    CHLPL 200 11/14/2019    TRIG 170 (H) 11/14/2019    HDL 50 11/14/2019     (H) 11/14/2019     Lab Results   Component Value Date    TSH 0.840 05/01/2020     No results found for: HGBA1C         Assessment and Plan:   Today, we have reviewed her care.  She does have significant wax in both ears.  We will move ahead with the flushing that and hopefully get her some relief.  I have asked her to keep her follow-up appointment in a couple weeks as scheduled.  We will move forward from there.  Thanks.       Diagnoses and all orders for this visit:    1. Bilateral impacted cerumen (Primary)  -     Ear Cerumen Removal  -     Cerumen Removal          Follow Up   Return in about 2 weeks (around 2/14/2022).  Patient was given instructions and counseling regarding her condition or for health maintenance advice. Please see specific information pulled into the AVS if appropriate.

## 2022-01-31 NOTE — PATIENT INSTRUCTIONS
Earwax Buildup, Adult  The ears produce a substance called earwax that helps keep bacteria out of the ear and protects the skin in the ear canal. Occasionally, earwax can build up in the ear and cause discomfort or hearing loss.  What are the causes?  This condition is caused by a buildup of earwax. Ear canals are self-cleaning. Ear wax is made in the outer part of the ear canal and generally falls out in small amounts over time.  When the self-cleaning mechanism is not working, earwax builds up and can cause decreased hearing and discomfort. Attempting to clean ears with cotton swabs can push the earwax deep into the ear canal and cause decreased hearing and pain.  What increases the risk?  This condition is more likely to develop in people who:  · Clean their ears often with cotton swabs.  · Pick at their ears.  · Use earplugs or in-ear headphones often, or wear hearing aids.  The following factors may also make you more likely to develop this condition:  · Being male.  · Being of older age.  · Naturally producing more earwax.  · Having narrow ear canals.  · Having earwax that is overly thick or sticky.  · Having excess hair in the ear canal.  · Having eczema.  · Being dehydrated.  What are the signs or symptoms?  Symptoms of this condition include:  · Reduced or muffled hearing.  · A feeling of fullness in the ear or feeling that the ear is plugged.  · Fluid coming from the ear.  · Ear pain or an itchy ear.  · Ringing in the ear.  · Coughing.  · Balance problems.  · An obvious piece of earwax that can be seen inside the ear canal.  How is this diagnosed?  This condition may be diagnosed based on:  · Your symptoms.  · Your medical history.  · An ear exam. During the exam, your health care provider will look into your ear with an instrument called an otoscope.  You may have tests, including a hearing test.  How is this treated?  This condition may be treated by:  · Using ear drops to soften the earwax.  · Having  the earwax removed by a health care provider. The health care provider may:  ? Flush the ear with water.  ? Use an instrument that has a loop on the end (curette).  ? Use a suction device.  · Having surgery to remove the wax buildup. This may be done in severe cases.  Follow these instructions at home:    · Take over-the-counter and prescription medicines only as told by your health care provider.  · Do not put any objects, including cotton swabs, into your ear. You can clean the opening of your ear canal with a washcloth or facial tissue.  · Follow instructions from your health care provider about cleaning your ears. Do not overclean your ears.  · Drink enough fluid to keep your urine pale yellow. This will help to thin the earwax.  · Keep all follow-up visits as told. If earwax builds up in your ears often or if you use hearing aids, consider seeing your health care provider for routine, preventive ear cleanings. Ask your health care provider how often you should schedule your cleanings.  · If you have hearing aids, clean them according to instructions from the  and your health care provider.  Contact a health care provider if:  · You have ear pain.  · You develop a fever.  · You have pus or other fluid coming from your ear.  · You have hearing loss.  · You have ringing in your ears that does not go away.  · You feel like the room is spinning (vertigo).  · Your symptoms do not improve with treatment.  Get help right away if:  · You have bleeding from the affected ear.  · You have severe ear pain.  Summary  · Earwax can build up in the ear and cause discomfort or hearing loss.  · The most common symptoms of this condition include reduced or muffled hearing, a feeling of fullness in the ear, or feeling that the ear is plugged.  · This condition may be diagnosed based on your symptoms, your medical history, and an ear exam.  · This condition may be treated by using ear drops to soften the earwax or by  having the earwax removed by a health care provider.  · Do not put any objects, including cotton swabs, into your ear. You can clean the opening of your ear canal with a washcloth or facial tissue.  This information is not intended to replace advice given to you by your health care provider. Make sure you discuss any questions you have with your health care provider.  Document Revised: 04/06/2021 Document Reviewed: 04/06/2021  Elsevier Patient Education © 2021 [employer]

## 2022-02-14 ENCOUNTER — OFFICE VISIT (OUTPATIENT)
Dept: FAMILY MEDICINE CLINIC | Age: 72
End: 2022-02-14

## 2022-02-14 ENCOUNTER — LAB (OUTPATIENT)
Dept: LAB | Facility: HOSPITAL | Age: 72
End: 2022-02-14

## 2022-02-14 VITALS
TEMPERATURE: 97.5 F | HEART RATE: 98 BPM | DIASTOLIC BLOOD PRESSURE: 91 MMHG | WEIGHT: 170.4 LBS | BODY MASS INDEX: 29.09 KG/M2 | SYSTOLIC BLOOD PRESSURE: 151 MMHG | HEIGHT: 64 IN

## 2022-02-14 DIAGNOSIS — M47.816 LUMBAR SPONDYLOSIS: ICD-10-CM

## 2022-02-14 DIAGNOSIS — I25.10 CORONARY ARTERY DISEASE INVOLVING NATIVE CORONARY ARTERY OF NATIVE HEART WITHOUT ANGINA PECTORIS: ICD-10-CM

## 2022-02-14 DIAGNOSIS — Z79.899 OTHER LONG TERM (CURRENT) DRUG THERAPY: ICD-10-CM

## 2022-02-14 DIAGNOSIS — E78.00 HIGH CHOLESTEROL: ICD-10-CM

## 2022-02-14 DIAGNOSIS — Z11.59 NEED FOR HEPATITIS C SCREENING TEST: ICD-10-CM

## 2022-02-14 DIAGNOSIS — R76.8 HEPATITIS C ANTIBODY TEST POSITIVE: ICD-10-CM

## 2022-02-14 DIAGNOSIS — I10 ESSENTIAL HYPERTENSION: ICD-10-CM

## 2022-02-14 DIAGNOSIS — J34.89 RHINORRHEA: Primary | ICD-10-CM

## 2022-02-14 LAB
ALBUMIN SERPL-MCNC: 4.9 G/DL (ref 3.5–5.2)
ALBUMIN/GLOB SERPL: 1.5 G/DL
ALP SERPL-CCNC: 132 U/L (ref 39–117)
ALT SERPL W P-5'-P-CCNC: 23 U/L (ref 1–33)
AMPHET+METHAMPHET UR QL: NEGATIVE
AMPHETAMINES UR QL: NEGATIVE
ANION GAP SERPL CALCULATED.3IONS-SCNC: 9.9 MMOL/L (ref 5–15)
AST SERPL-CCNC: 20 U/L (ref 1–32)
BARBITURATES UR QL SCN: NEGATIVE
BENZODIAZ UR QL SCN: NEGATIVE
BILIRUB SERPL-MCNC: 0.6 MG/DL (ref 0–1.2)
BUN SERPL-MCNC: 13 MG/DL (ref 8–23)
BUN/CREAT SERPL: 15.3 (ref 7–25)
BUPRENORPHINE SERPL-MCNC: NEGATIVE NG/ML
CALCIUM SPEC-SCNC: 10.4 MG/DL (ref 8.6–10.5)
CANNABINOIDS SERPL QL: NEGATIVE
CHLORIDE SERPL-SCNC: 102 MMOL/L (ref 98–107)
CHOLEST SERPL-MCNC: 220 MG/DL (ref 0–200)
CO2 SERPL-SCNC: 26.1 MMOL/L (ref 22–29)
COCAINE UR QL: NEGATIVE
CREAT SERPL-MCNC: 0.85 MG/DL (ref 0.57–1)
DEPRECATED RDW RBC AUTO: 58.4 FL (ref 37–54)
ERYTHROCYTE [DISTWIDTH] IN BLOOD BY AUTOMATED COUNT: 18.3 % (ref 12.3–15.4)
EXPIRATION DATE: ABNORMAL
GFR SERPL CREATININE-BSD FRML MDRD: 80 ML/MIN/1.73
GLOBULIN UR ELPH-MCNC: 3.3 GM/DL
GLUCOSE SERPL-MCNC: 71 MG/DL (ref 65–99)
HCT VFR BLD AUTO: 47.6 % (ref 34–46.6)
HCV AB SER DONR QL: REACTIVE
HDLC SERPL-MCNC: 73 MG/DL (ref 40–60)
HGB BLD-MCNC: 15.3 G/DL (ref 12–15.9)
LDLC SERPL CALC-MCNC: 130 MG/DL (ref 0–100)
LDLC/HDLC SERPL: 1.74 {RATIO}
Lab: ABNORMAL
MCH RBC QN AUTO: 28.2 PG (ref 26.6–33)
MCHC RBC AUTO-ENTMCNC: 32.1 G/DL (ref 31.5–35.7)
MCV RBC AUTO: 87.8 FL (ref 79–97)
MDMA UR QL SCN: NEGATIVE
METHADONE UR QL SCN: NEGATIVE
OPIATES UR QL: POSITIVE
OXYCODONE UR QL SCN: NEGATIVE
PCP UR QL SCN: NEGATIVE
PLATELET # BLD AUTO: 276 10*3/MM3 (ref 140–450)
PMV BLD AUTO: 11.3 FL (ref 6–12)
POTASSIUM SERPL-SCNC: 4.6 MMOL/L (ref 3.5–5.2)
PROT SERPL-MCNC: 8.2 G/DL (ref 6–8.5)
RBC # BLD AUTO: 5.42 10*6/MM3 (ref 3.77–5.28)
SODIUM SERPL-SCNC: 138 MMOL/L (ref 136–145)
TRIGL SERPL-MCNC: 100 MG/DL (ref 0–150)
TSH SERPL DL<=0.05 MIU/L-ACNC: 0.61 UIU/ML (ref 0.27–4.2)
VLDLC SERPL-MCNC: 17 MG/DL (ref 5–40)
WBC NRBC COR # BLD: 10.31 10*3/MM3 (ref 3.4–10.8)

## 2022-02-14 PROCEDURE — U0004 COV-19 TEST NON-CDC HGH THRU: HCPCS | Performed by: FAMILY MEDICINE

## 2022-02-14 PROCEDURE — 99214 OFFICE O/P EST MOD 30 MIN: CPT | Performed by: FAMILY MEDICINE

## 2022-02-14 PROCEDURE — 36415 COLL VENOUS BLD VENIPUNCTURE: CPT

## 2022-02-14 PROCEDURE — 84443 ASSAY THYROID STIM HORMONE: CPT

## 2022-02-14 PROCEDURE — 80053 COMPREHEN METABOLIC PANEL: CPT

## 2022-02-14 PROCEDURE — 80061 LIPID PANEL: CPT

## 2022-02-14 PROCEDURE — 86803 HEPATITIS C AB TEST: CPT

## 2022-02-14 PROCEDURE — 85027 COMPLETE CBC AUTOMATED: CPT

## 2022-02-14 PROCEDURE — 80305 DRUG TEST PRSMV DIR OPT OBS: CPT | Performed by: FAMILY MEDICINE

## 2022-02-14 PROCEDURE — 87522 HEPATITIS C REVRS TRNSCRPJ: CPT | Performed by: FAMILY MEDICINE

## 2022-02-14 RX ORDER — PREGABALIN 75 MG/1
75 CAPSULE ORAL 2 TIMES DAILY
Qty: 60 CAPSULE | Refills: 5 | Status: SHIPPED | OUTPATIENT
Start: 2022-02-14 | End: 2022-08-16

## 2022-02-14 RX ORDER — CETIRIZINE HYDROCHLORIDE 10 MG/1
10 TABLET ORAL DAILY
Qty: 90 TABLET | Refills: 1 | Status: SHIPPED | OUTPATIENT
Start: 2022-02-14 | End: 2022-08-16 | Stop reason: SDUPTHER

## 2022-02-14 RX ORDER — AMLODIPINE BESYLATE 5 MG/1
5 TABLET ORAL 2 TIMES DAILY
Qty: 180 TABLET | Refills: 1 | Status: SHIPPED | OUTPATIENT
Start: 2022-02-14 | End: 2022-08-16 | Stop reason: SDUPTHER

## 2022-02-14 RX ORDER — METOPROLOL SUCCINATE 50 MG/1
50 TABLET, EXTENDED RELEASE ORAL DAILY
Qty: 90 TABLET | Refills: 1 | Status: SHIPPED | OUTPATIENT
Start: 2022-02-14 | End: 2022-08-16 | Stop reason: SDUPTHER

## 2022-02-14 NOTE — PATIENT INSTRUCTIONS
Allergic Rhinitis, Adult    Allergic rhinitis is an allergic reaction that affects the mucous membrane inside the nose. The mucous membrane is the tissue that produces mucus.  There are two types of allergic rhinitis:  · Seasonal. This type is also called hay fever and happens only during certain seasons.  · Perennial. This type can happen at any time of the year.  Allergic rhinitis cannot be spread from person to person. This condition can be mild, moderate, or severe. It can develop at any age and may be outgrown.  What are the causes?  This condition is caused by allergens. These are things that can cause an allergic reaction. Allergens may differ for seasonal allergic rhinitis and perennial allergic rhinitis.  · Seasonal allergic rhinitis is triggered by pollen. Pollen can come from grasses, trees, and weeds.  · Perennial allergic rhinitis may be triggered by:  ? Dust mites.  ? Proteins in a pet's urine, saliva, or dander. Dander is dead skin cells from a pet.  ? Smoke, mold, or car fumes.  What increases the risk?  You are more likely to develop this condition if you have a family history of allergies or other conditions related to allergies, including:  · Allergic conjunctivitis. This is inflammation of parts of the eyes and eyelids.  · Asthma. This condition affects the lungs and makes it hard to breathe.  · Atopic dermatitis or eczema. This is long term (chronic) inflammation of the skin.  · Food allergies.  What are the signs or symptoms?  Symptoms of this condition include:  · Sneezing or coughing.  · A stuffy nose (nasal congestion), itchy nose, or nasal discharge.  · Itchy eyes and tearing of the eyes.  · A feeling of mucus dripping down the back of your throat (postnasal drip).  · Trouble sleeping.  · Tiredness or fatigue.  · Headache.  · Sore throat.  How is this diagnosed?  This condition may be diagnosed with your symptoms, medical history, and physical exam. Your health care provider may check for  related conditions, such as:  · Asthma.  · Pink eye. This is eye inflammation caused by infection (conjunctivitis).  · Ear infection.  · Upper respiratory infection. This is an infection in the nose, throat, or upper airways.  You may also have tests to find out which allergens trigger your symptoms. These may include skin tests or blood tests.  How is this treated?  There is no cure for this condition, but treatment can help control symptoms. Treatment may include:  · Taking medicines that block allergy symptoms, such as corticosteroids and antihistamines. Medicine may be given as a shot, nasal spray, or pill.  · Avoiding any allergens.  · Being exposed again and again to tiny amounts of allergens to help you build a defense against allergens (immunotherapy). This is done if other treatments have not helped. It may include:  ? Allergy shots. These are injected medicines that have small amounts of allergen in them.  ? Sublingual immunotherapy. This involves taking small doses of a medicine with allergen in it under your tongue.  If these treatments do not work, your health care provider may prescribe newer, stronger medicines.  Follow these instructions at home:  Avoiding allergens  Find out what you are allergic to and avoid those allergens. These are some things you can do to help avoid allergens:  · If you have perennial allergies:  ? Replace carpet with wood, tile, or vinyl manisha. Carpet can trap dander and dust.  ? Do not smoke. Do not allow smoking in your home.  ? Change your heating and air conditioning filters at least once a month.  · If you have seasonal allergies, take these steps during allergy season:  ? Keep windows closed as much as possible.  ? Plan outdoor activities when pollen counts are lowest. Check pollen counts before you plan outdoor activities.  ? When coming indoors, change clothing and shower before sitting on furniture or bedding.  · If you have a pet in the house that produces  allergens:  ? Keep the pet out of the bedroom.  ? Vacuum, sweep, and dust regularly.  General instructions  · Take over-the-counter and prescription medicines only as told by your health care provider.  · Drink enough fluid to keep your urine pale yellow.  · Keep all follow-up visits as told by your health care provider. This is important.  Where to find more information  · American Academy of Allergy, Asthma & Immunology: www.aaaai.org  Contact a health care provider if:  · You have a fever.  · You develop a cough that does not go away.  · You make whistling sounds when you breathe (wheeze).  · Your symptoms slow you down or stop you from doing your normal activities each day.  Get help right away if:  · You have shortness of breath.  This symptom may represent a serious problem that is an emergency. Do not wait to see if the symptom will go away. Get medical help right away. Call your local emergency services (911 in the [place] Do not drive yourself to the hospital.  Summary  · Allergic rhinitis may be managed by taking medicines as directed and avoiding allergens.  · If you have seasonal allergies, keep windows closed as much as possible during allergy season.  · Contact your health care provider if you develop a fever or a cough that does not go away.  This information is not intended to replace advice given to you by your health care provider. Make sure you discuss any questions you have with your health care provider.  Document Revised: 02/05/2021 Document Reviewed: 12/15/2020  Elsevier Patient Education © 2021 [employer]

## 2022-02-14 NOTE — PROGRESS NOTES
Eileen Kaufman presents to [facility].    Chief Complaint:  Possibly allergy issues, follow up    Subjective       History of Present Illness:  Eileen Alvarez is in today for what may be allergy symptoms.  She says that she had a real significant issue with what may have been allergies this morning.  Her eyes and nose were running.  She had sinus pressure.  She did 2 home Covid test that were negative.  She has not had COVID-19 up to this time.  She did not have a temperature at home.  She reports having taken some allergy medication which was helpful.    In addition to the above, she is also due for follow-up for her routine care.  She has a history of hypertension and remains on treatment for this.  Her blood pressure is well controlled today.     Eileen Alvarez also has chronic pain from disc and low back issues.  She continues to see pain mgmt regularly and remains on oxycodone under their care.  In addition, she continues to take pregabalin that we prescribe.  This does help her with some nerve pain.  She has significant pain related to the nerves when she does not take it.  She denies obvious side effects.  She denies abuse, diversion, or doctor shopping.  Kevyn is okay.      Review of Systems:  Review of Systems   Constitutional: Negative for chills and fever.   HENT: Positive for congestion and rhinorrhea.    Respiratory: Negative for cough and shortness of breath.    Cardiovascular: Negative for chest pain and palpitations.   Gastrointestinal: Negative for abdominal pain, nausea and vomiting.        Objective   Medical History:  Past Medical History:   • Anemia, unspecified   • Asymptomatic menopausal state   • Chronic obstructive pulmonary disease with (acute) exacerbation (HCC)   • Essential (primary) hypertension   • Low back pain   • Major depressive disorder, single episode, moderate (HCC)   • Mixed hyperlipidemia   • Nicotine dependence, unspecified, uncomplicated   • Osteopenia   •  Other intervertebral disc degeneration, lumbar region   • Other long term (current) drug therapy   • Solitary pulmonary nodule   • Vitamin D deficiency, unspecified     Past Surgical History:   • CARDIAC CATHETERIZATION   • CARPAL TUNNEL RELEASE   • CHOLECYSTECTOMY   • COLONOSCOPY    normal   • HYSTERECTOMY    Total   • KNEE ARTHROSCOPY   • NECK SURGERY      Family History   Problem Relation Age of Onset   • Cerebral aneurysm Mother         cause of death   • Heart attack Father         cause of death   • Esophageal cancer Brother      Social History     Tobacco Use   • Smoking status: Current Every Day Smoker     Packs/day: 0.50     Types: Cigarettes   • Smokeless tobacco: Current User   • Tobacco comment: Eileen Alvarez was 23 when she started smoking.  She did stop smoking for some time - about 13 years.  She has been smoking a total of approximately 33 years.  She has smoked less than a pack daily during this time. She currently uses smokless, vapor can/pouches   Substance Use Topics   • Alcohol use: Yes     Comment: Socially       Health Maintenance Due   Topic Date Due   • TDAP/TD VACCINES (1 - Tdap) Never done   • ZOSTER VACCINE (1 of 2) Never done   • HEPATITIS C SCREENING  Never done   • ANNUAL WELLNESS VISIT  06/17/2021   • LIPID PANEL  06/17/2021        Immunization History   Administered Date(s) Administered   • COVID-19 (PFIZER) PURPLE CAP 12/29/2020, 01/29/2021, 09/30/2021   • Fluzone High Dose =>65 Years ([employer] ONLY) 10/01/2021   • Hepatitis A 07/07/2018   • Influenza, Unspecified 10/01/2020   • PEDS-Pneumococcal Conjugate (PCV7) 07/05/2018   • Pneumococcal Polysaccharide (PPSV23) 03/25/2013, 02/13/2017       Allergies   Allergen Reactions   • Sulfa Antibiotics Unknown - Low Severity   • Aspirin Rash        Medications:  Current Outpatient Medications on File Prior to Visit   Medication Sig   • albuterol sulfate HFA (Ventolin HFA) 108 (90 Base) MCG/ACT inhaler Inhale 2 puffs Every 6 (Six) Hours As Needed  for Wheezing.   • Calcium Carbonate 1500 (600 Ca) MG tablet Calcium 600 600 mg (1,500 mg) oral tablet take 1 tablet by oral route daily   Suspended   • cyclobenzaprine (FLEXERIL) 10 MG tablet cyclobenzaprine 10 mg oral tablet take 1 tablet by oral route once a day (at bedtime) as needed   Active   • Diclofenac Sodium (VOLTAREN) 1 % gel gel APPLY 2 GRAMS EXTERNALLY TO THE AFFECTED AREA TWICE DAILY AS NEEDED   • ferrous sulfate 325 (65 FE) MG tablet ferrous sulfate 325 mg (65 mg iron) oral tablet take 1 tablet (325 mg) by oral route once daily   Active   • fluticasone (FLONASE) 50 MCG/ACT nasal spray fluticasone propionate 50 mcg/actuation nasal spray,suspension spray 1 spray (50 mcg) in each nostril by intranasal route once daily   Active   • Fluticasone Furoate-Vilanterol (Breo Ellipta) 100-25 MCG/INH inhaler Breo Ellipta 100-25 mcg/dose inhalation blister with device inhale 1 puff by inhalation route once daily at the same time each day   Active   • hydroCHLOROthiazide (HYDRODIURIL) 25 MG tablet TAKE 1 TABLET BY MOUTH DAILY   • HYDROcodone-acetaminophen (NORCO)  MG per tablet Every 6 (Six) Hours.   • montelukast (SINGULAIR) 10 MG tablet TAKE 1 TABLET BY MOUTH EVERY NIGHT   • multivitamin with minerals (MULTIVITAMIN ADULT PO) multivitamin oral tablet take 1 tablet by oral route daily   Active   • promethazine (PHENERGAN) 25 MG tablet Take 1 tablet by mouth Every 6 (Six) Hours As Needed for Nausea or Vomiting.   • [DISCONTINUED] amLODIPine (NORVASC) 5 MG tablet Take 1 tablet by mouth 2 (Two) Times a Day.   • [DISCONTINUED] atorvastatin (Lipitor) 20 MG tablet Lipitor 20 mg oral tablet take 1 tablet (20 mg) by oral route once daily   Suspended   • [DISCONTINUED] cetirizine (zyrTEC) 10 MG tablet Take 1 tablet by mouth Daily.   • [DISCONTINUED] metoprolol succinate XL (Toprol XL) 50 MG 24 hr tablet Take 1 tablet by mouth Daily.   • [DISCONTINUED] mirtazapine (REMERON) 30 MG tablet Take 1 tablet by mouth Every  "Night.   • [DISCONTINUED] pregabalin (LYRICA) 75 MG capsule Take 1 capsule by mouth 2 (Two) Times a Day.     No current facility-administered medications on file prior to visit.       Vital Signs:   /91 (BP Location: Right arm, Patient Position: Sitting)   Pulse 98   Temp 97.5 °F (36.4 °C) (Oral)   Ht 163.8 cm (64.49\")   Wt 77.3 kg (170 lb 6.4 oz)   BMI 28.81 kg/m²       Physical Exam:  Physical Exam  Vitals and nursing note reviewed.   Constitutional:       General: She is not in acute distress.     Appearance: She is not ill-appearing.   HENT:      Right Ear: Tympanic membrane and ear canal normal.      Left Ear: Tympanic membrane and ear canal normal.      Mouth/Throat:      Mouth: Mucous membranes are moist.      Comments: Pharynx appears normal  Eyes:      Extraocular Movements: Extraocular movements intact.      Pupils: Pupils are equal, round, and reactive to light.   Neck:      Thyroid: No thyromegaly.   Cardiovascular:      Rate and Rhythm: Normal rate and regular rhythm.      Heart sounds: No murmur heard.      Pulmonary:      Effort: Pulmonary effort is normal.      Breath sounds: Normal breath sounds.   Abdominal:      General: There is no distension.      Palpations: Abdomen is soft. There is no mass.      Tenderness: There is no abdominal tenderness.   Musculoskeletal:      Cervical back: Normal range of motion.   Skin:     Findings: No lesion or rash.   Neurological:      General: No focal deficit present.      Mental Status: She is oriented to person, place, and time.      Cranial Nerves: No cranial nerve deficit.   Psychiatric:         Mood and Affect: Mood normal.         Result Review      The following data was reviewed by Artur Quevedo MD on 02/14/2022.  Lab Results   Component Value Date    WBC 9.78 01/08/2021    HGB 16.20 (H) 01/08/2021    HCT 50.2 (H) 01/08/2021    MCV 85.7 01/08/2021    .00 01/08/2021     Lab Results   Component Value Date    GLUCOSE 68 01/08/2021 "    BUN 14 01/08/2021    CREATININE 0.94 (H) 01/08/2021     01/08/2021    K 4.2 01/08/2021     01/08/2021    CO2 25 01/08/2021    CALCIUM 10.4 01/08/2021    PROTEINTOT 8.0 01/08/2021    ALBUMIN 4.5 01/08/2021    ALT 10 01/08/2021    AST 17 01/08/2021    ALKPHOS 151 01/08/2021    BILITOT 0.54 01/08/2021    EGFRIFNONA 61 01/12/2018    GLOB 3.5 01/08/2021    BCR 15 01/08/2021    ANIONGAP 17 01/08/2021      Lab Results   Component Value Date    CHLPL 200 11/14/2019    TRIG 170 (H) 11/14/2019    HDL 50 11/14/2019     (H) 11/14/2019     Lab Results   Component Value Date    TSH 0.840 05/01/2020     No results found for: HGBA1C         Assessment and Plan:   Today, we have reviewed her care.  Eileen Alvarez likely has some allergy issues in play.  She is done testing for COVID-19 which was negative.  We will go ahead and repeat that as a precaution today.  Beyond that, we will refill her medications as noted below including Lyrica which is of benefit for her in regard to her back pain.  Labs are to be updated.  Her blood pressure will be repeated today.  We will plan to see her back in about 6 months for wellness.       Diagnoses and all orders for this visit:    1. Rhinorrhea (Primary)  -     COVID-19,APTIMA PANTHER(ESTEFANIA), LINDSEY/ DOMINIC, NP/OP SWAB IN UTM/VTM/SALINE TRANSPORT MEDIA,24 HR TAT - Swab, Nasopharynx  -     cetirizine (zyrTEC) 10 MG tablet; Take 1 tablet by mouth Daily.  Dispense: 90 tablet; Refill: 1    2. Essential hypertension  -     Comprehensive Metabolic Panel; Future  -     metoprolol succinate XL (Toprol XL) 50 MG 24 hr tablet; Take 1 tablet by mouth Daily.  Dispense: 90 tablet; Refill: 1  -     amLODIPine (NORVASC) 5 MG tablet; Take 1 tablet by mouth 2 (Two) Times a Day.  Dispense: 180 tablet; Refill: 1    3. Lumbar spondylosis  -     POC Urine Drug Screen [facility]  -     pregabalin (LYRICA) 75 MG capsule; Take 1 capsule by mouth 2 (Two) Times a Day.  Dispense: 60 capsule; Refill:  5    4. Other long term (current) drug therapy  -     POC Urine Drug Screen [facility]  -     pregabalin (LYRICA) 75 MG capsule; Take 1 capsule by mouth 2 (Two) Times a Day.  Dispense: 60 capsule; Refill: 5    5. Coronary artery disease involving native coronary artery of native heart without angina pectoris  -     CBC (No Diff); Future    6. High cholesterol  -     Comprehensive Metabolic Panel; Future  -     Lipid Panel; Future  -     TSH; Future    7. Need for hepatitis C screening test  -     Hepatitis C Antibody; Future          Follow Up   Return in about 6 months (around 8/14/2022) for Recheck, Medicare Wellness.  Patient was given instructions and counseling regarding her condition or for health maintenance advice. Please see specific information pulled into the AVS if appropriate.

## 2022-02-15 LAB — SARS-COV-2 RNA PNL SPEC NAA+PROBE: NOT DETECTED

## 2022-02-17 LAB
HCV RNA SERPL NAA+PROBE-ACNC: NORMAL IU/ML
TEST INFORMATION: NORMAL

## 2022-03-16 RX ORDER — MIRTAZAPINE 30 MG/1
30 TABLET, FILM COATED ORAL NIGHTLY
Qty: 90 TABLET | Refills: 1 | OUTPATIENT
Start: 2022-03-16

## 2022-03-18 ENCOUNTER — TELEPHONE (OUTPATIENT)
Dept: FAMILY MEDICINE CLINIC | Age: 72
End: 2022-03-18

## 2022-03-18 NOTE — TELEPHONE ENCOUNTER
I will have to review her chart later.  She may want to look for a prescription over the weekend.  Thanks.

## 2022-03-18 NOTE — TELEPHONE ENCOUNTER
Caller: Eileen Kaufman    Relationship: Self    Best call back number: [phone]    What is the best time to reach you: ANYTIME     Who are you requesting to speak with (clinical staff, provider,  specific staff member): GOMEZ       What was the call regarding: PATIENT WANTED TO  KNOW WHY HER MIRTAZAPINE WAS DISCONTINUED. STATES THAT SHE IS OUT OF IT AND SHE IS REALLY TIRED AND NOT SLEEPING. PATIENT STATES THAT THE PHARMACY INFORMED HER THAT PCP DISCONTINUED THIS MEDICATION AND SHE IS INQUIRING TO WHY PCP DISCONTINUED THE MEDICATION.       Do you require a callback: YES

## 2022-03-19 ENCOUNTER — TELEPHONE (OUTPATIENT)
Dept: FAMILY MEDICINE CLINIC | Age: 72
End: 2022-03-19

## 2022-03-19 RX ORDER — MIRTAZAPINE 30 MG/1
30 TABLET, FILM COATED ORAL NIGHTLY
Qty: 90 TABLET | Refills: 3 | Status: SHIPPED | OUTPATIENT
Start: 2022-03-19 | End: 2023-02-24 | Stop reason: SDUPTHER

## 2022-03-19 NOTE — TELEPHONE ENCOUNTER
I reviewed her chart.  I do not see a clinical reason that mirtazapine was stopped.  I have sent a refill for her.  I have also sent her a [employer] message.  Thanks.

## 2022-04-08 ENCOUNTER — OFFICE VISIT (OUTPATIENT)
Dept: FAMILY MEDICINE CLINIC | Age: 72
End: 2022-04-08

## 2022-04-08 ENCOUNTER — LAB (OUTPATIENT)
Dept: LAB | Facility: HOSPITAL | Age: 72
End: 2022-04-08

## 2022-04-08 VITALS
HEART RATE: 99 BPM | SYSTOLIC BLOOD PRESSURE: 145 MMHG | HEIGHT: 64 IN | DIASTOLIC BLOOD PRESSURE: 84 MMHG | BODY MASS INDEX: 29.06 KG/M2 | WEIGHT: 170.2 LBS

## 2022-04-08 DIAGNOSIS — R07.9 CHEST PAIN, UNSPECIFIED TYPE: ICD-10-CM

## 2022-04-08 DIAGNOSIS — R00.0 TACHYCARDIA: ICD-10-CM

## 2022-04-08 DIAGNOSIS — I10 ESSENTIAL HYPERTENSION: ICD-10-CM

## 2022-04-08 DIAGNOSIS — R55 NEAR SYNCOPE: Primary | ICD-10-CM

## 2022-04-08 DIAGNOSIS — R73.9 HYPERGLYCEMIA: ICD-10-CM

## 2022-04-08 DIAGNOSIS — I25.10 CORONARY ARTERY DISEASE INVOLVING NATIVE CORONARY ARTERY OF NATIVE HEART WITHOUT ANGINA PECTORIS: ICD-10-CM

## 2022-04-08 DIAGNOSIS — R55 NEAR SYNCOPE: ICD-10-CM

## 2022-04-08 LAB
ALBUMIN SERPL-MCNC: 4.5 G/DL (ref 3.5–5.2)
ALBUMIN/GLOB SERPL: 1.3 G/DL
ALP SERPL-CCNC: 111 U/L (ref 39–117)
ALT SERPL W P-5'-P-CCNC: 10 U/L (ref 1–33)
ANION GAP SERPL CALCULATED.3IONS-SCNC: 9.4 MMOL/L (ref 5–15)
AST SERPL-CCNC: 14 U/L (ref 1–32)
BILIRUB SERPL-MCNC: 0.4 MG/DL (ref 0–1.2)
BUN SERPL-MCNC: 12 MG/DL (ref 8–23)
BUN/CREAT SERPL: 17.1 (ref 7–25)
CALCIUM SPEC-SCNC: 10.6 MG/DL (ref 8.6–10.5)
CHLORIDE SERPL-SCNC: 103 MMOL/L (ref 98–107)
CO2 SERPL-SCNC: 26.6 MMOL/L (ref 22–29)
CREAT SERPL-MCNC: 0.7 MG/DL (ref 0.57–1)
DEPRECATED RDW RBC AUTO: 51.6 FL (ref 37–54)
EGFRCR SERPLBLD CKD-EPI 2021: 93.2 ML/MIN/1.73
ERYTHROCYTE [DISTWIDTH] IN BLOOD BY AUTOMATED COUNT: 16.5 % (ref 12.3–15.4)
GLOBULIN UR ELPH-MCNC: 3.5 GM/DL
GLUCOSE SERPL-MCNC: 78 MG/DL (ref 65–99)
HBA1C MFR BLD: 5.4 % (ref 4.8–5.6)
HCT VFR BLD AUTO: 45.6 % (ref 34–46.6)
HGB BLD-MCNC: 14.9 G/DL (ref 12–15.9)
MAGNESIUM SERPL-MCNC: 2.1 MG/DL (ref 1.6–2.4)
MCH RBC QN AUTO: 28.2 PG (ref 26.6–33)
MCHC RBC AUTO-ENTMCNC: 32.7 G/DL (ref 31.5–35.7)
MCV RBC AUTO: 86.2 FL (ref 79–97)
PLATELET # BLD AUTO: 358 10*3/MM3 (ref 140–450)
PMV BLD AUTO: 11 FL (ref 6–12)
POTASSIUM SERPL-SCNC: 3.7 MMOL/L (ref 3.5–5.2)
PROT SERPL-MCNC: 8 G/DL (ref 6–8.5)
RBC # BLD AUTO: 5.29 10*6/MM3 (ref 3.77–5.28)
SODIUM SERPL-SCNC: 139 MMOL/L (ref 136–145)
T4 FREE SERPL-MCNC: 0.89 NG/DL (ref 0.93–1.7)
TSH SERPL DL<=0.05 MIU/L-ACNC: 0.74 UIU/ML (ref 0.27–4.2)
WBC NRBC COR # BLD: 9.88 10*3/MM3 (ref 3.4–10.8)

## 2022-04-08 PROCEDURE — 99214 OFFICE O/P EST MOD 30 MIN: CPT | Performed by: FAMILY MEDICINE

## 2022-04-08 PROCEDURE — 84443 ASSAY THYROID STIM HORMONE: CPT

## 2022-04-08 PROCEDURE — 85027 COMPLETE CBC AUTOMATED: CPT

## 2022-04-08 PROCEDURE — 83036 HEMOGLOBIN GLYCOSYLATED A1C: CPT

## 2022-04-08 PROCEDURE — 84439 ASSAY OF FREE THYROXINE: CPT

## 2022-04-08 PROCEDURE — 80053 COMPREHEN METABOLIC PANEL: CPT

## 2022-04-08 PROCEDURE — 93000 ELECTROCARDIOGRAM COMPLETE: CPT | Performed by: FAMILY MEDICINE

## 2022-04-08 PROCEDURE — 83735 ASSAY OF MAGNESIUM: CPT

## 2022-04-08 PROCEDURE — 36415 COLL VENOUS BLD VENIPUNCTURE: CPT

## 2022-04-08 NOTE — PROGRESS NOTES
Eileen Kaufman presents to [facility].    Chief Complaint:  'my heart was beating so fast'    Subjective       History of Present Illness:  Eileen Alvarez is in today for evaluation of a recent episode she had.  She describes feeling poorly at work and having a rapid heartbeat.  She has stopped taking hydrocodone in the last week or so.  She does not think this is related to some kind of opiate withdrawal though.  She only felt bad that day.  She has noted some rapid heartbeat with exertion as well.  She thought this may be anxiety, but she did feel some pressure in her chest.      Review of Systems:  Review of Systems   Constitutional: Negative for chills and fever.   Respiratory: Positive for shortness of breath (with exertion). Negative for cough.    Cardiovascular: Positive for chest pain and palpitations.   Gastrointestinal: Negative for abdominal pain, nausea and vomiting.        Objective   Medical History:  Past Medical History:   • Anemia, unspecified   • Asymptomatic menopausal state   • Chronic obstructive pulmonary disease with (acute) exacerbation (HCC)   • Essential (primary) hypertension   • Low back pain   • Major depressive disorder, single episode, moderate (HCC)   • Mixed hyperlipidemia   • Nicotine dependence, unspecified, uncomplicated   • Osteopenia   • Other intervertebral disc degeneration, lumbar region   • Other long term (current) drug therapy   • Solitary pulmonary nodule   • Vitamin D deficiency, unspecified     Past Surgical History:   • CARDIAC CATHETERIZATION   • CARPAL TUNNEL RELEASE   • CHOLECYSTECTOMY   • COLONOSCOPY    normal   • HYSTERECTOMY    Total   • KNEE ARTHROSCOPY   • NECK SURGERY      Family History   Problem Relation Age of Onset   • Cerebral aneurysm Mother         cause of death   • Heart attack Father         cause of death   • Esophageal cancer Brother      Social History     Tobacco Use   • Smoking status: Current Every Day Smoker     Packs/day:  0.50     Types: Cigarettes   • Smokeless tobacco: Current User   • Tobacco comment: Eileen Alvarez was 23 when she started smoking.  She did stop smoking for some time - about 13 years.  She has been smoking a total of approximately 33 years.  She has smoked less than a pack daily during this time. She currently uses smokless, vapor can/pouches   Substance Use Topics   • Alcohol use: Yes     Comment: Socially       Health Maintenance Due   Topic Date Due   • TDAP/TD VACCINES (1 - Tdap) Never done   • ZOSTER VACCINE (1 of 2) Never done   • Pneumococcal Vaccine 65+ (2 - PCV) 07/05/2019   • ANNUAL WELLNESS VISIT  06/17/2021        Immunization History   Administered Date(s) Administered   • COVID-19 (PFIZER) PURPLE CAP 12/29/2020, 01/29/2021, 09/30/2021   • Fluzone High Dose =>65 Years ([employer] ONLY) 10/01/2021   • Hepatitis A 07/07/2018   • Influenza, Unspecified 10/01/2020   • PEDS-Pneumococcal Conjugate (PCV7) 07/05/2018   • Pneumococcal Polysaccharide (PPSV23) 03/25/2013, 02/13/2017       Allergies   Allergen Reactions   • Sulfa Antibiotics Unknown - Low Severity   • Aspirin Rash        Medications:  Current Outpatient Medications on File Prior to Visit   Medication Sig   • albuterol sulfate HFA (Ventolin HFA) 108 (90 Base) MCG/ACT inhaler Inhale 2 puffs Every 6 (Six) Hours As Needed for Wheezing.   • amLODIPine (NORVASC) 5 MG tablet Take 1 tablet by mouth 2 (Two) Times a Day.   • Calcium Carbonate 1500 (600 Ca) MG tablet Calcium 600 600 mg (1,500 mg) oral tablet take 1 tablet by oral route daily   Suspended   • cetirizine (zyrTEC) 10 MG tablet Take 1 tablet by mouth Daily.   • cyclobenzaprine (FLEXERIL) 10 MG tablet cyclobenzaprine 10 mg oral tablet take 1 tablet by oral route once a day (at bedtime) as needed   Active   • ferrous sulfate 325 (65 FE) MG tablet ferrous sulfate 325 mg (65 mg iron) oral tablet take 1 tablet (325 mg) by oral route once daily   Active   • fluticasone (FLONASE) 50 MCG/ACT nasal spray  "fluticasone propionate 50 mcg/actuation nasal spray,suspension spray 1 spray (50 mcg) in each nostril by intranasal route once daily   Active   • Fluticasone Furoate-Vilanterol (Breo Ellipta) 100-25 MCG/INH inhaler Breo Ellipta 100-25 mcg/dose inhalation blister with device inhale 1 puff by inhalation route once daily at the same time each day   Active   • hydroCHLOROthiazide (HYDRODIURIL) 25 MG tablet TAKE 1 TABLET BY MOUTH DAILY   • metoprolol succinate XL (Toprol XL) 50 MG 24 hr tablet Take 1 tablet by mouth Daily.   • mirtazapine (REMERON) 30 MG tablet Take 1 tablet by mouth Every Night.   • montelukast (SINGULAIR) 10 MG tablet TAKE 1 TABLET BY MOUTH EVERY NIGHT   • multivitamin with minerals tablet tablet multivitamin oral tablet take 1 tablet by oral route daily   Active   • pregabalin (LYRICA) 75 MG capsule Take 1 capsule by mouth 2 (Two) Times a Day.   • Diclofenac Sodium (VOLTAREN) 1 % gel gel APPLY 2 GRAMS EXTERNALLY TO THE AFFECTED AREA TWICE DAILY AS NEEDED   • promethazine (PHENERGAN) 25 MG tablet Take 1 tablet by mouth Every 6 (Six) Hours As Needed for Nausea or Vomiting.   • [DISCONTINUED] HYDROcodone-acetaminophen (NORCO)  MG per tablet Every 6 (Six) Hours.     No current facility-administered medications on file prior to visit.       Vital Signs:   /94 (BP Location: Right arm, Patient Position: Sitting, Cuff Size: Adult)   Pulse 87   Ht 163.8 cm (64.49\")   Wt 77.2 kg (170 lb 3.2 oz)   BMI 28.77 kg/m²       Physical Exam:  Physical Exam  Vitals reviewed.   Constitutional:       General: She is not in acute distress.     Appearance: She is not ill-appearing.   Eyes:      Pupils: Pupils are equal, round, and reactive to light.   Neck:      Comments: No thyromegaly  Cardiovascular:      Rate and Rhythm: Normal rate and regular rhythm.   Pulmonary:      Effort: Pulmonary effort is normal.      Breath sounds: Normal breath sounds.   Abdominal:      General: There is no distension.      " Palpations: Abdomen is soft.      Tenderness: There is no abdominal tenderness.   Musculoskeletal:      Cervical back: Neck supple.   Lymphadenopathy:      Cervical: No cervical adenopathy.   Skin:     Findings: No lesion or rash.   Neurological:      Mental Status: She is alert.         Result Review      The following data was reviewed by Artur Quevedo MD on 04/08/2022.  Lab Results   Component Value Date    WBC 10.31 02/14/2022    HGB 15.3 02/14/2022    HCT 47.6 (H) 02/14/2022    MCV 87.8 02/14/2022     02/14/2022     Lab Results   Component Value Date    GLUCOSE 71 02/14/2022    BUN 13 02/14/2022    CREATININE 0.85 02/14/2022     02/14/2022    K 4.6 02/14/2022     02/14/2022    CO2 26.1 02/14/2022    CALCIUM 10.4 02/14/2022    PROTEINTOT 8.2 02/14/2022    ALBUMIN 4.90 02/14/2022    ALT 23 02/14/2022    AST 20 02/14/2022    ALKPHOS 132 (H) 02/14/2022    BILITOT 0.6 02/14/2022    EGFRIFNONA 61 01/12/2018    GLOB 3.3 02/14/2022    AGRATIO 1.5 02/14/2022    BCR 15.3 02/14/2022    ANIONGAP 9.9 02/14/2022      Lab Results   Component Value Date    CHOL 220 (H) 02/14/2022    CHLPL 200 11/14/2019    TRIG 100 02/14/2022    HDL 73 (H) 02/14/2022     (H) 02/14/2022     Lab Results   Component Value Date    TSH 0.614 02/14/2022       ECG 12 Lead    Date/Time: 4/8/2022 4:08 PM  Performed by: Artur Quevedo MD  Authorized by: Artur Quevedo MD   Comparison: compared with previous ECG from 5/1/2020  Similar to previous ECG  Rhythm: sinus rhythm  Rate: normal  BPM: 83  Conduction: conduction normal  ST Segments: ST segments normal  T Waves: T waves normal  T inversion: aVL  QRS axis: normal  Other: no other findings    Clinical impression: normal ECG  Clinical impression comment: This is a normal EKG.  There is no significant change from her most recent tracing.  I do not see significant Q waves.                Assessment and Plan:   Today, we have reviewed her care.  I am  concerned about the symptoms that she has had.  We will go ahead with EKG and heart monitor testing.  I anticipate referral to see cardiology for evaluation.  We will also update some blood work as a precaution.  No other short-term change is anticipated.       Diagnoses and all orders for this visit:    1. Near syncope (Primary)  -     ECG 12 Lead  -     Mobile Cardiac Outpatient Telemetry; Future  -     TSH+Free T4; Future    2. Tachycardia  -     ECG 12 Lead  -     Mobile Cardiac Outpatient Telemetry; Future  -     TSH+Free T4; Future    3. Chest pain, unspecified type  -     ECG 12 Lead    4. Essential hypertension  -     Comprehensive Metabolic Panel; Future  -     Magnesium; Future    5. Coronary artery disease involving native coronary artery of native heart without angina pectoris  -     CBC (No Diff); Future    6. Hyperglycemia  -     Hemoglobin A1c; Future          Follow Up   Return if symptoms worsen or fail to improve.  Patient was given instructions and counseling regarding her condition or for health maintenance advice. Please see specific information pulled into the AVS if appropriate.

## 2022-04-11 ENCOUNTER — TELEPHONE (OUTPATIENT)
Dept: FAMILY MEDICINE CLINIC | Age: 72
End: 2022-04-11

## 2022-04-11 NOTE — TELEPHONE ENCOUNTER
Pt states she had another episode today with dizziness and heart beating fast. BP-128/95 and pulse was 97. Any new recommendations?

## 2022-04-12 NOTE — TELEPHONE ENCOUNTER
She has an appointment with cardiology next week.  I would recommend she plan to keep that appointment as scheduled.  She should continue current medications as prescribed.  We will be back in touch with her regarding the heart monitor testing when that result is back.  If she has chest pain, feels like she may pass out, or significant new shortness of breath with an episode like this, she may need to go to the emergency department as a precaution.  Let me know if she has other concerns.  Thanks.

## 2022-04-20 ENCOUNTER — OFFICE VISIT (OUTPATIENT)
Dept: CARDIOLOGY | Facility: CLINIC | Age: 72
End: 2022-04-20

## 2022-04-20 VITALS
HEIGHT: 64 IN | DIASTOLIC BLOOD PRESSURE: 88 MMHG | SYSTOLIC BLOOD PRESSURE: 142 MMHG | WEIGHT: 168 LBS | HEART RATE: 92 BPM | BODY MASS INDEX: 28.68 KG/M2

## 2022-04-20 DIAGNOSIS — I10 ESSENTIAL HYPERTENSION: ICD-10-CM

## 2022-04-20 DIAGNOSIS — R55 SYNCOPE AND COLLAPSE: Primary | ICD-10-CM

## 2022-04-20 PROCEDURE — 93000 ELECTROCARDIOGRAM COMPLETE: CPT | Performed by: INTERNAL MEDICINE

## 2022-04-20 PROCEDURE — 99213 OFFICE O/P EST LOW 20 MIN: CPT | Performed by: INTERNAL MEDICINE

## 2022-04-20 NOTE — PROGRESS NOTES
CARDIOLOGY FOLLOW-UP PROGRESS NOTE        Chief Complaint  Dizziness and Rapid Heart Rate    Subjective            LisaKim Kaufman presents to [facility] CARDIOLOGY  History of Present Illness    Ms. Kaufman was referred for cardiac evaluation because of dizziness and presyncopal episode.  She had a major episode which happened 2 weeks back while at work.  She suddenly felt hot beating fast.  Coworkers checked her blood pressure was noted to be high in 160s.  She also reported some increasing shortness of breath.  Denied any chest pain.  Patient also reports having a lot of anxiety problems.  She gives history of syncopal episodes in the past and was seen by Dr. Boo.  Work-up included a cardiac cath which was unremarkable.  More recently, she was seen in our office in 2020 for very similar symptoms.  Echocardiogram and Holter monitor study and done at that time was unremarkable.  Recently, a 4-day Holter monitor study was done which again did not show any arrhythmias but occasional PVCs and PACs.      Past History:    (1) Hypertension. (2) Negative for diabetes mellitus or coronary artery disease.     Medical History:  Past Medical History:   Diagnosis Date   • Anemia, unspecified    • Asymptomatic menopausal state    • Chronic obstructive pulmonary disease with (acute) exacerbation (HCC)    • Essential (primary) hypertension    • Low back pain    • Major depressive disorder, single episode, moderate (HCC)    • Nicotine dependence, unspecified, uncomplicated    • Osteopenia    • Other intervertebral disc degeneration, lumbar region    • Other long term (current) drug therapy    • Solitary pulmonary nodule    • Vitamin D deficiency, unspecified        Surgical History: has a past surgical history that includes Knee arthroscopy (Left); Cholecystectomy; Hysterectomy (1980s); Carpal tunnel release; Neck surgery (01/21/2015); Cardiac catheterization (07/25/2012); and Colonoscopy (08/01/2018).     Family  History: family history includes Cerebral aneurysm in her mother; Esophageal cancer in her brother; Heart attack in her father.     Social History: reports that she has been smoking cigarettes. She has been smoking about 1.00 pack per day. She uses smokeless tobacco. She reports current alcohol use. She reports that she does not use drugs.    Allergies: Sulfa antibiotics and Aspirin    Current Outpatient Medications on File Prior to Visit   Medication Sig   • albuterol sulfate HFA (Ventolin HFA) 108 (90 Base) MCG/ACT inhaler Inhale 2 puffs Every 6 (Six) Hours As Needed for Wheezing.   • amLODIPine (NORVASC) 5 MG tablet Take 1 tablet by mouth 2 (Two) Times a Day.   • Calcium Carbonate 1500 (600 Ca) MG tablet Calcium 600 600 mg (1,500 mg) oral tablet take 1 tablet by oral route daily   Suspended   • cetirizine (zyrTEC) 10 MG tablet Take 1 tablet by mouth Daily.   • cyclobenzaprine (FLEXERIL) 10 MG tablet cyclobenzaprine 10 mg oral tablet take 1 tablet by oral route once a day (at bedtime) as needed   Active   • Diclofenac Sodium (VOLTAREN) 1 % gel gel APPLY 2 GRAMS EXTERNALLY TO THE AFFECTED AREA TWICE DAILY AS NEEDED   • ferrous sulfate 325 (65 FE) MG tablet ferrous sulfate 325 mg (65 mg iron) oral tablet take 1 tablet (325 mg) by oral route once daily   Active   • fluticasone (FLONASE) 50 MCG/ACT nasal spray fluticasone propionate 50 mcg/actuation nasal spray,suspension spray 1 spray (50 mcg) in each nostril by intranasal route once daily   Active   • Fluticasone Furoate-Vilanterol (Breo Ellipta) 100-25 MCG/INH inhaler Breo Ellipta 100-25 mcg/dose inhalation blister with device inhale 1 puff by inhalation route once daily at the same time each day   Active   • hydroCHLOROthiazide (HYDRODIURIL) 25 MG tablet TAKE 1 TABLET BY MOUTH DAILY   • metoprolol succinate XL (Toprol XL) 50 MG 24 hr tablet Take 1 tablet by mouth Daily.   • mirtazapine (REMERON) 30 MG tablet Take 1 tablet by mouth Every Night.   • montelukast  "(SINGULAIR) 10 MG tablet TAKE 1 TABLET BY MOUTH EVERY NIGHT   • multivitamin with minerals tablet tablet multivitamin oral tablet take 1 tablet by oral route daily   Active   • pregabalin (LYRICA) 75 MG capsule Take 1 capsule by mouth 2 (Two) Times a Day.     No current facility-administered medications on file prior to visit.          Review of Systems   Respiratory: Positive for shortness of breath. Negative for cough and wheezing.    Cardiovascular: Positive for palpitations. Negative for chest pain and leg swelling.   Gastrointestinal: Negative for nausea and vomiting.   Neurological: Positive for dizziness. Negative for syncope.        Objective     /88   Pulse 92   Ht 162.6 cm (64\")   Wt 76.2 kg (168 lb)   BMI 28.84 kg/m²       Physical Exam    General : Alert, awake, no acute distress  Neck : Supple, no carotid bruit, no jugular venous distention  CVS : Regular rate and rhythm, no murmur, rubs or gallops  Lungs: Clear to auscultation bilaterally, no crackles or rhonchi  Abdomen: Soft, nontender, bowel sounds heard in all 4 quadrants  Extremities: Warm, well-perfused, no pedal edema    Result Review :     The following data was reviewed by: Simon Mao MD on 04/20/2022:    CMP    CMP 2/14/22 4/8/22   Glucose 71 78   BUN 13 12   Creatinine 0.85 0.70   eGFR African Am 80    Sodium 138 139   Potassium 4.6 3.7   Chloride 102 103   Calcium 10.4 10.6 (A)   Albumin 4.90 4.50   Total Bilirubin 0.6 0.4   Alkaline Phosphatase 132 (A) 111   AST (SGOT) 20 14   ALT (SGPT) 23 10   (A) Abnormal value            CBC    CBC 2/14/22 4/8/22   WBC 10.31 9.88   RBC 5.42 (A) 5.29 (A)   Hemoglobin 15.3 14.9   Hematocrit 47.6 (A) 45.6   MCV 87.8 86.2   MCH 28.2 28.2   MCHC 32.1 32.7   RDW 18.3 (A) 16.5 (A)   Platelets 276 358   (A) Abnormal value            TSH    TSH 2/14/22 4/8/22   TSH 0.614 0.737           Lipid Panel    Lipid Panel 2/14/22   Total Cholesterol 220 (A)   Triglycerides 100   HDL Cholesterol 73 (A) "   VLDL Cholesterol 17   LDL Cholesterol  130 (A)   LDL/HDL Ratio 1.74   (A) Abnormal value                 Data reviewed: Cardiology studies        4-day Holter monitor study done on 4/8/2022 showed    Patient was monitored for 4 days.  Average heart rate was 88.  Minimum heart rate of 68.  Maximum was 121.  There are occasional PACs and PVCs.  There is no significant tachy or bradycardia arrhythmias.    Echocardiogram done on May 10, 2020 showed    1.  Normal left ventricular systolic function with estimated LV ejection fraction of 55 to 60%.   2.  Grade 1 diastolic dysfunction of the left ventricle.   3.  Mild tricuspid regurgitation with evidence of mild pulmonary artery hypertension.               ECG 12 Lead    Date/Time: 4/20/2022 11:26 AM  Performed by: Simon Mao MD  Authorized by: Simon Mao MD   Comparison: compared with previous ECG from 4/8/2022  Similar to previous ECG  Rhythm: sinus rhythm  Rate: normal  ST Segments: ST segments normal  QRS axis: normal  Other findings comments: Poor R wave progression noted in anterior leads.,  Nonspecific changes noted in inferior leads.  Clinical impression comment: Borderline ECG                  Assessment and Plan        Diagnoses and all orders for this visit:    1. Syncope and collapse (Primary)  Assessment & Plan:  Recent episode of severe dizziness, tachycardia and presyncope.  4-day Holter monitor study unremarkable.  She was evaluated for similar symptoms in 2020 and echocardiogram at the time did not show any significant findings.  Patient reports a lot of anxiety issues as well.  Today's EKG showed nonspecific T wave changes inferior leads which are not new.  From cardiac standpoint, neurocardiogenic syncope significant for diagnosis and we will proceed with a tilt table study.    Orders:  -     Tilt Table; Future  -     ECG 12 Lead    2. Essential hypertension  Assessment & Plan:  Well-controlled, continue amlodipine, hydrochlorothiazide  and metoprolol the current dose.              Follow Up     Return for Will schedule f/u after reviewing test results.    Patient was given instructions and counseling regarding her condition or for health maintenance advice. Please see specific information pulled into the AVS if appropriate.

## 2022-04-22 PROBLEM — R55 SYNCOPE AND COLLAPSE: Status: ACTIVE | Noted: 2022-04-22

## 2022-04-22 NOTE — ASSESSMENT & PLAN NOTE
Recent episode of severe dizziness, tachycardia and presyncope.  4-day Holter monitor study unremarkable.  She was evaluated for similar symptoms in 2020 and echocardiogram at the time did not show any significant findings.  Patient reports a lot of anxiety issues as well.  Today's EKG showed nonspecific T wave changes inferior leads which are not new.  From cardiac standpoint, neurocardiogenic syncope significant for diagnosis and we will proceed with a tilt table study.

## 2022-06-02 ENCOUNTER — HOSPITAL ENCOUNTER (OUTPATIENT)
Dept: CARDIOLOGY | Facility: HOSPITAL | Age: 72
Discharge: HOME | End: 2022-06-02

## 2022-06-30 ENCOUNTER — OFFICE VISIT (OUTPATIENT)
Dept: FAMILY MEDICINE CLINIC | Age: 72
End: 2022-06-30

## 2022-06-30 VITALS
HEIGHT: 64 IN | BODY MASS INDEX: 29.02 KG/M2 | WEIGHT: 170 LBS | SYSTOLIC BLOOD PRESSURE: 126 MMHG | HEART RATE: 105 BPM | TEMPERATURE: 99 F | DIASTOLIC BLOOD PRESSURE: 80 MMHG

## 2022-06-30 DIAGNOSIS — R19.02 LEFT UPPER QUADRANT ABDOMINAL MASS: ICD-10-CM

## 2022-06-30 DIAGNOSIS — R30.0 DYSURIA: Primary | ICD-10-CM

## 2022-06-30 LAB
BACTERIA UR QL AUTO: ABNORMAL /HPF
BILIRUB UR QL STRIP: NEGATIVE
CLARITY UR: ABNORMAL
COD CRY URNS QL: ABNORMAL /HPF
COLOR UR: ABNORMAL
GLUCOSE UR STRIP-MCNC: NEGATIVE MG/DL
HGB UR QL STRIP.AUTO: NEGATIVE
KETONES UR QL STRIP: ABNORMAL
LEUKOCYTE ESTERASE UR QL STRIP.AUTO: ABNORMAL
NITRITE UR QL STRIP: NEGATIVE
PH UR STRIP.AUTO: 5.5 [PH] (ref 5–8)
PROT UR QL STRIP: ABNORMAL
RBC # UR STRIP: ABNORMAL /HPF
REF LAB TEST METHOD: ABNORMAL
SP GR UR STRIP: 1.02 (ref 1–1.03)
SQUAMOUS #/AREA URNS HPF: ABNORMAL /HPF
UROBILINOGEN UR QL STRIP: ABNORMAL
WBC # UR STRIP: ABNORMAL /HPF

## 2022-06-30 PROCEDURE — 96372 THER/PROPH/DIAG INJ SC/IM: CPT | Performed by: FAMILY MEDICINE

## 2022-06-30 PROCEDURE — 87086 URINE CULTURE/COLONY COUNT: CPT | Performed by: FAMILY MEDICINE

## 2022-06-30 PROCEDURE — 99213 OFFICE O/P EST LOW 20 MIN: CPT | Performed by: FAMILY MEDICINE

## 2022-06-30 PROCEDURE — 81001 URINALYSIS AUTO W/SCOPE: CPT | Performed by: FAMILY MEDICINE

## 2022-06-30 RX ORDER — CEFTRIAXONE 1 G/1
1 INJECTION, POWDER, FOR SOLUTION INTRAMUSCULAR; INTRAVENOUS ONCE
Status: COMPLETED | OUTPATIENT
Start: 2022-06-30 | End: 2022-06-30

## 2022-06-30 RX ADMIN — CEFTRIAXONE 1 G: 1 INJECTION, POWDER, FOR SOLUTION INTRAMUSCULAR; INTRAVENOUS at 16:12

## 2022-06-30 NOTE — PROGRESS NOTES
Eileen Kaufman presents to [facility].    Chief Complaint:  Presumed UTI    Subjective       History of Present Illness:  Eileen Alvarez is here today for evaluation of possible UTI.  She started having some urinary symptoms early this week.  She has had significant dysuria.  She is having some back pain as well.  She is also having both hesitancy and frequency of urination.  She has not been able to really pee all that much today.  She is not aware of running a fever at home, but she has a temperature of 99 degrees here.      Review of Systems:  Review of Systems   Constitutional: Negative for chills and fever.   Respiratory: Negative for cough and shortness of breath.    Cardiovascular: Negative for chest pain and palpitations.   Gastrointestinal: Positive for abdominal pain (suprapubic) and nausea. Negative for vomiting.        Objective   Medical History:  Past Medical History:   • Anemia, unspecified   • Asymptomatic menopausal state   • Chronic obstructive pulmonary disease with (acute) exacerbation (HCC)   • Essential (primary) hypertension   • Low back pain   • Major depressive disorder, single episode, moderate (HCC)   • Nicotine dependence, unspecified, uncomplicated   • Osteopenia   • Other intervertebral disc degeneration, lumbar region   • Other long term (current) drug therapy   • Solitary pulmonary nodule   • Vitamin D deficiency, unspecified     Past Surgical History:   • CARDIAC CATHETERIZATION   • CARPAL TUNNEL RELEASE   • CHOLECYSTECTOMY   • COLONOSCOPY    normal   • HYSTERECTOMY    Total   • KNEE ARTHROSCOPY   • NECK SURGERY      Family History   Problem Relation Age of Onset   • Cerebral aneurysm Mother         cause of death   • Heart attack Father         cause of death   • Esophageal cancer Brother      Social History     Tobacco Use   • Smoking status: Current Every Day Smoker     Packs/day: 1.00     Types: Cigarettes   • Smokeless tobacco: Current User   • Tobacco  comment: Eileen Alvarez was 23 when she started smoking.  She did stop smoking for some time - about 13 years.  She has been smoking a total of approximately 33 years.  She has smoked less than a pack daily during this time. She currently uses smokless, vapor can/pouches   Substance Use Topics   • Alcohol use: Yes     Comment: Socially       Health Maintenance Due   Topic Date Due   • TDAP/TD VACCINES (1 - Tdap) Never done   • ZOSTER VACCINE (1 of 2) Never done   • Pneumococcal Vaccine 65+ (2 - PCV) 07/05/2019   • ANNUAL WELLNESS VISIT  06/17/2021   • COVID-19 Vaccine (4 - Booster for Pfizer series) 01/30/2022        Immunization History   Administered Date(s) Administered   • COVID-19 (PFIZER) PURPLE CAP 12/29/2020, 01/29/2021, 09/30/2021   • Fluzone High Dose =>65 Years ([employer] ONLY) 10/01/2021   • Hepatitis A 07/07/2018   • Influenza, Unspecified 10/01/2020   • PEDS-Pneumococcal Conjugate (PCV7) 07/05/2018   • Pneumococcal Polysaccharide (PPSV23) 03/25/2013, 02/13/2017       Allergies   Allergen Reactions   • Sulfa Antibiotics Unknown - Low Severity   • Aspirin Rash        Medications:  Current Outpatient Medications on File Prior to Visit   Medication Sig   • albuterol sulfate HFA (Ventolin HFA) 108 (90 Base) MCG/ACT inhaler Inhale 2 puffs Every 6 (Six) Hours As Needed for Wheezing.   • amLODIPine (NORVASC) 5 MG tablet Take 1 tablet by mouth 2 (Two) Times a Day.   • Calcium Carbonate 1500 (600 Ca) MG tablet Calcium 600 600 mg (1,500 mg) oral tablet take 1 tablet by oral route daily   Suspended   • cetirizine (zyrTEC) 10 MG tablet Take 1 tablet by mouth Daily.   • cyclobenzaprine (FLEXERIL) 10 MG tablet Take 10 mg by mouth 2 (Two) Times a Day As Needed.   • fluticasone (FLONASE) 50 MCG/ACT nasal spray 2 sprays into the nostril(s) as directed by provider Daily.   • Fluticasone Furoate-Vilanterol (Breo Ellipta) 100-25 MCG/INH inhaler Inhale 1 puff Daily.   • hydroCHLOROthiazide (HYDRODIURIL) 25 MG tablet TAKE 1 TABLET BY  "MOUTH DAILY   • metoprolol succinate XL (Toprol XL) 50 MG 24 hr tablet Take 1 tablet by mouth Daily.   • mirtazapine (REMERON) 30 MG tablet Take 1 tablet by mouth Every Night.   • montelukast (SINGULAIR) 10 MG tablet TAKE 1 TABLET BY MOUTH EVERY NIGHT   • multivitamin with minerals tablet tablet 1 tablet Daily.   • pregabalin (LYRICA) 75 MG capsule Take 1 capsule by mouth 2 (Two) Times a Day.   • [DISCONTINUED] Diclofenac Sodium (VOLTAREN) 1 % gel gel APPLY 2 GRAMS EXTERNALLY TO THE AFFECTED AREA TWICE DAILY AS NEEDED   • [DISCONTINUED] ferrous sulfate 325 (65 FE) MG tablet ferrous sulfate 325 mg (65 mg iron) oral tablet take 1 tablet (325 mg) by oral route once daily   Active     No current facility-administered medications on file prior to visit.       Vital Signs:   /80 (BP Location: Right arm, Patient Position: Sitting)   Pulse 105   Temp 99 °F (37.2 °C) (Oral)   Ht 162.6 cm (64.02\")   Wt 77.1 kg (170 lb)   BMI 29.17 kg/m²       Physical Exam:  Physical Exam  Vitals reviewed.   Constitutional:       General: She is not in acute distress.     Appearance: She is not ill-appearing.   Eyes:      Pupils: Pupils are equal, round, and reactive to light.   Neck:      Comments: No thyromegaly  Cardiovascular:      Rate and Rhythm: Normal rate and regular rhythm.   Pulmonary:      Effort: Pulmonary effort is normal.      Breath sounds: Normal breath sounds.   Abdominal:      General: There is no distension.      Palpations: Abdomen is soft. There is mass (left upper quadrant).      Tenderness: There is abdominal tenderness (suprapubic and bilateral flank).   Musculoskeletal:      Cervical back: Neck supple.   Lymphadenopathy:      Cervical: No cervical adenopathy.   Skin:     Findings: No lesion or rash.   Neurological:      Mental Status: She is alert.         Result Review      The following data was reviewed by Artur Quevedo MD on 06/30/2022.  Lab Results   Component Value Date    WBC 9.88 " 04/08/2022    HGB 14.9 04/08/2022    HCT 45.6 04/08/2022    MCV 86.2 04/08/2022     04/08/2022     Lab Results   Component Value Date    GLUCOSE 78 04/08/2022    BUN 12 04/08/2022    CREATININE 0.70 04/08/2022     04/08/2022    K 3.7 04/08/2022     04/08/2022    CO2 26.6 04/08/2022    CALCIUM 10.6 (H) 04/08/2022    PROTEINTOT 8.0 04/08/2022    ALBUMIN 4.50 04/08/2022    ALT 10 04/08/2022    AST 14 04/08/2022    ALKPHOS 111 04/08/2022    BILITOT 0.4 04/08/2022    EGFRIFNONA 61 01/12/2018    GLOB 3.5 04/08/2022    AGRATIO 1.3 04/08/2022    BCR 17.1 04/08/2022    ANIONGAP 9.4 04/08/2022      Lab Results   Component Value Date    CHOL 220 (H) 02/14/2022    CHLPL 200 11/14/2019    TRIG 100 02/14/2022    HDL 73 (H) 02/14/2022     (H) 02/14/2022     Lab Results   Component Value Date    TSH 0.737 04/08/2022     Lab Results   Component Value Date    HGBA1C 5.40 04/08/2022            Assessment and Plan:   Today, we have reviewed her care.  She likely has a complicated urinary tract infection, possibly pyelonephritis.  She has not been able to void yet.  We will try to collect a urine specimen.  She does have a low-grade temperature here and her heart rate is also above normal.  I am leaning towards moving ahead with a Rocephin injection.  Again I would like to see the urine prior to moving ahead with that though.  Also, there is some uncertainty about whether there is a mass in the left upper quadrant.  This may be her spleen that I am feeling.  We will plan to see her back tomorrow assuming UTI is present.  We will consider how to move forward at that time.       Diagnoses and all orders for this visit:    1. Dysuria (Primary)  -     Urinalysis With Microscopic - Urine, Clean Catch  -     cefTRIAXone (ROCEPHIN) injection 1 g  -     Urine Culture - Urine, Urine, Clean Catch    2. Left upper quadrant abdominal mass  Comments:  As above.          Follow Up   Return in about 1 day (around 7/1/2022)  for Recheck.  Patient was given instructions and counseling regarding her condition or for health maintenance advice. Please see specific information pulled into the AVS if appropriate.

## 2022-07-01 ENCOUNTER — OFFICE VISIT (OUTPATIENT)
Dept: FAMILY MEDICINE CLINIC | Age: 72
End: 2022-07-01

## 2022-07-01 VITALS
BODY MASS INDEX: 29.02 KG/M2 | SYSTOLIC BLOOD PRESSURE: 151 MMHG | DIASTOLIC BLOOD PRESSURE: 87 MMHG | WEIGHT: 170 LBS | HEIGHT: 64 IN | HEART RATE: 87 BPM | TEMPERATURE: 98.6 F

## 2022-07-01 DIAGNOSIS — R19.02 LEFT UPPER QUADRANT ABDOMINAL MASS: ICD-10-CM

## 2022-07-01 DIAGNOSIS — R30.0 DYSURIA: Primary | ICD-10-CM

## 2022-07-01 PROCEDURE — 99213 OFFICE O/P EST LOW 20 MIN: CPT | Performed by: FAMILY MEDICINE

## 2022-07-01 RX ORDER — CEFDINIR 300 MG/1
300 CAPSULE ORAL 2 TIMES DAILY
Qty: 14 CAPSULE | Refills: 0 | Status: SHIPPED | OUTPATIENT
Start: 2022-07-01 | End: 2022-08-16

## 2022-07-01 NOTE — PROGRESS NOTES
Eileen Kaufman presents to [facility].    Chief Complaint:  Follow up on presumed UTI    Subjective       History of Present Illness:  Eileen Alvarez is in today for follow-up.  Please see her visit from yesterday.  She is seen some improvement in how she feels.  She denies any significant fever overnight.    She was also noted on exam yesterday to have possible left upper quadrant mass.  This has not previously been diagnosed.  She has not recently had imaging of the abdomen.      Review of Systems:  Review of Systems   Constitutional: Negative for chills and fever.   Respiratory: Negative for cough and shortness of breath.    Cardiovascular: Negative for chest pain and palpitations.   Gastrointestinal: Negative for abdominal pain, nausea and vomiting.        Objective   Medical History:  Past Medical History:   • Anemia, unspecified   • Asymptomatic menopausal state   • Chronic obstructive pulmonary disease with (acute) exacerbation (HCC)   • Essential (primary) hypertension   • Low back pain   • Major depressive disorder, single episode, moderate (HCC)   • Nicotine dependence, unspecified, uncomplicated   • Osteopenia   • Other intervertebral disc degeneration, lumbar region   • Other long term (current) drug therapy   • Solitary pulmonary nodule   • Vitamin D deficiency, unspecified     Past Surgical History:   • CARDIAC CATHETERIZATION   • CARPAL TUNNEL RELEASE   • CHOLECYSTECTOMY   • COLONOSCOPY    normal   • HYSTERECTOMY    Total   • KNEE ARTHROSCOPY   • NECK SURGERY      Family History   Problem Relation Age of Onset   • Cerebral aneurysm Mother         cause of death   • Heart attack Father         cause of death   • Esophageal cancer Brother      Social History     Tobacco Use   • Smoking status: Current Every Day Smoker     Packs/day: 1.00     Types: Cigarettes   • Smokeless tobacco: Current User   • Tobacco comment: Eileen Alvarez was 23 when she started smoking.  She did stop smoking for  some time - about 13 years.  She has been smoking a total of approximately 33 years.  She has smoked less than a pack daily during this time. She currently uses smokless, vapor can/pouches   Substance Use Topics   • Alcohol use: Yes     Comment: Socially       Health Maintenance Due   Topic Date Due   • TDAP/TD VACCINES (1 - Tdap) Never done   • ZOSTER VACCINE (1 of 2) Never done   • Pneumococcal Vaccine 65+ (2 - PCV) 07/05/2019   • ANNUAL WELLNESS VISIT  06/17/2021   • COVID-19 Vaccine (4 - Booster for Pfizer series) 01/30/2022        Immunization History   Administered Date(s) Administered   • COVID-19 (PFIZER) PURPLE CAP 12/29/2020, 01/29/2021, 09/30/2021   • Fluzone High Dose =>65 Years ([employer] ONLY) 10/01/2021   • Hepatitis A 07/07/2018   • Influenza, Unspecified 10/01/2020   • PEDS-Pneumococcal Conjugate (PCV7) 07/05/2018   • Pneumococcal Polysaccharide (PPSV23) 03/25/2013, 02/13/2017       Allergies   Allergen Reactions   • Sulfa Antibiotics Unknown - Low Severity   • Aspirin Rash        Medications:  Current Outpatient Medications on File Prior to Visit   Medication Sig   • albuterol sulfate HFA (Ventolin HFA) 108 (90 Base) MCG/ACT inhaler Inhale 2 puffs Every 6 (Six) Hours As Needed for Wheezing.   • amLODIPine (NORVASC) 5 MG tablet Take 1 tablet by mouth 2 (Two) Times a Day.   • Calcium Carbonate 1500 (600 Ca) MG tablet Calcium 600 600 mg (1,500 mg) oral tablet take 1 tablet by oral route daily   Suspended   • cetirizine (zyrTEC) 10 MG tablet Take 1 tablet by mouth Daily.   • cyclobenzaprine (FLEXERIL) 10 MG tablet Take 10 mg by mouth 2 (Two) Times a Day As Needed.   • fluticasone (FLONASE) 50 MCG/ACT nasal spray 2 sprays into the nostril(s) as directed by provider Daily.   • Fluticasone Furoate-Vilanterol (Breo Ellipta) 100-25 MCG/INH inhaler Inhale 1 puff Daily.   • hydroCHLOROthiazide (HYDRODIURIL) 25 MG tablet TAKE 1 TABLET BY MOUTH DAILY   • metoprolol succinate XL (Toprol XL) 50 MG 24 hr tablet Take  "1 tablet by mouth Daily.   • mirtazapine (REMERON) 30 MG tablet Take 1 tablet by mouth Every Night.   • montelukast (SINGULAIR) 10 MG tablet TAKE 1 TABLET BY MOUTH EVERY NIGHT   • multivitamin with minerals tablet tablet 1 tablet Daily.   • pregabalin (LYRICA) 75 MG capsule Take 1 capsule by mouth 2 (Two) Times a Day.     Current Facility-Administered Medications on File Prior to Visit   Medication   • [COMPLETED] cefTRIAXone (ROCEPHIN) injection 1 g       Vital Signs:   /87 (BP Location: Right arm, Patient Position: Sitting)   Pulse 87   Temp 98.6 °F (37 °C) (Oral)   Ht 162.6 cm (64.02\")   Wt 77.1 kg (170 lb)   BMI 29.17 kg/m²       Physical Exam:  Physical Exam  Vitals reviewed.   Constitutional:       General: She is not in acute distress.     Appearance: She is not ill-appearing.   Eyes:      Pupils: Pupils are equal, round, and reactive to light.   Neck:      Comments: No thyromegaly  Cardiovascular:      Rate and Rhythm: Normal rate and regular rhythm.   Pulmonary:      Effort: Pulmonary effort is normal.      Breath sounds: Normal breath sounds.   Abdominal:      General: There is no distension.      Palpations: Abdomen is soft. There is mass (left upper quadrant).      Tenderness: There is no abdominal tenderness.   Musculoskeletal:      Cervical back: Neck supple.   Lymphadenopathy:      Cervical: No cervical adenopathy.   Skin:     Findings: No lesion or rash.   Neurological:      Mental Status: She is alert.         Result Review      The following data was reviewed by Artur Quevedo MD on 07/01/2022.  Lab Results   Component Value Date    WBC 9.88 04/08/2022    HGB 14.9 04/08/2022    HCT 45.6 04/08/2022    MCV 86.2 04/08/2022     04/08/2022     Lab Results   Component Value Date    GLUCOSE 78 04/08/2022    BUN 12 04/08/2022    CREATININE 0.70 04/08/2022     04/08/2022    K 3.7 04/08/2022     04/08/2022    CO2 26.6 04/08/2022    CALCIUM 10.6 (H) 04/08/2022    " PROTEINTOT 8.0 04/08/2022    ALBUMIN 4.50 04/08/2022    ALT 10 04/08/2022    AST 14 04/08/2022    ALKPHOS 111 04/08/2022    BILITOT 0.4 04/08/2022    EGFRIFNONA 61 01/12/2018    GLOB 3.5 04/08/2022    AGRATIO 1.3 04/08/2022    BCR 17.1 04/08/2022    ANIONGAP 9.4 04/08/2022      Lab Results   Component Value Date    CHOL 220 (H) 02/14/2022    CHLPL 200 11/14/2019    TRIG 100 02/14/2022    HDL 73 (H) 02/14/2022     (H) 02/14/2022     Lab Results   Component Value Date    TSH 0.737 04/08/2022     Lab Results   Component Value Date    HGBA1C 5.40 04/08/2022            Assessment and Plan:   Today, we have reviewed her care.  She seems quite a bit better than yesterday.  The urine culture is not growing bacteria yet, but I am going to recommend an additional week of antibiotic therapy.  I have sent cefdinir to [facility] for her.  Additionally, we will move ahead with a CT of the abdomen as a precaution.  We will arrange this and be back in touch with her.       Diagnoses and all orders for this visit:    1. Dysuria (Primary)  -     cefdinir (OMNICEF) 300 MG capsule; Take 1 capsule by mouth 2 (Two) Times a Day.  Dispense: 14 capsule; Refill: 0    2. Left upper quadrant abdominal mass  -     CT Abdomen Pelvis With Contrast; Future          Follow Up   Return if symptoms worsen or fail to improve.

## 2022-07-02 LAB — BACTERIA SPEC AEROBE CULT: NORMAL

## 2022-07-05 DIAGNOSIS — I10 ESSENTIAL HYPERTENSION: ICD-10-CM

## 2022-07-05 RX ORDER — MONTELUKAST SODIUM 10 MG/1
10 TABLET ORAL NIGHTLY
Qty: 90 TABLET | Refills: 1 | Status: SHIPPED | OUTPATIENT
Start: 2022-07-05 | End: 2023-02-24 | Stop reason: SDUPTHER

## 2022-07-05 RX ORDER — HYDROCHLOROTHIAZIDE 25 MG/1
25 TABLET ORAL DAILY
Qty: 90 TABLET | Refills: 1 | Status: SHIPPED | OUTPATIENT
Start: 2022-07-05 | End: 2023-02-24 | Stop reason: SDUPTHER

## 2022-07-14 ENCOUNTER — HOSPITAL ENCOUNTER (OUTPATIENT)
Dept: CT IMAGING | Facility: HOSPITAL | Age: 72
Discharge: HOME | End: 2022-07-14
Admitting: FAMILY MEDICINE

## 2022-07-14 DIAGNOSIS — R19.02 LEFT UPPER QUADRANT ABDOMINAL MASS: ICD-10-CM

## 2022-07-14 LAB
CREAT BLDA-MCNC: 0.8 MG/DL
EGFRCR SERPLBLD CKD-EPI 2021: 79.4 ML/MIN/1.73

## 2022-07-14 PROCEDURE — 74176 CT ABD & PELVIS W/O CONTRAST: CPT

## 2022-07-14 PROCEDURE — 82565 ASSAY OF CREATININE: CPT

## 2022-07-15 ENCOUNTER — TELEPHONE (OUTPATIENT)
Dept: FAMILY MEDICINE CLINIC | Age: 72
End: 2022-07-15

## 2022-07-15 DIAGNOSIS — K63.9 COLON ABNORMALITY: ICD-10-CM

## 2022-07-15 DIAGNOSIS — R19.02 LEFT UPPER QUADRANT ABDOMINAL MASS: Primary | ICD-10-CM

## 2022-07-15 NOTE — TELEPHONE ENCOUNTER
----- Message from Artur Quevedo MD sent at 7/15/2022  6:49 AM EDT -----  Please let Eileen Alvarez know the CT does not show any obvious mass in the left abdomen.  Specifically, the spleen and pancreas are normal in appearance.  However, there are some abnormal findings of the colon.  There is some narrowing in part of the colon and thickening of the colon on the right side.  She most recently had colonoscopy about 4 years ago.  Because of these findings, I would recommend referral back to general surgeon of choice for consideration of repeat colonoscopy.  It would be okay to refer to Dr. Aquino here or back to Dr. Hayes if she would prefer.  Let me know if she has other concerns, and forward her a copy of this testing if she would like.      Also, make sure her arm is doing better today.  Thanks.

## 2022-07-19 ENCOUNTER — LAB (OUTPATIENT)
Dept: LAB | Facility: HOSPITAL | Age: 72
End: 2022-07-19

## 2022-07-19 ENCOUNTER — TELEPHONE (OUTPATIENT)
Dept: FAMILY MEDICINE CLINIC | Age: 72
End: 2022-07-19

## 2022-07-19 DIAGNOSIS — R35.0 FREQUENCY OF MICTURITION: ICD-10-CM

## 2022-07-19 DIAGNOSIS — R35.0 FREQUENCY OF MICTURITION: Primary | ICD-10-CM

## 2022-07-19 LAB
BACTERIA UR QL AUTO: ABNORMAL /HPF
BILIRUB UR QL STRIP: NEGATIVE
CLARITY UR: ABNORMAL
COLOR UR: YELLOW
GLUCOSE UR STRIP-MCNC: NEGATIVE MG/DL
HGB UR QL STRIP.AUTO: NEGATIVE
KETONES UR QL STRIP: NEGATIVE
LEUKOCYTE ESTERASE UR QL STRIP.AUTO: ABNORMAL
NITRITE UR QL STRIP: NEGATIVE
PH UR STRIP.AUTO: 5.5 [PH] (ref 5–8)
PROT UR QL STRIP: NEGATIVE
RBC # UR STRIP: ABNORMAL /HPF
REF LAB TEST METHOD: ABNORMAL
SP GR UR STRIP: 1.01 (ref 1–1.03)
SQUAMOUS #/AREA URNS HPF: ABNORMAL /HPF
UROBILINOGEN UR QL STRIP: ABNORMAL
WBC # UR STRIP: ABNORMAL /HPF

## 2022-07-19 PROCEDURE — 81001 URINALYSIS AUTO W/SCOPE: CPT

## 2022-08-16 ENCOUNTER — OFFICE VISIT (OUTPATIENT)
Dept: FAMILY MEDICINE CLINIC | Age: 72
End: 2022-08-16

## 2022-08-16 VITALS
TEMPERATURE: 99.1 F | BODY MASS INDEX: 28.61 KG/M2 | HEART RATE: 89 BPM | SYSTOLIC BLOOD PRESSURE: 120 MMHG | DIASTOLIC BLOOD PRESSURE: 72 MMHG | HEIGHT: 64 IN | WEIGHT: 167.6 LBS

## 2022-08-16 DIAGNOSIS — J34.89 RHINORRHEA: ICD-10-CM

## 2022-08-16 DIAGNOSIS — I10 ESSENTIAL HYPERTENSION: ICD-10-CM

## 2022-08-16 DIAGNOSIS — Z12.31 BREAST CANCER SCREENING BY MAMMOGRAM: ICD-10-CM

## 2022-08-16 DIAGNOSIS — Z79.899 OTHER LONG TERM (CURRENT) DRUG THERAPY: ICD-10-CM

## 2022-08-16 DIAGNOSIS — Z00.00 PHYSICAL EXAM: Primary | ICD-10-CM

## 2022-08-16 DIAGNOSIS — R82.81 PYURIA: ICD-10-CM

## 2022-08-16 DIAGNOSIS — M47.816 LUMBAR SPONDYLOSIS: ICD-10-CM

## 2022-08-16 LAB
BILIRUB UR QL STRIP: NEGATIVE
CLARITY UR: ABNORMAL
COLOR UR: YELLOW
GLUCOSE UR STRIP-MCNC: NEGATIVE MG/DL
HGB UR QL STRIP.AUTO: NEGATIVE
KETONES UR QL STRIP: NEGATIVE
LEUKOCYTE ESTERASE UR QL STRIP.AUTO: ABNORMAL
NITRITE UR QL STRIP: NEGATIVE
PH UR STRIP.AUTO: 6 [PH] (ref 5–8)
PROT UR QL STRIP: ABNORMAL
SP GR UR STRIP: 1.02 (ref 1–1.03)
UROBILINOGEN UR QL STRIP: ABNORMAL

## 2022-08-16 PROCEDURE — 1125F AMNT PAIN NOTED PAIN PRSNT: CPT | Performed by: FAMILY MEDICINE

## 2022-08-16 PROCEDURE — G0439 PPPS, SUBSEQ VISIT: HCPCS | Performed by: FAMILY MEDICINE

## 2022-08-16 PROCEDURE — 99214 OFFICE O/P EST MOD 30 MIN: CPT | Performed by: FAMILY MEDICINE

## 2022-08-16 PROCEDURE — 1159F MED LIST DOCD IN RCRD: CPT | Performed by: FAMILY MEDICINE

## 2022-08-16 PROCEDURE — 1170F FXNL STATUS ASSESSED: CPT | Performed by: FAMILY MEDICINE

## 2022-08-16 PROCEDURE — 81001 URINALYSIS AUTO W/SCOPE: CPT | Performed by: FAMILY MEDICINE

## 2022-08-16 RX ORDER — PREGABALIN 100 MG/1
100 CAPSULE ORAL 2 TIMES DAILY
Qty: 60 CAPSULE | Refills: 2 | Status: SHIPPED | OUTPATIENT
Start: 2022-08-16 | End: 2022-11-29 | Stop reason: SDUPTHER

## 2022-08-16 RX ORDER — CETIRIZINE HYDROCHLORIDE 10 MG/1
10 TABLET ORAL DAILY
Qty: 90 TABLET | Refills: 1 | Status: SHIPPED | OUTPATIENT
Start: 2022-08-16 | End: 2023-02-24 | Stop reason: SDUPTHER

## 2022-08-16 RX ORDER — AMLODIPINE BESYLATE 5 MG/1
5 TABLET ORAL 2 TIMES DAILY
Qty: 180 TABLET | Refills: 1 | Status: SHIPPED | OUTPATIENT
Start: 2022-08-16 | End: 2023-02-24 | Stop reason: SDUPTHER

## 2022-08-16 RX ORDER — METOPROLOL SUCCINATE 50 MG/1
50 TABLET, EXTENDED RELEASE ORAL DAILY
Qty: 90 TABLET | Refills: 1 | Status: SHIPPED | OUTPATIENT
Start: 2022-08-16 | End: 2023-02-24 | Stop reason: SDUPTHER

## 2022-08-16 NOTE — PROGRESS NOTES
The ABCs of the Annual Wellness Visit  Subsequent Medicare Wellness Visit    Chief Complaint:  Medicare wellness exam    Subjective    History of Present Illness:  Eileen Kaufman is a 72 y.o. female who presents for a Subsequent Medicare Wellness Visit.    The following portions of the patient's history were reviewed and updated as appropriate: allergies, current medications, past family history, past medical history, past social history, past surgical history and problem list.     Compared to one year ago, the patient feels her physical health is the same.    Compared to one year ago, the patient feels her mental health is the same.  She says that things are worse when she is in pain.    Recent Hospitalizations:  She was not admitted to the hospital during the last year.       Current Medical Providers:  Patient Care Team:  Artur Quevedo MD as PCP - General (Family Medicine)    Outpatient Medications Prior to Visit   Medication Sig Dispense Refill   • albuterol sulfate HFA (Ventolin HFA) 108 (90 Base) MCG/ACT inhaler Inhale 2 puffs Every 6 (Six) Hours As Needed for Wheezing. 8 g 11   • Calcium Carbonate 1500 (600 Ca) MG tablet Calcium 600 600 mg (1,500 mg) oral tablet take 1 tablet by oral route daily   Suspended     • cyclobenzaprine (FLEXERIL) 10 MG tablet Take 10 mg by mouth 2 (Two) Times a Day As Needed.     • fluticasone (FLONASE) 50 MCG/ACT nasal spray 2 sprays into the nostril(s) as directed by provider Daily.     • Fluticasone Furoate-Vilanterol (Breo Ellipta) 100-25 MCG/INH inhaler Inhale 1 puff Daily.     • hydroCHLOROthiazide (HYDRODIURIL) 25 MG tablet Take 1 tablet by mouth Daily. 90 tablet 1   • mirtazapine (REMERON) 30 MG tablet Take 1 tablet by mouth Every Night. 90 tablet 3   • montelukast (SINGULAIR) 10 MG tablet Take 1 tablet by mouth Every Night. 90 tablet 1   • multivitamin with minerals tablet tablet 1 tablet Daily.     • amLODIPine (NORVASC) 5 MG tablet Take 1 tablet by mouth 2 (Two)  Times a Day. 180 tablet 1   • cetirizine (zyrTEC) 10 MG tablet Take 1 tablet by mouth Daily. 90 tablet 1   • metoprolol succinate XL (Toprol XL) 50 MG 24 hr tablet Take 1 tablet by mouth Daily. 90 tablet 1   • pregabalin (LYRICA) 75 MG capsule Take 1 capsule by mouth 2 (Two) Times a Day. 60 capsule 5   • cefdinir (OMNICEF) 300 MG capsule Take 1 capsule by mouth 2 (Two) Times a Day. 14 capsule 0     No facility-administered medications prior to visit.       No opioid medication identified on active medication list. I have reviewed chart for other potential  high risk medication/s and harmful drug interactions in the elderly.          Aspirin is not on active medication list.  Aspirin use is not indicated based on review of current medical condition/s. Risk of harm outweighs potential benefits.  .    Patient Active Problem List   Diagnosis   • Allergic rhinitis   • Anxiety   • Sleep apnea   • Cervical spondylosis without myelopathy   • Congestive heart failure (HCC)   • High cholesterol   • Hypercalcemia   • Essential hypertension   • Lumbar spondylosis   • Fibromyalgia   • Chronic obstructive pulmonary disease (HCC)   • Chronic nonintractable headache   • Frequency of micturition   • Other long term (current) drug therapy   • Syncope and collapse     Advance Care Planning   Advance Directive is not on file.  ACP discussion was held with the patient during this visit. Patient does not have an advance directive, information provided.  She thinks her sister would make decisions for her if needed.    Eileen Alvarez is also here for follow-up on her usual issues.  She has chronic pain from disc and low back issues.    She was previously on oxycodone through pain management.  However, there was apparently an issue with her medication count.  She has been in more significant pain since then.  She does find that Lyrica is helpful with nerve pain that she has.  This has been an ongoing problem related to the back and other issues as  "noted.  She is asking if we could possibly increase the dose of that medication.  She denies abuse, diversion, or doctor shopping.  Kevyn is okay.    Eileen Alvarez also has other ongoing health problems including hypertension.  Her blood pressure is well controlled today.  Her medications are reviewed as noted.    Review of Systems:  Review of Systems   Constitutional: Negative for chills and fever.   Respiratory: Negative for cough and shortness of breath.    Cardiovascular: Negative for chest pain and palpitations.   Gastrointestinal: Negative for abdominal pain, nausea and vomiting.         Objective       Vitals:    08/16/22 1529   BP: 120/72   BP Location: Right arm   Patient Position: Sitting   Pulse: 89   Temp: 99.1 °F (37.3 °C)   TempSrc: Oral   Weight: 76 kg (167 lb 9.6 oz)   Height: 162.6 cm (64.02\")   PainSc:   8   PainLoc: Back     BMI Readings from Last 1 Encounters:   08/16/22 28.75 kg/m²   BMI is above normal parameters. Recommendations include: exercise counseling and nutrition counseling    Does the patient have evidence of cognitive impairment? No    Physical Exam  Vitals and nursing note reviewed.   Constitutional:       General: She is not in acute distress.     Appearance: She is not ill-appearing.   HENT:      Right Ear: Tympanic membrane and ear canal normal.      Left Ear: Tympanic membrane and ear canal normal.      Ears:      Comments: Hearing is diminished bilaterally with forced whisper.     Mouth/Throat:      Mouth: Mucous membranes are moist.      Comments: Pharynx appears normal  Eyes:      Extraocular Movements: Extraocular movements intact.      Pupils: Pupils are equal, round, and reactive to light.      Comments: Binocular vision is 20/40 without correction.   Neck:      Thyroid: No thyromegaly.   Cardiovascular:      Rate and Rhythm: Normal rate and regular rhythm.      Heart sounds: No murmur heard.  Pulmonary:      Effort: Pulmonary effort is normal.      Breath sounds: Normal breath " sounds.   Abdominal:      General: There is no distension.      Palpations: Abdomen is soft. There is no mass.      Tenderness: There is no abdominal tenderness.   Musculoskeletal:      Cervical back: Normal range of motion.   Skin:     Findings: No lesion or rash.   Neurological:      General: No focal deficit present.      Mental Status: She is oriented to person, place, and time.      Cranial Nerves: No cranial nerve deficit.   Psychiatric:         Mood and Affect: Mood normal.       The following data was reviewed by Artur Quevedo MD on 08/16/2022.  Lab Results   Component Value Date    WBC 9.88 04/08/2022    HGB 14.9 04/08/2022    HCT 45.6 04/08/2022    MCV 86.2 04/08/2022     04/08/2022     Lab Results   Component Value Date    GLUCOSE 78 04/08/2022    BUN 12 04/08/2022    CREATININE 0.80 07/14/2022     04/08/2022    K 3.7 04/08/2022     04/08/2022    CO2 26.6 04/08/2022    CALCIUM 10.6 (H) 04/08/2022    PROTEINTOT 8.0 04/08/2022    ALBUMIN 4.50 04/08/2022    ALT 10 04/08/2022    AST 14 04/08/2022    ALKPHOS 111 04/08/2022    BILITOT 0.4 04/08/2022    EGFRIFNONA 61 01/12/2018    GLOB 3.5 04/08/2022    AGRATIO 1.3 04/08/2022    BCR 17.1 04/08/2022    ANIONGAP 9.4 04/08/2022      Lab Results   Component Value Date    CHOL 220 (H) 02/14/2022    CHLPL 200 11/14/2019    TRIG 100 02/14/2022    HDL 73 (H) 02/14/2022     (H) 02/14/2022     Lab Results   Component Value Date    TSH 0.737 04/08/2022     Lab Results   Component Value Date    HGBA1C 5.40 04/08/2022        HEALTH RISK ASSESSMENT    Smoking Status:  Social History     Tobacco Use   Smoking Status Current Every Day Smoker   • Packs/day: 1.00   • Types: Cigarettes   Smokeless Tobacco Current User   Tobacco Comment    Eileen Alvarez was 23 when she started smoking.  She did stop smoking for some time - about 13 years.  She has been smoking a total of approximately 33 years.  She has smoked less than a pack daily during this time. She  currently uses smokless, vapor can/pouches     Alcohol Consumption:  Social History     Substance and Sexual Activity   Alcohol Use Yes    Comment: Socially     Fall Risk Screen:    JONATHAN Fall Risk Assessment was completed, and patient is at LOW risk for falls.Assessment completed on:1/13/2022    Depression Screening:  PHQ-2/PHQ-9 Depression Screening 1/31/2022   Retired PHQ-9 Total Score 0   Retired Total Score 0       Health Habits and Functional and Cognitive Screening:  Functional & Cognitive Status 8/16/2022   Do you have difficulty preparing food and eating? No   Do you have difficulty bathing yourself, getting dressed or grooming yourself? No   Do you have difficulty using the toilet? No   Do you have difficulty moving around from place to place? No   Do you have trouble with steps or getting out of a bed or a chair? No   Current Diet Unhealthy Diet   Dental Exam Not up to date   Eye Exam Not up to date   Exercise (times per week) 0 times per week   Current Exercises Include No Regular Exercise   Do you need help using the phone?  No   Are you deaf or do you have serious difficulty hearing?  No   Do you need help with transportation? No   Do you need help shopping? No   Do you need help preparing meals?  No   Do you need help with housework?  No   Do you need help with laundry? No   Do you need help taking your medications? No   Do you need help managing money? No   Do you ever drive or ride in a car without wearing a seat belt? No   Have you felt unusual stress, anger or loneliness in the last month? Yes   Who do you live with? Child   If you need help, do you have trouble finding someone available to you? No   Have you been bothered in the last four weeks by sexual problems? No   Do you have difficulty concentrating, remembering or making decisions? No       Age-appropriate Screening Schedule:  Refer to the list below for future screening recommendations based on patient's age, sex and/or medical  conditions. Orders for these recommended tests are listed in the plan section. The patient has been provided with a written plan.    Health Maintenance   Topic Date Due   • TDAP/TD VACCINES (1 - Tdap) Never done   • ZOSTER VACCINE (1 of 2) Never done   • DXA SCAN  08/25/2022   • INFLUENZA VACCINE  10/01/2022   • LIPID PANEL  02/14/2023   • MAMMOGRAM  08/24/2023                       Assessment and Plan:       CMS Preventative Services Quick Reference  Risk Factors Identified During Encounter  Cardiovascular Disease  Hearing Problem  Immunizations Discussed/Encouraged (specific Immunizations; Tdap, Influenza, Shingrix and COVID19    The above risks/problems have been discussed with the patient.  Follow up actions/plans if indicated are seen below in the Assessment/Plan Section.  Pertinent information has been shared with the patient in the After Visit Summary.    Today, we have reviewed her care.  With regard to the wellness exam, she will be having colonoscopy soon.  Hopefully, that will be normal.  In addition, she will be due for mammogram relatively soon, and we will make those arrangements.  With regard to vaccines, I would recommend she have a tetanus shot if she were to have a cut or scrape, particularly with mariano metal.  I did recommend she check with her pharmacy about the shingles vaccine.  We also will recommend she consider taking a COVID shot with her flu shot later in the fall.    With regard to her usual care, she continues to struggle with pain.  We will go ahead and increase the dose of Lyrica to 100 mg twice daily and see if that is of benefit to her.  We will continue to follow closely in this regard.  Her other medications will be refilled as noted below as well.  No other short-term change is made regarding those issues.  Finally, her urine has had some white blood cells when checked most recently.  We will repeat that today.  We will plan to see her back in about 6 months for recheck, sooner if  needed.    Diagnoses and all orders for this visit:    1. Physical exam (Primary)    2. Lumbar spondylosis  -     pregabalin (LYRICA) 100 MG capsule; Take 1 capsule by mouth 2 (Two) Times a Day.  Dispense: 60 capsule; Refill: 2    3. Other long term (current) drug therapy  -     pregabalin (LYRICA) 100 MG capsule; Take 1 capsule by mouth 2 (Two) Times a Day.  Dispense: 60 capsule; Refill: 2    4. Essential hypertension  -     amLODIPine (NORVASC) 5 MG tablet; Take 1 tablet by mouth 2 (Two) Times a Day.  Dispense: 180 tablet; Refill: 1  -     metoprolol succinate XL (Toprol XL) 50 MG 24 hr tablet; Take 1 tablet by mouth Daily.  Dispense: 90 tablet; Refill: 1    5. Rhinorrhea  -     cetirizine (zyrTEC) 10 MG tablet; Take 1 tablet by mouth Daily.  Dispense: 90 tablet; Refill: 1    6. Pyuria  -     Urinalysis With Microscopic - Urine, Clean Catch; Future    7. Breast cancer screening by mammogram  -     Mammo Screening Digital Tomosynthesis Bilateral With CAD; Future        Follow Up:   Return in about 6 months (around 2/16/2023) for Recheck.     An After Visit Summary and PPPS were given to the patient.

## 2022-08-17 LAB
BACTERIA UR QL AUTO: ABNORMAL /HPF
COD CRY URNS QL: ABNORMAL /HPF
HYALINE CASTS UR QL AUTO: ABNORMAL /LPF
RBC # UR STRIP: ABNORMAL /HPF
REF LAB TEST METHOD: ABNORMAL
SQUAMOUS #/AREA URNS HPF: ABNORMAL /HPF
WBC # UR STRIP: ABNORMAL /HPF

## 2022-09-26 ENCOUNTER — OFFICE VISIT (OUTPATIENT)
Dept: UROLOGY | Facility: CLINIC | Age: 72
End: 2022-09-26

## 2022-09-26 VITALS — RESPIRATION RATE: 12 BRPM | HEIGHT: 64 IN | BODY MASS INDEX: 28.65 KG/M2 | WEIGHT: 167.8 LBS

## 2022-09-26 DIAGNOSIS — R82.81 PYURIA: Primary | ICD-10-CM

## 2022-09-26 PROBLEM — M54.16 LUMBAR RADICULOPATHY: Status: ACTIVE | Noted: 2019-07-19

## 2022-09-26 LAB
BACTERIA UR QL AUTO: ABNORMAL /HPF
BILIRUB BLD-MCNC: NEGATIVE MG/DL
CLARITY, POC: CLEAR
COLOR UR: YELLOW
EXPIRATION DATE: ABNORMAL
GLUCOSE UR STRIP-MCNC: NEGATIVE MG/DL
HYALINE CASTS UR QL AUTO: ABNORMAL /LPF
KETONES UR QL: NEGATIVE
LEUKOCYTE EST, POC: ABNORMAL
Lab: ABNORMAL
NITRITE UR-MCNC: NEGATIVE MG/ML
PH UR: 6 [PH] (ref 5–8)
PROT UR STRIP-MCNC: NEGATIVE MG/DL
RBC # UR STRIP: ABNORMAL /HPF
RBC # UR STRIP: NEGATIVE /UL
REF LAB TEST METHOD: ABNORMAL
SP GR UR: 1.03 (ref 1–1.03)
SQUAMOUS #/AREA URNS HPF: ABNORMAL /HPF
UROBILINOGEN UR QL: NORMAL
WBC # UR STRIP: ABNORMAL /HPF

## 2022-09-26 PROCEDURE — 99212 OFFICE O/P EST SF 10 MIN: CPT | Performed by: NURSE PRACTITIONER

## 2022-09-26 PROCEDURE — 81003 URINALYSIS AUTO W/O SCOPE: CPT | Performed by: NURSE PRACTITIONER

## 2022-09-26 PROCEDURE — 81015 MICROSCOPIC EXAM OF URINE: CPT | Performed by: NURSE PRACTITIONER

## 2022-09-26 NOTE — PROGRESS NOTES
Chief Complaint: Pyruia (Pt not sure why she is here.   Pt states she doesn't have any problems with her urine.)    Subjective         History of Present Illness  Eileen Kaufman is a 72 y.o. female presents to [facility] to be seen for pyuria.    The patient states that she has urgency with urination has had this for several years.     She denies any bothersome voiding symptoms.     She has had urinalysis x 2 with WBC 6-12/HP however also contamination with squamous epithelial cells.     She also has a significant amount of calcium oxalate crystals in her urine.     She has stopped drinking energy drinks in the last month.     She has increased water intake as well.     CT scan in 7/2022 was negative for any normal urologic findings.    No family history of  malignancies.    No anticoagulant use.     Objective     Past Medical History:   Diagnosis Date   • Anemia, unspecified    • Asymptomatic menopausal state    • Chronic obstructive pulmonary disease with (acute) exacerbation (HCC)    • Essential (primary) hypertension    • Low back pain    • Major depressive disorder, single episode, moderate (HCC)    • Nicotine dependence, unspecified, uncomplicated    • Osteopenia    • Other intervertebral disc degeneration, lumbar region    • Other long term (current) drug therapy    • Solitary pulmonary nodule    • Vitamin D deficiency, unspecified        Past Surgical History:   Procedure Laterality Date   • CARDIAC CATHETERIZATION  07/25/2012   • CARPAL TUNNEL RELEASE     • CHOLECYSTECTOMY     • COLONOSCOPY  08/24/2022    Normal   • HYSTERECTOMY  1980s    Total   • KNEE ARTHROSCOPY Left    • NECK SURGERY  01/21/2015         Current Outpatient Medications:   •  albuterol sulfate HFA (Ventolin HFA) 108 (90 Base) MCG/ACT inhaler, Inhale 2 puffs Every 6 (Six) Hours As Needed for Wheezing., Disp: 8 g, Rfl: 11  •  amLODIPine (NORVASC) 5 MG tablet, Take 1 tablet by mouth 2 (Two) Times a Day., Disp: 180  tablet, Rfl: 1  •  Calcium Carbonate 1500 (600 Ca) MG tablet, Calcium 600 600 mg (1,500 mg) oral tablet take 1 tablet by oral route daily   Suspended, Disp: , Rfl:   •  cetirizine (zyrTEC) 10 MG tablet, Take 1 tablet by mouth Daily., Disp: 90 tablet, Rfl: 1  •  cyclobenzaprine (FLEXERIL) 10 MG tablet, Take 10 mg by mouth 2 (Two) Times a Day As Needed., Disp: , Rfl:   •  fluticasone (FLONASE) 50 MCG/ACT nasal spray, 2 sprays into the nostril(s) as directed by provider Daily., Disp: , Rfl:   •  Fluticasone Furoate-Vilanterol (Breo Ellipta) 100-25 MCG/INH inhaler, Inhale 1 puff Daily., Disp: , Rfl:   •  hydroCHLOROthiazide (HYDRODIURIL) 25 MG tablet, Take 1 tablet by mouth Daily., Disp: 90 tablet, Rfl: 1  •  metoprolol succinate XL (Toprol XL) 50 MG 24 hr tablet, Take 1 tablet by mouth Daily., Disp: 90 tablet, Rfl: 1  •  mirtazapine (REMERON) 30 MG tablet, Take 1 tablet by mouth Every Night., Disp: 90 tablet, Rfl: 3  •  montelukast (SINGULAIR) 10 MG tablet, Take 1 tablet by mouth Every Night., Disp: 90 tablet, Rfl: 1  •  multivitamin with minerals tablet tablet, 1 tablet Daily., Disp: , Rfl:   •  pregabalin (LYRICA) 100 MG capsule, Take 1 capsule by mouth 2 (Two) Times a Day., Disp: 60 capsule, Rfl: 2    Allergies   Allergen Reactions   • Sulfa Antibiotics Unknown - Low Severity   • Aspirin Rash        Family History   Problem Relation Age of Onset   • Cerebral aneurysm Mother         cause of death   • Heart attack Father         cause of death   • Esophageal cancer Brother        Social History     Socioeconomic History   • Marital status: Single   • Number of children: 2   Tobacco Use   • Smoking status: Current Every Day Smoker     Packs/day: 1.00     Types: Cigarettes   • Smokeless tobacco: Never Used   • Tobacco comment: Eileen Alvarze was 23 when she started smoking.  She did stop smoking for some time - about 13 years.  She has been smoking a total of approximately 33 years.  She has smoked less than a pack daily  "during this time. She currently uses smokless, vapor can/pouches   Vaping Use   • Vaping Use: Former   • Substances: Nicotine   • Devices: Pre-filled or refillable cartridge   Substance and Sexual Activity   • Alcohol use: Yes     Comment: Socially   • Drug use: Never   • Sexual activity: Defer       Vital Signs:   Resp 12   Ht 162.6 cm (64\")   Wt 76.1 kg (167 lb 12.8 oz)   BMI 28.80 kg/m²      Physical Exam     Result Review :   The following data was reviewed by: ESTEFANI Muhammad on 09/26/2022:  Results for orders placed or performed in visit on 09/26/22   POC Urinalysis Dipstick, Automated    Specimen: Urine   Result Value Ref Range    Color Yellow Yellow, Straw, Dark Yellow, Hannah    Clarity, UA Clear Clear    Specific Gravity  1.030 1.005 - 1.030    pH, Urine 6.0 5.0 - 8.0    Leukocytes Trace (A) Negative    Nitrite, UA Negative Negative    Protein, POC Negative Negative mg/dL    Glucose, UA Negative Negative mg/dL    Ketones, UA Negative Negative    Urobilinogen, UA Normal Normal, 0.2 E.U./dL    Bilirubin Negative Negative    Blood, UA Negative Negative    Lot Number [id number]     Expiration Date 2,023/8             Procedures        Assessment and Plan    Diagnoses and all orders for this visit:    1. Pyuria (Primary)  -     POC Urinalysis Dipstick, Automated  -     Urinalysis, Microscopic Only - Urine, Clean Catch; Future  -     Urinalysis, Microscopic Only - Urine, Clean Catch      Discussed with the patient that given she was drinking a significant amount of energy drinks this could have caused inflammation or irritation in her bladder that combined with potential dehydration as evidenced by calcium oxalate crystals in her urine could have been the cause of significant pyuria.    Given that she has stopped drinking energy drinks and has increased her oral hydration and her polyuria has been significantly reduced unsure at this point in time we need to proceed with a work-up of lower urinary " tracts.    We will have patient follow-up with us in 3 months with a repeat urinalysis and have her avoid foods or drinks that may cause bladder irritation or inflammation and continue to increase her water intake.    We will send urine for microscopy today.      I spent 15 minutes caring for Eileen on this date of service. This time includes time spent by me in the following activities:reviewing tests, obtaining and/or reviewing a separately obtained history, performing a medically appropriate examination and/or evaluation , counseling and educating the patient/family/caregiver, ordering medications, tests, or procedures, and documenting information in the medical record  Follow Up   Return in about 3 months (around 12/26/2022) for f/u repeat UA for pyuria .  Patient was given instructions and counseling regarding her condition or for health maintenance advice. Please see specific information pulled into the AVS if appropriate.         This document has been electronically signed by ESTEFANI Muhammad  September 26, 2022 14:18 EDT

## 2022-10-05 ENCOUNTER — TELEPHONE (OUTPATIENT)
Dept: FAMILY MEDICINE CLINIC | Age: 72
End: 2022-10-05

## 2022-11-29 DIAGNOSIS — M47.816 LUMBAR SPONDYLOSIS: ICD-10-CM

## 2022-11-29 DIAGNOSIS — Z79.899 OTHER LONG TERM (CURRENT) DRUG THERAPY: ICD-10-CM

## 2022-11-29 RX ORDER — PREGABALIN 100 MG/1
100 CAPSULE ORAL 2 TIMES DAILY
Qty: 60 CAPSULE | Refills: 2 | Status: SHIPPED | OUTPATIENT
Start: 2022-11-29 | End: 2023-02-24 | Stop reason: SDUPTHER

## 2022-11-29 NOTE — TELEPHONE ENCOUNTER
Caller: Eileen Kaufman    Relationship: Self    Best call back number:[phone]    Requested Prescriptions:   Requested Prescriptions     Pending Prescriptions Disp Refills   • pregabalin (LYRICA) 100 MG capsule 60 capsule 2     Sig: Take 1 capsule by mouth 2 (Two) Times a Day.        Pharmacy where request should be sent: [id number] - [facility] [place] - [address] AT [facility] OF [address] [facility] - [phone] [facility] [phone] FX     Additional details provided by patient: PATIENT TO THE LAST ONE LAST NIGHT     Does the patient have less than a 3 day supply:  [x] Yes  [] No    Would you like a call back once the refill request has been completed: [x] Yes [] No    If the office needs to give you a call back, can they leave a voicemail: [x] Yes [] No    Eleazar Carreno Rep   11/29/22 09:24 EST

## 2022-12-12 ENCOUNTER — APPOINTMENT (OUTPATIENT)
Dept: MAMMOGRAPHY | Facility: HOSPITAL | Age: 72
End: 2022-12-12

## 2023-02-13 ENCOUNTER — TELEMEDICINE (OUTPATIENT)
Dept: FAMILY MEDICINE CLINIC | Age: 73
End: 2023-02-13
Payer: MEDICARE

## 2023-02-13 ENCOUNTER — TELEPHONE (OUTPATIENT)
Dept: FAMILY MEDICINE CLINIC | Age: 73
End: 2023-02-13

## 2023-02-13 VITALS — HEIGHT: 64 IN | BODY MASS INDEX: 28.8 KG/M2

## 2023-02-13 DIAGNOSIS — U07.1 COVID: Primary | ICD-10-CM

## 2023-02-13 PROCEDURE — 99213 OFFICE O/P EST LOW 20 MIN: CPT | Performed by: NURSE PRACTITIONER

## 2023-02-13 RX ORDER — OMEGA-3/DHA/EPA/FISH OIL 300-1000MG
CAPSULE ORAL
COMMUNITY

## 2023-02-13 NOTE — TELEPHONE ENCOUNTER
Caller: Eileen Kaufman    Relationship to patient: Self    Best call back number:[phone]    Date of exposure:     Date of positive COVID19 test:02/12/2023    Date if possible COVID19 exposure:     COVID19 symptoms: FATIGUE, COUGH, TASTE IS OFF, SHORTNESS OF BREATH.    Date of initial quarantine:     Additional information or concerns: PATIENT IS WANTING TO KNOW WHAT SHE SHOULD DO.     What is the patients preferred pharmacy: [id number] [address] AT [facility] OF [address]  - [phone]  - [phone]   [phone]

## 2023-02-13 NOTE — PROGRESS NOTES
"Chief Complaint  Covid-19 Home Monitoring Video Visit (Pt tested + yesterday ), Nasal Congestion (Sx started Saturday ), Sore Throat ([phone]), Cough, Fatigue, Headache, and Fever (Low grade )    Subjective        Eileen Barriosry presents to [facility]  History of Present Illness  Patient here today for concerns of possible covid infection or URI     Known Exposure to positive case?   na  Date of exposure?   na  Date of symptoms start?   2/11/2023  (Symptoms may appear 2-14 days after exposure )    Fever or chills?   yes  Cough?   yes  Shortness of breath or difficulty breathing?  yes  Fatigue?   yes  Muscle or body aches?  yes   Headache?   yes  New loss of taste or smell? no  Sore throat?  yes  Congestion or runny nose? yes  Nausea or vomiting?   no  Diarrhea?   no  Any  emergency warning signs for COVID-19.   Trouble breathing?  no  Persistent pain or pressure in the chest?   no  New confusion? no  Inability to wake or stay awake?no  Pale, gray, or blue-colored skin, lips, or nail beds, depending on skin tone?   no    Taking any medications at home to help with symptoms?yes  Any prior vaccine to covid?   yes  Any significant health problems / existing lung / heart problems?  yes  Interested in monoclonal antibody treatment if test result is positive? no  (must be symptomatic, have a positive PCR covid test and meet ONE of the following criteria and be at least 18 YOA)  Age 65 or older  Obesity (BMI of 25 or more)  Pregnancy  Immunosuppressed  Diabetes  CKD  Cardiovascular disease, hypertension, chronic lung disease  Sickle cell disease   Neurodevelopment disorder    Patient consent to video visit.  Patient identity confirmed.  [employer] video call was used.  Provider in office at desktop  Patient at home.    Objective   Vital Signs:  Ht 162.6 cm (64\")   BMI 28.80 kg/m²   Estimated body mass index is 28.8 kg/m² as calculated from the following:    Height as of this encounter: " "162.6 cm (64\").    Weight as of 9/26/22: 76.1 kg (167 lb 12.8 oz).             Physical Exam  HENT:      Head: Normocephalic.   Pulmonary:      Effort: No respiratory distress.   Neurological:      Mental Status: She is alert and oriented to person, place, and time.   Psychiatric:         Mood and Affect: Mood normal.        Result Review :                   Assessment and Plan   Diagnoses and all orders for this visit:    1. COVID (Primary)  Comments:  Force fluid, OTC symptomtic treatment as needed, [facility] guidline for home isolation discussed, ER for CP or SOA  Orders:  -     Nirmatrelvir&Ritonavir 300/100 (PAXLOVID) 20 x 150 MG & 10 x 100MG tablet therapy pack tablet; Take 3 tablets by mouth 2 (Two) Times a Day for 5 days.  Dispense: 30 tablet; Refill: 0             Follow Up   Return if symptoms worsen or fail to improve.  Patient was given instructions and counseling regarding her condition or for health maintenance advice. Please see specific information pulled into the AVS if appropriate.       "

## 2023-02-13 NOTE — TELEPHONE ENCOUNTER
Pt states symptoms started Friday 2/10/23, congestion, runny nose, diarrhea, denies chest pain, states she only gets short of breath when she's talking, has quit smoking, did home test 2/12/23 yesterday that was positive.

## 2023-02-16 ENCOUNTER — TELEPHONE (OUTPATIENT)
Dept: FAMILY MEDICINE CLINIC | Age: 73
End: 2023-02-16
Payer: MEDICARE

## 2023-02-16 NOTE — TELEPHONE ENCOUNTER
Diarrhea can occur with covid and also may be a side effect of the medication. Okay to extend work noted and make sure to force fluids to replenish fluids lost.

## 2023-02-16 NOTE — TELEPHONE ENCOUNTER
Pt called stating that she has diarrhea now.  She says about 5-10 minutes after she eats or drinks anything it runs through her.  Is there anything she can take to help with that?  She also says she is suppose to go back to work tomorrow and is wondering if she should wait until Monday

## 2023-02-17 ENCOUNTER — TELEPHONE (OUTPATIENT)
Dept: FAMILY MEDICINE CLINIC | Age: 73
End: 2023-02-17

## 2023-02-17 NOTE — TELEPHONE ENCOUNTER
Caller: Eileen Kaufman    Relationship: Self    Best call back number: [phone]    What is the best time to reach you: ANYTIME     Who are you requesting to speak with (clinical staff, provider,  specific staff member):      What was the call regarding: PATIENT CALLING TO CHECK STATUS OF NOTE TO BE SENT TO EMPLOYER , PLEASE ADVISE.     Do you require a callback: YES

## 2023-02-24 ENCOUNTER — OFFICE VISIT (OUTPATIENT)
Dept: FAMILY MEDICINE CLINIC | Age: 73
End: 2023-02-24
Payer: MEDICARE

## 2023-02-24 VITALS
SYSTOLIC BLOOD PRESSURE: 111 MMHG | TEMPERATURE: 98 F | BODY MASS INDEX: 30.49 KG/M2 | WEIGHT: 178.6 LBS | HEART RATE: 78 BPM | DIASTOLIC BLOOD PRESSURE: 72 MMHG | HEIGHT: 64 IN

## 2023-02-24 DIAGNOSIS — Z79.899 OTHER LONG TERM (CURRENT) DRUG THERAPY: ICD-10-CM

## 2023-02-24 DIAGNOSIS — Z12.31 BREAST CANCER SCREENING BY MAMMOGRAM: ICD-10-CM

## 2023-02-24 DIAGNOSIS — I10 ESSENTIAL HYPERTENSION: Primary | ICD-10-CM

## 2023-02-24 DIAGNOSIS — J34.89 RHINORRHEA: ICD-10-CM

## 2023-02-24 DIAGNOSIS — F17.210 NICOTINE DEPENDENCE, CIGARETTES, UNCOMPLICATED: ICD-10-CM

## 2023-02-24 DIAGNOSIS — R73.9 HYPERGLYCEMIA: ICD-10-CM

## 2023-02-24 DIAGNOSIS — M47.816 LUMBAR SPONDYLOSIS: ICD-10-CM

## 2023-02-24 DIAGNOSIS — J44.9 CHRONIC OBSTRUCTIVE PULMONARY DISEASE, UNSPECIFIED COPD TYPE: ICD-10-CM

## 2023-02-24 DIAGNOSIS — E78.00 HIGH CHOLESTEROL: ICD-10-CM

## 2023-02-24 LAB
AMPHET+METHAMPHET UR QL: NEGATIVE
AMPHETAMINES UR QL: NEGATIVE
BARBITURATES UR QL SCN: NEGATIVE
BENZODIAZ UR QL SCN: NEGATIVE
BUPRENORPHINE SERPL-MCNC: NEGATIVE NG/ML
CANNABINOIDS SERPL QL: NEGATIVE
COCAINE UR QL: NEGATIVE
EXPIRATION DATE: NORMAL
Lab: NORMAL
MDMA UR QL SCN: NEGATIVE
METHADONE UR QL SCN: NEGATIVE
OPIATES UR QL: NEGATIVE
OXYCODONE UR QL SCN: NEGATIVE
PCP UR QL SCN: NEGATIVE

## 2023-02-24 PROCEDURE — 80305 DRUG TEST PRSMV DIR OPT OBS: CPT | Performed by: FAMILY MEDICINE

## 2023-02-24 PROCEDURE — 99214 OFFICE O/P EST MOD 30 MIN: CPT | Performed by: FAMILY MEDICINE

## 2023-02-24 RX ORDER — MIRTAZAPINE 30 MG/1
30 TABLET, FILM COATED ORAL NIGHTLY
Qty: 90 TABLET | Refills: 3 | Status: SHIPPED | OUTPATIENT
Start: 2023-02-24

## 2023-02-24 RX ORDER — AMLODIPINE BESYLATE 5 MG/1
5 TABLET ORAL 2 TIMES DAILY
Qty: 180 TABLET | Refills: 3 | Status: SHIPPED | OUTPATIENT
Start: 2023-02-24

## 2023-02-24 RX ORDER — METOPROLOL SUCCINATE 50 MG/1
50 TABLET, EXTENDED RELEASE ORAL DAILY
Qty: 90 TABLET | Refills: 3 | Status: SHIPPED | OUTPATIENT
Start: 2023-02-24

## 2023-02-24 RX ORDER — MONTELUKAST SODIUM 10 MG/1
10 TABLET ORAL NIGHTLY
Qty: 90 TABLET | Refills: 3 | Status: SHIPPED | OUTPATIENT
Start: 2023-02-24

## 2023-02-24 RX ORDER — MONTELUKAST SODIUM 4 MG/1
1 TABLET, CHEWABLE ORAL DAILY
Qty: 30 TABLET | Refills: 0 | Status: SHIPPED | OUTPATIENT
Start: 2023-02-24

## 2023-02-24 RX ORDER — ALBUTEROL SULFATE 90 UG/1
2 AEROSOL, METERED RESPIRATORY (INHALATION) EVERY 6 HOURS PRN
Qty: 8 G | Refills: 11 | Status: SHIPPED | OUTPATIENT
Start: 2023-02-24

## 2023-02-24 RX ORDER — PREGABALIN 100 MG/1
100 CAPSULE ORAL 2 TIMES DAILY
Qty: 60 CAPSULE | Refills: 2 | Status: SHIPPED | OUTPATIENT
Start: 2023-02-24

## 2023-02-24 RX ORDER — CETIRIZINE HYDROCHLORIDE 10 MG/1
10 TABLET ORAL DAILY
Qty: 90 TABLET | Refills: 3 | Status: SHIPPED | OUTPATIENT
Start: 2023-02-24

## 2023-02-24 RX ORDER — HYDROCHLOROTHIAZIDE 25 MG/1
25 TABLET ORAL DAILY
Qty: 90 TABLET | Refills: 3 | Status: SHIPPED | OUTPATIENT
Start: 2023-02-24

## 2023-02-24 NOTE — PROGRESS NOTES
Eileen Kaufman presents to [facility].    Chief Complaint:  Blood pressure, chronic pain    Subjective       History of Present Illness:  Eileen Alvarez is also here for follow-up on her usual issues.  She has chronic pain from disc and low back issues.  She currently takes generic Lyrica for this.  She does find the medication to be of some benefit and would like to continue it currently.  Kevyn is reviewed today.     Eileen Alvarez also has other ongoing health problems including hypertension.  Her blood pressure is well controlled today.  Her medications are reviewed as noted.    She has had elevated cholesterol in the past.  She was not able to tolerate generic atorvastatin.    Review of Systems:  Review of Systems   Constitutional: Negative for chills and fever.   Respiratory: Negative for cough and shortness of breath.    Cardiovascular: Negative for chest pain and palpitations.   Gastrointestinal: Negative for abdominal pain, nausea and vomiting.        Objective   Medical History:  Past Medical History:   • Anemia, unspecified   • Asymptomatic menopausal state   • Chronic obstructive pulmonary disease with (acute) exacerbation (HCC)   • Essential (primary) hypertension   • Low back pain   • Major depressive disorder, single episode, moderate (HCC)   • Nicotine dependence, unspecified, uncomplicated   • Osteopenia   • Other intervertebral disc degeneration, lumbar region   • Other long term (current) drug therapy   • Solitary pulmonary nodule   • Vitamin D deficiency, unspecified     Past Surgical History:   • CARDIAC CATHETERIZATION   • CARPAL TUNNEL RELEASE   • CHOLECYSTECTOMY   • COLONOSCOPY    Normal   • HYSTERECTOMY    Total   • KNEE ARTHROSCOPY   • NECK SURGERY      Family History   Problem Relation Age of Onset   • Cerebral aneurysm Mother         cause of death   • Heart attack Father         cause of death   • Esophageal cancer Brother      Social History     Tobacco Use   • Smoking  status: Former     Packs/day: 1.00     Years: 50.00     Pack years: 50.00     Types: Cigarettes     Quit date: 2023     Years since quittin.1   • Smokeless tobacco: Never   • Tobacco comments:     Eileen Alvarez was 23 when she started smoking.  She did stop smoking for some time - about 13 years.  She has been smoking a total of approximately 33 years.  She has smoked less than a pack daily during this time. She currently uses smokless, vapor can/pouches   Substance Use Topics   • Alcohol use: Yes     Comment: Socially       Health Maintenance Due   Topic Date Due   • TDAP/TD VACCINES (1 - Tdap) Never done   • ZOSTER VACCINE (1 of 2) Never done   • COVID-19 Vaccine (4 - Booster for Pfizer series) 2021   • Pneumococcal Vaccine 65+ (3 - PPSV23 if available, else PCV20) 2022   • LUNG CANCER SCREENING  2022   • DXA SCAN  2022   • LIPID PANEL  2023        Immunization History   Administered Date(s) Administered   • COVID-19 (PFIZER) PURPLE CAP 2020, 2021, 2021   • Fluzone High Dose =>65 Years (Vaxcare ONLY) 10/01/2021   • Hepatitis A 2018   • Influenza, Unspecified 10/01/2020   • Pneumococcal Conjugate 13-Valent (PCV13) 2018   • Pneumococcal Polysaccharide (PPSV23) 2013, 2017       Allergies   Allergen Reactions   • Sulfa Antibiotics Unknown - Low Severity   • Aspirin Rash   • Atorvastatin Other (See Comments)     Hair loss        Medications:  Current Outpatient Medications on File Prior to Visit   Medication Sig   • Calcium Carbonate 1500 (600 Ca) MG tablet Calcium 600 600 mg (1,500 mg) oral tablet take 1 tablet by oral route daily   Suspended   • fish oil-omega-3 fatty acids 1000 MG capsule Take  by mouth.   • multivitamin with minerals tablet tablet 1 tablet Daily.   • [DISCONTINUED] albuterol sulfate HFA (Ventolin HFA) 108 (90 Base) MCG/ACT inhaler Inhale 2 puffs Every 6 (Six) Hours As Needed for Wheezing.   • [DISCONTINUED] amLODIPine  "(NORVASC) 5 MG tablet Take 1 tablet by mouth 2 (Two) Times a Day.   • [DISCONTINUED] cetirizine (zyrTEC) 10 MG tablet Take 1 tablet by mouth Daily.   • [DISCONTINUED] cyclobenzaprine (FLEXERIL) 10 MG tablet Take 10 mg by mouth 2 (Two) Times a Day As Needed.   • [DISCONTINUED] fluticasone (FLONASE) 50 MCG/ACT nasal spray 2 sprays into the nostril(s) as directed by provider Daily.   • [DISCONTINUED] Fluticasone Furoate-Vilanterol (Breo Ellipta) 100-25 MCG/INH inhaler Inhale 1 puff Daily.   • [DISCONTINUED] hydroCHLOROthiazide (HYDRODIURIL) 25 MG tablet Take 1 tablet by mouth Daily.   • [DISCONTINUED] metoprolol succinate XL (Toprol XL) 50 MG 24 hr tablet Take 1 tablet by mouth Daily.   • [DISCONTINUED] mirtazapine (REMERON) 30 MG tablet Take 1 tablet by mouth Every Night.   • [DISCONTINUED] montelukast (SINGULAIR) 10 MG tablet Take 1 tablet by mouth Every Night.   • [DISCONTINUED] pregabalin (LYRICA) 100 MG capsule Take 1 capsule by mouth 2 (Two) Times a Day.     No current facility-administered medications on file prior to visit.       Vital Signs:   /72 (BP Location: Left arm, Patient Position: Sitting)   Pulse 78   Temp 98 °F (36.7 °C) (Oral)   Ht 162.6 cm (64.02\")   Wt 81 kg (178 lb 9.6 oz)   BMI 30.64 kg/m²       Physical Exam:  Physical Exam  Vitals reviewed.   Constitutional:       General: She is not in acute distress.     Appearance: She is not ill-appearing.   Eyes:      Pupils: Pupils are equal, round, and reactive to light.   Neck:      Comments: No thyromegaly  Cardiovascular:      Rate and Rhythm: Normal rate and regular rhythm.   Pulmonary:      Effort: Pulmonary effort is normal.      Breath sounds: Normal breath sounds.   Abdominal:      General: There is no distension.      Palpations: Abdomen is soft.      Tenderness: There is no abdominal tenderness.   Musculoskeletal:      Cervical back: Neck supple.   Lymphadenopathy:      Cervical: No cervical adenopathy.   Skin:     Findings: No " lesion or rash.   Neurological:      Mental Status: She is alert.         Result Review      The following data was reviewed by Artur Quevedo MD on 02/24/2023.  Lab Results   Component Value Date    WBC 9.88 04/08/2022    HGB 14.9 04/08/2022    HCT 45.6 04/08/2022    MCV 86.2 04/08/2022     04/08/2022     Lab Results   Component Value Date    GLUCOSE 78 04/08/2022    BUN 12 04/08/2022    CREATININE 0.80 07/14/2022     04/08/2022    K 3.7 04/08/2022     04/08/2022    CO2 26.6 04/08/2022    CALCIUM 10.6 (H) 04/08/2022    PROTEINTOT 8.0 04/08/2022    ALBUMIN 4.50 04/08/2022    ALT 10 04/08/2022    AST 14 04/08/2022    ALKPHOS 111 04/08/2022    BILITOT 0.4 04/08/2022    EGFR 79.4 07/14/2022    GLOB 3.5 04/08/2022    AGRATIO 1.3 04/08/2022    BCR 17.1 04/08/2022    ANIONGAP 9.4 04/08/2022      Lab Results   Component Value Date    CHOL 220 (H) 02/14/2022    CHLPL 200 11/14/2019    TRIG 100 02/14/2022    HDL 73 (H) 02/14/2022     (H) 02/14/2022     Lab Results   Component Value Date    TSH 0.737 04/08/2022     Lab Results   Component Value Date    HGBA1C 5.40 04/08/2022            Assessment and Plan:   Today, we have reviewed her care. Eileen Alvarez seems to be doing fairly well overall.  She did have COVID-19 recently, but she seems to be recovering fairly well from that.  We have reviewed her medications and will refill those for her as below.  Lyrica does continue to be of benefit, and it will be continued.  We will tentatively plan to see her back in about 6 months.  I will review her chart further for any needed screenings though.       Diagnoses and all orders for this visit:    1. Essential hypertension (Primary)  -     amLODIPine (NORVASC) 5 MG tablet; Take 1 tablet by mouth 2 (Two) Times a Day.  Dispense: 180 tablet; Refill: 3  -     hydroCHLOROthiazide (HYDRODIURIL) 25 MG tablet; Take 1 tablet by mouth Daily.  Dispense: 90 tablet; Refill: 3  -     metoprolol succinate XL (Toprol XL)  50 MG 24 hr tablet; Take 1 tablet by mouth Daily.  Dispense: 90 tablet; Refill: 3  -     Comprehensive Metabolic Panel; Future    2. Rhinorrhea  -     cetirizine (zyrTEC) 10 MG tablet; Take 1 tablet by mouth Daily.  Dispense: 90 tablet; Refill: 3  -     montelukast (SINGULAIR) 10 MG tablet; Take 1 tablet by mouth Every Night.  Dispense: 90 tablet; Refill: 3    3. Other long term (current) drug therapy  -     POC Urine Drug Screen [facility]  -     pregabalin (LYRICA) 100 MG capsule; Take 1 capsule by mouth 2 (Two) Times a Day.  Dispense: 60 capsule; Refill: 2  -     CBC (No Diff); Future    4. Lumbar spondylosis  -     pregabalin (LYRICA) 100 MG capsule; Take 1 capsule by mouth 2 (Two) Times a Day.  Dispense: 60 capsule; Refill: 2    5. Hyperglycemia  -     Hemoglobin A1c; Future    6. High cholesterol  -     Lipid Panel; Future  -     TSH; Future    7. Chronic obstructive pulmonary disease, unspecified COPD type (HCC)  -     albuterol sulfate HFA (Ventolin HFA) 108 (90 Base) MCG/ACT inhaler; Inhale 2 puffs Every 6 (Six) Hours As Needed for Wheezing.  Dispense: 8 g; Refill: 11    Other orders  -     mirtazapine (REMERON) 30 MG tablet; Take 1 tablet by mouth Every Night.  Dispense: 90 tablet; Refill: 3  -     colestipol (COLESTID) 1 g tablet; Take 1 tablet by mouth Daily.  Dispense: 30 tablet; Refill: 0    Follow Up   Return in about 6 months (around 8/24/2023) for Recheck, Medicare Wellness.  Patient was given instructions and counseling regarding her condition or for health maintenance advice. Please see specific information pulled into the AVS if appropriate.

## 2023-03-07 ENCOUNTER — TELEPHONE (OUTPATIENT)
Dept: FAMILY MEDICINE CLINIC | Age: 73
End: 2023-03-07
Payer: MEDICARE

## 2023-03-08 ENCOUNTER — LAB (OUTPATIENT)
Dept: LAB | Facility: HOSPITAL | Age: 73
End: 2023-03-08
Payer: MEDICARE

## 2023-03-08 ENCOUNTER — TELEPHONE (OUTPATIENT)
Dept: FAMILY MEDICINE CLINIC | Age: 73
End: 2023-03-08

## 2023-03-08 ENCOUNTER — OFFICE VISIT (OUTPATIENT)
Dept: FAMILY MEDICINE CLINIC | Age: 73
End: 2023-03-08
Payer: MEDICARE

## 2023-03-08 VITALS
TEMPERATURE: 97.7 F | SYSTOLIC BLOOD PRESSURE: 123 MMHG | DIASTOLIC BLOOD PRESSURE: 70 MMHG | WEIGHT: 182.8 LBS | OXYGEN SATURATION: 99 % | HEIGHT: 64 IN | BODY MASS INDEX: 31.21 KG/M2 | HEART RATE: 84 BPM

## 2023-03-08 DIAGNOSIS — M62.838 MUSCLE SPASM: ICD-10-CM

## 2023-03-08 DIAGNOSIS — R73.9 HYPERGLYCEMIA: ICD-10-CM

## 2023-03-08 DIAGNOSIS — D64.9 ANEMIA, UNSPECIFIED TYPE: ICD-10-CM

## 2023-03-08 DIAGNOSIS — M25.59 PAIN IN OTHER JOINT: ICD-10-CM

## 2023-03-08 DIAGNOSIS — M25.59 PAIN IN OTHER JOINT: Primary | ICD-10-CM

## 2023-03-08 DIAGNOSIS — I10 ESSENTIAL HYPERTENSION: ICD-10-CM

## 2023-03-08 DIAGNOSIS — E78.00 HIGH CHOLESTEROL: ICD-10-CM

## 2023-03-08 DIAGNOSIS — Z79.899 OTHER LONG TERM (CURRENT) DRUG THERAPY: ICD-10-CM

## 2023-03-08 LAB
ALBUMIN SERPL-MCNC: 4.3 G/DL (ref 3.5–5.2)
ALBUMIN/GLOB SERPL: 1.5 G/DL
ALP SERPL-CCNC: 127 U/L (ref 39–117)
ALT SERPL W P-5'-P-CCNC: 15 U/L (ref 1–33)
ANION GAP SERPL CALCULATED.3IONS-SCNC: 10 MMOL/L (ref 5–15)
AST SERPL-CCNC: 21 U/L (ref 1–32)
BILIRUB SERPL-MCNC: 0.2 MG/DL (ref 0–1.2)
BUN SERPL-MCNC: 18 MG/DL (ref 8–23)
BUN/CREAT SERPL: 21.2 (ref 7–25)
CALCIUM SPEC-SCNC: 9.4 MG/DL (ref 8.6–10.5)
CHLORIDE SERPL-SCNC: 103 MMOL/L (ref 98–107)
CHOLEST SERPL-MCNC: 187 MG/DL (ref 0–200)
CHROMATIN AB SERPL-ACNC: <10 IU/ML (ref 0–14)
CK SERPL-CCNC: 203 U/L (ref 20–180)
CO2 SERPL-SCNC: 27 MMOL/L (ref 22–29)
CREAT SERPL-MCNC: 0.85 MG/DL (ref 0.57–1)
CRP SERPL-MCNC: 0.8 MG/DL (ref 0–0.5)
DEPRECATED RDW RBC AUTO: 50.1 FL (ref 37–54)
EGFRCR SERPLBLD CKD-EPI 2021: 73.8 ML/MIN/1.73
ERYTHROCYTE [DISTWIDTH] IN BLOOD BY AUTOMATED COUNT: 16.2 % (ref 12.3–15.4)
ERYTHROCYTE [SEDIMENTATION RATE] IN BLOOD: 22 MM/HR (ref 0–30)
GLOBULIN UR ELPH-MCNC: 2.9 GM/DL
GLUCOSE SERPL-MCNC: 84 MG/DL (ref 65–99)
HCT VFR BLD AUTO: 35.4 % (ref 34–46.6)
HDLC SERPL-MCNC: 61 MG/DL (ref 40–60)
HGB BLD-MCNC: 11.7 G/DL (ref 12–15.9)
LDLC SERPL CALC-MCNC: 108 MG/DL (ref 0–100)
LDLC/HDLC SERPL: 1.74 {RATIO}
MAGNESIUM SERPL-MCNC: 2.2 MG/DL (ref 1.6–2.4)
MCH RBC QN AUTO: 28.2 PG (ref 26.6–33)
MCHC RBC AUTO-ENTMCNC: 33.1 G/DL (ref 31.5–35.7)
MCV RBC AUTO: 85.3 FL (ref 79–97)
PLATELET # BLD AUTO: 277 10*3/MM3 (ref 140–450)
PMV BLD AUTO: 11.9 FL (ref 6–12)
POTASSIUM SERPL-SCNC: 3.9 MMOL/L (ref 3.5–5.2)
PROT SERPL-MCNC: 7.2 G/DL (ref 6–8.5)
RBC # BLD AUTO: 4.15 10*6/MM3 (ref 3.77–5.28)
SODIUM SERPL-SCNC: 140 MMOL/L (ref 136–145)
TRIGL SERPL-MCNC: 99 MG/DL (ref 0–150)
TSH SERPL DL<=0.05 MIU/L-ACNC: 1.65 UIU/ML (ref 0.27–4.2)
VLDLC SERPL-MCNC: 18 MG/DL (ref 5–40)
WBC NRBC COR # BLD: 7.96 10*3/MM3 (ref 3.4–10.8)

## 2023-03-08 PROCEDURE — 86140 C-REACTIVE PROTEIN: CPT

## 2023-03-08 PROCEDURE — 3078F DIAST BP <80 MM HG: CPT | Performed by: NURSE PRACTITIONER

## 2023-03-08 PROCEDURE — 85652 RBC SED RATE AUTOMATED: CPT

## 2023-03-08 PROCEDURE — 85027 COMPLETE CBC AUTOMATED: CPT

## 2023-03-08 PROCEDURE — 86431 RHEUMATOID FACTOR QUANT: CPT

## 2023-03-08 PROCEDURE — 86038 ANTINUCLEAR ANTIBODIES: CPT

## 2023-03-08 PROCEDURE — 83540 ASSAY OF IRON: CPT | Performed by: NURSE PRACTITIONER

## 2023-03-08 PROCEDURE — 99213 OFFICE O/P EST LOW 20 MIN: CPT | Performed by: NURSE PRACTITIONER

## 2023-03-08 PROCEDURE — 82550 ASSAY OF CK (CPK): CPT

## 2023-03-08 PROCEDURE — 82728 ASSAY OF FERRITIN: CPT | Performed by: NURSE PRACTITIONER

## 2023-03-08 PROCEDURE — 80061 LIPID PANEL: CPT

## 2023-03-08 PROCEDURE — 83036 HEMOGLOBIN GLYCOSYLATED A1C: CPT

## 2023-03-08 PROCEDURE — 84466 ASSAY OF TRANSFERRIN: CPT | Performed by: NURSE PRACTITIONER

## 2023-03-08 PROCEDURE — 84443 ASSAY THYROID STIM HORMONE: CPT

## 2023-03-08 PROCEDURE — 36415 COLL VENOUS BLD VENIPUNCTURE: CPT

## 2023-03-08 PROCEDURE — 3074F SYST BP LT 130 MM HG: CPT | Performed by: NURSE PRACTITIONER

## 2023-03-08 PROCEDURE — 1160F RVW MEDS BY RX/DR IN RCRD: CPT | Performed by: NURSE PRACTITIONER

## 2023-03-08 PROCEDURE — 80053 COMPREHEN METABOLIC PANEL: CPT

## 2023-03-08 PROCEDURE — 1159F MED LIST DOCD IN RCRD: CPT | Performed by: NURSE PRACTITIONER

## 2023-03-08 PROCEDURE — 83735 ASSAY OF MAGNESIUM: CPT

## 2023-03-08 NOTE — TELEPHONE ENCOUNTER
Caller: Eileen Kaufman    Relationship: Self    Best call back number:[phone]    What is the best time to reach you:ANYTIME TODAY    Who are you requesting to speak with (clinical staff, provider,  specific staff member):CLINICAL    Do you know the name of the person who called: PATIENT    What was the call regarding: PATIENT HAS PAIN IN RIGHT HAND LOCKS UP AND FINGERS STAY UP. DOES THIS FREQUENTLY. PLEASE CALL PATIENT BACK AND ADVISE.    Do you require a callback: YES

## 2023-03-08 NOTE — PROGRESS NOTES
"Chief Complaint  Eileen Kaufman presents to [facility] for Hand Pain (Right hand pain X 2 week/Also states her whole body feels stiff )      Subjective     History of Present Illness  Eileen Alvarez reports that a few days ago, starting having her middle finger started drawing and started having stiffness in all of her body.  She denies any other associated symptoms.  No recent illness, no change in her daily activity.  She is currently wearing a splint in her hand that she feels does not help.  She has been using a balloon to squeeze that she reports helps while she is doing it, but as soon as she stops , her hand begins to draw / spasm again. No new medications      She is due for labs with her PCP that she has not done- I have advised that she get this done, we will add additional arthritis labs/CK levels and have her follow up based on findings.          Assessment and Plan       Diagnoses and all orders for this visit:    1. Pain in other joint (Primary)  Comments:  will check labs and recommend follow up with PCP   Orders:  -     AUBREY Direct Reflex to 11 Biomarker; Future  -     C-reactive protein; Future  -     Sedimentation rate; Future  -     Rheumatoid Factor; Future    2. Muscle spasm  Comments:  will check labs and recommendations pending outcome of labs   Orders:  -     Magnesium; Future  -     CK; Future        Follow Up   Return for Pending lab results.      No orders of the defined types were placed in this encounter.      There are no discontinued medications.         Review of Systems    Objective     Vitals:    03/08/23 1618   BP: 123/70   BP Location: Left arm   Patient Position: Sitting   Cuff Size: Adult   Pulse: 84   Temp: 97.7 °F (36.5 °C)   TempSrc: Oral   SpO2: 99%   Weight: 82.9 kg (182 lb 12.8 oz)   Height: 162.6 cm (64.02\")     Body mass index is 31.36 kg/m².     Physical Exam  Constitutional:       General: She is not in acute distress.     Appearance: Normal " appearance.   HENT:      Head: Normocephalic.   Cardiovascular:      Rate and Rhythm: Normal rate and regular rhythm.   Pulmonary:      Effort: Pulmonary effort is normal.      Breath sounds: Normal breath sounds.   Musculoskeletal:         General: Normal range of motion.      Right hand: Deformity (middle finger extended ) and tenderness present. Normal range of motion.   Neurological:      General: No focal deficit present.      Mental Status: She is alert and oriented to person, place, and time.   Psychiatric:         Mood and Affect: Mood normal.         Behavior: Behavior normal.            Result Review                       Allergies   Allergen Reactions   • Sulfa Antibiotics Unknown - Low Severity   • Aspirin Rash   • Atorvastatin Other (See Comments)     Hair loss      Past Medical History:   Diagnosis Date   • Anemia, unspecified    • Asymptomatic menopausal state    • Chronic obstructive pulmonary disease with (acute) exacerbation ([facility])    • Essential (primary) hypertension    • Low back pain    • Major depressive disorder, single episode, moderate (HCC)    • Nicotine dependence, unspecified, uncomplicated    • Osteopenia    • Other intervertebral disc degeneration, lumbar region    • Other long term (current) drug therapy    • Solitary pulmonary nodule    • Vitamin D deficiency, unspecified      Current Outpatient Medications   Medication Sig Dispense Refill   • albuterol sulfate HFA (Ventolin HFA) 108 (90 Base) MCG/ACT inhaler Inhale 2 puffs Every 6 (Six) Hours As Needed for Wheezing. 8 g 11   • amLODIPine (NORVASC) 5 MG tablet Take 1 tablet by mouth 2 (Two) Times a Day. 180 tablet 3   • Calcium Carbonate 1500 (600 Ca) MG tablet Calcium 600 600 mg (1,500 mg) oral tablet take 1 tablet by oral route daily   Suspended     • cetirizine (zyrTEC) 10 MG tablet Take 1 tablet by mouth Daily. 90 tablet 3   • colestipol (COLESTID) 1 g tablet Take 1 tablet by mouth Daily. 30 tablet 0   • fish oil-omega-3 fatty  acids 1000 MG capsule Take  by mouth.     • hydroCHLOROthiazide (HYDRODIURIL) 25 MG tablet Take 1 tablet by mouth Daily. 90 tablet 3   • metoprolol succinate XL (Toprol XL) 50 MG 24 hr tablet Take 1 tablet by mouth Daily. 90 tablet 3   • mirtazapine (REMERON) 30 MG tablet Take 1 tablet by mouth Every Night. 90 tablet 3   • montelukast (SINGULAIR) 10 MG tablet Take 1 tablet by mouth Every Night. 90 tablet 3   • multivitamin with minerals tablet tablet 1 tablet Daily.     • pregabalin (LYRICA) 100 MG capsule Take 1 capsule by mouth 2 (Two) Times a Day. 60 capsule 2     No current facility-administered medications for this visit.     Past Surgical History:   Procedure Laterality Date   • CARDIAC CATHETERIZATION  07/25/2012   • CARPAL TUNNEL RELEASE     • CHOLECYSTECTOMY     • COLONOSCOPY  08/24/2022    Normal   • HYSTERECTOMY  1980s    Total   • KNEE ARTHROSCOPY Left    • NECK SURGERY  01/21/2015      Health Maintenance Due   Topic Date Due   • TDAP/TD VACCINES (1 - Tdap) Never done   • ZOSTER VACCINE (1 of 2) Never done   • COVID-19 Vaccine (4 - Booster for Pfizer series) 11/25/2021   • Pneumococcal Vaccine 65+ (3 - PPSV23 if available, else PCV20) 02/13/2022   • LUNG CANCER SCREENING  08/24/2022   • DXA SCAN  08/25/2022   • LIPID PANEL  02/14/2023      Immunization History   Administered Date(s) Administered   • COVID-19 (PFIZER) PURPLE CAP 12/29/2020, 01/29/2021, 09/30/2021   • Fluzone High Dose =>65 Years (Vaxcare ONLY) 10/01/2021   • Hepatitis A 07/07/2018   • Influenza, Unspecified 10/01/2020   • Pneumococcal Conjugate 13-Valent (PCV13) 07/05/2018   • Pneumococcal Polysaccharide (PPSV23) 03/25/2013, 02/13/2017           EMR Dragon/Transcription disclaimer:  This encounter note may contain some electronic transcription/translation of spoken language to printed text. The electronic translation of spoken language may permit erroneous, or at times, nonsensical words or phrases to be inadvertently transcribed;  Although I have reviewed the note for such errors, some may still exist.        Homa Cam, APRN

## 2023-03-09 LAB
FERRITIN SERPL-MCNC: 53.4 NG/ML (ref 13–150)
HBA1C MFR BLD: 5.4 % (ref 4.8–5.6)
IRON 24H UR-MRATE: 59 MCG/DL (ref 37–145)
IRON SATN MFR SERPL: 14 % (ref 20–50)
TIBC SERPL-MCNC: 426 MCG/DL (ref 298–536)
TRANSFERRIN SERPL-MCNC: 286 MG/DL (ref 200–360)

## 2023-03-09 RX ORDER — PREDNISONE 20 MG/1
TABLET ORAL
Qty: 10 TABLET | Refills: 0 | Status: SHIPPED | OUTPATIENT
Start: 2023-03-09

## 2023-03-10 LAB — ANA SER QL: NEGATIVE

## 2023-03-23 ENCOUNTER — TELEPHONE (OUTPATIENT)
Dept: FAMILY MEDICINE CLINIC | Age: 73
End: 2023-03-23
Payer: MEDICARE

## 2023-04-14 RX ORDER — MONTELUKAST SODIUM 4 MG/1
1 TABLET, CHEWABLE ORAL DAILY
Qty: 90 TABLET | Refills: 1 | Status: SHIPPED | OUTPATIENT
Start: 2023-04-14

## 2023-04-14 NOTE — TELEPHONE ENCOUNTER
Caller: Eileen Kaufman    Relationship: Self    Best call back number: [phone]    Requested Prescriptions:   Requested Prescriptions      No prescriptions requested or ordered in this encounter      MEDICATION FOR HER BOWELS     Pharmacy where request should be sent: "[facility] [id number] [place] [facility] [address] AT [facility] OF [address] &  - [phone]  - [phone] FX     Last office visit with prescribing clinician: 2/24/2023   Last telemedicine visit with prescribing clinician: 10/2/2023   Next office visit with prescribing clinician: 10/2/2023     Additional details provided by patient:     THE PATIENT IS OUT OF THIS MEDICATION. PATIENT SAID SHE DID NOT HAVE THE BOTTLE WITH HER AND DO NOT KNOW THE NAME OF THE MEDICATION     Does the patient have less than a 3 day supply:  [x] Yes  [] No    Would you like a call back once the refill request has been completed: [] Yes [x] No    If the office needs to give you a call back, can they leave a voicemail: [] Yes [x] No    Eleazar Quiroga Rep   04/14/23 11:53 EDT

## 2023-04-14 NOTE — TELEPHONE ENCOUNTER
I may have given this for some issue with diarrhea that she was having.  Please confirm if it is helpful, and if she would like to continue it for a longer period of time.  I may send a larger quantity and refills.  Thanks.

## 2023-04-20 ENCOUNTER — OFFICE VISIT (OUTPATIENT)
Dept: FAMILY MEDICINE CLINIC | Age: 73
End: 2023-04-20
Payer: MEDICARE

## 2023-04-20 VITALS
HEART RATE: 93 BPM | DIASTOLIC BLOOD PRESSURE: 75 MMHG | TEMPERATURE: 97.9 F | SYSTOLIC BLOOD PRESSURE: 144 MMHG | BODY MASS INDEX: 32.91 KG/M2 | OXYGEN SATURATION: 99 % | HEIGHT: 64 IN | WEIGHT: 192.8 LBS

## 2023-04-20 DIAGNOSIS — T78.40XA WHEEZING DUE TO ALLERGY: Primary | ICD-10-CM

## 2023-04-20 DIAGNOSIS — R06.2 WHEEZING DUE TO ALLERGY: Primary | ICD-10-CM

## 2023-04-20 DIAGNOSIS — J30.9 ALLERGIC RHINITIS, UNSPECIFIED SEASONALITY, UNSPECIFIED TRIGGER: ICD-10-CM

## 2023-04-20 RX ORDER — FLUTICASONE PROPIONATE 50 MCG
2 SPRAY, SUSPENSION (ML) NASAL DAILY
Qty: 18 ML | Refills: 5 | Status: SHIPPED | OUTPATIENT
Start: 2023-04-20

## 2023-04-20 RX ORDER — TRIAMCINOLONE ACETONIDE 40 MG/ML
40 INJECTION, SUSPENSION INTRA-ARTICULAR; INTRAMUSCULAR ONCE
Status: COMPLETED | OUTPATIENT
Start: 2023-04-20 | End: 2023-04-20

## 2023-04-20 RX ADMIN — TRIAMCINOLONE ACETONIDE 40 MG: 40 INJECTION, SUSPENSION INTRA-ARTICULAR; INTRAMUSCULAR at 15:28

## 2023-04-20 NOTE — PROGRESS NOTES
"Chief Complaint  Facial Pain (Sinus pressure, watery and puffy eyes, wheezing in chest.  No fever, chills, body aches, sore throat or coughing.  )    Subjective          LisaKim Kaufman presents to [facility] with sinus pressure, watery and swollen eyes, shortness of air and wheezing.  Denies fever, chills, nausea, vomiting, diarrhea, chest pain or sore throat.  Patient does have history of allergies.  She has been taking Zyrtec and Singulair.  Needs refill of Flonase.  She has also been using albuterol as needed.  No known ill contacts.    Objective   Vital Signs:   Vitals:    04/20/23 1435   BP: 144/75   BP Location: Right arm   Patient Position: Sitting   Cuff Size: Large Adult   Pulse: 93   Temp: 97.9 °F (36.6 °C)   TempSrc: Oral   SpO2: 99%   Weight: 87.5 kg (192 lb 12.8 oz)   Height: 162.6 cm (64.02\")       Physical Exam  Vitals reviewed.   Constitutional:       General: She is not in acute distress.     Appearance: Normal appearance. She is well-developed.   HENT:      Head: Normocephalic and atraumatic.      Nose: Congestion present.   Eyes:      Conjunctiva/sclera: Conjunctivae normal.      Pupils: Pupils are equal, round, and reactive to light.   Cardiovascular:      Rate and Rhythm: Normal rate and regular rhythm.      Heart sounds: Normal heart sounds. No murmur heard.  Pulmonary:      Effort: Pulmonary effort is normal. No respiratory distress.      Breath sounds: Wheezing present.   Skin:     General: Skin is warm and dry.   Neurological:      Mental Status: She is alert and oriented to person, place, and time.   Psychiatric:         Mood and Affect: Mood and affect normal.         Behavior: Behavior normal.         Thought Content: Thought content normal.         Judgment: Judgment normal.          Result Review :              Assessment and Plan    Diagnoses and all orders for this visit:    1. Wheezing due to allergy (Primary)  -     triamcinolone acetonide " (KENALOG-40) injection 40 mg    2. Allergic rhinitis, unspecified seasonality, unspecified trigger  -     fluticasone (FLONASE) 50 MCG/ACT nasal spray; 2 sprays into the nostril(s) as directed by provider Daily.  Dispense: 18 mL; Refill: 5       Increase fluid intake and rest.  Tylenol/ibuprofen for discomfort/fever.  Kenalog injection given today..  Zyrtec, Singulair and Flonase daily.  Albuterol as needed.  Potential side effects of medication discussed.  Notify office with any acute concerns or issues.  Follow-up as needed.  Patient to go to ED with any chest pain or shortness of air.    Please note that portions of this note were completed with a voice recognition program.  Follow Up    Return if symptoms worsen or fail to improve.  Patient was given instructions and counseling regarding her condition or for health maintenance advice. Please see specific information pulled into the AVS if appropriate.    English

## 2023-04-25 ENCOUNTER — HOSPITAL ENCOUNTER (OUTPATIENT)
Dept: GENERAL RADIOLOGY | Facility: HOSPITAL | Age: 73
Discharge: HOME | End: 2023-04-25
Payer: MEDICARE

## 2023-04-25 ENCOUNTER — OFFICE VISIT (OUTPATIENT)
Dept: FAMILY MEDICINE CLINIC | Age: 73
End: 2023-04-25
Payer: MEDICARE

## 2023-04-25 VITALS
TEMPERATURE: 98.5 F | DIASTOLIC BLOOD PRESSURE: 74 MMHG | WEIGHT: 193 LBS | OXYGEN SATURATION: 91 % | SYSTOLIC BLOOD PRESSURE: 114 MMHG | HEIGHT: 64 IN | HEART RATE: 89 BPM | BODY MASS INDEX: 32.95 KG/M2

## 2023-04-25 DIAGNOSIS — J44.1 COPD WITH EXACERBATION: Primary | ICD-10-CM

## 2023-04-25 DIAGNOSIS — R05.1 ACUTE COUGH: ICD-10-CM

## 2023-04-25 DIAGNOSIS — R06.02 SHORTNESS OF BREATH: ICD-10-CM

## 2023-04-25 LAB
EXPIRATION DATE: NORMAL
FLUAV AG UPPER RESP QL IA.RAPID: NOT DETECTED
FLUBV AG UPPER RESP QL IA.RAPID: NOT DETECTED
INTERNAL CONTROL: NORMAL
Lab: NORMAL
SARS-COV-2 AG UPPER RESP QL IA.RAPID: NOT DETECTED

## 2023-04-25 PROCEDURE — 71046 X-RAY EXAM CHEST 2 VIEWS: CPT

## 2023-04-25 RX ORDER — AMOXICILLIN AND CLAVULANATE POTASSIUM 875; 125 MG/1; MG/1
1 TABLET, FILM COATED ORAL 2 TIMES DAILY
Qty: 14 TABLET | Refills: 0 | Status: SHIPPED | OUTPATIENT
Start: 2023-04-25 | End: 2023-05-02

## 2023-04-25 RX ORDER — PREDNISONE 20 MG/1
TABLET ORAL
Qty: 13 TABLET | Refills: 0 | Status: SHIPPED | OUTPATIENT
Start: 2023-04-25 | End: 2023-05-03

## 2023-04-25 NOTE — PROGRESS NOTES
ECG 12 Lead    Date/Time: 4/25/2023 4:12 PM  Performed by: Artur Quevedo MD  Authorized by: Diamond Carter APRN   Comparison: compared with previous ECG from 4/20/2022  Similar to previous ECG  Rhythm: sinus rhythm  Rate: normal  BPM: 81  Conduction: conduction normal  ST Segments: ST segments normal  T Waves: T waves normal  T flattening: aVL  QRS axis: normal  Other findings comments: Consider LVH    Clinical impression: abnormal EKG  Clinical impression comment: This is a borderline EKG with nonspecific findings.  There is no worrisome change compared to the most recent tracing.

## 2023-04-25 NOTE — PROGRESS NOTES
Subjective     CHIEF COMPLAINT    Chief Complaint   Patient presents with   • Shortness of Breath     X 1 day      History of Present Illness  Patient is a 73-year-old female who presents to the clinic today with shortness of breath.  She was seen in our office on 4- and given a Kenalog injection for wheezing.  She reports that she felt better for a little bit but then started getting worse again.  She reports a history of COPD, asthma, sleep apnea.  She denies any fever or chills.  Denies any nausea, vomiting, body aches or headaches.  She also has a cough and some congestion. Her cough has been more productive of thick sputum. Denies any known exposure to any illness. Patient states her oxygen saturation at home runs between 91-93%.    Review of Systems   Constitutional: Negative for chills and fever.   HENT: Positive for congestion.    Respiratory: Positive for cough, shortness of breath and wheezing.    Cardiovascular: Negative for chest pain, palpitations and leg swelling.   Gastrointestinal: Negative for nausea and vomiting.   Musculoskeletal: Negative for myalgias.   Neurological: Negative for headaches.     Allergies   Allergen Reactions   • Sulfa Antibiotics Unknown - Low Severity   • Aspirin Rash   • Atorvastatin Other (See Comments)     Hair loss       Current Outpatient Medications on File Prior to Visit   Medication Sig Dispense Refill   • albuterol sulfate HFA (Ventolin HFA) 108 (90 Base) MCG/ACT inhaler Inhale 2 puffs Every 6 (Six) Hours As Needed for Wheezing. 8 g 11   • amLODIPine (NORVASC) 5 MG tablet Take 1 tablet by mouth 2 (Two) Times a Day. 180 tablet 3   • Calcium Carbonate 1500 (600 Ca) MG tablet Calcium 600 600 mg (1,500 mg) oral tablet take 1 tablet by oral route daily   Suspended     • cetirizine (zyrTEC) 10 MG tablet Take 1 tablet by mouth Daily. 90 tablet 3   • colestipol (COLESTID) 1 g tablet Take 1 tablet by mouth Daily. 90 tablet 1   • fluticasone (FLONASE) 50 MCG/ACT nasal  "spray 2 sprays into the nostril(s) as directed by provider Daily. 18 mL 5   • hydroCHLOROthiazide (HYDRODIURIL) 25 MG tablet Take 1 tablet by mouth Daily. 90 tablet 3   • metoprolol succinate XL (Toprol XL) 50 MG 24 hr tablet Take 1 tablet by mouth Daily. 90 tablet 3   • mirtazapine (REMERON) 30 MG tablet Take 1 tablet by mouth Every Night. 90 tablet 3   • montelukast (SINGULAIR) 10 MG tablet Take 1 tablet by mouth Every Night. 90 tablet 3   • multivitamin with minerals tablet tablet 1 tablet Daily.     • pregabalin (LYRICA) 100 MG capsule Take 1 capsule by mouth 2 (Two) Times a Day. 60 capsule 2     No current facility-administered medications on file prior to visit.     /74 (BP Location: Left arm, Patient Position: Sitting, Cuff Size: Large Adult)   Pulse 89   Temp 98.5 °F (36.9 °C) (Oral)   Ht 162.6 cm (64.02\")   Wt 87.5 kg (193 lb)   SpO2 91% Comment: Acrylic Nails  BMI 33.11 kg/m²     Objective     Physical Exam  Vitals and nursing note reviewed.   Constitutional:       General: She is not in acute distress.     Appearance: Normal appearance. She is not ill-appearing.   HENT:      Head: Normocephalic.      Right Ear: Tympanic membrane, ear canal and external ear normal.      Left Ear: Tympanic membrane, ear canal and external ear normal.      Nose: Congestion present.      Right Sinus: No maxillary sinus tenderness or frontal sinus tenderness.      Left Sinus: No maxillary sinus tenderness or frontal sinus tenderness.      Mouth/Throat:      Lips: Pink.      Mouth: Mucous membranes are moist.      Pharynx: Oropharynx is clear. Uvula midline. No pharyngeal swelling, oropharyngeal exudate, posterior oropharyngeal erythema or uvula swelling.   Eyes:      Extraocular Movements: Extraocular movements intact.      Pupils: Pupils are equal, round, and reactive to light.   Cardiovascular:      Rate and Rhythm: Normal rate and regular rhythm.      Heart sounds: Normal heart sounds. No murmur " heard.  Pulmonary:      Effort: Pulmonary effort is normal. No accessory muscle usage or respiratory distress.      Breath sounds: Normal breath sounds. No wheezing or rhonchi.   Musculoskeletal:      Cervical back: Normal range of motion.   Lymphadenopathy:      Cervical: No cervical adenopathy.   Skin:     General: Skin is warm and dry.   Neurological:      General: No focal deficit present.      Mental Status: She is alert and oriented to person, place, and time.   Psychiatric:         Mood and Affect: Mood and affect normal.         Behavior: Behavior normal.          Lab Results (last 24 hours)     Procedure Component Value Units Date/Time    POCT SARS-CoV-2 Antigen REYNOLD + Flu [[id number]] Collected: 04/25/23 1620    Specimen: Swab Updated: 04/25/23 1621     SARS Antigen Not Detected     Influenza A Antigen REYNOLD Not Detected     Influenza B Antigen REYNOLD Not Detected     Internal Control Passed     Lot Number [id number]     Expiration Date 12/6/23           Diagnoses and all orders for this visit:    1. COPD with exacerbation (Primary)  -     predniSONE (DELTASONE) 20 MG tablet; Take 3 tablets by mouth Daily for 2 days, THEN 2 tablets Daily for 2 days, THEN 1 tablet Daily for 2 days, THEN 0.5 tablets Daily for 2 days.  Dispense: 13 tablet; Refill: 0  -     amoxicillin-clavulanate (Augmentin) 875-125 MG per tablet; Take 1 tablet by mouth 2 (Two) Times a Day for 7 days.  Dispense: 14 tablet; Refill: 0    2. Shortness of breath  -     ECG 12 Lead  -     POCT SARS-CoV-2 Antigen REYNOLD + Flu  -     XR Chest PA & Lateral; Future    3. Acute cough  -     XR Chest PA & Lateral; Future      EKG completed in office and reviewed with on-call provider.  No acute finding at this time.  Patient's COVID and flu swab was negative.  Patient was also assessed by on-call provider, Dr. Quevedo who advised on treatment plan. We will check a chest x ray to rule out any acute concerns. Will treat for COPD exacerbation as above. Recommend  Mucinex. Return to clinic if symptoms worsen or do not improve. Strict ER precautions advised. Patient voiced understanding of all instructions and had no further questions at this time.       Follow-up:  Return if symptoms worsen or fail to improve.  Patient was given instructions and counseling regarding her condition or for health maintenance advice. Please see specific information pulled into the AVS if appropriate.

## 2023-04-28 ENCOUNTER — TELEPHONE (OUTPATIENT)
Dept: FAMILY MEDICINE CLINIC | Age: 73
End: 2023-04-28
Payer: MEDICARE

## 2023-04-28 NOTE — TELEPHONE ENCOUNTER
Assessment/Plan:    No problem-specific Assessment & Plan notes found for this encounter  Subjective: hernia     Patient ID: Femi Aguilar is a 57 y o  female  Objective: There were no vitals taken for this visit           Physical Exam lmtrc.

## 2023-04-28 NOTE — TELEPHONE ENCOUNTER
----- Message from ESTEFANI Gee sent at 4/26/2023  6:04 AM EDT -----  Regarding: Call patient  Please call patient and see how she is feeling

## 2023-05-01 NOTE — TELEPHONE ENCOUNTER
Great thanks.  She should return to clinic for further evaluation if her symptoms worsen or do not improve.

## 2023-05-11 ENCOUNTER — READMISSION MANAGEMENT (OUTPATIENT)
Dept: CALL CENTER | Facility: HOSPITAL | Age: 73
End: 2023-05-11
Payer: MEDICARE

## 2023-05-11 NOTE — OUTREACH NOTE
Prep Survey    Flowsheet Row Responses   [facility] facility patient discharged from? Non-BH   Is LACE score < 7 ? Non-BH Discharge   Eligibility [facility]   Hospital [facility]   Date of Discharge 05/11/23   Discharge Disposition Home or Self Care   Discharge diagnosis Hypoxemia   Does the patient have one of the following disease processes/diagnoses(primary or secondary)? Other   Prep survey completed? Yes          Sue [facility] - Registered Nurse

## 2023-05-12 ENCOUNTER — TRANSITIONAL CARE MANAGEMENT TELEPHONE ENCOUNTER (OUTPATIENT)
Dept: CALL CENTER | Facility: HOSPITAL | Age: 73
End: 2023-05-12
Payer: MEDICARE

## 2023-05-12 NOTE — OUTREACH NOTE
Call Center TCM Note    Flowsheet Row Responses   [facility] facility patient discharged from? Non-BH  [[facility]]   Does the patient have one of the following disease processes/diagnoses(primary or secondary)? Other   TCM attempt successful? No  [ VR]   Unsuccessful attempts Attempt 1          Rebecca Degroot RN    2023, 08:35 EDT

## 2023-05-12 NOTE — OUTREACH NOTE
Call Center TCM Note    Flowsheet Row Responses   [facility] patient discharged from? Non-  [[facility]]   Does the patient have one of the following disease processes/diagnoses(primary or secondary)? Other   TCM attempt successful? No   Unsuccessful attempts Attempt 2          Rebecca Degroot RN    5/12/2023, 09:56 EDT

## 2023-05-14 ENCOUNTER — TRANSITIONAL CARE MANAGEMENT TELEPHONE ENCOUNTER (OUTPATIENT)
Dept: CALL CENTER | Facility: HOSPITAL | Age: 73
End: 2023-05-14
Payer: MEDICARE

## 2023-05-14 NOTE — OUTREACH NOTE
Call Center TCM Note    Flowsheet Row Responses   [facility] facility patient discharged from? Non-BH   Does the patient have one of the following disease processes/diagnoses(primary or secondary)? Other   TCM attempt successful? No   Unsuccessful attempts Attempt 3          Leslee Menjivar RN    5/14/2023, 12:13 EDT

## 2023-05-15 ENCOUNTER — TRANSITIONAL CARE MANAGEMENT TELEPHONE ENCOUNTER (OUTPATIENT)
Dept: FAMILY MEDICINE CLINIC | Age: 73
End: 2023-05-15
Payer: MEDICARE

## 2023-05-23 ENCOUNTER — OFFICE VISIT (OUTPATIENT)
Dept: FAMILY MEDICINE CLINIC | Age: 73
End: 2023-05-23
Payer: MEDICARE

## 2023-05-23 VITALS
WEIGHT: 196.4 LBS | SYSTOLIC BLOOD PRESSURE: 96 MMHG | TEMPERATURE: 98.7 F | HEART RATE: 100 BPM | DIASTOLIC BLOOD PRESSURE: 53 MMHG | HEIGHT: 64 IN | BODY MASS INDEX: 33.53 KG/M2

## 2023-05-23 DIAGNOSIS — J44.1 COPD WITH EXACERBATION: Primary | ICD-10-CM

## 2023-05-23 RX ORDER — ALPRAZOLAM 0.25 MG/1
1 TABLET ORAL 3 TIMES DAILY PRN
COMMUNITY
Start: 2023-05-11

## 2023-05-23 RX ORDER — UMECLIDINIUM BROMIDE AND VILANTEROL TRIFENATATE 62.5; 25 UG/1; UG/1
1 POWDER RESPIRATORY (INHALATION)
Qty: 180 EACH | Refills: 3 | Status: SHIPPED | OUTPATIENT
Start: 2023-05-23

## 2023-05-23 NOTE — PROGRESS NOTES
Eileen Kaufman presents to [facility].    Chief Complaint:  COPD exacerbation    Transitional Care Management:   -Eileen Alvarez was admitted to [facility] on 5/10/2023 with discharge on 5/11/2023.   -While inpatient, she was cared for by the hospitalist service including Dr. Teague.   -Staff from [facility] reach out to her within 48 hours of hospital discharge to arrange follow-up.   -Her problems and medications have been reviewed and reconciled.    Subjective       History of Present Illness:  Eileen Alvarez is being seen today for follow-up on her care.  She was admitted to [facility] as noted above for a presumed COPD exacerbation.  She was placed on steroids and treatments in the hospital.  She was only briefly, roughly overnight and then released back home.  She was given just a few days of additional prednisone burst, and then that was discontinued.  She was previously on combination inhaler, Trelegy, but she is not taking that currently.  She does use albuterol via inhaler as needed.    Review of Systems:  Review of Systems   Constitutional: Negative for chills and fever.   Respiratory: Positive for shortness of breath and wheezing. Negative for cough.    Cardiovascular: Negative for chest pain and palpitations.   Gastrointestinal: Negative for abdominal pain, nausea and vomiting.      Objective   Medical History:  Past Medical History:   • Anemia, unspecified   • Asymptomatic menopausal state   • Chronic obstructive pulmonary disease with (acute) exacerbation   • Essential (primary) hypertension   • Low back pain   • Major depressive disorder, single episode, moderate   • Nicotine dependence, unspecified, uncomplicated   • Osteopenia   • Other intervertebral disc degeneration, lumbar region   • Other long term (current) drug therapy   • Solitary pulmonary nodule   • Vitamin D deficiency, unspecified     Past Surgical History:   • CARDIAC CATHETERIZATION   • CARPAL TUNNEL RELEASE   •  CHOLECYSTECTOMY   • COLONOSCOPY    Normal   • HYSTERECTOMY    Total   • KNEE ARTHROSCOPY   • NECK SURGERY      Family History   Problem Relation Age of Onset   • Cerebral aneurysm Mother         cause of death   • Heart attack Father         cause of death   • Esophageal cancer Brother      Social History     Tobacco Use   • Smoking status: Former     Packs/day: 1.00     Years: 50.00     Pack years: 50.00     Types: Cigarettes     Quit date: 2023     Years since quittin.3   • Smokeless tobacco: Never   • Tobacco comments:     Eileen Alvarez was 23 when she started smoking.  She did stop smoking for some time - about 13 years.  She has been smoking a total of approximately 33 years.  She has smoked less than a pack daily during this time. She currently uses smokless, vapor can/pouches   Substance Use Topics   • Alcohol use: Yes     Comment: Socially       Health Maintenance Due   Topic Date Due   • TDAP/TD VACCINES (1 - Tdap) Never done   • ZOSTER VACCINE (1 of 2) Never done   • COVID-19 Vaccine (4 - Booster for Pfizer series) 2021   • Pneumococcal Vaccine 65+ (3 - PPSV23 if available, else PCV20) 2022   • DXA SCAN  2022        Immunization History   Administered Date(s) Administered   • COVID-19 (PFIZER) Purple Cap Monovalent 2020, 2021, 2021   • Fluzone High Dose =>65 Years ([employer] ONLY) 10/01/2021   • Hepatitis A 2018   • Influenza, Unspecified 10/01/2020   • Pneumococcal Conjugate 13-Valent (PCV13) 2018   • Pneumococcal Polysaccharide (PPSV23) 2013, 2017       Allergies   Allergen Reactions   • Sulfa Antibiotics Unknown - Low Severity   • Aspirin Rash   • Atorvastatin Other (See Comments)     Hair loss        Medications:  Current Outpatient Medications on File Prior to Visit   Medication Sig   • albuterol sulfate HFA (Ventolin HFA) 108 (90 Base) MCG/ACT inhaler Inhale 2 puffs Every 6 (Six) Hours As Needed for Wheezing.   • ALPRAZolam (XANAX) 0.25 MG  "tablet Take 1 tablet by mouth 3 (Three) Times a Day As Needed.   • amLODIPine (NORVASC) 5 MG tablet Take 1 tablet by mouth 2 (Two) Times a Day.   • Calcium Carbonate 1500 (600 Ca) MG tablet Calcium 600 600 mg (1,500 mg) oral tablet take 1 tablet by oral route daily   Suspended   • cetirizine (zyrTEC) 10 MG tablet Take 1 tablet by mouth Daily.   • colestipol (COLESTID) 1 g tablet Take 1 tablet by mouth Daily.   • fluticasone (FLONASE) 50 MCG/ACT nasal spray 2 sprays into the nostril(s) as directed by provider Daily.   • hydroCHLOROthiazide (HYDRODIURIL) 25 MG tablet Take 1 tablet by mouth Daily.   • metoprolol succinate XL (Toprol XL) 50 MG 24 hr tablet Take 1 tablet by mouth Daily.   • mirtazapine (REMERON) 30 MG tablet Take 1 tablet by mouth Every Night.   • montelukast (SINGULAIR) 10 MG tablet Take 1 tablet by mouth Every Night.   • multivitamin with minerals tablet tablet 1 tablet Daily.   • pregabalin (LYRICA) 100 MG capsule Take 1 capsule by mouth 2 (Two) Times a Day.     No current facility-administered medications on file prior to visit.       Vital Signs:   BP 96/53 (BP Location: Right arm, Patient Position: Sitting)   Pulse 100   Temp 98.7 °F (37.1 °C) (Oral)   Ht 162.6 cm (64.02\")   Wt 89.1 kg (196 lb 6.4 oz)   BMI 33.70 kg/m²       Physical Exam:  Physical Exam  Vitals reviewed.   Constitutional:       General: She is not in acute distress.     Appearance: She is not ill-appearing.   Eyes:      Pupils: Pupils are equal, round, and reactive to light.   Neck:      Comments: No thyromegaly  Cardiovascular:      Rate and Rhythm: Normal rate and regular rhythm.   Pulmonary:      Effort: Pulmonary effort is normal.      Breath sounds: Normal breath sounds.   Abdominal:      General: There is no distension.      Palpations: Abdomen is soft.      Tenderness: There is no abdominal tenderness.   Musculoskeletal:      Cervical back: Neck supple.   Lymphadenopathy:      Cervical: No cervical adenopathy. "   Skin:     Findings: No lesion or rash.   Neurological:      Mental Status: She is alert.       Result Review      The following data was reviewed by Artur Quevedo MD on 05/23/2023.  Lab Results   Component Value Date    WBC 7.96 03/08/2023    HGB 11.7 (L) 03/08/2023    HCT 35.4 03/08/2023    MCV 85.3 03/08/2023     03/08/2023     Lab Results   Component Value Date    GLUCOSE 84 03/08/2023    BUN 18 03/08/2023    CREATININE 0.85 03/08/2023     03/08/2023    K 3.9 03/08/2023     03/08/2023    CO2 27.0 03/08/2023    CALCIUM 9.4 03/08/2023    PROTEINTOT 7.2 03/08/2023    ALBUMIN 4.3 03/08/2023    ALT 15 03/08/2023    AST 21 03/08/2023    ALKPHOS 127 (H) 03/08/2023    BILITOT 0.2 03/08/2023    EGFR 73.8 03/08/2023    GLOB 2.9 03/08/2023    AGRATIO 1.5 03/08/2023    BCR 21.2 03/08/2023    ANIONGAP 10.0 03/08/2023      Lab Results   Component Value Date    CHOL 187 03/08/2023    CHLPL 200 11/14/2019    TRIG 99 03/08/2023    HDL 61 (H) 03/08/2023     (H) 03/08/2023     Lab Results   Component Value Date    TSH 1.650 03/08/2023     Lab Results   Component Value Date    HGBA1C 5.40 03/08/2023     XR Chest 1 View (05/10/2023 22:12)  CT chest for pulmonary embolus (05/10/2023 22:06)  Glucose, Nova Meter (05/11/2023 11:27)  BASIC METABOLIC PANEL (05/11/2023 05:32)  CBC with automated diff (05/11/2023 05:32)  PROCALCITONIN (05/11/2023 05:32)  High Sensitivity Troponin I (05/11/2023 05:32)  HEMOGLOBIN A1C (05/11/2023 05:32)  SARS-CoV2/Influenza A&B RT-PCR (05/11/2023 04:38)  Blood Gas, Arterial - (05/11/2023 01:31)  High Sensitivity Troponin I (05/10/2023 22:57)  MAGNESIUM (05/10/2023 22:57)  C-Reactive Protein (05/10/2023 22:57)  CBC with automated diff (05/10/2023 21:20)  COMPREHENSIVE METABOLIC PANEL (05/10/2023 21:20)  High Sensitivity Troponin I (05/10/2023 21:20)  APTT (05/10/2023 21:20)  PROTIME-INR (05/10/2023 21:20)      Assessment and Plan:   Today, we have reviewed her care including  testing as noted above.  There was no particularly worrisome finding on her labs.  This was felt to be a COPD exacerbation with some panic component.  Her lungs sound good today.  For the near term, we will place her back on daily LAMA LABA therapy as noted below.  We will see how things progress from there.  She is not really wheezing to a significant degree, and we will hold off on inhaled steroid for the near term.  Tentatively, we will plan to see her back in October as scheduled, but if she does not see gradual improvement with the breathing, I have asked her to reach out to us in the next few weeks.       Diagnoses and all orders for this visit:    1. COPD with exacerbation (Primary)  -     Umeclidinium-Vilanterol (Anoro Ellipta) 62.5-25 MCG/ACT aerosol powder  inhaler; Inhale 1 puff Daily.  Dispense: 180 each; Refill: 3    Follow Up   Return in about 4 months (around 10/2/2023) for Recheck as scheduled.  Patient was given instructions and counseling regarding her condition or for health maintenance advice. Please see specific information pulled into the AVS if appropriate.

## 2023-06-02 ENCOUNTER — TELEPHONE (OUTPATIENT)
Dept: FAMILY MEDICINE CLINIC | Age: 73
End: 2023-06-02
Payer: MEDICARE

## 2023-06-02 DIAGNOSIS — J44.1 COPD WITH EXACERBATION: Primary | ICD-10-CM

## 2023-06-02 RX ORDER — PREDNISONE 20 MG/1
TABLET ORAL
Qty: 10 TABLET | Refills: 0 | Status: SHIPPED | OUTPATIENT
Start: 2023-06-02

## 2023-06-02 RX ORDER — DOXYCYCLINE 100 MG/1
100 TABLET ORAL 2 TIMES DAILY
Qty: 20 TABLET | Refills: 0 | Status: SHIPPED | OUTPATIENT
Start: 2023-06-02

## 2023-06-02 NOTE — TELEPHONE ENCOUNTER
Please let her know that I have sent 2 prescriptions to [facility], a short course of prednisone, and antibiotic.  I would recommend she take the antibiotic, doxycycline, with food.  If she runs fever or has worsening of her symptoms over the weekend, I would recommend she be seen.  Thanks.

## 2023-06-02 NOTE — TELEPHONE ENCOUNTER
Pt states she is having flare up of COPD. Having some congestion and a little more shortness of breath. Would like rx for steroid sent to pharm before weekend. Please advise.

## 2023-06-16 ENCOUNTER — OFFICE VISIT (OUTPATIENT)
Dept: FAMILY MEDICINE CLINIC | Age: 73
End: 2023-06-16
Payer: MEDICARE

## 2023-06-16 VITALS
HEART RATE: 100 BPM | WEIGHT: 196 LBS | HEIGHT: 64 IN | SYSTOLIC BLOOD PRESSURE: 119 MMHG | TEMPERATURE: 98.9 F | DIASTOLIC BLOOD PRESSURE: 71 MMHG | BODY MASS INDEX: 33.46 KG/M2

## 2023-06-16 DIAGNOSIS — I95.9 HYPOTENSION, UNSPECIFIED HYPOTENSION TYPE: ICD-10-CM

## 2023-06-16 DIAGNOSIS — R42 DIZZINESS: ICD-10-CM

## 2023-06-16 DIAGNOSIS — R06.02 SHORTNESS OF BREATH: Primary | ICD-10-CM

## 2023-06-16 DIAGNOSIS — R53.1 WEAKNESS: ICD-10-CM

## 2023-06-16 PROBLEM — M46.1 BILATERAL SACROILIITIS: Status: ACTIVE | Noted: 2018-04-21

## 2023-06-16 LAB
EXPIRATION DATE: NORMAL
EXPIRATION DATE: NORMAL
FLUAV AG UPPER RESP QL IA.RAPID: NOT DETECTED
FLUBV AG UPPER RESP QL IA.RAPID: NOT DETECTED
GLUCOSE BLDC GLUCOMTR-MCNC: 115 MG/DL (ref 70–130)
INTERNAL CONTROL: NORMAL
Lab: NORMAL
Lab: NORMAL
SARS-COV-2 AG UPPER RESP QL IA.RAPID: NOT DETECTED

## 2023-06-16 PROCEDURE — 93000 ELECTROCARDIOGRAM COMPLETE: CPT

## 2023-06-16 PROCEDURE — 1159F MED LIST DOCD IN RCRD: CPT

## 2023-06-16 PROCEDURE — 3078F DIAST BP <80 MM HG: CPT

## 2023-06-16 PROCEDURE — 1160F RVW MEDS BY RX/DR IN RCRD: CPT

## 2023-06-16 PROCEDURE — 82948 REAGENT STRIP/BLOOD GLUCOSE: CPT

## 2023-06-16 PROCEDURE — 99213 OFFICE O/P EST LOW 20 MIN: CPT

## 2023-06-16 PROCEDURE — 87428 SARSCOV & INF VIR A&B AG IA: CPT

## 2023-06-16 PROCEDURE — 3074F SYST BP LT 130 MM HG: CPT

## 2023-06-16 NOTE — PROGRESS NOTES
Procedure     ECG 12 Lead    Date/Time: 6/16/2023 10:44 AM  Performed by: Tyler Mcmillan MD  Authorized by: Diamond Carter APRN   Comparison: not compared with previous ECG   Previous ECG: no previous ECG available  Rhythm: sinus tachycardia  Rate: tachycardic  Conduction: conduction normal  ST Segments: ST segments normal  T Waves: T waves normal  QRS axis: normal  Other findings: non-specific ST-T wave changes    Clinical impression: non-specific ECG

## 2023-06-16 NOTE — PROGRESS NOTES
"Subjective     CHIEF COMPLAINT    Chief Complaint   Patient presents with    Dizziness     Short of air, fatigue, BP dropped this morning, lowest was 96/58     History of Present Illness  Patient is a 73-year-old female who presents to the clinic today due to complaints of dizziness, shortness of breath and a \"shaky feeling.\"  She states that around 830 this morning at work she felt extremely dizzy and short of breath.  She works at [employer] and went to the [employer].  They checked her blood pressure and it was 96/58.  She states they did give her something to eat.  She really has not felt better since then.  She continues to feel extremely dizzy and weak.  She states she feels like she is walking on air.  Immediately prior to this episode she was pushing a cart down the [address] and taking garbage out.  She just feels extremely wobbly.  She is in office in a wheelchair today which is abnormal for her.  Her supervisor at work drove her here today.  She denies any fever or chills recently.  She denies any chest pain but does describe a heaviness in her chest.  Denies any headaches or visual changes.  Notably she recently completed a course of doxycycline for 10 days and a 5-day course of prednisone for presumed COPD exacerbation.    Review of Systems   Constitutional:  Positive for fatigue. Negative for fever.   HENT:  Positive for congestion.    Eyes:  Negative for visual disturbance.   Respiratory:  Positive for shortness of breath. Negative for cough and wheezing.    Cardiovascular:  Negative for chest pain and palpitations.   Gastrointestinal:  Negative for nausea and vomiting.   Neurological:  Positive for dizziness. Negative for headaches.       Past Medical History:   Diagnosis Date    Anemia, unspecified     Asymptomatic menopausal state     Chronic obstructive pulmonary disease with (acute) exacerbation     Essential (primary) hypertension     Low back pain     Major depressive disorder, single episode, " moderate     Nicotine dependence, unspecified, uncomplicated     Osteopenia     Other intervertebral disc degeneration, lumbar region     Other long term (current) drug therapy     Solitary pulmonary nodule     Vitamin D deficiency, unspecified          Allergies   Allergen Reactions    Sulfa Antibiotics Unknown - Low Severity    Aspirin Rash    Atorvastatin Other (See Comments)     Hair loss       Current Outpatient Medications on File Prior to Visit   Medication Sig Dispense Refill    albuterol sulfate HFA (Ventolin HFA) 108 (90 Base) MCG/ACT inhaler Inhale 2 puffs Every 6 (Six) Hours As Needed for Wheezing. 8 g 11    ALPRAZolam (XANAX) 0.25 MG tablet Take 1 tablet by mouth 3 (Three) Times a Day As Needed.      amLODIPine (NORVASC) 5 MG tablet Take 1 tablet by mouth 2 (Two) Times a Day. 180 tablet 3    Calcium Carbonate 1500 (600 Ca) MG tablet Calcium 600 600 mg (1,500 mg) oral tablet take 1 tablet by oral route daily   Suspended      cetirizine (zyrTEC) 10 MG tablet Take 1 tablet by mouth Daily. 90 tablet 3    colestipol (COLESTID) 1 g tablet Take 1 tablet by mouth Daily. 90 tablet 1    fluticasone (FLONASE) 50 MCG/ACT nasal spray 2 sprays into the nostril(s) as directed by provider Daily. 18 mL 5    hydroCHLOROthiazide (HYDRODIURIL) 25 MG tablet Take 1 tablet by mouth Daily. 90 tablet 3    metoprolol succinate XL (Toprol XL) 50 MG 24 hr tablet Take 1 tablet by mouth Daily. 90 tablet 3    mirtazapine (REMERON) 30 MG tablet Take 1 tablet by mouth Every Night. 90 tablet 3    montelukast (SINGULAIR) 10 MG tablet Take 1 tablet by mouth Every Night. 90 tablet 3    multivitamin with minerals tablet tablet 1 tablet Daily.      pregabalin (LYRICA) 100 MG capsule Take 1 capsule by mouth 2 (Two) Times a Day. 60 capsule 2    Umeclidinium-Vilanterol (Anoro Ellipta) 62.5-25 MCG/ACT aerosol powder  inhaler Inhale 1 puff Daily. 180 each 3    [DISCONTINUED] doxycycline (ADOXA) 100 MG tablet Take 1 tablet by mouth 2 (Two) Times a  "Day. (Patient not taking: Reported on 6/16/2023) 20 tablet 0    [DISCONTINUED] predniSONE (DELTASONE) 20 MG tablet Take 2 tablets by oral route once daily (Patient not taking: Reported on 6/16/2023) 10 tablet 0     No current facility-administered medications on file prior to visit.     /71 (BP Location: Left arm, Patient Position: Sitting, Cuff Size: Adult)   Pulse 100   Temp 98.9 °F (37.2 °C) (Oral)   Ht 162.6 cm (64.02\")   Wt 88.9 kg (196 lb)   BMI 33.63 kg/m²     Objective     Physical Exam  Vitals and nursing note reviewed.   Constitutional:       General: She is not in acute distress.     Appearance: Normal appearance. She is not ill-appearing.   HENT:      Head: Normocephalic.      Mouth/Throat:      Lips: Pink.      Mouth: Mucous membranes are moist.   Eyes:      Extraocular Movements: Extraocular movements intact.      Pupils: Pupils are equal, round, and reactive to light.   Cardiovascular:      Rate and Rhythm: Regular rhythm. Tachycardia present.      Heart sounds: Normal heart sounds. No murmur heard.  Pulmonary:      Effort: Pulmonary effort is normal. No accessory muscle usage or respiratory distress.      Breath sounds: Decreased air movement present. Wheezing (mild, diffuse) present. No rhonchi.   Musculoskeletal:      Cervical back: Normal range of motion.   Neurological:      General: No focal deficit present.      Mental Status: She is alert and oriented to person, place, and time.      Cranial Nerves: No facial asymmetry.      Motor: Weakness (Generalized) present.      Comments: Patient presents in wheelchair today.  Normally ambulates without assistance.  Able to stand   Psychiatric:         Mood and Affect: Mood and affect normal.         Behavior: Behavior normal.        Lab Results (last 24 hours)       Procedure Component Value Units Date/Time    POCT SARS-CoV-2 Antigen REYNOLD + Flu [[id number]] Collected: 06/16/23 1002    Specimen: Swab Updated: 06/16/23 1006     SARS Antigen Not " Detected     Influenza A Antigen REYNOLD Not Detected     Influenza B Antigen REYNOLD Not Detected     Internal Control Passed     Lot Number [id number]     Expiration Date 12/20/23    POCT Glucose [[id number]] Collected: 06/16/23 1007    Specimen: Blood Updated: 06/16/23 1007     Glucose 115 mg/dL      Lot Number [id number]     Expiration Date 02/10/24               Diagnoses and all orders for this visit:    1. Shortness of breath (Primary)  -     POCT SARS-CoV-2 Antigen REYNOLD + Flu    2. Dizziness  -     POCT Glucose  -     ECG 12 Lead    3. Weakness    4. Hypotension, unspecified hypotension type      We are having difficulty obtaining patient's oxygen level in office due to acrylic nails however we did get a reading of 60%.  Oxygen applied in office.  Glucose is 115.  Patient is negative for COVID and flu on rapid testing.    Given patient's symptoms of dizziness, weakness, low blood pressure and her chronic conditions, recommend she proceed to the ER via ambulance.  I feel that she needs further work-up and evaluation to determine the cause of her symptoms.  Patient is agreeable to plan.  Son and daughter are both in clinic with patient today and agreeable to plan.  EMS called per nursing staff.      Follow-up:  No follow-ups on file.  Patient was given instructions and counseling regarding her condition or for health maintenance advice. Please see specific information pulled into the AVS if appropriate.

## 2023-06-19 ENCOUNTER — TELEPHONE (OUTPATIENT)
Dept: FAMILY MEDICINE CLINIC | Age: 73
End: 2023-06-19
Payer: MEDICARE

## 2023-06-19 ENCOUNTER — READMISSION MANAGEMENT (OUTPATIENT)
Dept: CALL CENTER | Facility: HOSPITAL | Age: 73
End: 2023-06-19
Payer: MEDICARE

## 2023-06-19 NOTE — OUTREACH NOTE
Prep Survey      Flowsheet Row Responses   [facility] facility patient discharged from? Non-[facility]   Is LACE score < 7 ? Non-BH Discharge   Eligibility [facility]   H[facility]   Date of Admission 06/16/23   Date of Discharge 06/19/23   Discharge Disposition Home or Self Care   Discharge diagnosis COPD   Does the patient have one of the following disease processes/diagnoses(primary or secondary)? COPD   Prep survey completed? Yes            Sue COX - Registered Nurse

## 2023-06-26 ENCOUNTER — TELEPHONE (OUTPATIENT)
Dept: FAMILY MEDICINE CLINIC | Age: 73
End: 2023-06-26

## 2023-06-26 PROBLEM — R53.1 WEAKNESS: Status: ACTIVE | Noted: 2023-06-26

## 2023-06-26 PROBLEM — J96.01 ACUTE RESPIRATORY FAILURE WITH HYPOXIA: Status: ACTIVE | Noted: 2023-06-26

## 2023-06-26 NOTE — TELEPHONE ENCOUNTER
Caller: Eileen Kaufman    Relationship to patient: Self    Best call back number: [phone]    Patient is needing: PATIENT CALLED STATING SHE WAS SUPPOSED TO HAVE AN APPOINTMENT WITH MD SIBLEY ON 7.5.23 TO BE RELEASED BACK TO WORK. PATIENT STATES SHE HAS PAPERWORK THAT SAYS THE DAY BUT NOT THE TIME TO COME IN. PATIENT STATES IT SAYS ON THE PAPERWORK SHE CAN ONLY SEE HER PCP TO BE RELEASED AND IS NOT FOR SURE WHAT TO DO OR WHEN TO COME IN. PLEASE ADVISE.

## 2023-07-26 ENCOUNTER — OUTSIDE FACILITY SERVICE (OUTPATIENT)
Dept: FAMILY MEDICINE CLINIC | Age: 73
End: 2023-07-26
Payer: MEDICARE

## 2023-07-27 ENCOUNTER — OFFICE VISIT (OUTPATIENT)
Dept: FAMILY MEDICINE CLINIC | Age: 73
End: 2023-07-27
Payer: MEDICARE

## 2023-07-27 VITALS
SYSTOLIC BLOOD PRESSURE: 138 MMHG | BODY MASS INDEX: 34.86 KG/M2 | DIASTOLIC BLOOD PRESSURE: 83 MMHG | WEIGHT: 204.2 LBS | OXYGEN SATURATION: 95 % | HEART RATE: 90 BPM | TEMPERATURE: 98.2 F | HEIGHT: 64 IN

## 2023-07-27 DIAGNOSIS — K21.9 GASTROESOPHAGEAL REFLUX DISEASE, UNSPECIFIED WHETHER ESOPHAGITIS PRESENT: ICD-10-CM

## 2023-07-27 DIAGNOSIS — D64.9 ANEMIA, UNSPECIFIED TYPE: ICD-10-CM

## 2023-07-27 DIAGNOSIS — J44.9 CHRONIC OBSTRUCTIVE PULMONARY DISEASE, UNSPECIFIED COPD TYPE: Primary | ICD-10-CM

## 2023-07-27 DIAGNOSIS — R06.00 DYSPNEA, UNSPECIFIED TYPE: ICD-10-CM

## 2023-07-27 LAB
EXPIRATION DATE: ABNORMAL
HGB BLDA-MCNC: 8.8 G/DL (ref 12–17)
Lab: ABNORMAL

## 2023-07-27 PROCEDURE — 85018 HEMOGLOBIN: CPT | Performed by: FAMILY MEDICINE

## 2023-07-27 PROCEDURE — 3079F DIAST BP 80-89 MM HG: CPT | Performed by: FAMILY MEDICINE

## 2023-07-27 PROCEDURE — 99214 OFFICE O/P EST MOD 30 MIN: CPT | Performed by: FAMILY MEDICINE

## 2023-07-27 PROCEDURE — 3075F SYST BP GE 130 - 139MM HG: CPT | Performed by: FAMILY MEDICINE

## 2023-07-27 RX ORDER — IPRATROPIUM BROMIDE AND ALBUTEROL SULFATE 2.5; .5 MG/3ML; MG/3ML
3 SOLUTION RESPIRATORY (INHALATION)
Qty: 1080 ML | Refills: 1 | Status: SHIPPED | OUTPATIENT
Start: 2023-07-27

## 2023-07-27 RX ORDER — BUDESONIDE 0.5 MG/2ML
0.5 INHALANT ORAL
Qty: 180 ML | Refills: 1 | Status: SHIPPED | OUTPATIENT
Start: 2023-07-27

## 2023-07-27 RX ORDER — PANTOPRAZOLE SODIUM 40 MG/1
40 TABLET, DELAYED RELEASE ORAL DAILY
Qty: 90 TABLET | Refills: 3 | Status: SHIPPED | OUTPATIENT
Start: 2023-07-27

## 2023-07-27 RX ORDER — ALBUTEROL SULFATE 0.63 MG/3ML
0.63 SOLUTION RESPIRATORY (INHALATION) EVERY 6 HOURS PRN
COMMUNITY

## 2023-07-27 NOTE — Clinical Note
She will stop up regarding her pulmonology appointment.  I am wondering if there might be an opportunity to have her seen somewhere else sooner.  It would be okay from my standpoint if she went to [place] or [place] for evaluation.  Thanks.

## 2023-07-27 NOTE — PROGRESS NOTES
Eileen Kaufman presents to [facility].    Chief Complaint:  Follow up on COPD    Subjective        History of Present Illness:  Eileen Alvarez is being seen today for follow-up on her care.  She has significant COPD for which she was actually in the hospital about 6 weeks ago.  Please see her most recent visit.  However, Eileen Alvarez continues to struggle with her breathing.  She remains on supplemental oxygen at this time at 2 L/min.  She has not been able to wean down off of the oxygen.  She states her oxygen level will start to fall on her.  She did have a fairly significant evaluation while inpatient including CT of the chest.  She is scheduled to see pulmonology, but that visit is 3 months off.  She was prescribed Anoro after her most recent visit.  She has not seen improvement in how she feels with it thus far.  She was discharged home on budesonide as well.    Review of Systems:  Review of Systems   Constitutional:  Negative for chills and fever.   Respiratory:  Positive for shortness of breath. Negative for cough.    Cardiovascular:  Positive for palpitations (sometimes). Negative for chest pain.   Gastrointestinal:  Negative for abdominal pain, nausea and vomiting.      Objective   Medical History:  Past Medical History:    Anemia, unspecified    Asymptomatic menopausal state    Chronic obstructive pulmonary disease with (acute) exacerbation    Essential (primary) hypertension    Low back pain    Major depressive disorder, single episode, moderate    Nicotine dependence, unspecified, uncomplicated    Osteopenia    Other intervertebral disc degeneration, lumbar region    Other long term (current) drug therapy    Solitary pulmonary nodule    Vitamin D deficiency, unspecified     Past Surgical History:    CARDIAC CATHETERIZATION    CARPAL TUNNEL RELEASE    CHOLECYSTECTOMY    COLONOSCOPY    Normal    HYSTERECTOMY    Total    KNEE ARTHROSCOPY    NECK SURGERY      Family History   Problem Relation  Age of Onset    Cerebral aneurysm Mother         cause of death    Heart attack Father         cause of death    Esophageal cancer Brother      Social History     Tobacco Use    Smoking status: Former     Packs/day: 1.00     Years: 50.00     Pack years: 50.00     Types: Cigarettes     Quit date: 2023     Years since quittin.5     Passive exposure: Past    Smokeless tobacco: Never    Tobacco comments:     Eileen Alvarez was 23 when she started smoking.  She did stop smoking for some time - about 13 years.  She has been smoking a total of approximately 33 years.  She has smoked less than a pack daily during this time. She currently uses smokless, vapor can/pouches   Substance Use Topics    Alcohol use: Yes     Comment: Socially       Health Maintenance Due   Topic Date Due    TDAP/TD VACCINES (1 - Tdap) Never done    ZOSTER VACCINE (1 of 2) Never done    COVID-19 Vaccine (4 - Pfizer series) 2021    Pneumococcal Vaccine 65+ (3 - PPSV23 or PCV20) 2022    DXA SCAN  2022        Immunization History   Administered Date(s) Administered    COVID-19 (PFIZER) Purple Cap Monovalent 2020, 2021, 2021    Fluzone High Dose =>65 Years ([employer] ONLY) 10/01/2021, 2022    Hepatitis A 2018    Influenza, Unspecified 10/01/2020    Pneumococcal Conjugate 13-Valent (PCV13) 2018    Pneumococcal Polysaccharide (PPSV23) 2013, 2017       Allergies   Allergen Reactions    Sulfa Antibiotics Unknown - Low Severity    Aspirin Rash    Atorvastatin Other (See Comments)     Hair loss        Medications:  Current Outpatient Medications on File Prior to Visit   Medication Sig    albuterol (ACCUNEB) 0.63 MG/3ML nebulizer solution Inhale 3 mL Every 6 (Six) Hours As Needed.    albuterol sulfate HFA (Ventolin HFA) 108 (90 Base) MCG/ACT inhaler Inhale 2 puffs Every 6 (Six) Hours As Needed for Wheezing.    amLODIPine (NORVASC) 5 MG tablet Take 1 tablet by mouth 2 (Two) Times a Day.    Calcium  "Carbonate 1500 (600 Ca) MG tablet Calcium 600 600 mg (1,500 mg) oral tablet take 1 tablet by oral route daily   Suspended    cetirizine (zyrTEC) 10 MG tablet Take 1 tablet by mouth Daily.    colestipol (COLESTID) 1 g tablet Take 1 tablet by mouth Daily.    cyclobenzaprine (FLEXERIL) 10 MG tablet Take 1 tablet by mouth 2 (Two) Times a Day As Needed.    ferrous sulfate 325 (65 FE) MG EC tablet Take 1 tablet by mouth Daily With Breakfast.    fluticasone (FLONASE) 50 MCG/ACT nasal spray 2 sprays into the nostril(s) as directed by provider Daily.    hydroCHLOROthiazide (HYDRODIURIL) 25 MG tablet Take 1 tablet by mouth Daily.    metoprolol succinate XL (Toprol XL) 50 MG 24 hr tablet Take 1 tablet by mouth Daily.    mirtazapine (REMERON) 30 MG tablet Take 1 tablet by mouth Every Night.    montelukast (SINGULAIR) 10 MG tablet Take 1 tablet by mouth Every Night.    multivitamin with minerals tablet tablet 1 tablet Daily.    O2 (OXYGEN) Inhale 2 L/min 1 (One) Time.    pregabalin (LYRICA) 100 MG capsule Take 1 capsule by mouth 2 (Two) Times a Day.    Umeclidinium-Vilanterol (Anoro Ellipta) 62.5-25 MCG/ACT aerosol powder  inhaler Inhale 1 puff Daily.    [DISCONTINUED] famotidine (PEPCID) 20 MG tablet Take 1 tablet by mouth every night at bedtime.    [DISCONTINUED] budesonide (PULMICORT) 0.5 MG/2ML nebulizer solution Inhale 2 mL.    [DISCONTINUED] ipratropium-albuterol (DUO-NEB) 0.5-2.5 mg/3 ml nebulizer Inhale 3 mL.     No current facility-administered medications on file prior to visit.       Vital Signs:   /83 (BP Location: Left arm, Patient Position: Sitting)   Pulse 90   Temp 98.2 °F (36.8 °C) (Oral)   Ht 162.6 cm (64.02\")   Wt 92.6 kg (204 lb 3.2 oz)   SpO2 95% Comment: 2 liters  BMI 35.03 kg/m²       Physical Exam:  Physical Exam  Vitals reviewed.   Constitutional:       General: She is not in acute distress.     Appearance: She is obese. She is not ill-appearing.   Eyes:      Pupils: Pupils are equal, round, " and reactive to light.   Neck:      Comments: No thyromegaly  Cardiovascular:      Rate and Rhythm: Normal rate and regular rhythm.   Pulmonary:      Effort: Pulmonary effort is normal.      Comments: Decreased breath sounds are noted in both lungs.  Abdominal:      General: There is no distension.      Palpations: Abdomen is soft.      Tenderness: There is no abdominal tenderness.   Musculoskeletal:      Cervical back: Neck supple.   Lymphadenopathy:      Cervical: No cervical adenopathy.   Skin:     Findings: No lesion or rash.   Neurological:      Mental Status: She is alert.       Result Review      The following data was reviewed by Artur Quevedo MD on 07/27/2023.  Lab Results   Component Value Date    WBC 11.09 (H) 07/03/2023    HGB 8.8 (A) 07/27/2023    HCT 32.9 (L) 07/03/2023    MCV 80.6 07/03/2023     07/03/2023     Lab Results   Component Value Date    GLUCOSE 106 (H) 06/26/2023    BUN 19 06/26/2023    CREATININE 0.74 06/26/2023     06/26/2023    K 4.2 06/26/2023     06/26/2023    CO2 24.0 06/26/2023    CALCIUM 10.2 06/26/2023    PROTEINTOT 6.9 06/26/2023    ALBUMIN 4.2 06/26/2023    ALT 9 06/26/2023    AST 10 06/26/2023    ALKPHOS 89 06/26/2023    BILITOT <0.2 06/26/2023    EGFR 86.6 06/26/2023    GLOB 2.7 06/26/2023    AGRATIO 1.6 06/26/2023    BCR 25.7 (H) 06/26/2023    ANIONGAP 10.0 06/26/2023      Lab Results   Component Value Date    CHOL 187 03/08/2023    CHLPL 200 11/14/2019    TRIG 99 03/08/2023    HDL 61 (H) 03/08/2023     (H) 03/08/2023     Lab Results   Component Value Date    TSH 1.650 03/08/2023     Lab Results   Component Value Date    HGBA1C 5.40 03/08/2023            Assessment and Plan:   Today, we have reviewed her care.  Madelin continues to have ongoing need for oxygen supplementation.  She continues on appropriate treatments for her COPD which are outlined above.  For now, we will recommend she continue current care.  Her pulmonology appointment is  scheduled for 90 days from now.  I will ask her to stop by referrals to see if we might be able to get her seen somewhere else more quickly.  I am concerned that she does not seem to be making progress with this.  Regarding anemia, we will recheck a hemoglobin level via fingerstick before she leaves today.  I want to make sure that is stable.  Finally, she does have some ongoing shortness of breath that we have been attributing to COPD.  We will reach out to [facility] and confirm whether echocardiogram was completed while she was there.  If that was not done, we will move ahead with this.  I want to make sure her heart function looks normal.  Tentative follow-up with me will be in October as scheduled, sooner if needed.  She is not physically able to work at this time.    ADDENDUM: Please let Madlein know the followin.  Her hemoglobin level is actually lower still at 8.8.  This is a significant drop compared to her normal level.  2.  In addition to seeing Dr. Shaw for consideration of colonoscopy, I would also recommend she see hematology at [facility] and have placed a referral order to see Dr. Huertas there.  She may benefit from an iron infusion or other specific treatments for this.  I will forward this information to referral staff as well.  3.  I have also sent a prescription for pantoprazole, a strong acid blocker, to [facility].  I am recommending this in case she has an ulcer present in the stomach.  This will REPLACE famotidine.  4.  They did not do an echocardiogram while she was in the hospital.  I have placed an order for this.  We will be in touch regarding it.  The purpose is to evaluate her heart function and heart valves.  Thanks.       Diagnoses and all orders for this visit:    1. Chronic obstructive pulmonary disease, unspecified COPD type (Primary)  -     budesonide (PULMICORT) 0.5 MG/2ML nebulizer solution; Take 2 mL by nebulization Daily.  Dispense: 180 mL; Refill: 1  -     ipratropium-albuterol  (DUO-NEB) 0.5-2.5 mg/3 ml nebulizer; Take 3 mL by nebulization 4 (Four) Times a Day.  Dispense: 1080 mL; Refill: 1    2. Anemia, unspecified type  -     POCT hemoglobin  -     Ambulatory Referral to Hematology / Oncology    3. Dyspnea, unspecified type  -     Adult Transthoracic Echo Complete W/ Cont if Necessary Per Protocol; Future    4. Gastroesophageal reflux disease, unspecified whether esophagitis present  -     pantoprazole (PROTONIX) 40 MG EC tablet; Take 1 tablet by mouth Daily.  Dispense: 90 tablet; Refill: 3    Follow Up   Return in about 2 months (around 10/2/2023) for Recheck as scheduled.  Patient was given instructions and counseling regarding her condition or for health maintenance advice. Please see specific information pulled into the AVS if appropriate.

## 2023-07-27 NOTE — Clinical Note
Please reach out to [facility] and confirm if she had echocardiogram while she was inpatient.  Let me know 1 where the other.  Thanks.

## 2023-08-02 ENCOUNTER — TELEPHONE (OUTPATIENT)
Dept: FAMILY MEDICINE CLINIC | Age: 73
End: 2023-08-02
Payer: MEDICARE

## 2023-08-14 DIAGNOSIS — M47.816 LUMBAR SPONDYLOSIS: ICD-10-CM

## 2023-08-14 DIAGNOSIS — Z79.899 OTHER LONG TERM (CURRENT) DRUG THERAPY: ICD-10-CM

## 2023-08-14 RX ORDER — PREGABALIN 100 MG/1
100 CAPSULE ORAL 2 TIMES DAILY
Qty: 60 CAPSULE | Refills: 2 | Status: SHIPPED | OUTPATIENT
Start: 2023-08-14

## 2023-08-28 ENCOUNTER — HOSPITAL ENCOUNTER (OUTPATIENT)
Dept: CARDIOLOGY | Facility: HOSPITAL | Age: 73
Discharge: HOME | End: 2023-08-28
Admitting: FAMILY MEDICINE
Payer: MEDICARE

## 2023-08-28 DIAGNOSIS — R06.00 DYSPNEA, UNSPECIFIED TYPE: ICD-10-CM

## 2023-08-28 PROCEDURE — 93306 TTE W/DOPPLER COMPLETE: CPT

## 2023-08-29 LAB
BH CV ECHO MEAS - AO MAX PG: 9 MMHG
BH CV ECHO MEAS - AO MEAN PG: 5 MMHG
BH CV ECHO MEAS - AO ROOT DIAM: 3.2 CM
BH CV ECHO MEAS - AO V2 MAX: 148 CM/SEC
BH CV ECHO MEAS - AO V2 VTI: 29.8 CM
BH CV ECHO MEAS - AVA(I,D): 2.6 CM2
BH CV ECHO MEAS - EDV(CUBED): 39.3 ML
BH CV ECHO MEAS - EDV(MOD-SP2): 54.9 ML
BH CV ECHO MEAS - EDV(MOD-SP4): 59.3 ML
BH CV ECHO MEAS - EF(MOD-BP): 60.5 %
BH CV ECHO MEAS - EF(MOD-SP2): 66.3 %
BH CV ECHO MEAS - EF(MOD-SP4): 61.2 %
BH CV ECHO MEAS - ESV(CUBED): 12.2 ML
BH CV ECHO MEAS - ESV(MOD-SP2): 18.5 ML
BH CV ECHO MEAS - ESV(MOD-SP4): 23 ML
BH CV ECHO MEAS - FS: 32.4 %
BH CV ECHO MEAS - IVS/LVPW: 1.8 CM
BH CV ECHO MEAS - IVSD: 1.8 CM
BH CV ECHO MEAS - LA DIMENSION: 3.2 CM
BH CV ECHO MEAS - LAT PEAK E' VEL: 10 CM/SEC
BH CV ECHO MEAS - LV MASS(C)D: 166.2 GRAMS
BH CV ECHO MEAS - LV MAX PG: 6.4 MMHG
BH CV ECHO MEAS - LV MEAN PG: 3 MMHG
BH CV ECHO MEAS - LV V1 MAX: 126 CM/SEC
BH CV ECHO MEAS - LV V1 VTI: 24.4 CM
BH CV ECHO MEAS - LVIDD: 3.4 CM
BH CV ECHO MEAS - LVIDS: 2.3 CM
BH CV ECHO MEAS - LVOT AREA: 3.1 CM2
BH CV ECHO MEAS - LVOT DIAM: 2 CM
BH CV ECHO MEAS - LVPWD: 1 CM
BH CV ECHO MEAS - MED PEAK E' VEL: 7.2 CM/SEC
BH CV ECHO MEAS - MV A MAX VEL: 83.9 CM/SEC
BH CV ECHO MEAS - MV DEC SLOPE: 549 CM/SEC2
BH CV ECHO MEAS - MV DEC TIME: 0.21 MSEC
BH CV ECHO MEAS - MV E MAX VEL: 82.1 CM/SEC
BH CV ECHO MEAS - MV E/A: 0.98
BH CV ECHO MEAS - MV MAX PG: 5 MMHG
BH CV ECHO MEAS - MV MEAN PG: 2 MMHG
BH CV ECHO MEAS - MV P1/2T: 59.8 MSEC
BH CV ECHO MEAS - MV V2 VTI: 34.4 CM
BH CV ECHO MEAS - MVA(P1/2T): 3.7 CM2
BH CV ECHO MEAS - MVA(VTI): 2.23 CM2
BH CV ECHO MEAS - RAP SYSTOLE: 3 MMHG
BH CV ECHO MEAS - RVDD: 2.9 CM
BH CV ECHO MEAS - RVSP: 47 MMHG
BH CV ECHO MEAS - SV(LVOT): 76.7 ML
BH CV ECHO MEAS - SV(MOD-SP2): 36.4 ML
BH CV ECHO MEAS - SV(MOD-SP4): 36.3 ML
BH CV ECHO MEAS - TR MAX PG: 44.1 MMHG
BH CV ECHO MEAS - TR MAX VEL: 332 CM/SEC
BH CV ECHO MEASUREMENTS AVERAGE E/E' RATIO: 9.55
IVRT: 82 MSEC
LEFT ATRIUM VOLUME INDEX: 13.9 ML/M2
LEFT ATRIUM VOLUME: 27.3 ML

## 2023-08-30 NOTE — PROGRESS NOTES
Please let Eileen Alvarez know the following regarding the echocardiogram:  1.  The main pulm function of the heart is normal.  2.  There is some mild diastolic dysfunction.  This means that the heart muscle does not relax normally between beats.  There is not a change in medication that I would recommend at least for the near term related to this.  3.  There is some evidence of increased pressure on the right side of the heart.  I would recommend she review this with Dr. Crowe tomorrow at her follow-up appointment.    Otherwise, we will discuss and review further when she sees me on Friday.  Let me know if she has other concerns, and forward her a copy of this testing if she would like.  Thanks.

## 2023-08-31 ENCOUNTER — OFFICE VISIT (OUTPATIENT)
Dept: PULMONOLOGY | Facility: CLINIC | Age: 73
End: 2023-08-31
Payer: MEDICARE

## 2023-08-31 VITALS
TEMPERATURE: 97.1 F | OXYGEN SATURATION: 91 % | DIASTOLIC BLOOD PRESSURE: 68 MMHG | HEIGHT: 64 IN | RESPIRATION RATE: 20 BRPM | SYSTOLIC BLOOD PRESSURE: 126 MMHG | BODY MASS INDEX: 35.51 KG/M2 | HEART RATE: 88 BPM | WEIGHT: 208 LBS

## 2023-08-31 DIAGNOSIS — Z23 NEED FOR VACCINATION WITH 20-POLYVALENT PNEUMOCOCCAL CONJUGATE VACCINE: ICD-10-CM

## 2023-08-31 DIAGNOSIS — J96.11 CHRONIC RESPIRATORY FAILURE WITH HYPOXIA: ICD-10-CM

## 2023-08-31 DIAGNOSIS — J44.9 CHRONIC OBSTRUCTIVE PULMONARY DISEASE, UNSPECIFIED COPD TYPE: Primary | ICD-10-CM

## 2023-08-31 RX ORDER — BUDESONIDE, GLYCOPYRROLATE, AND FORMOTEROL FUMARATE 160; 9; 4.8 UG/1; UG/1; UG/1
2 AEROSOL, METERED RESPIRATORY (INHALATION) 2 TIMES DAILY
Qty: 1 EACH | Refills: 5 | Status: SHIPPED | OUTPATIENT
Start: 2023-08-31

## 2023-08-31 RX ORDER — BUDESONIDE, GLYCOPYRROLATE, AND FORMOTEROL FUMARATE 160; 9; 4.8 UG/1; UG/1; UG/1
2 AEROSOL, METERED RESPIRATORY (INHALATION) 2 TIMES DAILY
Qty: 2 EACH | Refills: 0 | COMMUNITY
Start: 2023-08-31 | End: 2023-09-01 | Stop reason: SDUPTHER

## 2023-08-31 NOTE — PROGRESS NOTES
Primary Care Provider  rAtur Quevedo MD   Referring Provider  No ref. provider found      Patient Complaint  Establish Care and COPD      Subjective          Eileen Kaufman presents to [facility] PULMONARY & [facility]ITICAL [facility]ARE MEDICINE      History of Presenting Illness  Eileen Kaufman is a 73 y.o. female with history of COPD here as a follow-up after recent hospitalization for COPD exacerbation at outside hospital.    Patient has long history of COPD.  She currently uses 2 L nasal cannula most of the day.  Sometimes she takes it off and her SPO2 drops.  Her current regimen includes Anoro, albuterol as needed with nebulizers Pulmicort and DuoNeb as needed.  She has to use her albuterol inhaler 2-3 times a day.  She feels like this regimen has her symptoms moderately controlled.  However, it does not last the entire day.  She has dyspnea on exertion, particularly when walking up and down around her house.  Patient was a former smoker, quit 01/2023.  Previously she smoked half a pack for over 40 years.  Within the last few days she reported some runny nose and a cough with white/clear sputum.  She denies fever, chills, hemoptysis at this time. I have personally reviewed patient's past family, social, medical and surgical histories and agree with their findings.    Review of Systems  Constitutional:  No fever. No chills. No weakness.  Eyes: No pain, erythema, or discharge. No blurring of vision.  ENT:  No sore throat, URI symptoms.   Cardiovascular:  No chest pain. No palpitations. No lower extremity edema.  Respiratory:  No shortness of breath; +chronic cough, pleuritic chest pain. No hemoptysis. +chronic dyspnea.   GI:  Normal appetite. No nausea, vomiting, diarrhea. No pain. No melena.  Musculoskeletal:  No arthralgias or myalgias.  Neurologic:  No headache. No weakness.    Family History   Problem Relation Age of Onset    Cerebral aneurysm Mother         cause of death    Heart attack Father          cause of death    Esophageal cancer Brother         Social History     Socioeconomic History    Marital status: Single    Number of children: 2   Tobacco Use    Smoking status: Former     Packs/day: 1.00     Years: 50.00     Pack years: 50.00     Types: Cigarettes     Quit date: 2023     Years since quittin.6     Passive exposure: Past    Smokeless tobacco: Never    Tobacco comments:     Eileen Alvarez was 23 when she started smoking.  She did stop smoking for some time - about 13 years.  She has been smoking a total of approximately 33 years.  She has smoked less than a pack daily during this time. She currently uses smokless, vapor can/pouches   Vaping Use    Vaping Use: Former    Substances: Nicotine    Devices: Pre-filled or refillable cartridge   Substance and Sexual Activity    Alcohol use: Yes     Comment: Socially    Drug use: Never    Sexual activity: Defer        Past Medical History:   Diagnosis Date    Anemia, unspecified     Asymptomatic menopausal state     Chronic obstructive pulmonary disease with (acute) exacerbation     Essential (primary) hypertension     Low back pain     Major depressive disorder, single episode, moderate     Nicotine dependence, unspecified, uncomplicated     Osteopenia     Other intervertebral disc degeneration, lumbar region     Other long term (current) drug therapy     Solitary pulmonary nodule     Vitamin D deficiency, unspecified         Immunization History   Administered Date(s) Administered    COVID-19 (MODERNA) BIVALENT 12+YRS 09/10/2022    COVID-19 (MODERNA) Monovalent Original Booster 2022    COVID-19 (PFIZER) Purple Cap Monovalent 2020, 2021, 2021    Fluzone High Dose =>65 Years (Vaxcare ONLY) 10/01/2021, 2022    Hepatitis A 2018    Influenza Quad Vaccine (Inpatient) 2013    Influenza, Unspecified 10/01/2020    Pneumococcal Conjugate 13-Valent (PCV13) 2018    Pneumococcal Polysaccharide (PPSV23) 2013,  02/13/2017    Shingrix 09/10/2022       Allergies   Allergen Reactions    Sulfa Antibiotics Unknown - Low Severity    Aspirin Rash    Atorvastatin Other (See Comments)     Hair loss          Current Outpatient Medications:     albuterol (ACCUNEB) 0.63 MG/3ML nebulizer solution, Inhale 3 mL Every 6 (Six) Hours As Needed., Disp: , Rfl:     albuterol sulfate HFA (Ventolin HFA) 108 (90 Base) MCG/ACT inhaler, Inhale 2 puffs Every 6 (Six) Hours As Needed for Wheezing., Disp: 8 g, Rfl: 11    amLODIPine (NORVASC) 5 MG tablet, Take 1 tablet by mouth 2 (Two) Times a Day., Disp: 180 tablet, Rfl: 3    budesonide (PULMICORT) 0.5 MG/2ML nebulizer solution, Take 2 mL by nebulization Daily., Disp: 180 mL, Rfl: 1    Calcium Carbonate 1500 (600 Ca) MG tablet, Calcium 600 600 mg (1,500 mg) oral tablet take 1 tablet by oral route daily   Suspended, Disp: , Rfl:     cetirizine (zyrTEC) 10 MG tablet, Take 1 tablet by mouth Daily., Disp: 90 tablet, Rfl: 3    colestipol (COLESTID) 1 g tablet, Take 1 tablet by mouth Daily., Disp: 90 tablet, Rfl: 1    cyclobenzaprine (FLEXERIL) 10 MG tablet, Take 1 tablet by mouth 2 (Two) Times a Day As Needed., Disp: , Rfl:     ferrous sulfate 325 (65 FE) MG EC tablet, Take 1 tablet by mouth Daily With Breakfast., Disp: 90 tablet, Rfl: 1    fluticasone (FLONASE) 50 MCG/ACT nasal spray, 2 sprays into the nostril(s) as directed by provider Daily., Disp: 18 mL, Rfl: 5    hydroCHLOROthiazide (HYDRODIURIL) 25 MG tablet, Take 1 tablet by mouth Daily., Disp: 90 tablet, Rfl: 3    ipratropium-albuterol (DUO-NEB) 0.5-2.5 mg/3 ml nebulizer, Take 3 mL by nebulization 4 (Four) Times a Day., Disp: 1080 mL, Rfl: 1    metoprolol succinate XL (Toprol XL) 50 MG 24 hr tablet, Take 1 tablet by mouth Daily., Disp: 90 tablet, Rfl: 3    mirtazapine (REMERON) 30 MG tablet, Take 1 tablet by mouth Every Night., Disp: 90 tablet, Rfl: 3    montelukast (SINGULAIR) 10 MG tablet, Take 1 tablet by mouth Every Night., Disp: 90 tablet, Rfl:  "3    multivitamin with minerals tablet tablet, 1 tablet Daily., Disp: , Rfl:     O2 (OXYGEN), Inhale 2 L/min 1 (One) Time., Disp: , Rfl:     pantoprazole (PROTONIX) 40 MG EC tablet, Take 1 tablet by mouth Daily., Disp: 90 tablet, Rfl: 3    pregabalin (LYRICA) 100 MG capsule, Take 1 capsule by mouth 2 (Two) Times a Day., Disp: 60 capsule, Rfl: 2    Umeclidinium-Vilanterol (Anoro Ellipta) 62.5-25 MCG/ACT aerosol powder  inhaler, Inhale 1 puff Daily., Disp: 180 each, Rfl: 3     Objective     Vital Signs:   /68 (BP Location: Right arm, Patient Position: Sitting, Cuff Size: Adult)   Pulse 88   Temp 97.1 øF (36.2 øC) (Temporal)   Resp 20   Ht 162.6 cm (64\")   Wt 94.3 kg (208 lb)   SpO2 91% Comment: 2L Oxygen  BMI 35.70 kg/mý     Physical Exam  Vitals:    08/31/23 0810   BP: 126/68   Pulse: 88   Resp: 20   Temp: 97.1 øF (36.2 øC)   SpO2: 91%       General: Alert, NAD  HEENT:  EOMI, no sinus tenderness  Neck:  Supple, no thyromegaly,  no JVD  Lymph: no cervical, supraclavicular lymphadenopathy bilaterally  Chest:  clear to auscultation bilaterally, no wheezing or crackles; no work of breathing noted on 2L NC  CV: RRR, no M/G/R, pulses 2+, equal.  Abd:  Soft, NT, ND, +BS  EXT:  no clubbing, no cyanosis, no edema b/l  Neuro:  A&Ox3, CN grossly intact, no focal deficits  Skin: No rashes or lesions noted       Result Review :   I have personally reviewed patient's labs and images.          Assessment and Plan      Patient Active Problem List   Diagnosis    Allergic rhinitis    Anxiety    Sleep apnea    Cervical spondylosis without myelopathy    Congestive heart failure    High cholesterol    Hypercalcemia    Essential hypertension    Lumbar radiculopathy    Fibromyalgia    Chronic obstructive pulmonary disease    Chronic nonintractable headache    Frequency of micturition    Other long term (current) drug therapy    Syncope and collapse    Bilateral sacroiliitis    Acute respiratory failure with hypoxia    Weakness "       Impression and Plan:    COPD  Chronic hypoxic respiratory failure requiring oxygen, 2L NC  Previous tobacco user, half pack per day for over 40 years, quit 01/2023    -Continue 2 L nasal cannula; goal SPO2 88 to 92%  -We will give samples of Breztri 2 puffs twice daily; informed patient not to use Anoro during this time  -Continue as needed albuterol with as needed nebulizers  -We will order PFTs  -Low-dose CT done 6/30/2023 showed no new or suspicious nodule; continue annual screening    Vaccination status: Patient is up-to-date with her COVID and influenza vaccination.  We will give the PVC 20 pneumonia vaccination today.    Medications personally reviewed.    Follow Up   No follow-ups on file.  Patient was given instructions and counseling regarding her condition or for health maintenance advice. Please see specific information pulled into the AVS if appropriate.

## 2023-09-01 ENCOUNTER — OFFICE VISIT (OUTPATIENT)
Dept: FAMILY MEDICINE CLINIC | Age: 73
End: 2023-09-01
Payer: MEDICARE

## 2023-09-01 VITALS
HEART RATE: 108 BPM | HEIGHT: 64 IN | TEMPERATURE: 99 F | BODY MASS INDEX: 35.85 KG/M2 | WEIGHT: 210 LBS | OXYGEN SATURATION: 96 % | DIASTOLIC BLOOD PRESSURE: 69 MMHG | SYSTOLIC BLOOD PRESSURE: 135 MMHG

## 2023-09-01 DIAGNOSIS — K29.30 CHRONIC SUPERFICIAL GASTRITIS, PRESENCE OF BLEEDING UNSPECIFIED: ICD-10-CM

## 2023-09-01 DIAGNOSIS — M54.41 CHRONIC RIGHT-SIDED LOW BACK PAIN WITH RIGHT-SIDED SCIATICA: ICD-10-CM

## 2023-09-01 DIAGNOSIS — J44.9 CHRONIC OBSTRUCTIVE PULMONARY DISEASE, UNSPECIFIED COPD TYPE: Primary | ICD-10-CM

## 2023-09-01 DIAGNOSIS — G89.29 CHRONIC RIGHT-SIDED LOW BACK PAIN WITH RIGHT-SIDED SCIATICA: ICD-10-CM

## 2023-09-01 DIAGNOSIS — I51.89 DIASTOLIC DYSFUNCTION: ICD-10-CM

## 2023-09-01 PROCEDURE — 99214 OFFICE O/P EST MOD 30 MIN: CPT | Performed by: FAMILY MEDICINE

## 2023-09-01 PROCEDURE — 3075F SYST BP GE 130 - 139MM HG: CPT | Performed by: FAMILY MEDICINE

## 2023-09-01 PROCEDURE — 1159F MED LIST DOCD IN RCRD: CPT | Performed by: FAMILY MEDICINE

## 2023-09-01 PROCEDURE — 1160F RVW MEDS BY RX/DR IN RCRD: CPT | Performed by: FAMILY MEDICINE

## 2023-09-01 PROCEDURE — 3078F DIAST BP <80 MM HG: CPT | Performed by: FAMILY MEDICINE

## 2023-09-01 NOTE — PROGRESS NOTES
Eileen Kaufman presents to [facility].    Chief Complaint:  Follow up on ER visit, COPD exacerbation    Subjective        History of Present Illness:  Eileen Alvarez is being seen today for follow up related to her recent ER visit.  She was seen in the ER on 8/17/2023.  She went because of increased shortness of breath.  She had evaluation including labs and x-ray as noted below.  She ended up being treated with steroids and Z-Raghav.  She has completed those and is doing okay at this time.  She actually was seen by pulmonology yesterday, and some adjustment to her inhaler therapy was recommended.    Eileen Alvarez is having ongoing issues with low back pain.  She says that she has daily pain in the lower back that radiates to the hip and leg.  She has chronic degenerative changes and disc disease.  She might be interested in seeing pain mgmt again.  She struggles with pain and is not a good candidate for NSAID use.    Review of Systems:  Review of Systems   Constitutional:  Negative for chills and fever.   Respiratory:  Positive for shortness of breath. Negative for cough.    Cardiovascular:  Negative for chest pain and palpitations.   Gastrointestinal:  Negative for abdominal pain, nausea and vomiting.      Objective   Medical History:  Past Medical History:    Anemia, unspecified    Asymptomatic menopausal state    Chronic obstructive pulmonary disease with (acute) exacerbation    Essential (primary) hypertension    Low back pain    Major depressive disorder, single episode, moderate    Nicotine dependence, unspecified, uncomplicated    Osteopenia    Other intervertebral disc degeneration, lumbar region    Other long term (current) drug therapy    Solitary pulmonary nodule    Vitamin D deficiency, unspecified     Past Surgical History:    CARDIAC CATHETERIZATION    CARPAL TUNNEL RELEASE    CHOLECYSTECTOMY    COLONOSCOPY    Normal    HYSTERECTOMY    Total    KNEE ARTHROSCOPY    NECK SURGERY      Family  History   Problem Relation Age of Onset    Cerebral aneurysm Mother         cause of death    Heart attack Father         cause of death    Esophageal cancer Brother      Social History     Tobacco Use    Smoking status: Former     Packs/day: 1.00     Years: 50.00     Pack years: 50.00     Types: Cigarettes     Quit date: 2023     Years since quittin.6     Passive exposure: Past    Smokeless tobacco: Never    Tobacco comments:     Eileen Alvarez was 23 when she started smoking.  She did stop smoking for some time - about 13 years.  She has been smoking a total of approximately 33 years.  She has smoked less than a pack daily during this time. She currently uses smokless, vapor can/pouches   Substance Use Topics    Alcohol use: Yes     Comment: Socially       Health Maintenance Due   Topic Date Due    TDAP/TD VACCINES (1 - Tdap) Never done    DXA SCAN  2022    ZOSTER VACCINE (2 of 2) 2022    COVID-19 Vaccine (6 - Pfizer series) 01/10/2023    ANNUAL WELLNESS VISIT  2023        Immunization History   Administered Date(s) Administered    COVID-19 (MODERNA) BIVALENT 12+YRS 09/10/2022    COVID-19 (MODERNA) Monovalent Original Booster 2022    COVID-19 (PFIZER) Purple Cap Monovalent 2020, 2021, 2021    Fluzone High Dose =>65 Years (Vaxcare ONLY) 10/01/2021, 2022    Hepatitis A 2018    Influenza Quad Vaccine (Inpatient) 2013    Influenza, Unspecified 10/01/2020    Pneumococcal Conjugate 13-Valent (PCV13) 2018    Pneumococcal Conjugate 20-Valent (PCV20) 2023    Pneumococcal Polysaccharide (PPSV23) 2013, 2017    Shingrix 09/10/2022       Allergies   Allergen Reactions    Sulfa Antibiotics Unknown - Low Severity    Aspirin Rash    Atorvastatin Other (See Comments)     Hair loss        Medications:  Current Outpatient Medications on File Prior to Visit   Medication Sig    albuterol (ACCUNEB) 0.63 MG/3ML nebulizer solution Inhale 3 mL Every 6  (Six) Hours As Needed.    albuterol sulfate HFA (Ventolin HFA) 108 (90 Base) MCG/ACT inhaler Inhale 2 puffs Every 6 (Six) Hours As Needed for Wheezing.    amLODIPine (NORVASC) 5 MG tablet Take 1 tablet by mouth 2 (Two) Times a Day.    Budeson-Glycopyrrol-Formoterol (Breztri Aerosphere) 160-9-4.8 MCG/ACT aerosol inhaler Inhale 2 puffs 2 (Two) Times a Day.    budesonide (PULMICORT) 0.5 MG/2ML nebulizer solution Take 2 mL by nebulization Daily.    Calcium Carbonate 1500 (600 Ca) MG tablet Calcium 600 600 mg (1,500 mg) oral tablet take 1 tablet by oral route daily   Suspended    cetirizine (zyrTEC) 10 MG tablet Take 1 tablet by mouth Daily.    colestipol (COLESTID) 1 g tablet Take 1 tablet by mouth Daily.    cyclobenzaprine (FLEXERIL) 10 MG tablet Take 1 tablet by mouth 2 (Two) Times a Day As Needed.    ferrous sulfate 325 (65 FE) MG EC tablet Take 1 tablet by mouth Daily With Breakfast.    fluticasone (FLONASE) 50 MCG/ACT nasal spray 2 sprays into the nostril(s) as directed by provider Daily.    hydroCHLOROthiazide (HYDRODIURIL) 25 MG tablet Take 1 tablet by mouth Daily.    ipratropium-albuterol (DUO-NEB) 0.5-2.5 mg/3 ml nebulizer Take 3 mL by nebulization 4 (Four) Times a Day.    metoprolol succinate XL (Toprol XL) 50 MG 24 hr tablet Take 1 tablet by mouth Daily.    mirtazapine (REMERON) 30 MG tablet Take 1 tablet by mouth Every Night.    montelukast (SINGULAIR) 10 MG tablet Take 1 tablet by mouth Every Night.    multivitamin with minerals tablet tablet 1 tablet Daily.    O2 (OXYGEN) Inhale 2 L/min 1 (One) Time.    pantoprazole (PROTONIX) 40 MG EC tablet Take 1 tablet by mouth Daily.    pregabalin (LYRICA) 100 MG capsule Take 1 capsule by mouth 2 (Two) Times a Day.    Budeson-Glycopyrrol-Formoterol (Breztri Aerosphere) 160-9-4.8 MCG/ACT aerosol inhaler Inhale 2 puffs 2 (Two) Times a Day for 1 day.    Umeclidinium-Vilanterol (Anoro Ellipta) 62.5-25 MCG/ACT aerosol powder  inhaler Inhale 1 puff Daily.     No current  "facility-administered medications on file prior to visit.       Vital Signs:   /69 (BP Location: Left arm, Patient Position: Sitting)   Pulse 108   Temp 99 øF (37.2 øC) (Oral)   Ht 162.6 cm (64.02\")   Wt 95.3 kg (210 lb)   SpO2 96% Comment: 2 liters  BMI 36.03 kg/mý       Physical Exam:  Physical Exam  Vitals reviewed.   Constitutional:       General: She is not in acute distress.     Appearance: She is not ill-appearing.   Eyes:      Pupils: Pupils are equal, round, and reactive to light.   Neck:      Comments: No thyromegaly  Cardiovascular:      Rate and Rhythm: Normal rate and regular rhythm.   Pulmonary:      Effort: Pulmonary effort is normal.      Comments: Decreased breath sounds are present bilaterally.  Abdominal:      General: There is no distension.      Palpations: Abdomen is soft.      Tenderness: There is no abdominal tenderness.   Musculoskeletal:      Cervical back: Neck supple.   Lymphadenopathy:      Cervical: No cervical adenopathy.   Skin:     Findings: No lesion or rash.   Neurological:      Mental Status: She is alert.       Result Review      The following data was reviewed by Artur Quevedo MD on 09/01/2023.  Lab Results   Component Value Date    WBC 11.09 (H) 07/03/2023    HGB 8.8 (A) 07/27/2023    HCT 32.9 (L) 07/03/2023    MCV 80.6 07/03/2023     07/03/2023     Lab Results   Component Value Date    GLUCOSE 106 (H) 06/26/2023    BUN 19 06/26/2023    CREATININE 0.74 06/26/2023     06/26/2023    K 4.2 06/26/2023     06/26/2023    CO2 24.0 06/26/2023    CALCIUM 10.2 06/26/2023    PROTEINTOT 6.9 06/26/2023    ALBUMIN 4.2 06/26/2023    ALT 9 06/26/2023    AST 10 06/26/2023    ALKPHOS 89 06/26/2023    BILITOT <0.2 06/26/2023    EGFR 86.6 06/26/2023    GLOB 2.7 06/26/2023    AGRATIO 1.6 06/26/2023    BCR 25.7 (H) 06/26/2023    ANIONGAP 10.0 06/26/2023      Lab Results   Component Value Date    CHOL 187 03/08/2023    CHLPL 200 11/14/2019    TRIG 99 03/08/2023 "    HDL 61 (H) 03/08/2023     (H) 03/08/2023     Lab Results   Component Value Date    TSH 1.650 03/08/2023     Lab Results   Component Value Date    HGBA1C 5.40 03/08/2023     High Sensitivity Troponin I (08/18/2023 01:06)  High Sensitivity Troponin I (08/17/2023 23:15)  CBC with Auto Diff (08/17/2023 23:15)  APTT (08/17/2023 23:15)  PROTIME-INR (08/17/2023 23:15)  COMPREHENSIVE METABOLIC PANEL (08/17/2023 23:15)  XR Chest 1 View (08/17/2023 23:32)   MRI Lumbar Spine Without Contrast (11/03/2021 11:51)          Assessment and Plan:   Today, we have reviewed her care.  From the standpoint of the COPD exacerbation, she seems stable at this time.  I did review the labs and radiology from her ER visit.  She will be following up with pulmonology again in a couple of months.  Regarding the chronic low back pain, she has actually been taking some over-the-counter medication to try to help with this.  She is not a good candidate for NSAID use though because of previous issues with gastritis.  We will make referral to pain management locally for a second opinion.  Finally, she was noted to have some elevated right-sided pressures of the heart and mild diastolic dysfunction.  She expresses concerns as to whether there may be a cardiac issue present.  We will make referral to cardiology for evaluation as well.  Tentative follow-up will be with me as scheduled in early October.       Diagnoses and all orders for this visit:    1. Chronic obstructive pulmonary disease, unspecified COPD type (Primary)  Comments:  As above.    2. Chronic right-sided low back pain with right-sided sciatica  -     Ambulatory Referral to Pain Management Clinic    3. Chronic superficial gastritis, presence of bleeding unspecified  Comments:  As above.    4. Diastolic dysfunction  -     Ambulatory Referral to Cardiology    Follow Up   Return in about 31 days (around 10/2/2023) for Recheck as scheduled.  Patient was given instructions and  counseling regarding her condition or for health maintenance advice. Please see specific information pulled into the AVS if appropriate.

## 2023-09-05 ENCOUNTER — PATIENT ROUNDING (BHMG ONLY) (OUTPATIENT)
Dept: PULMONOLOGY | Facility: CLINIC | Age: 73
End: 2023-09-05
Payer: MEDICARE

## 2023-09-05 NOTE — PROGRESS NOTES
September 5, 2023    Hello, may I speak with Eileen Kaufman?    My name is Keren      I am  with [facility]UL[facility] CRITICAL CARE MEDICINE  [address]  [address]  [id number] [address] [phone].    Before we get started may I verify your date of birth? 1950    I am calling to officially welcome you to our practice and ask about your recent visit. Is this a good time to talk? My chart message sent for patient rounding.

## 2023-09-18 ENCOUNTER — TELEPHONE (OUTPATIENT)
Dept: PULMONOLOGY | Facility: CLINIC | Age: 73
End: 2023-09-18

## 2023-09-18 NOTE — TELEPHONE ENCOUNTER
Caller: Eileen Kaufman    Relationship: Self    Best call back number: [phone]     What is the best time to reach you: ANYTIME    Who are you requesting to speak with (clinical staff, provider,  specific staff member):     Do you know the name of the person who called:     What was the call regarding: HER APPT ON 10/4/23    Is it okay if the provider responds through MyChart: NA

## 2023-09-26 ENCOUNTER — OFFICE VISIT (OUTPATIENT)
Dept: CARDIOLOGY | Facility: CLINIC | Age: 73
End: 2023-09-26
Payer: MEDICARE

## 2023-09-26 VITALS
SYSTOLIC BLOOD PRESSURE: 151 MMHG | BODY MASS INDEX: 36.5 KG/M2 | WEIGHT: 213.8 LBS | HEART RATE: 94 BPM | HEIGHT: 64 IN | DIASTOLIC BLOOD PRESSURE: 83 MMHG

## 2023-09-26 DIAGNOSIS — R06.00 DYSPNEA, UNSPECIFIED TYPE: Primary | ICD-10-CM

## 2023-09-26 DIAGNOSIS — I10 ESSENTIAL HYPERTENSION: ICD-10-CM

## 2023-09-26 RX ORDER — METOPROLOL SUCCINATE 50 MG/1
50 TABLET, EXTENDED RELEASE ORAL 2 TIMES DAILY
Qty: 180 TABLET | Refills: 3 | Status: SHIPPED | OUTPATIENT
Start: 2023-09-26

## 2023-09-26 NOTE — PROGRESS NOTES
Chief Complaint  Follow-up (Echo results)    Subjective        History of Present Illness  Eileen Kaufman presents to [facility] CARDIOLOGY   Ms. Kaufman is a 73-year-old female patient coming in today for cardiac follow-up.  She has been having ongoing worsening shortness of breath, at least largely in part due to underlying COPD requiring oxygen use.  Her PCP had her complete echocardiogram just to reevaluate cardiac function recently.  2 emergency room evaluations over the course is related to shortness of breath and chest pain, complaints of chest pain.  No significant EKG changes, cardiac enzymes have been negative, and CT of the chest was also negative for any PE.   Visit today she denies any complaints of chest pain or pressure.  Shortness of breath is fairly chronic in nature, and she is following with pulmonology.  States she occasionally feels like she has palpitations, but describes as a racing sensation, but not fluttering,/skipping.      Past History:     (1) Hypertension. (2) Negative for diabetes mellitus or coronary artery disease. 3) COPD       Past Medical History:   Diagnosis Date    Anemia, unspecified     Asymptomatic menopausal state     Chronic obstructive pulmonary disease with (acute) exacerbation     Essential (primary) hypertension     Low back pain     Major depressive disorder, single episode, moderate     Nicotine dependence, unspecified, uncomplicated     Osteopenia     Other intervertebral disc degeneration, lumbar region     Other long term (current) drug therapy     Solitary pulmonary nodule     Vitamin D deficiency, unspecified        Allergies   Allergen Reactions    Sulfa Antibiotics Unknown - Low Severity    Aspirin Rash    Atorvastatin Other (See Comments)     Hair loss        Past Surgical History:   Procedure Laterality Date    CARDIAC CATHETERIZATION  07/25/2012    CARPAL TUNNEL RELEASE      CHOLECYSTECTOMY      COLONOSCOPY  08/24/2022    Normal    HYSTERECTOMY   1980s    Total    KNEE ARTHROSCOPY Left     NECK SURGERY  01/21/2015        Social History  She  reports that she quit smoking about 8 months ago. Her smoking use included cigarettes. She started smoking about 50 years ago. She has a 50.00 pack-year smoking history. She has been exposed to tobacco smoke. She has never used smokeless tobacco. She reports current alcohol use. She reports that she does not use drugs.    Family History  Her family history includes Cerebral aneurysm in her mother; Esophageal cancer in her brother; Heart attack in her father.       Current Outpatient Medications on File Prior to Visit   Medication Sig    albuterol (ACCUNEB) 0.63 MG/3ML nebulizer solution Inhale 3 mL Every 6 (Six) Hours As Needed.    albuterol sulfate HFA (Ventolin HFA) 108 (90 Base) MCG/ACT inhaler Inhale 2 puffs Every 6 (Six) Hours As Needed for Wheezing.    budesonide (PULMICORT) 0.5 MG/2ML nebulizer solution Take 2 mL by nebulization Daily.    Calcium Carbonate 1500 (600 Ca) MG tablet Calcium 600 600 mg (1,500 mg) oral tablet take 1 tablet by oral route daily   Suspended    cetirizine (zyrTEC) 10 MG tablet Take 1 tablet by mouth Daily.    cyclobenzaprine (FLEXERIL) 10 MG tablet Take 1 tablet by mouth 2 (Two) Times a Day As Needed.    ferrous sulfate 325 (65 FE) MG EC tablet Take 1 tablet by mouth Daily With Breakfast.    ipratropium-albuterol (DUO-NEB) 0.5-2.5 mg/3 ml nebulizer Take 3 mL by nebulization 4 (Four) Times a Day.    multivitamin with minerals tablet tablet 1 tablet Daily.    O2 (OXYGEN) Inhale 2 L/min 1 (One) Time.     No current facility-administered medications on file prior to visit.         Review of Systems   Constitutional:  Positive for fatigue.   Respiratory:  Positive for shortness of breath. Negative for cough and chest tightness.    Cardiovascular:  Negative for chest pain, palpitations and leg swelling.   Gastrointestinal:  Negative for nausea and vomiting.   Musculoskeletal:  Positive for  "arthralgias.   Neurological:  Negative for dizziness and syncope.   Hematological:  Does not bruise/bleed easily.        Objective   Vitals:    09/26/23 0925   BP: 151/83   Pulse: 94   Weight: 97 kg (213 lb 12.8 oz)   Height: 162.6 cm (64\")         Physical Exam  General : Alert, awake, no acute distress  Neck : Supple, no carotid bruit, no jugular venous distention  CVS : Regular rate and rhythm, no murmur, rubs or gallops  Lungs: Clear to auscultation bilaterally, prolonged expiration, fine wheezes on expiration, no crackles or rhonchi  Abdomen: Soft, nontender, bowel sounds active  Extremities: Warm, well-perfused, no pedal edema      Result Review     The following data was reviewed by Madelin Slaughter, APRN  No results found for: \"PROBNP\"  CMP          3/8/2023    17:05 6/26/2023    09:18   CMP   Glucose 84  106    BUN 18  19    Creatinine 0.85  0.74    EGFR 73.8  86.6    Sodium 140  140    Potassium 3.9  4.2    Chloride 103  106    Calcium 9.4  10.2    Total Protein 7.2  6.9    Albumin 4.3  4.2    Globulin 2.9  2.7    Total Bilirubin 0.2  <0.2    Alkaline Phosphatase 127  89    AST (SGOT) 21  10    ALT (SGPT) 15  9    Albumin/Globulin Ratio 1.5  1.6    BUN/Creatinine Ratio 21.2  25.7    Anion Gap 10.0  10.0      CBC w/diff          6/26/2023    09:18 7/3/2023    16:18 7/27/2023    15:36   CBC w/Diff   WBC 18.39  11.09     RBC 3.82  4.08     Hemoglobin 10.0  10.5  8.8    Hematocrit 32.8  32.9     MCV 85.9  80.6     MCH 26.2  25.7     MCHC 30.5  31.9     RDW 16.2  15.5     Platelets 435  404     Neutrophil Rel % 82.0  66.2     Immature Granulocyte Rel % 2.0  1.0     Lymphocyte Rel % 10.6  19.2     Monocyte Rel % 5.1  11.5     Eosinophil Rel % 0.1  1.4     Basophil Rel % 0.2  0.7        Lab Results   Component Value Date    TSH 2.909 09/07/2023      Lab Results   Component Value Date    FREET4 0.80 09/07/2023      No results found for: \"DDIMERQUANT\"  Magnesium   Date Value Ref Range Status   09/07/2023 1.9 1.8 - " "2.4 mg/dL Final      No results found for: \"DIGOXIN\"   No results found for: \"TROPONINT\"        Lipid Panel          3/8/2023    17:05   Lipid Panel   Total Cholesterol 187    Triglycerides 99    HDL Cholesterol 61    VLDL Cholesterol 18    LDL Cholesterol  108    LDL/HDL Ratio 1.74        Results for orders placed during the hospital encounter of 08/28/23    Adult Transthoracic Echo Complete W/ Cont if Necessary Per Protocol    Interpretation Summary    Left ventricular systolic function is normal. Calculated left ventricular EF = 60.5%    Sigmoid-shaped ventricular septum is present.    Left ventricular diastolic function is consistent with (grade I) impaired relaxation and age.    Estimated right ventricular systolic pressure from tricuspid regurgitation is moderately elevated (45-55 mmHg).    No hemodynamically significant valvular pathology.             Assessment and Plan   Diagnoses and all orders for this visit:    1. Dyspnea, unspecified type (Primary)  Assessment & Plan:  He is chronic ongoing shortness of breath, which she feels like is progressively get worse.  She does not have any appearance of volume overload.  Echocardiogram shows grade 1 diastolic dysfunction, however this is not changed since previous echocardiogram.  Her symptoms are at least in large part due to her underlying respiratory disease, however did discuss with her this could be an anginal equivalent.  She has had a remote cardiac catheterization (currently I am unable to locate those records) per patient which was normal.  We discussed option for further evaluation with stress test, however she prefers to work on rate control and see if this alleviates any of her symptoms.      2. Essential hypertension  Assessment & Plan:  Blood pressure is moderately elevated in the office today, and her  heart rate is also fast.  Continue current doses of amlodipine, hydrochlorothiazide, and we will add beta-blocker to help reduce her rate this may " offset some of her symptoms.    Orders:  -     metoprolol succinate XL (Toprol XL) 50 MG 24 hr tablet; Take 1 tablet by mouth 2 (Two) Times a Day.  Dispense: 180 tablet; Refill: 3            Follow Up   Return in about 4 months (around 1/26/2024) for Next scheduled follow up, with Dr. Mao.    Patient was given instructions and counseling regarding her condition or for health maintenance advice. Please see specific information pulled into the AVS if appropriate.     Signed,  Madelin Slaughter, APRN  09/26/2023     Dictated Utilizing Dragon Dictation: Please note that portions of this note were completed with a voice recognition program.  Part of this note may be an electronic transcription/translation of spoken language to printed text using the Dragon Dictation System.

## 2023-09-27 ENCOUNTER — HOSPITAL ENCOUNTER (OUTPATIENT)
Dept: RESPIRATORY THERAPY | Facility: HOSPITAL | Age: 73
Discharge: HOME | End: 2023-09-27
Admitting: STUDENT IN AN ORGANIZED HEALTH CARE EDUCATION/TRAINING PROGRAM
Payer: MEDICARE

## 2023-09-27 DIAGNOSIS — J96.11 CHRONIC RESPIRATORY FAILURE WITH HYPOXIA: ICD-10-CM

## 2023-09-27 DIAGNOSIS — J44.9 CHRONIC OBSTRUCTIVE PULMONARY DISEASE, UNSPECIFIED COPD TYPE: ICD-10-CM

## 2023-09-27 PROCEDURE — 94060 EVALUATION OF WHEEZING: CPT

## 2023-09-27 PROCEDURE — 94729 DIFFUSING CAPACITY: CPT

## 2023-09-27 PROCEDURE — 94726 PLETHYSMOGRAPHY LUNG VOLUMES: CPT

## 2023-09-27 RX ORDER — ALBUTEROL SULFATE 2.5 MG/3ML
2.5 SOLUTION RESPIRATORY (INHALATION) ONCE
Status: COMPLETED | OUTPATIENT
Start: 2023-09-27 | End: 2023-09-27

## 2023-09-27 RX ADMIN — ALBUTEROL SULFATE 2.5 MG: 2.5 SOLUTION RESPIRATORY (INHALATION) at 11:58

## 2023-10-02 ENCOUNTER — OFFICE VISIT (OUTPATIENT)
Dept: FAMILY MEDICINE CLINIC | Age: 73
End: 2023-10-02
Payer: MEDICARE

## 2023-10-02 VITALS
WEIGHT: 205.4 LBS | TEMPERATURE: 99.2 F | DIASTOLIC BLOOD PRESSURE: 61 MMHG | HEART RATE: 88 BPM | SYSTOLIC BLOOD PRESSURE: 108 MMHG | HEIGHT: 64 IN | OXYGEN SATURATION: 98 % | BODY MASS INDEX: 35.07 KG/M2

## 2023-10-02 DIAGNOSIS — J34.89 RHINORRHEA: ICD-10-CM

## 2023-10-02 DIAGNOSIS — J30.9 ALLERGIC RHINITIS, UNSPECIFIED SEASONALITY, UNSPECIFIED TRIGGER: ICD-10-CM

## 2023-10-02 DIAGNOSIS — Z79.899 OTHER LONG TERM (CURRENT) DRUG THERAPY: ICD-10-CM

## 2023-10-02 DIAGNOSIS — Z00.00 PHYSICAL EXAM: Primary | ICD-10-CM

## 2023-10-02 DIAGNOSIS — M47.816 LUMBAR SPONDYLOSIS: ICD-10-CM

## 2023-10-02 DIAGNOSIS — K52.9 CHRONIC DIARRHEA: ICD-10-CM

## 2023-10-02 DIAGNOSIS — K21.9 GASTROESOPHAGEAL REFLUX DISEASE, UNSPECIFIED WHETHER ESOPHAGITIS PRESENT: ICD-10-CM

## 2023-10-02 DIAGNOSIS — I10 ESSENTIAL HYPERTENSION: ICD-10-CM

## 2023-10-02 DIAGNOSIS — H91.93 BILATERAL HEARING LOSS, UNSPECIFIED HEARING LOSS TYPE: ICD-10-CM

## 2023-10-02 PROCEDURE — 99214 OFFICE O/P EST MOD 30 MIN: CPT | Performed by: FAMILY MEDICINE

## 2023-10-02 PROCEDURE — 1159F MED LIST DOCD IN RCRD: CPT | Performed by: FAMILY MEDICINE

## 2023-10-02 PROCEDURE — 1160F RVW MEDS BY RX/DR IN RCRD: CPT | Performed by: FAMILY MEDICINE

## 2023-10-02 PROCEDURE — 3074F SYST BP LT 130 MM HG: CPT | Performed by: FAMILY MEDICINE

## 2023-10-02 PROCEDURE — G0439 PPPS, SUBSEQ VISIT: HCPCS | Performed by: FAMILY MEDICINE

## 2023-10-02 PROCEDURE — 1170F FXNL STATUS ASSESSED: CPT | Performed by: FAMILY MEDICINE

## 2023-10-02 PROCEDURE — 3078F DIAST BP <80 MM HG: CPT | Performed by: FAMILY MEDICINE

## 2023-10-02 RX ORDER — HYDROCHLOROTHIAZIDE 25 MG/1
25 TABLET ORAL DAILY
Qty: 90 TABLET | Refills: 3 | Status: SHIPPED | OUTPATIENT
Start: 2023-10-02

## 2023-10-02 RX ORDER — AMLODIPINE BESYLATE 5 MG/1
5 TABLET ORAL 2 TIMES DAILY
Qty: 180 TABLET | Refills: 3 | Status: SHIPPED | OUTPATIENT
Start: 2023-10-02

## 2023-10-02 RX ORDER — FLUTICASONE PROPIONATE 50 MCG
2 SPRAY, SUSPENSION (ML) NASAL DAILY
Qty: 18 ML | Refills: 5 | Status: SHIPPED | OUTPATIENT
Start: 2023-10-02

## 2023-10-02 RX ORDER — MONTELUKAST SODIUM 4 MG/1
1 TABLET, CHEWABLE ORAL DAILY
Qty: 90 TABLET | Refills: 1 | Status: SHIPPED | OUTPATIENT
Start: 2023-10-02

## 2023-10-02 RX ORDER — MONTELUKAST SODIUM 10 MG/1
10 TABLET ORAL NIGHTLY
Qty: 90 TABLET | Refills: 3 | Status: SHIPPED | OUTPATIENT
Start: 2023-10-02

## 2023-10-02 RX ORDER — PANTOPRAZOLE SODIUM 40 MG/1
40 TABLET, DELAYED RELEASE ORAL DAILY
Qty: 90 TABLET | Refills: 3 | Status: SHIPPED | OUTPATIENT
Start: 2023-10-02

## 2023-10-02 RX ORDER — PREGABALIN 100 MG/1
100 CAPSULE ORAL 2 TIMES DAILY
Qty: 60 CAPSULE | Refills: 2 | Status: SHIPPED | OUTPATIENT
Start: 2023-10-02

## 2023-10-02 RX ORDER — MIRTAZAPINE 30 MG/1
30 TABLET, FILM COATED ORAL NIGHTLY
Qty: 90 TABLET | Refills: 3 | Status: SHIPPED | OUTPATIENT
Start: 2023-10-02

## 2023-10-02 NOTE — PROGRESS NOTES
The ABCs of the Annual Wellness Visit  Subsequent Medicare Wellness Visit    Subjective    Eileen Kaufman is a 73 y.o. female who presents for a Subsequent Medicare Wellness Visit.    The following portions of the patient's history were reviewed and updated as appropriate: allergies, current medications, past family history, past medical history, past social history, past surgical history, and problem list.    Compared to one year ago, the patient feels her physical health is worse.    Compared to one year ago, the patient feels her mental health is worse.    Recent Hospitalizations:  She was admitted within the past 365 days at [facility].     Current Medical Providers:  Patient Care Team:  Artur Quevedo MD as PCP - General (Family Medicine)  Neisha Milton APRN as Nurse Practitioner (Nurse Practitioner)  Madelin Slaughter APRN as Nurse Practitioner (Nurse Practitioner)    Outpatient Medications Prior to Visit   Medication Sig Dispense Refill    albuterol (ACCUNEB) 0.63 MG/3ML nebulizer solution Inhale 3 mL Every 6 (Six) Hours As Needed.      albuterol sulfate HFA (Ventolin HFA) 108 (90 Base) MCG/ACT inhaler Inhale 2 puffs Every 6 (Six) Hours As Needed for Wheezing. 8 g 11    Budeson-Glycopyrrol-Formoterol (Breztri Aerosphere) 160-9-4.8 MCG/ACT aerosol inhaler Inhale 2 puffs 2 (Two) Times a Day. 1 each 5    budesonide (PULMICORT) 0.5 MG/2ML nebulizer solution Take 2 mL by nebulization Daily. 180 mL 1    Calcium Carbonate 1500 (600 Ca) MG tablet Calcium 600 600 mg (1,500 mg) oral tablet take 1 tablet by oral route daily   Suspended      cetirizine (zyrTEC) 10 MG tablet Take 1 tablet by mouth Daily. 90 tablet 3    cyclobenzaprine (FLEXERIL) 10 MG tablet Take 1 tablet by mouth 2 (Two) Times a Day As Needed.      ferrous sulfate 325 (65 FE) MG EC tablet Take 1 tablet by mouth Daily With Breakfast. 90 tablet 1    ipratropium-albuterol (DUO-NEB) 0.5-2.5 mg/3 ml nebulizer Take 3 mL by nebulization 4 (Four)  Times a Day. 1080 mL 1    metoprolol succinate XL (Toprol XL) 50 MG 24 hr tablet Take 1 tablet by mouth 2 (Two) Times a Day. 180 tablet 3    multivitamin with minerals tablet tablet 1 tablet Daily.      O2 (OXYGEN) Inhale 2 L/min 1 (One) Time.      amLODIPine (NORVASC) 5 MG tablet Take 1 tablet by mouth 2 (Two) Times a Day. 180 tablet 3    colestipol (COLESTID) 1 g tablet Take 1 tablet by mouth Daily. 90 tablet 1    fluticasone (FLONASE) 50 MCG/ACT nasal spray 2 sprays into the nostril(s) as directed by provider Daily. 18 mL 5    hydroCHLOROthiazide (HYDRODIURIL) 25 MG tablet Take 1 tablet by mouth Daily. 90 tablet 3    mirtazapine (REMERON) 30 MG tablet Take 1 tablet by mouth Every Night. 90 tablet 3    montelukast (SINGULAIR) 10 MG tablet Take 1 tablet by mouth Every Night. 90 tablet 3    pantoprazole (PROTONIX) 40 MG EC tablet Take 1 tablet by mouth Daily. 90 tablet 3    pregabalin (LYRICA) 100 MG capsule Take 1 capsule by mouth 2 (Two) Times a Day. 60 capsule 2     No facility-administered medications prior to visit.     No opioid medication identified on active medication list. I have reviewed chart for other potential  high risk medication/s and harmful drug interactions in the elderly.      Aspirin is not on active medication list.  Aspirin use is not indicated based on review of current medical condition/s. Risk of harm outweighs potential benefits.  .    Patient Active Problem List   Diagnosis    Allergic rhinitis    Anxiety    Sleep apnea    Cervical spondylosis without myelopathy    Congestive heart failure    High cholesterol    Hypercalcemia    Essential hypertension    Lumbar radiculopathy    Fibromyalgia    Chronic obstructive pulmonary disease    Chronic nonintractable headache    Frequency of micturition    Other long term (current) drug therapy    Syncope and collapse    Bilateral sacroiliitis    Acute respiratory failure with hypoxia    Weakness    Chronic respiratory failure with hypoxia     "Chronic right-sided low back pain with right-sided sciatica    Chronic superficial gastritis    Diastolic dysfunction     Advance Care Planning  Advance Directive is not on file.  ACP discussion was held with the patient during this visit. Patient does not have an advance directive, information provided.  Her son, Mitch, would make decisions if needed.     Objective    Vitals:    10/02/23 1544 10/02/23 1551   BP: 108/61    BP Location: Left arm    Patient Position: Sitting    Pulse: 88    Temp: 99.2 °F (37.3 °C)    TempSrc: Oral    SpO2: (!) 83%  Comment: RA 98%  Comment: 2 liters   Weight: 93.2 kg (205 lb 6.4 oz)    Height: 162.6 cm (64.02\")    PainSc:   9    PainLoc: Back      Estimated body mass index is 35.24 kg/m² as calculated from the following:    Height as of this encounter: 162.6 cm (64.02\").    Weight as of this encounter: 93.2 kg (205 lb 6.4 oz).    Class 2 Severe Obesity (BMI >=35 and <=39.9). Obesity-related health conditions include the following: obstructive sleep apnea and hypertension. Obesity is worsening. BMI is is above average; BMI management plan is completed. We discussed portion control and increasing exercise.    Does the patient have evidence of cognitive impairment? No        HEALTH RISK ASSESSMENT    Smoking Status:  Social History     Tobacco Use   Smoking Status Former    Packs/day: 1.00    Years: 50.00    Pack years: 50.00    Types: Cigarettes    Quit date: 2023    Years since quittin.7    Passive exposure: Past   Smokeless Tobacco Never   Tobacco Comments    Eileen Alvarez was 23 when she started smoking.  She did stop smoking for some time - about 13 years.  She has been smoking a total of approximately 33 years.  She has smoked less than a pack daily during this time. She currently uses smokless, vapor can/pouches     Alcohol Consumption:  Social History     Substance and Sexual Activity   Alcohol Use Yes    Comment: Socially     Fall Risk Screen:    JONATHAN Fall Risk Assessment " was completed, and patient is at MODERATE risk for falls. Assessment completed on:10/2/2023    Depression Screening:      10/2/2023     3:48 PM   PHQ-2/PHQ-9 Depression Screening   Little Interest or Pleasure in Doing Things 0-->not at all   Feeling Down, Depressed or Hopeless 0-->not at all   PHQ-9: Brief Depression Severity Measure Score 0       Health Habits and Functional and Cognitive Screening:      10/2/2023     3:49 PM   Functional & Cognitive Status   Do you have difficulty preparing food and eating? No   Do you have difficulty bathing yourself, getting dressed or grooming yourself? Yes   Do you have difficulty using the toilet? No   Do you have difficulty moving around from place to place? No   Do you have trouble with steps or getting out of a bed or a chair? Yes   Current Diet Well Balanced Diet   Dental Exam Up to date   Eye Exam Up to date   Exercise (times per week) 2 times per week   Current Exercises Include Other;Walking        Exercise Comment leg exercises   Do you need help using the phone?  No   Are you deaf or do you have serious difficulty hearing?  No   Do you need help to go to places out of walking distance? No   Do you need help shopping? No   Do you need help preparing meals?  No   Do you need help with housework?  Yes   Do you need help with laundry? No   Do you need help taking your medications? No   Do you need help managing money? No   Do you ever drive or ride in a car without wearing a seat belt? No   Have you felt unusual stress, anger or loneliness in the last month? Yes   Who do you live with? Child   If you need help, do you have trouble finding someone available to you? No   Have you been bothered in the last four weeks by sexual problems? No   Do you have difficulty concentrating, remembering or making decisions? No       Age-appropriate Screening Schedule:  Refer to the list below for future screening recommendations based on patient's age, sex and/or medical conditions.  Orders for these recommended tests are listed in the plan section. The patient has been provided with a written plan.    Health Maintenance   Topic Date Due    TDAP/TD VACCINES (1 - Tdap) Never done    ZOSTER VACCINE (2 of 2) 11/05/2022    COVID-19 Vaccine (6 - 2023-24 season) 09/01/2023    DXA SCAN  03/30/2024 (Originally 8/25/2022)    INFLUENZA VACCINE  03/31/2024 (Originally 8/1/2023)    LIPID PANEL  03/08/2024    BMI FOLLOWUP  05/23/2024    LUNG CANCER SCREENING  09/21/2024    ANNUAL WELLNESS VISIT  10/02/2024    MAMMOGRAM  07/17/2025    COLORECTAL CANCER SCREENING  08/24/2032    HEPATITIS C SCREENING  Completed    Pneumococcal Vaccine 65+  Completed          CMS Preventative Services Quick Reference  Risk Factors Identified During Encounter  Chronic Pain:  Continue current medications.  Depression/Dysphoria: Current medication is effective, no change recommended  Hearing Problem: Referral to ENT ordered  Immunizations Discussed/Encouraged: Tdap, Shingrix, and COVID19  The above risks/problems have been discussed with the patient.  Pertinent information has been shared with the patient in the After Visit Summary.  An After Visit Summary and PPPS were made available to the patient.    Follow Up:   Next Medicare Wellness visit to be scheduled in 1 year.     Additional E&M Note during same encounter follows:  Patient has multiple medical problems which are significant and separately identifiable that require additional work above and beyond the Medicare Wellness Visit.      Chief Complaint:  Chronic pain, blood pressure, COPD    Subjective    History of Present Illness:  In addition to the Medicare wellness exam, Eileen Alvarez is also here for follow-up on her usual issues.  She has chronic pain from disc and low back issues.  She currently takes generic Lyrica for this.  She does find the medication to be of some benefit and would like to continue it currently.  Kevyn is reviewed today.     Eileen Alvarez also has other ongoing  "health problems including hypertension.  Her blood pressure is well controlled today.  Her medications are reviewed as noted.    Review of Systems:  Review of Systems   Constitutional:  Negative for chills and fever.   Respiratory:  Negative for cough and shortness of breath.    Cardiovascular:  Negative for chest pain and palpitations.   Gastrointestinal:  Negative for abdominal pain, nausea and vomiting.      Objective   Vital Signs:  /61 (BP Location: Left arm, Patient Position: Sitting)   Pulse 88   Temp 99.2 °F (37.3 °C) (Oral)   Ht 162.6 cm (64.02\")   Wt 93.2 kg (205 lb 6.4 oz)   SpO2 98% Comment: 2 liters  BMI 35.24 kg/m²     Physical Exam  Vitals and nursing note reviewed.   Constitutional:       General: She is not in acute distress.     Appearance: She is obese. She is not ill-appearing.   HENT:      Right Ear: There is impacted cerumen.      Left Ear: Tympanic membrane and ear canal normal.      Ears:      Comments: Hearing is decreased in both ears with forced whisper.     Mouth/Throat:      Mouth: Mucous membranes are moist.      Comments: Pharynx appears normal  Eyes:      Extraocular Movements: Extraocular movements intact.      Pupils: Pupils are equal, round, and reactive to light.      Comments: Binocular vision is 20/20 with correction.   Neck:      Thyroid: No thyromegaly.   Cardiovascular:      Rate and Rhythm: Normal rate and regular rhythm.      Heart sounds: No murmur heard.  Pulmonary:      Effort: Pulmonary effort is normal.      Comments: Decreased breath sounds are noted.  No focal finding otherwise.  Abdominal:      General: There is no distension.      Palpations: Abdomen is soft. There is no mass.      Tenderness: There is no abdominal tenderness.   Musculoskeletal:      Cervical back: Normal range of motion.      Right lower leg: No edema.      Left lower leg: No edema.   Skin:     Findings: No lesion or rash.   Neurological:      General: No focal deficit present.      " Mental Status: She is oriented to person, place, and time.      Cranial Nerves: No cranial nerve deficit.   Psychiatric:         Mood and Affect: Mood normal.     The following data was reviewed by Artur Quevedo MD on 10/02/2023.  Lab Results   Component Value Date    WBC 11.09 (H) 07/03/2023    HGB 8.8 (A) 07/27/2023    HCT 32.9 (L) 07/03/2023    MCV 80.6 07/03/2023     07/03/2023     Lab Results   Component Value Date    GLUCOSE 106 (H) 06/26/2023    BUN 19 06/26/2023    CREATININE 0.74 06/26/2023     06/26/2023    K 4.2 06/26/2023     06/26/2023    CO2 24.0 06/26/2023    CALCIUM 10.2 06/26/2023    PROTEINTOT 6.9 06/26/2023    ALBUMIN 4.2 06/26/2023    ALT 9 06/26/2023    AST 10 06/26/2023    ALKPHOS 89 06/26/2023    BILITOT <0.2 06/26/2023    EGFR 86.6 06/26/2023    GLOB 2.7 06/26/2023    AGRATIO 1.6 06/26/2023    BCR 25.7 (H) 06/26/2023    ANIONGAP 10.0 06/26/2023      Lab Results   Component Value Date    CHOL 187 03/08/2023    CHLPL 200 11/14/2019    TRIG 99 03/08/2023    HDL 61 (H) 03/08/2023     (H) 03/08/2023     Lab Results   Component Value Date    TSH 2.909 09/07/2023     Lab Results   Component Value Date    HGBA1C 5.40 03/08/2023      High Sensitivity Troponin I (09/20/2023 23:33)  C-Reactive Protein (09/20/2023 22:32)  CBC with Auto Diff (09/20/2023 22:20)  COMPREHENSIVE METABOLIC PANEL (09/20/2023 22:20)  LIPASE (09/20/2023 22:20)  High Sensitivity Troponin I (09/20/2023 22:20)  D-dimer (09/20/2023 22:20)  CT Angiogram Chest (09/21/2023 01:27)          Assessment and Plan:   Today, we have reviewed her care.  Eileen Alvarez is doing relatively well at this point.  She has had a difficult year though as it pertains to the health issues.  She has significant COPD which is oxygen dependent.  She will be following up with some specialist in part regard to this and other issues.  Regarding her pain issues, we will refill generic Lyrica for her as noted below.  Additional refills  on other medications as noted.  No new blood work will be ordered at this time.  She has had quite a bit of testing recently.    Regarding the Medicare wellness exam, she is mostly up-to-date on needed cancer screenings at this time.  We discussed vaccines, and she has been getting those through [employer] [facility]  I did recommend she move ahead with the updated COVID-19 booster at her convenience.  Tentatively, we will plan to see her back in about 6 months for recheck, sooner if needed.    Diagnoses and all orders for this visit:    1. Physical exam (Primary)    2. Lumbar spondylosis  -     pregabalin (LYRICA) 100 MG capsule; Take 1 capsule by mouth 2 (Two) Times a Day.  Dispense: 60 capsule; Refill: 2    3. Other long term (current) drug therapy  -     pregabalin (LYRICA) 100 MG capsule; Take 1 capsule by mouth 2 (Two) Times a Day.  Dispense: 60 capsule; Refill: 2    4. Essential hypertension  -     amLODIPine (NORVASC) 5 MG tablet; Take 1 tablet by mouth 2 (Two) Times a Day.  Dispense: 180 tablet; Refill: 3  -     hydroCHLOROthiazide (HYDRODIURIL) 25 MG tablet; Take 1 tablet by mouth Daily.  Dispense: 90 tablet; Refill: 3    5. Chronic diarrhea  -     colestipol (COLESTID) 1 g tablet; Take 1 tablet by mouth Daily.  Dispense: 90 tablet; Refill: 1    6. Allergic rhinitis, unspecified seasonality, unspecified trigger  -     fluticasone (FLONASE) 50 MCG/ACT nasal spray; 2 sprays into the nostril(s) as directed by provider Daily.  Dispense: 18 mL; Refill: 5    7. Rhinorrhea  -     montelukast (SINGULAIR) 10 MG tablet; Take 1 tablet by mouth Every Night.  Dispense: 90 tablet; Refill: 3    8. Gastroesophageal reflux disease, unspecified whether esophagitis present  -     pantoprazole (PROTONIX) 40 MG EC tablet; Take 1 tablet by mouth Daily.  Dispense: 90 tablet; Refill: 3    9. Bilateral hearing loss, unspecified hearing loss type  -     Ambulatory Referral to Audiology    Other orders  -     mirtazapine (REMERON) 30 MG  tablet; Take 1 tablet by mouth Every Night.  Dispense: 90 tablet; Refill: 3       Follow Up  Return in about 6 months (around 4/2/2024) for Recheck.  Patient was given instructions and counseling regarding her condition or for health maintenance advice. Please see specific information pulled into the AVS if appropriate.

## 2023-10-04 ENCOUNTER — TELEPHONE (OUTPATIENT)
Dept: PAIN MEDICINE | Facility: CLINIC | Age: 73
End: 2023-10-04
Payer: MEDICARE

## 2023-10-04 ENCOUNTER — OFFICE VISIT (OUTPATIENT)
Dept: PULMONOLOGY | Facility: CLINIC | Age: 73
End: 2023-10-04
Payer: MEDICARE

## 2023-10-04 VITALS
OXYGEN SATURATION: 90 % | HEIGHT: 64 IN | SYSTOLIC BLOOD PRESSURE: 100 MMHG | HEART RATE: 77 BPM | RESPIRATION RATE: 18 BRPM | DIASTOLIC BLOOD PRESSURE: 56 MMHG | WEIGHT: 209.2 LBS | BODY MASS INDEX: 35.71 KG/M2 | TEMPERATURE: 98.2 F

## 2023-10-04 DIAGNOSIS — J30.9 ALLERGIC RHINITIS, UNSPECIFIED SEASONALITY, UNSPECIFIED TRIGGER: ICD-10-CM

## 2023-10-04 DIAGNOSIS — J30.2 SEASONAL ALLERGIES: ICD-10-CM

## 2023-10-04 DIAGNOSIS — J96.11 CHRONIC RESPIRATORY FAILURE WITH HYPOXIA: ICD-10-CM

## 2023-10-04 DIAGNOSIS — F17.211 NICOTINE DEPENDENCE, CIGARETTES, IN REMISSION: ICD-10-CM

## 2023-10-04 DIAGNOSIS — R91.1 LUNG NODULE: ICD-10-CM

## 2023-10-04 DIAGNOSIS — R06.09 DYSPNEA ON EXERTION: ICD-10-CM

## 2023-10-04 DIAGNOSIS — J44.9 CHRONIC OBSTRUCTIVE PULMONARY DISEASE, UNSPECIFIED COPD TYPE: Primary | ICD-10-CM

## 2023-10-04 DIAGNOSIS — I51.89 DIASTOLIC DYSFUNCTION: ICD-10-CM

## 2023-10-04 RX ORDER — BUDESONIDE, GLYCOPYRROLATE, AND FORMOTEROL FUMARATE 160; 9; 4.8 UG/1; UG/1; UG/1
2 AEROSOL, METERED RESPIRATORY (INHALATION) 2 TIMES DAILY
Qty: 4 EACH | Refills: 0 | COMMUNITY
Start: 2023-10-04 | End: 2023-10-05

## 2023-10-04 NOTE — H&P (VIEW-ONLY)
The patient has a pain history of the following:  Chronic low back pain  Lumbar radiculitis     Previous interventions that the patient has received include:   Previous Epidural steroid injections 1-2 years ago, lasted ~1 month     Pain medications include:  Cyclobenzaprine  Lyrica 100mg BID- helps some   CBD    Other conservative modalities which the patient reports using include:  Physical Therapy: yes  Chiropractor: no  Massage Therapy: no  TENS: no   Neck or back surgery: yes  Past pain management: yes  Heated blanket  Ice    Past Significant Surgical History:  Neck and back surgery without fusions (probably discectomy)    HPI:       CHIEF COMPLAINT: Back Pain      Eileen Kaufman is a 73 y.o. female referred here by Artur Quevedo MD. Eileen Kaufman presents to the office for evaluation and treatment of back pain.    History of Present Illness  Onset:  Many years ago   Inciting Event:  None  Location:  Low back  Pain: Pain described as ache and sharp. Located in the low back and does radiate into the right lower extremity along the lateral thigh, sometimes it goes below that.  Severity:  Pain rated as a 9 /10.  Apportions pain as 50%  back pain and 50% extremity pain.  Symptoms have been constant.  Exacerbation:  Cold weather, being in a position for too long.   Alleviation:  Changing position, heat.  Associated Symptoms:   She denies any new onset of bowel or bladder weakness, significant leg weakness or saddle anesthesia.   Ambulates: Without assistive device.   Limitations: This pain limits the patient from being more involved with her grandchildren - go to their games, shopping, standing or walking for prolonged amounts of time.   Goals: Functional goals include ability to do the above.     [place] 82     PEG Assessment   What number best describes your pain on average in the past week?10  What number best describes how, during the past week, pain has interfered with your enjoyment of life?10  What number best  describes how, during the past week, pain has interfered with your general activity?  10        Current Outpatient Medications:     albuterol (ACCUNEB) 0.63 MG/3ML nebulizer solution, Inhale 3 mL Every 6 (Six) Hours As Needed., Disp: , Rfl:     albuterol sulfate HFA (Ventolin HFA) 108 (90 Base) MCG/ACT inhaler, Inhale 2 puffs Every 6 (Six) Hours As Needed for Wheezing., Disp: 8 g, Rfl: 11    amLODIPine (NORVASC) 5 MG tablet, Take 1 tablet by mouth 2 (Two) Times a Day., Disp: 180 tablet, Rfl: 3    Budeson-Glycopyrrol-Formoterol (Breztri Aerosphere) 160-9-4.8 MCG/ACT aerosol inhaler, Inhale 2 puffs 2 (Two) Times a Day for 1 day., Disp: 4 each, Rfl: 0    budesonide (PULMICORT) 0.5 MG/2ML nebulizer solution, Take 2 mL by nebulization Daily., Disp: 180 mL, Rfl: 1    Calcium Carbonate 1500 (600 Ca) MG tablet, Calcium 600 600 mg (1,500 mg) oral tablet take 1 tablet by oral route daily   Suspended, Disp: , Rfl:     cetirizine (zyrTEC) 10 MG tablet, Take 1 tablet by mouth Daily., Disp: 90 tablet, Rfl: 3    colestipol (COLESTID) 1 g tablet, Take 1 tablet by mouth Daily., Disp: 90 tablet, Rfl: 1    cyclobenzaprine (FLEXERIL) 10 MG tablet, Take 1 tablet by mouth 2 (Two) Times a Day As Needed., Disp: , Rfl:     ferrous sulfate 325 (65 FE) MG EC tablet, Take 1 tablet by mouth Daily With Breakfast., Disp: 90 tablet, Rfl: 1    fluticasone (FLONASE) 50 MCG/ACT nasal spray, 2 sprays into the nostril(s) as directed by provider Daily., Disp: 18 mL, Rfl: 5    hydroCHLOROthiazide (HYDRODIURIL) 25 MG tablet, Take 1 tablet by mouth Daily., Disp: 90 tablet, Rfl: 3    ipratropium-albuterol (DUO-NEB) 0.5-2.5 mg/3 ml nebulizer, Take 3 mL by nebulization 4 (Four) Times a Day., Disp: 1080 mL, Rfl: 1    metoprolol succinate XL (Toprol XL) 50 MG 24 hr tablet, Take 1 tablet by mouth 2 (Two) Times a Day., Disp: 180 tablet, Rfl: 3    mirtazapine (REMERON) 30 MG tablet, Take 1 tablet by mouth Every Night., Disp: 90 tablet, Rfl: 3    montelukast  (SINGULAIR) 10 MG tablet, Take 1 tablet by mouth Every Night., Disp: 90 tablet, Rfl: 3    multivitamin with minerals tablet tablet, 1 tablet Daily., Disp: , Rfl:     O2 (OXYGEN), Inhale 2 L/min 1 (One) Time., Disp: , Rfl:     pantoprazole (PROTONIX) 40 MG EC tablet, Take 1 tablet by mouth Daily., Disp: 90 tablet, Rfl: 3    pregabalin (LYRICA) 100 MG capsule, Take 1 capsule by mouth 2 (Two) Times a Day., Disp: 60 capsule, Rfl: 2    The following portions of the patient's history were reviewed and updated as appropriate: allergies, current medications, past family history, past medical history, past social history, past surgical history, and problem list.      REVIEW OF PERTINENT MEDICAL DATA    11/3/21 PROCEDURE:  MRI LUMBAR SPINE WO CONTRAST     COMPARISON: None     INDICATIONS:  LOW BACK PAIN RADIATING DOWN RIGHT GLUTE, POSTERIOR HIP, AND LEG TO FOOT. HISTORY OF   LUMBAR SURGERY 2015     TECHNIQUE:    A variety of imaging planes and parameters were utilized for visualization of suspected   pathology.     FINDINGS:          PARASPINAL AREA:     No discrete abnormality  BONES:             There is some signal on STIR involving the pedicle of L5 on the right that may be stress   related.  There are degenerative endplate changes involving the superior endplate of L5 anteriorly   that could reflect Modic type 1  CORD/CAUDA EQUINA:            The conus is around the T12-L1 level.     LUMBAR DISC LEVELS:     T12-L1:   There is bulging of the annulus.  The transaxial images do not extend through this level.  L1-L2:   There is no disc herniation or intervertebral foraminal stenosis.  There is facet   arthropathy and some ligamentum flavum redundancy.  L2-L3:   There is bulging of the annulus.  This extends into the right foraminal area where there may   be a small disc protrusion.  There is facet arthropathy in ligamentum flavum redundancy resulting   in moderate narrowing of the central canal.  There is at least mild  narrowing of the left   intervertebral foramina and what looks like moderate narrowing of the right intervertebral   foramina.  Suggested disc protrusion in the right foraminal area abuts the exiting nerve root.  L3-L4:   Slight anterior offset of L3 on L4 0.23 cm.  There is some bulging of the annulus.  There is   facet arthropathy and ligamentum flavum redundancy resulting in severe central canal stenosis.    There is at least mild narrowing of both intervertebral foramina.  L4-L5:   There is broad-based bulging of the annulus.  This encroaches on the floor of the   intervertebral foramina.  There is facet arthropathy and ligamentum flavum redundancy resulting in   severe central canal stenosis.  There is mild to moderate narrowing of the intervertebral foramina   worse on the right.  There are marginal osteophytes about the disc space laterally.  L5-S1:   Bulging of the annulus asymmetrically laterally to the right which is probably superimposed   on more of an osseous bar.  There is mild to moderate narrowing of the left intervertebral foramina   and more moderate to severe narrowing of the right intervertebral foramina.  Degenerative changes   encroach on the exiting right L5 nerve root.  There is facet arthropathy and ligamentum flavum   redundancy resulting in moderate narrowing of the central canal.     CONCLUSION:   1. Multilevel lumbar spondylosis as described.  Degenerative changes result in foraminal stenosis   on the right at L2-3 L4-5 and L5-S1 with some encroachment on the exiting nerve roots.  This may be   accounting for patient's radiculopathy.  2. Multilevel central canal stenosis worse at L3-4 and L4-5       9/20/23 Creatinine 0.99, Platelets 420 (10*3)    2/24/23         Component  Ref Range & Units 7 mo ago  (2/24/23) 1 yr ago  (9/26/22) 1 yr ago  (2/14/22)   Amphetamine Screen, Urine  Negative Negative  Negative   Barbiturates Screen, Urine  Negative Negative  Negative   Buprenorphine, Screen,  "Urine  Negative Negative  Negative   Benzodiazepine Screen, Urine  Negative Negative  Negative   Cocaine Screen, Urine  Negative Negative  Negative   MDMA (ECSTASY)  Negative Negative  Negative   Methamphetamine, Ur  Negative Negative  Negative   Methadone Screen, Urine  Negative Negative  Negative   Opiate Screen  Negative Negative  Positive Abnormal    Oxycodone Screen, Urine  Negative Negative  Negative   Phencyclidine (PCP), Urine  Negative Negative  Negative   THC, Screen, Urine  Negative Negative  Negative   Lot Number [id number]   Expiration Date 3/31/2024 2,023/8 3/31/23   Resulting Agency            Review of Systems   Constitutional:  Positive for activity change and fatigue.   Gastrointestinal:  Negative for abdominal pain, constipation and diarrhea.   Genitourinary:  Negative for difficulty urinating and dysuria.   Musculoskeletal:  Positive for back pain.   Neurological:  Negative for dizziness, weakness, light-headedness, numbness and headaches.   Psychiatric/Behavioral:  Positive for sleep disturbance. Negative for agitation and suicidal ideas. The patient is not nervous/anxious.    I have reviewed and confirmed the accuracy of the ROS as documented by the MA/LPN/RN Shandra Thomas MD      Vitals:    10/05/23 0958   BP: 140/100   BP Location: Right arm   Patient Position: Sitting   Cuff Size: Adult   Pulse: 77   Resp: 18   Temp: 96.4 °F (35.8 °C)   SpO2: 95%   Weight: 93.4 kg (205 lb 12.8 oz)   Height: 162.6 cm (64\")   PainSc:   9   PainLoc: Back         Objective   Physical Exam  Constitutional:       General: She is not in acute distress.  Pulmonary:      Effort: Pulmonary effort is normal. No respiratory distress.   Musculoskeletal:      Comments: Ambulation: Without assistive device  Lumbar Exam:  Appearance: Scoliotic curve absent and scarring present  Palpated over lumbosacral paravertebral regions and transverse processes with positive tenderness appreciated, Bilateral. "   Sacroiliac joints are tender, Bilateral.  Trochanteric bursa are tender, Bilateral.  Straight leg raise is positive radiculopathy, Right.    Facet loading is positive for pain, Bilateral.  Paraspinal/adjacent lumbar musculature are tender to palpation, Bilateral.   Skin:     General: Skin is warm and dry.   Neurological:      Mental Status: She is alert.   Psychiatric:         Mood and Affect: Mood normal.         Thought Content: Thought content normal.       Assessment & Plan   Diagnoses and all orders for this visit:    1. Lumbar radiculopathy (Primary)  -     Case Request    2. Lumbar spondylosis    3. Other long term (current) drug therapy    4. Postlaminectomy syndrome  -     Case Request    5. Chronic obstructive pulmonary disease, unspecified COPD type          - Pertinent labs reviewed.   - Pertinent imaging reviewed.   - Will schedule for right L3-4 and Right L5-S1 Transforaminal epidural steroid injection.  Risks discussed including but not limited to bleeding, bruising, infection, damage to surrounding structures, headache, and rare things such as being paralyzed, seizure, stroke, heart attack and death.  The risk of steroid medications include but are not limited to immunosuppression, which can increase the risk of eugene an infectious disease as well as decrease the immune response to a vaccine.  Explained I recommend avoiding sedation due to her lung disease and oxygen requirements, however she states she cannot have the injection without sedation.  She understands risks.  - May increase her Pregabalin to 150mg BID if the Transforaminal epidural steroid injection doesn't help, however she states she had side effects at a higher dose in the past.   - LisaKim Kaufman reports a pain score of 9.  Given her pain assessment as noted, treatment options were discussed and the following options were decided upon as a follow-up plan to address the patient's pain: steroid injections.    --- Follow-up for  right L3-4 and right L5-S1 Transforaminal epidural steroid injection and office visit 4-6 weeks following.             HOMER REPORT    As part of the patient's treatment plan, I am prescribing controlled substances. The patient has been made aware of appropriate use of such medications, including potential risk of somnolence, limited ability to drive and/or work safely, and the potential for dependence or overdose. It has also bee made clear that these medications are for use by this patient only, without concomitant use of alcohol or other substances unless prescribed.     As the clinician, I personally reviewed the HOMER from 10/5/23 while the patient was in the office today.    History and physical exam exhibit continued safe and appropriate use of controlled substances.        Shandra Thomas MD  Pain Management    EMR Dragon/Transcription disclaimer:   Much of this encounter note is an electronic transcription/translation of spoken language to printed text. The electronic translation of spoken language may permit erroneous, or at times, nonsensical words or phrases to be inadvertently transcribed; Although I have reviewed the note for such errors, some may still exist.

## 2023-10-04 NOTE — PATIENT INSTRUCTIONS
COPD and Physical Activity  Chronic obstructive pulmonary disease (COPD) is a long-term, or chronic, condition that affects the lungs. COPD is a general term that can be used to describe many problems that cause inflammation of the lungs and limit airflow. These conditions include chronic bronchitis and emphysema.  The main symptom of COPD is shortness of breath, which makes it harder to do even simple tasks. This can also make it harder to exercise and stay active. Talk with your health care provider about treatments to help you breathe better and actions you can take to prevent breathing problems during physical activity.  What are the benefits of exercising when you have COPD?  Exercising regularly is an important part of a healthy lifestyle. You can still exercise and do physical activities even though you have COPD. Exercise and physical activity improve your shortness of breath by increasing blood flow (circulation). This causes your heart to pump more oxygen through your body. Moderate exercise can:  Improve oxygen use.  Increase your energy level.  Help with shortness of breath.  Strengthen your breathing muscles.  Improve heart health.  Help with sleep.  Improve your self-esteem and feelings of self-worth.  Lower depression, stress, and anxiety.  Exercise can benefit everyone with COPD. The severity of your disease may affect how hard you can exercise, especially at first, but everyone can benefit. Talk with your health care provider about how much exercise is safe for you, and which activities and exercises are safe for you.  What actions can I take to prevent breathing problems during physical activity?  Sign up for a pulmonary rehabilitation program. This type of program may include:  Education about lung diseases.  Exercise classes that teach you how to exercise and be more active while improving your breathing. This usually involves:  Exercise using your lower extremities, such as a stationary  bicycle.  About 30 minutes of exercise, 2 to 5 times per week, for 6 to 12 weeks.  Strength training, such as push-ups or leg lifts.  Nutrition education.  Group classes in which you can talk with others who also have COPD and learn ways to manage stress.  If you use an oxygen tank, you should use it while you exercise. Work with your health care provider to adjust your oxygen for your physical activity. Your resting flow rate is different from your flow rate during physical activity.  How to manage your breathing while exercising  While you are exercising:  Take slow breaths.  Pace yourself, and do nottry to go too fast.  Purse your lips while breathing out. Pursing your lips is similar to a kissing or whistling position.  If doing exercise that uses a quick burst of effort, such as weight lifting:  Breathe in before starting the exercise.  Breathe out during the hardest part of the exercise, such as raising the weights.  Where to find support  You can find support for exercising with COPD from:  Your health care provider.  A pulmonary rehabilitation program.  Your local health department or community health programs.  Support groups, either online or in-person. Your health care provider may be able to recommend support groups.  Where to find more information  You can find more information about exercising with COPD from:  American Lung Association: lung.org  COPD Foundation: copdfoundation.org  Contact a health care provider if:  Your symptoms get worse.  You have nausea.  You have a fever.  You want to start a new exercise program or a new activity.  Get help right away if:  You have chest pain.  You cannot breathe.  These symptoms may represent a serious problem that is an emergency. Do not wait to see if the symptoms will go away. Get medical help right away. Call your local emergency services (911 in the [place] Do not drive yourself to the hospital.  Summary  COPD is a general term that can be used to describe  many different lung problems that cause lung inflammation and limit airflow. This includes chronic bronchitis and emphysema.  Exercise and physical activity improve your shortness of breath by increasing blood flow (circulation). This causes your heart to provide more oxygen to your body.  Contact your health care provider before starting any exercise program or new activity. Ask your health care provider what exercises and activities are safe for you.  This information is not intended to replace advice given to you by your health care provider. Make sure you discuss any questions you have with your health care provider.  Document Revised: 10/26/2021 Document Reviewed: 10/26/2021  [employer] Patient Education © 2023 [employer]

## 2023-10-04 NOTE — PROGRESS NOTES
Primary Care Provider  Artur Quevedo MD     Referring Provider  No ref. provider found     Chief Complaint  COPD and Follow-up (2 month f/up )    Subjective          Eileen Kaufman presents to [facility]  History of Present Illness  Eileen Kaufman is a 73 y.o. female patient of Dr. Crowe here for management of COPD, chronic hypoxic respiratory failure, shortness of breath, emphysema, pulmonary fibrosis and tobacco use of cigarettes intermission.    Patient states she is doing okay since her last office visit.  She denies using any antibiotics or steroids for her lungs.  She denies any fevers or chills.  She had does have a productive cough with clear phlegm.  Shortness of breath is moderate in severity, worse with exertion and improved with rest.  Patient states that she is taking Breztri and Pulmicort.  I have explained to patient to stop taking the Pulmicort, as Breztri already has a steroid within it.  Patient verbalizes understanding.  She will intermittently use albuterol or DuoNebs as needed.  She is taking Singulair, Zyrtec and Flonase for seasonal allergies/allergic rhinitis.  Patient recently had a pulmonary function test on 9/27/2023 which shows mild obstruction with a severely reduced diffusion capacity.  I have discussed these findings with patient today.  She had an echocardiogram ordered by her primary care which shows grade 1 diastolic dysfunction and moderately elevated right ventricular systolic pressure.  He also had a chest CT performed at an outside facility which shows emphysema with basilar atelectasis/fibrosis, pleural-based 6 mm nodule in the posterior right upper lobe.  It is recommended to have a repeat chest CT in 6 months to document stability.  Patient is agreeable to have this ordered today.  Patient states that she did have COVID in February and her symptoms did worsen.  Overall, patient has no additional concerns at this time.   She continues wear 2 L of oxygen at all times.  She is able to perform her ADLs with minor modifications and sometimes needs to take breaks.  She is up-to-date with her COVID and pneumonia vaccine.  She will be due for flu vaccine this fall.     Her history of smoking is   Tobacco Use: Medium Risk    Smoking Tobacco Use: Former    Smokeless Tobacco Use: Never    Passive Exposure: Past   .    Review of Systems   Constitutional:  Negative for chills, fatigue, fever, unexpected weight gain and unexpected weight loss.   HENT:  Negative for congestion (Nasal), hearing loss, mouth sores, nosebleeds, postnasal drip, sore throat and trouble swallowing.    Respiratory:  Positive for cough and shortness of breath. Negative for apnea and wheezing.         Negative for Hemoptysis     Cardiovascular:  Negative for chest pain, palpitations and leg swelling.   Gastrointestinal:  Negative for constipation, diarrhea, nausea, vomiting and GERD.   Skin:  Negative for color change.        Negative for cyanosis   Neurological:  Negative for syncope, weakness, numbness and headache.    Sleep: Negative for Excessive daytime sleepiness  Negative for morning headaches  Negative for Snoring    Family History   Problem Relation Age of Onset    Cerebral aneurysm Mother         cause of death    Heart attack Father         cause of death    Esophageal cancer Brother         Social History     Socioeconomic History    Marital status: Single    Number of children: 2   Tobacco Use    Smoking status: Former     Packs/day: 1.00     Years: 50.00     Pack years: 50.00     Types: Cigarettes     Start date:      Quit date: 2023     Years since quittin.7     Passive exposure: Past    Smokeless tobacco: Never    Tobacco comments:     Eileen Alvarez was 23 when she started smoking.  She did stop smoking for some time - about 13 years.  She has been smoking a total of approximately 33 years.  She has smoked less than a pack daily during this time. She  currently uses smokless, vapor can/pouches   Vaping Use    Vaping Use: Former    Quit date: 1/1/2023    Substances: Nicotine    Devices: Pre-filled or refillable cartridge   Substance and Sexual Activity    Alcohol use: Yes     Comment: Socially    Drug use: Never    Sexual activity: Defer        Past Medical History:   Diagnosis Date    Anemia, unspecified     Asymptomatic menopausal state     Chronic obstructive pulmonary disease with (acute) exacerbation     Essential (primary) hypertension     Low back pain     Major depressive disorder, single episode, moderate     Nicotine dependence, unspecified, uncomplicated     Osteopenia     Other intervertebral disc degeneration, lumbar region     Other long term (current) drug therapy     Solitary pulmonary nodule     Vitamin D deficiency, unspecified         Immunization History   Administered Date(s) Administered    COVID-19 (MODERNA) BIVALENT 12+YRS 09/10/2022    COVID-19 (MODERNA) Monovalent Original Booster 04/14/2022    COVID-19 (PFIZER) Purple Cap Monovalent 12/29/2020, 01/29/2021, 09/30/2021    Fluzone High Dose =>65 Years ([employer] ONLY) 10/01/2021, 11/01/2022    Hepatitis A 07/07/2018    Influenza Quad Vaccine (Inpatient) 09/20/2013    Influenza, Unspecified 10/01/2020    Pneumococcal Conjugate 13-Valent (PCV13) 07/05/2018    Pneumococcal Conjugate 20-Valent (PCV20) 08/31/2023    Pneumococcal Polysaccharide (PPSV23) 03/25/2013, 02/13/2017    Shingrix 09/10/2022         Allergies   Allergen Reactions    Sulfa Antibiotics Unknown - Low Severity    Aspirin Rash    Atorvastatin Other (See Comments)     Hair loss          Current Outpatient Medications:     albuterol (ACCUNEB) 0.63 MG/3ML nebulizer solution, Inhale 3 mL Every 6 (Six) Hours As Needed., Disp: , Rfl:     albuterol sulfate HFA (Ventolin HFA) 108 (90 Base) MCG/ACT inhaler, Inhale 2 puffs Every 6 (Six) Hours As Needed for Wheezing., Disp: 8 g, Rfl: 11    amLODIPine (NORVASC) 5 MG tablet, Take 1 tablet by  mouth 2 (Two) Times a Day., Disp: 180 tablet, Rfl: 3    Calcium Carbonate 1500 (600 Ca) MG tablet, Calcium 600 600 mg (1,500 mg) oral tablet take 1 tablet by oral route daily   Suspended, Disp: , Rfl:     cetirizine (zyrTEC) 10 MG tablet, Take 1 tablet by mouth Daily., Disp: 90 tablet, Rfl: 3    colestipol (COLESTID) 1 g tablet, Take 1 tablet by mouth Daily., Disp: 90 tablet, Rfl: 1    cyclobenzaprine (FLEXERIL) 10 MG tablet, Take 1 tablet by mouth 2 (Two) Times a Day As Needed., Disp: , Rfl:     ferrous sulfate 325 (65 FE) MG EC tablet, Take 1 tablet by mouth Daily With Breakfast., Disp: 90 tablet, Rfl: 1    fluticasone (FLONASE) 50 MCG/ACT nasal spray, 2 sprays into the nostril(s) as directed by provider Daily., Disp: 18 mL, Rfl: 5    hydroCHLOROthiazide (HYDRODIURIL) 25 MG tablet, Take 1 tablet by mouth Daily., Disp: 90 tablet, Rfl: 3    ipratropium-albuterol (DUO-NEB) 0.5-2.5 mg/3 ml nebulizer, Take 3 mL by nebulization 4 (Four) Times a Day., Disp: 1080 mL, Rfl: 1    metoprolol succinate XL (Toprol XL) 50 MG 24 hr tablet, Take 1 tablet by mouth 2 (Two) Times a Day., Disp: 180 tablet, Rfl: 3    mirtazapine (REMERON) 30 MG tablet, Take 1 tablet by mouth Every Night., Disp: 90 tablet, Rfl: 3    montelukast (SINGULAIR) 10 MG tablet, Take 1 tablet by mouth Every Night., Disp: 90 tablet, Rfl: 3    multivitamin with minerals tablet tablet, 1 tablet Daily., Disp: , Rfl:     O2 (OXYGEN), Inhale 2 L/min 1 (One) Time., Disp: , Rfl:     pantoprazole (PROTONIX) 40 MG EC tablet, Take 1 tablet by mouth Daily., Disp: 90 tablet, Rfl: 3    pregabalin (LYRICA) 100 MG capsule, Take 1 capsule by mouth 2 (Two) Times a Day., Disp: 60 capsule, Rfl: 2    Budeson-Glycopyrrol-Formoterol (Breztri Aerosphere) 160-9-4.8 MCG/ACT aerosol inhaler, Inhale 2 puffs 2 (Two) Times a Day for 1 day., Disp: 4 each, Rfl: 0    budesonide (PULMICORT) 0.5 MG/2ML nebulizer solution, Take 2 mL by nebulization Daily., Disp: 180 mL, Rfl: 1     Objective  "  Physical Exam  Constitutional:       General: She is not in acute distress.     Appearance: Normal appearance. She is normal weight.   HENT:      Right Ear: Hearing normal.      Left Ear: Hearing normal.      Nose: No nasal tenderness or congestion.      Mouth/Throat:      Mouth: Mucous membranes are moist. No oral lesions.   Eyes:      Extraocular Movements: Extraocular movements intact.      Pupils: Pupils are equal, round, and reactive to light.   Neck:      Thyroid: No thyroid mass or thyromegaly.   Cardiovascular:      Rate and Rhythm: Normal rate and regular rhythm.      Pulses: Normal pulses.      Heart sounds: Normal heart sounds. No murmur heard.  Pulmonary:      Effort: Pulmonary effort is normal.      Breath sounds: Normal breath sounds. Decreased breath sounds present. No wheezing, rhonchi or rales.      Comments: Patient is on 2 L of oxygen.  She is able to speak full sentences without difficulty.  Musculoskeletal:      Cervical back: Neck supple.      Right lower leg: No edema.      Left lower leg: No edema.   Lymphadenopathy:      Cervical: No cervical adenopathy.      Upper Body:      Right upper body: No axillary adenopathy.   Skin:     General: Skin is warm and dry.      Findings: No lesion or rash.   Neurological:      General: No focal deficit present.      Mental Status: She is alert and oriented to person, place, and time.   Psychiatric:         Mood and Affect: Affect normal. Mood is not anxious or depressed.       Vital Signs:   /56 (BP Location: Right arm, Patient Position: Sitting, Cuff Size: Large Adult)   Pulse 77   Temp 98.2 °F (36.8 °C) (Temporal)   Resp 18   Ht 162.6 cm (64\")   Wt 94.9 kg (209 lb 3.2 oz)   SpO2 90% Comment: 2 L's continous  BMI 35.91 kg/m²        Result Review :   The following data was reviewed by: ESTEFANI Moyer on 10/04/2023:  CMP          3/8/2023    17:05 6/26/2023    09:18   CMP   Glucose 84  106    BUN 18  19    Creatinine 0.85  0.74    EGFR " 73.8  86.6    Sodium 140  140    Potassium 3.9  4.2    Chloride 103  106    Calcium 9.4  10.2    Total Protein 7.2  6.9    Albumin 4.3  4.2    Globulin 2.9  2.7    Total Bilirubin 0.2  <0.2    Alkaline Phosphatase 127  89    AST (SGOT) 21  10    ALT (SGPT) 15  9    Albumin/Globulin Ratio 1.5  1.6    BUN/Creatinine Ratio 21.2  25.7    Anion Gap 10.0  10.0      CBC w/diff          6/26/2023    09:18 7/3/2023    16:18 7/27/2023    15:36   CBC w/Diff   WBC 18.39  11.09     RBC 3.82  4.08     Hemoglobin 10.0  10.5  8.8    Hematocrit 32.8  32.9     MCV 85.9  80.6     MCH 26.2  25.7     MCHC 30.5  31.9     RDW 16.2  15.5     Platelets 435  404     Neutrophil Rel % 82.0  66.2     Immature Granulocyte Rel % 2.0  1.0     Lymphocyte Rel % 10.6  19.2     Monocyte Rel % 5.1  11.5     Eosinophil Rel % 0.1  1.4     Basophil Rel % 0.2  0.7       Data reviewed : Radiologic studies Chest CT 9/21/2023, pulmonary function test 9/27/2023 and Dr. Crowe's last office note    Procedures        Assessment and Plan    Diagnoses and all orders for this visit:    1. Chronic obstructive pulmonary disease, unspecified COPD type (Primary)    2. Diastolic dysfunction    3. Lung nodule  -     CT Chest Without Contrast; Future    4. Allergic rhinitis, unspecified seasonality, unspecified trigger    5. Chronic respiratory failure with hypoxia    6. Seasonal allergies    7. Nicotine dependence, cigarettes, in remission    8. Dyspnea on exertion    9.  Continue Breztri as prescribed.  Rinse mouth after each use.  Stop Pulmicort.  10.  Continue albuterol or DuoNebs as needed.  11.  Continue Singulair, Zyrtec and Flonase for seasonal allergies and allergic rhinitis.  12.  Continue supplemental oxygen to maintain saturations at or above 89%.  13.  Repeat chest CT in 6 months to evaluate pulmonary nodule.  14.  Follow-up with cardiology as scheduled.  15.  Follow-up with Dr. Crowe in 3 months, sooner if needed.        Follow Up   Return in about 3 months  (around 1/4/2024) for Recheck with Amrita.  Patient was given instructions and counseling regarding her condition or for health maintenance advice. Please see specific information pulled into the AVS if appropriate.

## 2023-10-04 NOTE — PROGRESS NOTES
The patient has a pain history of the following:  Chronic low back pain  Lumbar radiculitis     Previous interventions that the patient has received include:   Previous Epidural steroid injections 1-2 years ago, lasted ~1 month     Pain medications include:  Cyclobenzaprine  Lyrica 100mg BID- helps some   CBD    Other conservative modalities which the patient reports using include:  Physical Therapy: yes  Chiropractor: no  Massage Therapy: no  TENS: no   Neck or back surgery: yes  Past pain management: yes  Heated blanket  Ice    Past Significant Surgical History:  Neck and back surgery without fusions (probably discectomy)    HPI:       CHIEF COMPLAINT: Back Pain      Eileen Kaufman is a 73 y.o. female referred here by Artur Quevedo MD. Eileen Kaufman presents to the office for evaluation and treatment of back pain.    History of Present Illness  Onset:  Many years ago   Inciting Event:  None  Location:  Low back  Pain: Pain described as ache and sharp. Located in the low back and does radiate into the right lower extremity along the lateral thigh, sometimes it goes below that.  Severity:  Pain rated as a 9 /10.  Apportions pain as 50%  back pain and 50% extremity pain.  Symptoms have been constant.  Exacerbation:  Cold weather, being in a position for too long.   Alleviation:  Changing position, heat.  Associated Symptoms:   She denies any new onset of bowel or bladder weakness, significant leg weakness or saddle anesthesia.   Ambulates: Without assistive device.   Limitations: This pain limits the patient from being more involved with her grandchildren - go to their games, shopping, standing or walking for prolonged amounts of time.   Goals: Functional goals include ability to do the above.     [place] 82     PEG Assessment   What number best describes your pain on average in the past week?10  What number best describes how, during the past week, pain has interfered with your enjoyment of life?10  What number best  describes how, during the past week, pain has interfered with your general activity?  10        Current Outpatient Medications:     albuterol (ACCUNEB) 0.63 MG/3ML nebulizer solution, Inhale 3 mL Every 6 (Six) Hours As Needed., Disp: , Rfl:     albuterol sulfate HFA (Ventolin HFA) 108 (90 Base) MCG/ACT inhaler, Inhale 2 puffs Every 6 (Six) Hours As Needed for Wheezing., Disp: 8 g, Rfl: 11    amLODIPine (NORVASC) 5 MG tablet, Take 1 tablet by mouth 2 (Two) Times a Day., Disp: 180 tablet, Rfl: 3    Budeson-Glycopyrrol-Formoterol (Breztri Aerosphere) 160-9-4.8 MCG/ACT aerosol inhaler, Inhale 2 puffs 2 (Two) Times a Day for 1 day., Disp: 4 each, Rfl: 0    budesonide (PULMICORT) 0.5 MG/2ML nebulizer solution, Take 2 mL by nebulization Daily., Disp: 180 mL, Rfl: 1    Calcium Carbonate 1500 (600 Ca) MG tablet, Calcium 600 600 mg (1,500 mg) oral tablet take 1 tablet by oral route daily   Suspended, Disp: , Rfl:     cetirizine (zyrTEC) 10 MG tablet, Take 1 tablet by mouth Daily., Disp: 90 tablet, Rfl: 3    colestipol (COLESTID) 1 g tablet, Take 1 tablet by mouth Daily., Disp: 90 tablet, Rfl: 1    cyclobenzaprine (FLEXERIL) 10 MG tablet, Take 1 tablet by mouth 2 (Two) Times a Day As Needed., Disp: , Rfl:     ferrous sulfate 325 (65 FE) MG EC tablet, Take 1 tablet by mouth Daily With Breakfast., Disp: 90 tablet, Rfl: 1    fluticasone (FLONASE) 50 MCG/ACT nasal spray, 2 sprays into the nostril(s) as directed by provider Daily., Disp: 18 mL, Rfl: 5    hydroCHLOROthiazide (HYDRODIURIL) 25 MG tablet, Take 1 tablet by mouth Daily., Disp: 90 tablet, Rfl: 3    ipratropium-albuterol (DUO-NEB) 0.5-2.5 mg/3 ml nebulizer, Take 3 mL by nebulization 4 (Four) Times a Day., Disp: 1080 mL, Rfl: 1    metoprolol succinate XL (Toprol XL) 50 MG 24 hr tablet, Take 1 tablet by mouth 2 (Two) Times a Day., Disp: 180 tablet, Rfl: 3    mirtazapine (REMERON) 30 MG tablet, Take 1 tablet by mouth Every Night., Disp: 90 tablet, Rfl: 3    montelukast  (SINGULAIR) 10 MG tablet, Take 1 tablet by mouth Every Night., Disp: 90 tablet, Rfl: 3    multivitamin with minerals tablet tablet, 1 tablet Daily., Disp: , Rfl:     O2 (OXYGEN), Inhale 2 L/min 1 (One) Time., Disp: , Rfl:     pantoprazole (PROTONIX) 40 MG EC tablet, Take 1 tablet by mouth Daily., Disp: 90 tablet, Rfl: 3    pregabalin (LYRICA) 100 MG capsule, Take 1 capsule by mouth 2 (Two) Times a Day., Disp: 60 capsule, Rfl: 2    The following portions of the patient's history were reviewed and updated as appropriate: allergies, current medications, past family history, past medical history, past social history, past surgical history, and problem list.      REVIEW OF PERTINENT MEDICAL DATA    11/3/21 PROCEDURE:  MRI LUMBAR SPINE WO CONTRAST     COMPARISON: None     INDICATIONS:  LOW BACK PAIN RADIATING DOWN RIGHT GLUTE, POSTERIOR HIP, AND LEG TO FOOT. HISTORY OF   LUMBAR SURGERY 2015     TECHNIQUE:    A variety of imaging planes and parameters were utilized for visualization of suspected   pathology.     FINDINGS:          PARASPINAL AREA:     No discrete abnormality  BONES:             There is some signal on STIR involving the pedicle of L5 on the right that may be stress   related.  There are degenerative endplate changes involving the superior endplate of L5 anteriorly   that could reflect Modic type 1  CORD/CAUDA EQUINA:            The conus is around the T12-L1 level.     LUMBAR DISC LEVELS:     T12-L1:   There is bulging of the annulus.  The transaxial images do not extend through this level.  L1-L2:   There is no disc herniation or intervertebral foraminal stenosis.  There is facet   arthropathy and some ligamentum flavum redundancy.  L2-L3:   There is bulging of the annulus.  This extends into the right foraminal area where there may   be a small disc protrusion.  There is facet arthropathy in ligamentum flavum redundancy resulting   in moderate narrowing of the central canal.  There is at least mild  narrowing of the left   intervertebral foramina and what looks like moderate narrowing of the right intervertebral   foramina.  Suggested disc protrusion in the right foraminal area abuts the exiting nerve root.  L3-L4:   Slight anterior offset of L3 on L4 0.23 cm.  There is some bulging of the annulus.  There is   facet arthropathy and ligamentum flavum redundancy resulting in severe central canal stenosis.    There is at least mild narrowing of both intervertebral foramina.  L4-L5:   There is broad-based bulging of the annulus.  This encroaches on the floor of the   intervertebral foramina.  There is facet arthropathy and ligamentum flavum redundancy resulting in   severe central canal stenosis.  There is mild to moderate narrowing of the intervertebral foramina   worse on the right.  There are marginal osteophytes about the disc space laterally.  L5-S1:   Bulging of the annulus asymmetrically laterally to the right which is probably superimposed   on more of an osseous bar.  There is mild to moderate narrowing of the left intervertebral foramina   and more moderate to severe narrowing of the right intervertebral foramina.  Degenerative changes   encroach on the exiting right L5 nerve root.  There is facet arthropathy and ligamentum flavum   redundancy resulting in moderate narrowing of the central canal.     CONCLUSION:   1. Multilevel lumbar spondylosis as described.  Degenerative changes result in foraminal stenosis   on the right at L2-3 L4-5 and L5-S1 with some encroachment on the exiting nerve roots.  This may be   accounting for patient's radiculopathy.  2. Multilevel central canal stenosis worse at L3-4 and L4-5       9/20/23 Creatinine 0.99, Platelets 420 (10*3)    2/24/23         Component  Ref Range & Units 7 mo ago  (2/24/23) 1 yr ago  (9/26/22) 1 yr ago  (2/14/22)   Amphetamine Screen, Urine  Negative Negative  Negative   Barbiturates Screen, Urine  Negative Negative  Negative   Buprenorphine, Screen,  "Urine  Negative Negative  Negative   Benzodiazepine Screen, Urine  Negative Negative  Negative   Cocaine Screen, Urine  Negative Negative  Negative   MDMA (ECSTASY)  Negative Negative  Negative   Methamphetamine, Ur  Negative Negative  Negative   Methadone Screen, Urine  Negative Negative  Negative   Opiate Screen  Negative Negative  Positive Abnormal    Oxycodone Screen, Urine  Negative Negative  Negative   Phencyclidine (PCP), Urine  Negative Negative  Negative   THC, Screen, Urine  Negative Negative  Negative   Lot Number [id number]   Expiration Date 3/31/2024 2,023/8 3/31/23   Resulting Agency            Review of Systems   Constitutional:  Positive for activity change and fatigue.   Gastrointestinal:  Negative for abdominal pain, constipation and diarrhea.   Genitourinary:  Negative for difficulty urinating and dysuria.   Musculoskeletal:  Positive for back pain.   Neurological:  Negative for dizziness, weakness, light-headedness, numbness and headaches.   Psychiatric/Behavioral:  Positive for sleep disturbance. Negative for agitation and suicidal ideas. The patient is not nervous/anxious.    I have reviewed and confirmed the accuracy of the ROS as documented by the MA/LPN/RN Shandra Thomas MD      Vitals:    10/05/23 0958   BP: 140/100   BP Location: Right arm   Patient Position: Sitting   Cuff Size: Adult   Pulse: 77   Resp: 18   Temp: 96.4 °F (35.8 °C)   SpO2: 95%   Weight: 93.4 kg (205 lb 12.8 oz)   Height: 162.6 cm (64\")   PainSc:   9   PainLoc: Back         Objective   Physical Exam  Constitutional:       General: She is not in acute distress.  Pulmonary:      Effort: Pulmonary effort is normal. No respiratory distress.   Musculoskeletal:      Comments: Ambulation: Without assistive device  Lumbar Exam:  Appearance: Scoliotic curve absent and scarring present  Palpated over lumbosacral paravertebral regions and transverse processes with positive tenderness appreciated, Bilateral. "   Sacroiliac joints are tender, Bilateral.  Trochanteric bursa are tender, Bilateral.  Straight leg raise is positive radiculopathy, Right.    Facet loading is positive for pain, Bilateral.  Paraspinal/adjacent lumbar musculature are tender to palpation, Bilateral.   Skin:     General: Skin is warm and dry.   Neurological:      Mental Status: She is alert.   Psychiatric:         Mood and Affect: Mood normal.         Thought Content: Thought content normal.       Assessment & Plan   Diagnoses and all orders for this visit:    1. Lumbar radiculopathy (Primary)  -     Case Request    2. Lumbar spondylosis    3. Other long term (current) drug therapy    4. Postlaminectomy syndrome  -     Case Request    5. Chronic obstructive pulmonary disease, unspecified COPD type          - Pertinent labs reviewed.   - Pertinent imaging reviewed.   - Will schedule for right L3-4 and Right L5-S1 Transforaminal epidural steroid injection.  Risks discussed including but not limited to bleeding, bruising, infection, damage to surrounding structures, headache, and rare things such as being paralyzed, seizure, stroke, heart attack and death.  The risk of steroid medications include but are not limited to immunosuppression, which can increase the risk of eugene an infectious disease as well as decrease the immune response to a vaccine.  Explained I recommend avoiding sedation due to her lung disease and oxygen requirements, however she states she cannot have the injection without sedation.  She understands risks.  - May increase her Pregabalin to 150mg BID if the Transforaminal epidural steroid injection doesn't help, however she states she had side effects at a higher dose in the past.   - LisaKim Kaufman reports a pain score of 9.  Given her pain assessment as noted, treatment options were discussed and the following options were decided upon as a follow-up plan to address the patient's pain: steroid injections.    --- Follow-up for  right L3-4 and right L5-S1 Transforaminal epidural steroid injection and office visit 4-6 weeks following.             HOMER REPORT    As part of the patient's treatment plan, I am prescribing controlled substances. The patient has been made aware of appropriate use of such medications, including potential risk of somnolence, limited ability to drive and/or work safely, and the potential for dependence or overdose. It has also bee made clear that these medications are for use by this patient only, without concomitant use of alcohol or other substances unless prescribed.     As the clinician, I personally reviewed the HOMER from 10/5/23 while the patient was in the office today.    History and physical exam exhibit continued safe and appropriate use of controlled substances.        Shandra Thomas MD  Pain Management    EMR Dragon/Transcription disclaimer:   Much of this encounter note is an electronic transcription/translation of spoken language to printed text. The electronic translation of spoken language may permit erroneous, or at times, nonsensical words or phrases to be inadvertently transcribed; Although I have reviewed the note for such errors, some may still exist.

## 2023-10-05 ENCOUNTER — TRANSCRIBE ORDERS (OUTPATIENT)
Dept: SURGERY | Facility: SURGERY CENTER | Age: 73
End: 2023-10-05
Payer: MEDICARE

## 2023-10-05 ENCOUNTER — PREP FOR SURGERY (OUTPATIENT)
Dept: SURGERY | Facility: SURGERY CENTER | Age: 73
End: 2023-10-05
Payer: MEDICARE

## 2023-10-05 ENCOUNTER — OFFICE VISIT (OUTPATIENT)
Dept: PAIN MEDICINE | Facility: CLINIC | Age: 73
End: 2023-10-05
Payer: MEDICARE

## 2023-10-05 VITALS
HEART RATE: 77 BPM | WEIGHT: 205.8 LBS | DIASTOLIC BLOOD PRESSURE: 100 MMHG | HEIGHT: 64 IN | SYSTOLIC BLOOD PRESSURE: 140 MMHG | TEMPERATURE: 96.4 F | OXYGEN SATURATION: 95 % | RESPIRATION RATE: 18 BRPM | BODY MASS INDEX: 35.13 KG/M2

## 2023-10-05 DIAGNOSIS — Z41.9 SURGERY, ELECTIVE: Primary | ICD-10-CM

## 2023-10-05 DIAGNOSIS — F41.9 ANXIETY: Primary | ICD-10-CM

## 2023-10-05 DIAGNOSIS — Z79.899 OTHER LONG TERM (CURRENT) DRUG THERAPY: ICD-10-CM

## 2023-10-05 DIAGNOSIS — M54.16 LUMBAR RADICULOPATHY: Primary | ICD-10-CM

## 2023-10-05 DIAGNOSIS — M47.816 LUMBAR SPONDYLOSIS: ICD-10-CM

## 2023-10-05 DIAGNOSIS — J44.9 CHRONIC OBSTRUCTIVE PULMONARY DISEASE, UNSPECIFIED COPD TYPE: ICD-10-CM

## 2023-10-05 DIAGNOSIS — M96.1 POSTLAMINECTOMY SYNDROME: ICD-10-CM

## 2023-10-05 RX ORDER — DIAZEPAM 5 MG/1
5 TABLET ORAL ONCE AS NEEDED
OUTPATIENT
Start: 2023-10-05

## 2023-10-05 NOTE — PATIENT INSTRUCTIONS
Some common side effects of gabapentin and/or pregabalin include sleepiness, swelling in hands or feet, depression, blurred vision, and drowsiness.  Please contact the clinic immediately with any significant side effects so that the treatment plan may be adjusted in a safe manner.     What to expect if we're setting up an injection/procedure    - I have placed the order today, we'll start speaking to your insurance for authorization (this can sometimes take a few weeks).   - You should be scheduled for your procedure before you leave the office.  If you were not, please call our office to schedule.   - If you have any symptoms of an infection or of COVID, please reschedule your procedure.   - Your procedure will be performed in the surgery center in this building.  It is located in the basement level.  You may drive around to the back side of the building for easy access.  - LIGHT Intravenous (IV) sedation or anxiety medication is offered for some procedures: you will NOT be put to sleep.  If you plan to have sedation, do not eat or drink anything on the day of your procedure.   - Most procedures require having someone drive you.  Please make sure you arrange a  unless told otherwise.   - If you take a blood thinner and you were not instructed whether to continue or hold it, please contact us with any questions.

## 2023-10-12 PROBLEM — R06.00 DYSPNEA: Status: ACTIVE | Noted: 2023-10-12

## 2023-10-12 NOTE — ASSESSMENT & PLAN NOTE
He is chronic ongoing shortness of breath, which she feels like is progressively get worse.  She does not have any appearance of volume overload.  Echocardiogram shows grade 1 diastolic dysfunction, however this is not changed since previous echocardiogram.  Her symptoms are at least in large part due to her underlying respiratory disease, however did discuss with her this could be an anginal equivalent.  She has had a remote cardiac catheterization (currently I am unable to locate those records) per patient which was normal.  We discussed option for further evaluation with stress test, however she prefers to work on rate control and see if this alleviates any of her symptoms.

## 2023-10-12 NOTE — ASSESSMENT & PLAN NOTE
Blood pressure is moderately elevated in the office today, and her  heart rate is also fast.  Continue current doses of amlodipine, hydrochlorothiazide, and we will add beta-blocker to help reduce her rate this may offset some of her symptoms.

## 2023-10-13 ENCOUNTER — TELEPHONE (OUTPATIENT)
Dept: FAMILY MEDICINE CLINIC | Age: 73
End: 2023-10-13

## 2023-10-13 NOTE — TELEPHONE ENCOUNTER
Caller: Eileen Kaumfan    Relationship to patient: Self    Best call back number: [phone]    Patient is needing: PATIENT CALLED IN AND IS REQUESTING AN ORDER FOR A PORTABLE OXYGEN CONCENTRATOR. PATIENT SAID SHE HAD SPOKEN WITH NAEL AND GAVE PHONE NUMBER [phone].

## 2023-10-14 NOTE — TELEPHONE ENCOUNTER
Please make it so for her COPD and hypoxia.  If she needs to return for additional oxygen saturations, make it so.  Thanks.

## 2023-10-24 NOTE — DISCHARGE INSTRUCTIONS
[facility] - Post-procedure Instructions          --  While there are no absolute restrictions, it is recommended that you do not perform strenuous activity today. In the morning, you may resume your level of activity as before your block.    --  If you have a band-aid at your injection site, please remove it later today. Observe the area for any redness, swelling, pus-like drainage, or a temperature over 101°. If any of these symptoms occur, please call your doctor at [phone]. If after office hours, leave a message and the on-call provider will return your call.    --  Ice may be applied to your injection site. It is recommended you avoid direct heat (heating pad; hot tub) for 1-2 days.    --  Call [facility] at [phone] if you experience persistent headache, persistent bleeding from the injection site, or severe pain not relieved by heat or oral medication.    --  Do not make important decisions today.    --  Due to the effects of the block and/or the I.V. Sedation, DO NOT drive or operate hazardous machinery for 12 hours.  Local anesthetics may cause numbness after procedure and precautions must be taken with regards to operating equipment as well as with walking, even if ambulating with assistance of another person or with an assistive device.    --  Do not drink alcohol for 12 hours.    -- You may return to work tomorrow, or as directed by your referring doctor.    --  Occasionally you may notice a slight increase in your pain after the procedure. This should start to improve within the next 24-48 hours. Radiofrequency ablation procedure pain may last 3-4 weeks.    --  It may take as long as 3-4 days before you notice a gradual improvement in your pain and/or other symptoms.    -- You may continue to take your prescribed pain medication as needed.    --  Some normal possible side effects of steroid use could include fluid retention, increased blood sugar, dull headache,  increased sweating, increased appetite, mood swings and flushing.    --  Diabetics are recommended to watch their blood glucose level closely for 24-48 hours after the injection.    --  Must stay in PACU for 20 min upon arrival and prove no leg weakness before being discharged.    --  IN THE EVENT OF A LIFE THREATENING EMERGENCY, (CHEST PAIN, BREATHING DIFFICULTIES, PARALYSIS…) YOU SHOULD GO TO YOUR NEAREST EMERGENCY ROOM.    --  You should be contacted by our office within 2-3 days to schedule follow up or next appointment date.  If not contacted within 7 days, please call the office at [phone]

## 2023-10-25 ENCOUNTER — HOSPITAL ENCOUNTER (OUTPATIENT)
Dept: GENERAL RADIOLOGY | Facility: SURGERY CENTER | Age: 73
Setting detail: HOSPITAL OUTPATIENT SURGERY
End: 2023-10-25
Payer: MEDICARE

## 2023-10-25 ENCOUNTER — HOSPITAL ENCOUNTER (OUTPATIENT)
Facility: SURGERY CENTER | Age: 73
Setting detail: HOSPITAL OUTPATIENT SURGERY
Discharge: HOME | End: 2023-10-25
Attending: ANESTHESIOLOGY | Admitting: ANESTHESIOLOGY
Payer: MEDICARE

## 2023-10-25 VITALS
SYSTOLIC BLOOD PRESSURE: 147 MMHG | TEMPERATURE: 97.8 F | RESPIRATION RATE: 20 BRPM | HEART RATE: 68 BPM | DIASTOLIC BLOOD PRESSURE: 73 MMHG | OXYGEN SATURATION: 94 %

## 2023-10-25 DIAGNOSIS — Z41.9 SURGERY, ELECTIVE: ICD-10-CM

## 2023-10-25 DIAGNOSIS — F41.9 ANXIETY: ICD-10-CM

## 2023-10-25 PROCEDURE — 76000 FLUOROSCOPY <1 HR PHYS/QHP: CPT

## 2023-10-25 PROCEDURE — 25010000002 BUPIVACAINE (PF) 0.25 % SOLUTION 10 ML VIAL: Performed by: ANESTHESIOLOGY

## 2023-10-25 PROCEDURE — 25510000001 IOPAMIDOL 61 % SOLUTION 30 ML VIAL: Performed by: ANESTHESIOLOGY

## 2023-10-25 PROCEDURE — 64483 NJX AA&/STRD TFRM EPI L/S 1: CPT | Performed by: ANESTHESIOLOGY

## 2023-10-25 PROCEDURE — 77002 NEEDLE LOCALIZATION BY XRAY: CPT

## 2023-10-25 PROCEDURE — 25010000002 DEXAMETHASONE SODIUM PHOSPHATE 100 MG/10ML SOLUTION 10 ML VIAL: Performed by: ANESTHESIOLOGY

## 2023-10-25 RX ORDER — DIAZEPAM 5 MG/1
5 TABLET ORAL ONCE AS NEEDED
Status: COMPLETED | OUTPATIENT
Start: 2023-10-25 | End: 2023-10-25

## 2023-10-25 RX ADMIN — DIAZEPAM 5 MG: 5 TABLET ORAL at 13:09

## 2023-10-25 NOTE — OP NOTE
Lumbar Transforaminal Epidural Steroid Injection Right L3-4 and Right L5-S1 Levels  [facility]    PREOPERATIVE DIAGNOSIS:  Lumbar radicular pain, Lumbar Postlaminectomy Syndrome and Lumbar Radiculopathy    POSTOPERATIVE DIAGNOSIS:  Same as preop diagnosis    PROCEDURE:    1. CPT 74636 --  Diagnostic & Therapeutic Transforaminal Epidural Steroid Injection at the L3 and L5 level, on the right     PRE-PROCEDURE DISCUSSION WITH PATIENT:    Risks and complications were discussed with the patient prior to starting the procedure and informed consent was obtained.  We discussed various topics including but not limited to bleeding, infection, injury, nerve injury, paralysis, coma, death, postprocedural painful flare-up, postprocedural site soreness, and a lack of pain relief.  We discussed the diagnostic aspect of transforaminal epidural / selective nerve root blockade.    SURGEON:  Shandra Thomas MD    SEDATION:  Anxiolysis was used for this procedure which included PO Valium 5mg  ANESTHETIC:  0.25% bupivacaine  STEROID:  10mg dexamethasone    DESCRIPTON OF PROCEDURE:  After obtaining informed consent, an I.V. was not started in the preoperative area. The patient taken to the operating room and was placed in the prone position with a pillow under the abdomen.  All pressure points were well padded.  EKG, blood pressure, and pulse oximeter were monitored.  The lumbar area was prepped with Chloraprep and draped in a sterile fashion.     The AP fluoroscopic image was used to visualize the L3 vertebral body.  The superior endplate was squared off radiographically.  The image was then obliqued towards the patient's right side to maximize visualization of the pedicle. The skin and subcutaneous tissue at the 6 o'clock position of the pedicle was anesthetized with 1% lidocaine. A 22-gauge spinal needle was then advanced percutaneously through the anesthetized skin tract under fluoroscopic guidance in AP and  lateral views until the needle tip lie in the monica-lateral aspect of the epidural space.  After negative aspiration of the needle for blood or CSF, a volume of 1 mL of Isovue was injected producing good epidural spread with no evidence of loculation, vascular run-off, or intrathecal spread. Subsequently, a volume of 3 mL of injectate was administered without resistance.  The needle was removed intact.  Vital signs were stable throughout.      The same procedure was then performed at the right L5-S1 foramen in the exact same fashion.      The total volume injected consisted of 10 mg of dexamethasone with 1 mL of 0.25% bupivacaine and preservative-free normal saline.       ESTIMATED BLOOD LOSS:  <5 mL  SPECIMENS:  none    COMPLICATIONS:   No complications were noted., There was no indication of vascular uptake on live injection of contrast dye., and There was no indication of intrathecal uptake on live injection of contrast dye.    TOLERANCE & DISCHARGE CONDITION:    The patient tolerated the procedure well.  The patient was transported to the recovery area without difficulties.  The patient was discharged to home under the care of family in stable and satisfactory condition.    PLAN OF CARE:  The patient was given our standard instruction sheet.  The patient will Return to clinic 4-6 wks.  The patient will resume all medications as per the medication reconciliation sheet.

## 2023-11-09 ENCOUNTER — TELEPHONE (OUTPATIENT)
Dept: FAMILY MEDICINE CLINIC | Age: 73
End: 2023-11-09

## 2023-11-09 NOTE — TELEPHONE ENCOUNTER
Caller: Eileen Kaufman    Relationship: Self    Best call back number: [phone]    What is the best time to reach you: ANYTIME    Who are you requesting to speak with (clinical staff, provider,  specific staff member): GOMEZ    Do you know the name of the person who called: PATIENT    What was the call regarding: PATIENT SAYS HER OXYGEN WITH [employer]OG[employer]  HAS NOT BEEN SIGNED OFF ON BY THE DOCTOR ON HER CHART. PLEASE CALL PATIENT BACK AND ADVISE.    Is it okay if the provider responds through MyChart: N/A

## 2023-12-06 ENCOUNTER — OFFICE VISIT (OUTPATIENT)
Dept: PAIN MEDICINE | Facility: CLINIC | Age: 73
End: 2023-12-06
Payer: MEDICARE

## 2023-12-06 VITALS
SYSTOLIC BLOOD PRESSURE: 130 MMHG | OXYGEN SATURATION: 96 % | RESPIRATION RATE: 20 BRPM | TEMPERATURE: 96.8 F | BODY MASS INDEX: 36.47 KG/M2 | WEIGHT: 213.6 LBS | HEART RATE: 105 BPM | DIASTOLIC BLOOD PRESSURE: 70 MMHG | HEIGHT: 64 IN

## 2023-12-06 DIAGNOSIS — G89.29 CHRONIC RIGHT-SIDED LOW BACK PAIN WITH RIGHT-SIDED SCIATICA: ICD-10-CM

## 2023-12-06 DIAGNOSIS — M51.37 DEGENERATION OF LUMBAR OR LUMBOSACRAL INTERVERTEBRAL DISC: ICD-10-CM

## 2023-12-06 DIAGNOSIS — M46.1 BILATERAL SACROILIITIS: ICD-10-CM

## 2023-12-06 DIAGNOSIS — M54.16 LUMBAR RADICULOPATHY: Primary | ICD-10-CM

## 2023-12-06 DIAGNOSIS — M54.41 CHRONIC RIGHT-SIDED LOW BACK PAIN WITH RIGHT-SIDED SCIATICA: ICD-10-CM

## 2023-12-06 PROCEDURE — 1125F AMNT PAIN NOTED PAIN PRSNT: CPT | Performed by: PHYSICIAN ASSISTANT

## 2023-12-06 PROCEDURE — 99214 OFFICE O/P EST MOD 30 MIN: CPT | Performed by: PHYSICIAN ASSISTANT

## 2023-12-06 PROCEDURE — 3078F DIAST BP <80 MM HG: CPT | Performed by: PHYSICIAN ASSISTANT

## 2023-12-06 PROCEDURE — 1160F RVW MEDS BY RX/DR IN RCRD: CPT | Performed by: PHYSICIAN ASSISTANT

## 2023-12-06 PROCEDURE — 3075F SYST BP GE 130 - 139MM HG: CPT | Performed by: PHYSICIAN ASSISTANT

## 2023-12-06 PROCEDURE — 1159F MED LIST DOCD IN RCRD: CPT | Performed by: PHYSICIAN ASSISTANT

## 2023-12-06 RX ORDER — PREGABALIN 150 MG/1
150 CAPSULE ORAL 2 TIMES DAILY
Qty: 60 CAPSULE | Refills: 1 | Status: SHIPPED | OUTPATIENT
Start: 2023-12-06

## 2023-12-06 NOTE — PROGRESS NOTES
CHIEF COMPLAINT  LUMBAR/SACRAL TRANSFORAMINAL EPIDURAL Right L3-4 and RIght L5-S1  10-25-23  Patient reports LSTE was not effective her pain has gotten worse.      Subjective   Eileen Kaufman is a 73 y.o. female  who presents to the office for follow-up of procedure.  She completed a #1 diagnostic right L3-4, L5-S1 lumbar TFESI   on 10/25/2023 performed by Dr. Thomas for management of low back and lower extremity pain. Patient reports suboptimal relief from the procedure.  Patient continues with primarily right-sided lumbosacral spine pain which radiates into the right posterior buttock/hip and into the posterior aspect of the thigh midway.  She finds that her back pain is equal to her leg pain.  Pain is described as a constant sharp and achy sensation.  She denies lower extremity numbness or tingling but does report subjective weakness.  Denies bladder or bowel incontinence and also denies saddle anesthesia.    She is currently utilizing pregabalin 100 mg p.o. twice daily which she feels is somewhat helpful though it does not give significant relief throughout the entire day.  She denies adverse effects from the medication.    Pain today 8/10 VAS in severity.    Back Pain  This is a chronic problem. The current episode started more than 1 year ago. The problem occurs constantly. The problem is unchanged. The pain is present in the lumbar spine. The quality of the pain is described as aching (Sharp). The pain radiates to the right thigh. The pain is at a severity of 8/10. The pain is moderate. The pain is The same all the time. The symptoms are aggravated by standing (Walking/activity). Associated symptoms include abdominal pain, leg pain and weakness. Pertinent negatives include no chest pain, dysuria, fever, headaches or numbness.        PEG Assessment   What number best describes your pain on average in the past week?10  What number best describes how, during the past week, pain has interfered with your enjoyment of  life?10  What number best describes how, during the past week, pain has interfered with your general activity?  10    Review of Pertinent Medical Data ---  PROCEDURE:  MRI LUMBAR SPINE WO CONTRAST     COMPARISON: None     INDICATIONS:  LOW BACK PAIN RADIATING DOWN RIGHT GLUTE, POSTERIOR HIP, AND LEG TO FOOT. HISTORY OF   LUMBAR SURGERY 2015     TECHNIQUE:    A variety of imaging planes and parameters were utilized for visualization of suspected   pathology.     FINDINGS:          PARASPINAL AREA:     No discrete abnormality  BONES:             There is some signal on STIR involving the pedicle of L5 on the right that may be stress   related.  There are degenerative endplate changes involving the superior endplate of L5 anteriorly   that could reflect Modic type 1  CORD/CAUDA EQUINA:            The conus is around the T12-L1 level.     LUMBAR DISC LEVELS:     T12-L1:   There is bulging of the annulus.  The transaxial images do not extend through this level.  L1-L2:   There is no disc herniation or intervertebral foraminal stenosis.  There is facet   arthropathy and some ligamentum flavum redundancy.  L2-L3:   There is bulging of the annulus.  This extends into the right foraminal area where there may   be a small disc protrusion.  There is facet arthropathy in ligamentum flavum redundancy resulting   in moderate narrowing of the central canal.  There is at least mild narrowing of the left   intervertebral foramina and what looks like moderate narrowing of the right intervertebral   foramina.  Suggested disc protrusion in the right foraminal area abuts the exiting nerve root.  L3-L4:   Slight anterior offset of L3 on L4 0.23 cm.  There is some bulging of the annulus.  There is   facet arthropathy and ligamentum flavum redundancy resulting in severe central canal stenosis.    There is at least mild narrowing of both intervertebral foramina.  L4-L5:   There is broad-based bulging of the annulus.  This encroaches on the  floor of the   intervertebral foramina.  There is facet arthropathy and ligamentum flavum redundancy resulting in   severe central canal stenosis.  There is mild to moderate narrowing of the intervertebral foramina   worse on the right.  There are marginal osteophytes about the disc space laterally.  L5-S1:   Bulging of the annulus asymmetrically laterally to the right which is probably superimposed   on more of an osseous bar.  There is mild to moderate narrowing of the left intervertebral foramina   and more moderate to severe narrowing of the right intervertebral foramina.  Degenerative changes   encroach on the exiting right L5 nerve root.  There is facet arthropathy and ligamentum flavum   redundancy resulting in moderate narrowing of the central canal.     CONCLUSION:   1. Multilevel lumbar spondylosis as described.  Degenerative changes result in foraminal stenosis   on the right at L2-3 L4-5 and L5-S1 with some encroachment on the exiting nerve roots.  This may be   accounting for patient's radiculopathy.  2. Multilevel central canal stenosis worse at L3-4 and L4-5               ASHISH PERALTA MD         Electronically Signed and Approved By: ASHISH PERALTA MD on 11/03/2021 at 14:27      The following portions of the patient's history were reviewed and updated as appropriate: allergies, current medications, past family history, past medical history, past social history, past surgical history, and problem list.    Review of Systems   Constitutional:  Negative for activity change (less), fatigue and fever.   HENT:  Negative for congestion.    Respiratory:  Positive for shortness of breath. Negative for cough and chest tightness.    Cardiovascular:  Negative for chest pain.   Gastrointestinal:  Positive for abdominal pain. Negative for constipation and diarrhea.   Genitourinary:  Negative for difficulty urinating and dysuria.   Musculoskeletal:  Positive for back pain.   Neurological:  Positive for weakness.  "Negative for dizziness, light-headedness, numbness and headaches.   Psychiatric/Behavioral:  Positive for agitation and sleep disturbance. Negative for suicidal ideas. The patient is not nervous/anxious.      I have reviewed and confirmed the accuracy of the ROS as documented by the MA/LPN/RN TANMAY Lucio   Vitals:    12/06/23 1250   BP: 130/70   BP Location: Right arm   Patient Position: Sitting   Cuff Size: Adult   Pulse: 105   Resp: 20   Temp: 96.8 °F (36 °C)   TempSrc: Temporal   SpO2: 96%  Comment: 2L   Weight: 96.9 kg (213 lb 9.6 oz)   Height: 162.6 cm (64\")   PainSc:   8         Objective   Physical Exam  Vitals and nursing note reviewed.   Constitutional:       Appearance: Normal appearance. She is obese.   HENT:      Head: Normocephalic.   Pulmonary:      Effort: Pulmonary effort is normal.   Musculoskeletal:      Lumbar back: Spasms and tenderness present. Decreased range of motion. Positive right straight leg raise test.        Back:    Skin:     General: Skin is warm and dry.   Neurological:      General: No focal deficit present.      Mental Status: She is alert and oriented to person, place, and time.      Cranial Nerves: Cranial nerves 2-12 are intact.      Sensory: Sensation is intact.      Motor: Motor function is intact.      Gait: Gait abnormal.   Psychiatric:         Mood and Affect: Mood normal.         Behavior: Behavior normal.         Thought Content: Thought content normal.         Judgment: Judgment normal.         Assessment & Plan   Diagnoses and all orders for this visit:    1. Lumbar radiculopathy (Primary)  -     MRI lumbar spine w wo contrast; Future  -     pregabalin (LYRICA) 150 MG capsule; Take 1 capsule by mouth 2 (Two) Times a Day.  Dispense: 60 capsule; Refill: 1    2. Degeneration of lumbar or lumbosacral intervertebral disc  -     MRI lumbar spine w wo contrast; Future    3. Chronic right-sided low back pain with right-sided sciatica    4. Bilateral sacroiliitis  -     " pregabalin (LYRICA) 150 MG capsule; Take 1 capsule by mouth 2 (Two) Times a Day.  Dispense: 60 capsule; Refill: 1        Eileen Kaufman reports a pain score of 8.  Given her pain assessment as noted, treatment options were discussed and the following options were decided upon as a follow-up plan to address the patient's pain: continuation of current treatment plan for pain, prescription for non-opiod analgesics, and use of non-medical modalities (ice, heat, stretching and/or behavior modifications).      --- Due to patient's suboptimal relief to lumbar TFESI I recommend proceeding with updated lumbar MRI with and without contrast for further evaluation of disc herniation and/or nerve root impingement.  Based on the MRI results we may consider alternative injection or have the patient sent for surgical evaluation.  --- At this time we will increase pregabalin to 150 mg p.o. twice daily per Dr. Faith initial consult recommendation.  --- Follow-up in 6 weeks for further evaluation and treat recommendations to be made.           HOMER REPORT  As part of the patient's treatment plan, I am prescribing controlled substances. The patient has been made aware of appropriate use of such medications, including potential risk of somnolence, limited ability to drive and/or work safely, and the potential for dependence or overdose. It has also been made clear that these medications are for use by this patient only, without concomitant use of alcohol or other substances unless prescribed.     Patient has completed prescribing agreement detailing terms of continued prescribing of controlled substances, including monitoring HOMER reports, urine drug screening, and pill counts if necessary. The patient is aware that inappropriate use will results in cessation of prescribing such medications.    As the clinician, I personally reviewed the HOMER from 12/6/2023 while the patient was in the office today.    History and physical exam  exhibit continued safe and appropriate use of controlled substances.       Dictated utilizing Dragon dictation.

## 2024-01-17 ENCOUNTER — OFFICE VISIT (OUTPATIENT)
Dept: PAIN MEDICINE | Facility: CLINIC | Age: 74
End: 2024-01-17
Payer: MEDICARE

## 2024-01-17 VITALS
OXYGEN SATURATION: 96 % | BODY MASS INDEX: 37.63 KG/M2 | TEMPERATURE: 97.1 F | HEART RATE: 73 BPM | SYSTOLIC BLOOD PRESSURE: 116 MMHG | WEIGHT: 220.4 LBS | DIASTOLIC BLOOD PRESSURE: 74 MMHG | HEIGHT: 64 IN

## 2024-01-17 DIAGNOSIS — M62.838 MUSCLE SPASM: ICD-10-CM

## 2024-01-17 DIAGNOSIS — M96.1 POSTLAMINECTOMY SYNDROME: ICD-10-CM

## 2024-01-17 DIAGNOSIS — M79.7 FIBROMYALGIA: ICD-10-CM

## 2024-01-17 DIAGNOSIS — M46.1 BILATERAL SACROILIITIS: ICD-10-CM

## 2024-01-17 DIAGNOSIS — M51.36 DDD (DEGENERATIVE DISC DISEASE), LUMBAR: ICD-10-CM

## 2024-01-17 DIAGNOSIS — M54.16 LUMBAR RADICULOPATHY: Primary | ICD-10-CM

## 2024-01-17 PROCEDURE — 1159F MED LIST DOCD IN RCRD: CPT | Performed by: PHYSICIAN ASSISTANT

## 2024-01-17 PROCEDURE — 1160F RVW MEDS BY RX/DR IN RCRD: CPT | Performed by: PHYSICIAN ASSISTANT

## 2024-01-17 PROCEDURE — 99214 OFFICE O/P EST MOD 30 MIN: CPT | Performed by: PHYSICIAN ASSISTANT

## 2024-01-17 PROCEDURE — 1125F AMNT PAIN NOTED PAIN PRSNT: CPT | Performed by: PHYSICIAN ASSISTANT

## 2024-01-17 PROCEDURE — 3074F SYST BP LT 130 MM HG: CPT | Performed by: PHYSICIAN ASSISTANT

## 2024-01-17 PROCEDURE — 3078F DIAST BP <80 MM HG: CPT | Performed by: PHYSICIAN ASSISTANT

## 2024-01-17 RX ORDER — NAPROXEN 250 MG/1
250 TABLET ORAL 2 TIMES DAILY WITH MEALS
COMMUNITY
Start: 2023-12-30

## 2024-01-17 RX ORDER — CYCLOBENZAPRINE HCL 5 MG
5 TABLET ORAL 2 TIMES DAILY PRN
Qty: 60 TABLET | Refills: 1 | Status: SHIPPED | OUTPATIENT
Start: 2024-01-17

## 2024-01-17 RX ORDER — CYCLOBENZAPRINE HCL 5 MG
5 TABLET ORAL 2 TIMES DAILY PRN
COMMUNITY
Start: 2023-12-30 | End: 2024-01-17 | Stop reason: SDUPTHER

## 2024-01-17 RX ORDER — LIDOCAINE 50 MG/G
1 PATCH TOPICAL EVERY 24 HOURS
COMMUNITY
Start: 2023-12-31 | End: 2024-01-17 | Stop reason: SDUPTHER

## 2024-01-17 RX ORDER — LIDOCAINE 50 MG/G
2 PATCH TOPICAL EVERY 24 HOURS
Qty: 60 PATCH | Refills: 2 | Status: SHIPPED | OUTPATIENT
Start: 2024-01-17

## 2024-01-17 NOTE — PROGRESS NOTES
CHIEF COMPLAINT    Follow up back and neck pain. The patient states she was seen in [facility] ER at the end of December for neck pain. She was prescribed muscle relaxant and Lidocaine patch. The back pain has worsened since her last visit.     Subjective   Eileen Kaufman is a 73 y.o. female  who presents for follow-up.  She has a history of chronic neck and low back pain.  The patient continues with persistent pain primarily affecting the right side of the lumbosacral spine radiating to the right posterior buttock and hip and into the posterior aspect of the thigh usually terminating proximal to the knee.  She feels that her back pain is equal to her leg pain and is described as a constant sharp and achy sensation.  She denies lower extremity numbness or tingling but does report subjective weakness.  This patient also has secondary complaints of posterior cervical spine pain and was evaluated at [facility] emergency department on 12/30/2023 for acute strain of the neck with lidocaine patch and Flexeril which was helpful.    Current medication regimen consists of pregabalin 150 mg p.o. twice daily which has been helpful and she tolerates without adverse effects.    Pain today 9/10 VAS in severity.      Back Pain  This is a chronic problem. The current episode started more than 1 year ago. The problem occurs constantly. The problem is unchanged. The pain is present in the lumbar spine. The quality of the pain is described as aching (Sharp). The pain radiates to the right thigh. The pain is at a severity of 9/10. The pain is severe. The pain is The same all the time. The symptoms are aggravated by standing (Walking/activity). Associated symptoms include leg pain, numbness (bilateral foot, right leg) and weakness (bilateral legs when going from sitting to standing). Pertinent negatives include no abdominal pain, chest pain, dysuria, fever or headaches.        PEG Assessment   What number best describes your  pain on average in the past week?8  What number best describes how, during the past week, pain has interfered with your enjoyment of life?9  What number best describes how, during the past week, pain has interfered with your general activity?  9    Review of Pertinent Medical Data ---  PROCEDURE:  MRI LUMBAR SPINE WO CONTRAST     COMPARISON: None     INDICATIONS:  LOW BACK PAIN RADIATING DOWN RIGHT GLUTE, POSTERIOR HIP, AND LEG TO FOOT. HISTORY OF   LUMBAR SURGERY 2015     TECHNIQUE:    A variety of imaging planes and parameters were utilized for visualization of suspected   pathology.     FINDINGS:          PARASPINAL AREA:     No discrete abnormality  BONES:             There is some signal on STIR involving the pedicle of L5 on the right that may be stress   related.  There are degenerative endplate changes involving the superior endplate of L5 anteriorly   that could reflect Modic type 1  CORD/CAUDA EQUINA:            The conus is around the T12-L1 level.     LUMBAR DISC LEVELS:     T12-L1:   There is bulging of the annulus.  The transaxial images do not extend through this level.  L1-L2:   There is no disc herniation or intervertebral foraminal stenosis.  There is facet   arthropathy and some ligamentum flavum redundancy.  L2-L3:   There is bulging of the annulus.  This extends into the right foraminal area where there may   be a small disc protrusion.  There is facet arthropathy in ligamentum flavum redundancy resulting   in moderate narrowing of the central canal.  There is at least mild narrowing of the left   intervertebral foramina and what looks like moderate narrowing of the right intervertebral   foramina.  Suggested disc protrusion in the right foraminal area abuts the exiting nerve root.  L3-L4:   Slight anterior offset of L3 on L4 0.23 cm.  There is some bulging of the annulus.  There is   facet arthropathy and ligamentum flavum redundancy resulting in severe central canal stenosis.    There is at  least mild narrowing of both intervertebral foramina.  L4-L5:   There is broad-based bulging of the annulus.  This encroaches on the floor of the   intervertebral foramina.  There is facet arthropathy and ligamentum flavum redundancy resulting in   severe central canal stenosis.  There is mild to moderate narrowing of the intervertebral foramina   worse on the right.  There are marginal osteophytes about the disc space laterally.  L5-S1:   Bulging of the annulus asymmetrically laterally to the right which is probably superimposed   on more of an osseous bar.  There is mild to moderate narrowing of the left intervertebral foramina   and more moderate to severe narrowing of the right intervertebral foramina.  Degenerative changes   encroach on the exiting right L5 nerve root.  There is facet arthropathy and ligamentum flavum   redundancy resulting in moderate narrowing of the central canal.     CONCLUSION:   1. Multilevel lumbar spondylosis as described.  Degenerative changes result in foraminal stenosis   on the right at L2-3 L4-5 and L5-S1 with some encroachment on the exiting nerve roots.  This may be   accounting for patient's radiculopathy.  2. Multilevel central canal stenosis worse at L3-4 and L4-5               ASHISH PERALTA MD         Electronically Signed and Approved By: ASHISH PERALTA MD on 11/03/2021 at 14:27      The following portions of the patient's history were reviewed and updated as appropriate: allergies, current medications, past family history, past medical history, past social history, past surgical history, and problem list.    Review of Systems   Constitutional:  Negative for chills and fever.   Respiratory:  Negative for cough and shortness of breath.    Cardiovascular:  Negative for chest pain.   Gastrointestinal:  Negative for abdominal pain and constipation.   Genitourinary:  Negative for difficulty urinating and dysuria.   Musculoskeletal:  Positive for back pain.   Neurological:  Positive  "for weakness (bilateral legs when going from sitting to standing) and numbness (bilateral foot, right leg). Negative for dizziness, light-headedness and headaches.     I have reviewed and confirmed the accuracy of the ROS as documented by the MA/LPN/RN TANMAY Lucio  Vitals:    01/17/24 1258   BP: 116/74   BP Location: Left arm   Patient Position: Sitting   Pulse: 73   Temp: 97.1 °F (36.2 °C)   TempSrc: Temporal   SpO2: 96%   Weight: 100 kg (220 lb 6.4 oz)   Height: 162.6 cm (64\")   PainSc:   9   PainLoc: Back         Objective   Physical Exam  Vitals and nursing note reviewed.   Constitutional:       Appearance: Normal appearance. She is obese.   HENT:      Head: Normocephalic.   Pulmonary:      Effort: Pulmonary effort is normal.      Comments: On 2 L of oxygen per minute  Musculoskeletal:      Lumbar back: Spasms and tenderness present. Decreased range of motion. Positive right straight leg raise test.        Back:    Skin:     General: Skin is warm and dry.   Neurological:      General: No focal deficit present.      Mental Status: She is alert and oriented to person, place, and time.      Cranial Nerves: Cranial nerves 2-12 are intact.      Sensory: Sensation is intact.      Motor: Motor function is intact.      Gait: Gait abnormal.   Psychiatric:         Mood and Affect: Mood normal.         Behavior: Behavior normal.         Thought Content: Thought content normal.         Judgment: Judgment normal.             Assessment & Plan   Diagnoses and all orders for this visit:    1. Lumbar radiculopathy (Primary)  -     MRI lumbar spine w wo contrast; Future    2. Postlaminectomy syndrome  -     MRI lumbar spine w wo contrast; Future    3. DDD (degenerative disc disease), lumbar    4. Fibromyalgia  -     lidocaine (LIDODERM) 5 %; Place 2 patches on the skin as directed by provider Daily.  Dispense: 60 patch; Refill: 2    5. Muscle spasm  -     cyclobenzaprine (FLEXERIL) 5 MG tablet; Take 1 tablet by mouth 2 " (Two) Times a Day As Needed for Muscle Spasms.  Dispense: 60 tablet; Refill: 1  -     lidocaine (LIDODERM) 5 %; Place 2 patches on the skin as directed by provider Daily.  Dispense: 60 patch; Refill: 2    6. Bilateral sacroiliitis        LisaKim Kaufman reports a pain score of 9.  Given her pain assessment as noted, treatment options were discussed and the following options were decided upon as a follow-up plan to address the patient's pain: continuation of current treatment plan for pain, prescription for non-opiod analgesics, and use of non-medical modalities (ice, heat, stretching and/or behavior modifications).      --- Ms. Kaufman states that she was never contacted to have the lumbar MRI therefore new order has been sent to [facility] in [facility].  Due to her suboptimal relief to lumbar TFESI as well as previous history of lumbar laminectomy I recommend proceeding with updated lumbar MRI with and without contrast for further evaluation of disc herniation and/or nerve root impingement.  Pending the results we will determine if the patient is a candidate for an alternative interventional pain management procedure or needs surgical evaluation.  --- Continue pregabalin 150 mg p.o. twice daily.  She does not require refill on today.  --- Follow-up in 6 weeks for further evaluation and treat recommendations to be made.         HOMER CUMMINS  As part of the patient's treatment plan, I am prescribing controlled substances. The patient has been made aware of appropriate use of such medications, including potential risk of somnolence, limited ability to drive and/or work safely, and the potential for dependence or overdose. It has also been made clear that these medications are for use by this patient only, without concomitant use of alcohol or other substances unless prescribed.     Patient has completed prescribing agreement detailing terms of continued prescribing of controlled substances, including monitoring  HOMER reports, urine drug screening, and pill counts if necessary. The patient is aware that inappropriate use will results in cessation of prescribing such medications.    As the clinician, I personally reviewed the HOMER from 1/17/2024 while the patient was in the office today.    History and physical exam exhibit continued safe and appropriate use of controlled substances.     Dictated utilizing Dragon dictation.

## 2024-01-22 ENCOUNTER — OFFICE VISIT (OUTPATIENT)
Dept: PULMONOLOGY | Facility: CLINIC | Age: 74
End: 2024-01-22
Payer: MEDICARE

## 2024-01-22 VITALS
HEIGHT: 64 IN | SYSTOLIC BLOOD PRESSURE: 132 MMHG | DIASTOLIC BLOOD PRESSURE: 73 MMHG | HEART RATE: 108 BPM | TEMPERATURE: 97.7 F | OXYGEN SATURATION: 92 % | RESPIRATION RATE: 18 BRPM | WEIGHT: 222 LBS | BODY MASS INDEX: 37.9 KG/M2

## 2024-01-22 DIAGNOSIS — R06.09 DYSPNEA ON EXERTION: ICD-10-CM

## 2024-01-22 DIAGNOSIS — J96.11 CHRONIC RESPIRATORY FAILURE WITH HYPOXIA: Primary | ICD-10-CM

## 2024-01-22 DIAGNOSIS — J44.9 CHRONIC OBSTRUCTIVE PULMONARY DISEASE, UNSPECIFIED COPD TYPE: ICD-10-CM

## 2024-01-22 PROCEDURE — 3075F SYST BP GE 130 - 139MM HG: CPT | Performed by: STUDENT IN AN ORGANIZED HEALTH CARE EDUCATION/TRAINING PROGRAM

## 2024-01-22 PROCEDURE — 1159F MED LIST DOCD IN RCRD: CPT | Performed by: STUDENT IN AN ORGANIZED HEALTH CARE EDUCATION/TRAINING PROGRAM

## 2024-01-22 PROCEDURE — 3078F DIAST BP <80 MM HG: CPT | Performed by: STUDENT IN AN ORGANIZED HEALTH CARE EDUCATION/TRAINING PROGRAM

## 2024-01-22 PROCEDURE — 99214 OFFICE O/P EST MOD 30 MIN: CPT | Performed by: STUDENT IN AN ORGANIZED HEALTH CARE EDUCATION/TRAINING PROGRAM

## 2024-01-22 PROCEDURE — 1160F RVW MEDS BY RX/DR IN RCRD: CPT | Performed by: STUDENT IN AN ORGANIZED HEALTH CARE EDUCATION/TRAINING PROGRAM

## 2024-01-22 RX ORDER — CEPHALEXIN 500 MG/1
500 CAPSULE ORAL
COMMUNITY
Start: 2024-01-18 | End: 2024-01-25

## 2024-01-22 RX ORDER — ALBUTEROL SULFATE 0.63 MG/3ML
0.63 SOLUTION RESPIRATORY (INHALATION) EVERY 4 HOURS PRN
Qty: 1 EACH | Refills: 9 | Status: SHIPPED | OUTPATIENT
Start: 2024-01-22

## 2024-01-22 RX ORDER — IPRATROPIUM BROMIDE AND ALBUTEROL SULFATE 2.5; .5 MG/3ML; MG/3ML
3 SOLUTION RESPIRATORY (INHALATION)
Qty: 1080 ML | Refills: 1 | Status: SHIPPED | OUTPATIENT
Start: 2024-01-22 | End: 2024-01-29

## 2024-01-22 RX ORDER — BUDESONIDE, GLYCOPYRROLATE, AND FORMOTEROL FUMARATE 160; 9; 4.8 UG/1; UG/1; UG/1
2 AEROSOL, METERED RESPIRATORY (INHALATION) 2 TIMES DAILY
Qty: 1 EACH | Refills: 5 | Status: SHIPPED | OUTPATIENT
Start: 2024-01-22

## 2024-01-22 RX ORDER — BUDESONIDE 0.5 MG/2ML
0.5 INHALANT ORAL
Qty: 180 ML | Refills: 1 | Status: SHIPPED | OUTPATIENT
Start: 2024-01-22

## 2024-01-22 NOTE — PROGRESS NOTES
Primary Care Provider  Artur Quevedo MD   Referring Provider  No ref. provider found      Patient Complaint  Follow-up (3 Months) and COPD      Subjective          Eileen Kaufman presents to [facility]      History of Presenting Illness  Eileen Kaufman is a 73 y.o. female with history of COPD here as a follow-up.    Previous visit: Patient has long history of COPD.  She currently uses 2 L nasal cannula most of the day.  Sometimes she takes it off and her SPO2 drops.  Her current regimen includes Anoro, albuterol as needed with nebulizers Pulmicort and DuoNeb as needed.  She has to use her albuterol inhaler 2-3 times a day.  She feels like this regimen has her symptoms moderately controlled.  However, it does not last the entire day.  She has dyspnea on exertion, particularly when walking up and down around her house.  Patient was a former smoker, quit 01/2023.  Previously she smoked half a pack for over 40 years.  Within the last few days she reported some runny nose and a cough with white/clear sputum.  She denies fever, chills, hemoptysis at this time. I have personally reviewed patient's past family, social, medical and surgical histories and agree with their findings.    Interval update: Patient is doing okay.  She continues on 3 L nasal cannula.  At home she uses up to 4 L.  She reports chronic dyspnea on exertion.  Recently she has been feeling some congestion which she attributes to the weather change.  She denies sputum production.  She uses her Breztri daily as well as her as needed nebulizers.  She feels like she is unable to get anything up when she coughs.  We discussed the possibility of bronchoscopy for airway clearance.  She expressed understanding and will consider this in the future.  No other acute issues at this time.    Review of Systems  Constitutional:  No fever. No chills. No weakness.  Eyes: No pain, erythema, or discharge. No  blurring of vision.  ENT:  No sore throat, URI symptoms.   Cardiovascular:  No chest pain. No palpitations. No lower extremity edema.  Respiratory:  No shortness of breath; +chronic cough, pleuritic chest pain. No hemoptysis. +chronic dyspnea.   GI:  Normal appetite. No nausea, vomiting, diarrhea. No pain. No melena.  Musculoskeletal:  No arthralgias or myalgias.  Neurologic:  No headache. No weakness.    Family History   Problem Relation Age of Onset    Cerebral aneurysm Mother         cause of death    Heart attack Father         cause of death    Esophageal cancer Brother         Social History     Socioeconomic History    Marital status: Single    Number of children: 2   Tobacco Use    Smoking status: Former     Packs/day: 1.00     Years: 50.00     Additional pack years: 0.00     Total pack years: 50.00     Types: Cigarettes     Start date:      Quit date: 2023     Years since quittin.0     Passive exposure: Past    Smokeless tobacco: Never    Tobacco comments:     Eileen Alvarez was 23 when she started smoking.  She did stop smoking for some time - about 13 years.  She has been smoking a total of approximately 33 years.  She has smoked less than a pack daily during this time. She currently uses smokless, vapor can/pouches   Vaping Use    Vaping Use: Former    Quit date: 2023    Substances: Nicotine    Devices: Pre-filled or refillable cartridge   Substance and Sexual Activity    Alcohol use: Yes     Comment: Socially    Drug use: Never    Sexual activity: Defer        Past Medical History:   Diagnosis Date    Anemia, unspecified     Asymptomatic menopausal state     Chronic obstructive pulmonary disease with (acute) exacerbation     Essential (primary) hypertension     High cholesterol 2021    Low back pain     Major depressive disorder, single episode, moderate     Nicotine dependence, unspecified, uncomplicated     Osteopenia     Other intervertebral disc degeneration, lumbar region     Other  long term (current) drug therapy     Solitary pulmonary nodule     Vitamin D deficiency, unspecified         Immunization History   Administered Date(s) Administered    COVID-19 (MODERNA) BIVALENT 12+YRS 09/10/2022    COVID-19 (MODERNA) Monovalent Original Booster 04/14/2022    COVID-19 (PFIZER) Purple Cap Monovalent 12/29/2020, 01/29/2021, 09/30/2021    Fluzone High Dose =>65 Years (Vaxcare ONLY) 10/01/2021, 11/01/2022    Hepatitis A 07/07/2018    Influenza Quad Vaccine (Inpatient) 09/20/2013    Influenza, Unspecified 10/01/2020    Pneumococcal Conjugate 13-Valent (PCV13) 07/05/2018    Pneumococcal Conjugate 20-Valent (PCV20) 08/31/2023    Pneumococcal Polysaccharide (PPSV23) 03/25/2013, 02/13/2017    Shingrix 09/10/2022       Allergies   Allergen Reactions    Sulfa Antibiotics Unknown - Low Severity    Aspirin Rash    Atorvastatin Other (See Comments)     Hair loss          Current Outpatient Medications:     albuterol (ACCUNEB) 0.63 MG/3ML nebulizer solution, Inhale 3 mL Every 6 (Six) Hours As Needed., Disp: , Rfl:     albuterol sulfate HFA (Ventolin HFA) 108 (90 Base) MCG/ACT inhaler, Inhale 2 puffs Every 6 (Six) Hours As Needed for Wheezing., Disp: 8 g, Rfl: 11    amLODIPine (NORVASC) 5 MG tablet, Take 1 tablet by mouth 2 (Two) Times a Day., Disp: 180 tablet, Rfl: 3    budesonide (PULMICORT) 0.5 MG/2ML nebulizer solution, Take 2 mL by nebulization Daily., Disp: 180 mL, Rfl: 1    Calcium Carbonate 1500 (600 Ca) MG tablet, Calcium 600 600 mg (1,500 mg) oral tablet take 1 tablet by oral route daily   Suspended, Disp: , Rfl:     cephalexin (KEFLEX) 500 MG capsule, Take 1 capsule by mouth., Disp: , Rfl:     cetirizine (zyrTEC) 10 MG tablet, Take 1 tablet by mouth Daily., Disp: 90 tablet, Rfl: 3    colestipol (COLESTID) 1 g tablet, Take 1 tablet by mouth Daily., Disp: 90 tablet, Rfl: 1    cyclobenzaprine (FLEXERIL) 5 MG tablet, Take 1 tablet by mouth 2 (Two) Times a Day As Needed for Muscle Spasms., Disp: 60 tablet,  "Rfl: 1    ferrous sulfate 325 (65 FE) MG EC tablet, Take 1 tablet by mouth Daily With Breakfast., Disp: 90 tablet, Rfl: 1    fluticasone (FLONASE) 50 MCG/ACT nasal spray, 2 sprays into the nostril(s) as directed by provider Daily., Disp: 18 mL, Rfl: 5    hydroCHLOROthiazide (HYDRODIURIL) 25 MG tablet, Take 1 tablet by mouth Daily., Disp: 90 tablet, Rfl: 3    ipratropium-albuterol (DUO-NEB) 0.5-2.5 mg/3 ml nebulizer, Take 3 mL by nebulization 4 (Four) Times a Day., Disp: 1080 mL, Rfl: 1    lidocaine (LIDODERM) 5 %, Place 2 patches on the skin as directed by provider Daily., Disp: 60 patch, Rfl: 2    metoprolol succinate XL (Toprol XL) 50 MG 24 hr tablet, Take 1 tablet by mouth 2 (Two) Times a Day., Disp: 180 tablet, Rfl: 3    mirtazapine (REMERON) 30 MG tablet, Take 1 tablet by mouth Every Night., Disp: 90 tablet, Rfl: 3    montelukast (SINGULAIR) 10 MG tablet, Take 1 tablet by mouth Every Night., Disp: 90 tablet, Rfl: 3    multivitamin with minerals tablet tablet, 1 tablet Daily., Disp: , Rfl:     naproxen (NAPROSYN) 250 MG tablet, Take 1 tablet by mouth 2 (Two) Times a Day With Meals., Disp: , Rfl:     O2 (OXYGEN), Inhale 2 L/min 1 (One) Time., Disp: , Rfl:     pantoprazole (PROTONIX) 40 MG EC tablet, Take 1 tablet by mouth Daily., Disp: 90 tablet, Rfl: 3    pregabalin (LYRICA) 150 MG capsule, Take 1 capsule by mouth 2 (Two) Times a Day., Disp: 60 capsule, Rfl: 1    pregabalin (LYRICA) 100 MG capsule, Take 1 capsule by mouth 2 (Two) Times a Day. (Patient not taking: Reported on 1/22/2024), Disp: 60 capsule, Rfl: 2     Objective     Vital Signs:   /73 (BP Location: Left arm, Patient Position: Sitting, Cuff Size: Adult)   Pulse 108   Temp 97.7 °F (36.5 °C) (Temporal)   Resp 18   Ht 162.6 cm (64\")   Wt 101 kg (222 lb)   SpO2 92% Comment: 3L PD  BMI 38.11 kg/m²     Physical Exam  Vitals:    01/22/24 1449   BP: 132/73   Pulse: 108   Resp: 18   Temp: 97.7 °F (36.5 °C)   SpO2: 92%       General: Alert, " NAD  HEENT:  EOMI, no sinus tenderness  Neck:  Supple, no thyromegaly,  no JVD  Lymph: no cervical, supraclavicular lymphadenopathy bilaterally  Chest:  clear to auscultation bilaterally, no wheezing or crackles; no work of breathing noted on 3L NC  CV: RRR, no M/G/R, pulses 2+, equal.  Abd:  Soft, NT, ND, +BS, obese  EXT:  no clubbing, no cyanosis, no edema b/l  Neuro:  A&Ox3, CN grossly intact, no focal deficits  Skin: No rashes or lesions noted       Result Review :   I have personally reviewed patient's labs and images.          Assessment and Plan      Patient Active Problem List   Diagnosis    Allergic rhinitis    Anxiety    Sleep apnea    Cervical spondylosis without myelopathy    Congestive heart failure    High cholesterol    Hypercalcemia    Essential hypertension    Lumbar radiculopathy    Fibromyalgia    Chronic obstructive pulmonary disease    Chronic nonintractable headache    Frequency of micturition    Other long term (current) drug therapy    Syncope and collapse    Bilateral sacroiliitis    Acute respiratory failure with hypoxia    Weakness    Chronic respiratory failure with hypoxia    Chronic right-sided low back pain with right-sided sciatica    Chronic superficial gastritis    Diastolic dysfunction    Postlaminectomy syndrome    Dyspnea       Impression and Plan:    COPD  Chronic hypoxic respiratory failure requiring oxygen, 2L NC  Previous tobacco user, half pack per day for over 40 years, quit 01/2023    -Continue 3-4L nasal cannula; goal SPO2 88 to 92%  -Continue Breztri 2 puffs twice daily  -Continue as needed albuterol with as needed nebulizers  -PFTs show mild obstructive lung disease.  FEV1 72% predicted  -Low-dose CT done 6/30/2023 showed no new or suspicious nodule; continue annual screening, next due 06/2024    Vaccination status: Patient is up-to-date with her COVID, pneumonia, and influenza vaccination.   Medications personally reviewed.    Follow Up   No follow-ups on  file.  Patient was given instructions and counseling regarding her condition or for health maintenance advice. Please see specific information pulled into the AVS if appropriate.

## 2024-01-29 RX ORDER — IPRATROPIUM BROMIDE AND ALBUTEROL SULFATE 2.5; .5 MG/3ML; MG/3ML
SOLUTION RESPIRATORY (INHALATION)
Qty: 90 ML | Refills: 5 | Status: SHIPPED | OUTPATIENT
Start: 2024-01-29

## 2024-01-31 ENCOUNTER — OFFICE VISIT (OUTPATIENT)
Dept: CARDIOLOGY | Facility: CLINIC | Age: 74
End: 2024-01-31
Payer: MEDICARE

## 2024-01-31 VITALS
BODY MASS INDEX: 38.24 KG/M2 | HEIGHT: 64 IN | HEART RATE: 86 BPM | DIASTOLIC BLOOD PRESSURE: 71 MMHG | WEIGHT: 224 LBS | SYSTOLIC BLOOD PRESSURE: 117 MMHG

## 2024-01-31 DIAGNOSIS — R06.02 SHORTNESS OF BREATH: Primary | ICD-10-CM

## 2024-01-31 DIAGNOSIS — I10 ESSENTIAL HYPERTENSION: ICD-10-CM

## 2024-01-31 PROCEDURE — 1160F RVW MEDS BY RX/DR IN RCRD: CPT | Performed by: INTERNAL MEDICINE

## 2024-01-31 PROCEDURE — 1159F MED LIST DOCD IN RCRD: CPT | Performed by: INTERNAL MEDICINE

## 2024-01-31 PROCEDURE — 99213 OFFICE O/P EST LOW 20 MIN: CPT | Performed by: INTERNAL MEDICINE

## 2024-01-31 PROCEDURE — 3074F SYST BP LT 130 MM HG: CPT | Performed by: INTERNAL MEDICINE

## 2024-01-31 PROCEDURE — 3078F DIAST BP <80 MM HG: CPT | Performed by: INTERNAL MEDICINE

## 2024-02-02 ENCOUNTER — OFFICE VISIT (OUTPATIENT)
Dept: FAMILY MEDICINE CLINIC | Age: 74
End: 2024-02-02
Payer: MEDICARE

## 2024-02-02 VITALS
DIASTOLIC BLOOD PRESSURE: 66 MMHG | HEART RATE: 86 BPM | OXYGEN SATURATION: 99 % | HEIGHT: 64 IN | BODY MASS INDEX: 38.45 KG/M2 | SYSTOLIC BLOOD PRESSURE: 114 MMHG | WEIGHT: 225.2 LBS | TEMPERATURE: 98.1 F

## 2024-02-02 DIAGNOSIS — G47.09 OTHER INSOMNIA: ICD-10-CM

## 2024-02-02 DIAGNOSIS — R06.09 DYSPNEA ON EXERTION: ICD-10-CM

## 2024-02-02 DIAGNOSIS — M54.2 NECK PAIN: Primary | ICD-10-CM

## 2024-02-02 DIAGNOSIS — I10 ESSENTIAL HYPERTENSION: ICD-10-CM

## 2024-02-02 RX ORDER — MIRTAZAPINE 30 MG/1
30 TABLET, FILM COATED ORAL NIGHTLY
Qty: 90 TABLET | Refills: 3 | Status: SHIPPED | OUTPATIENT
Start: 2024-02-02

## 2024-02-02 RX ORDER — AMLODIPINE BESYLATE 5 MG/1
5 TABLET ORAL 2 TIMES DAILY
Qty: 180 TABLET | Refills: 3 | Status: SHIPPED | OUTPATIENT
Start: 2024-02-02

## 2024-02-02 NOTE — PROGRESS NOTES
Eileen Kaufman presents to [facility].    Chief Complaint:  Neck pain, shortness of breath    Subjective   History of Present Illness:  Eileen Alvarez is being seen today for follow-up on her care.  She was seen in the emergency department at [facility] on 1/18/2024.  She apparently developed significant shortness of breath, left-sided neck and head pain.  She had extensive evaluation there including labs and imaging which have been reviewed as noted below.  There was no evidence of stroke or threatening issue at that time.  She ended up being treated and released.  She is here in part for follow-up on this.  Over the last 2 weeks, Eileen Alvarez has been doing okay.  She is not having any significant neck pain currently.  She will be moving ahead with MRI of the lumbar spine in the next week.    Regarding hypertension, she continues to take amlodipine twice daily.  Her pressure is well controlled today.    She also has some ongoing issues with insomnia and takes mirtazipine for this.  It is of ongoing benefit.    Review of Systems:  Review of Systems   Constitutional:  Negative for chills and fever.   Respiratory:  Positive for shortness of breath. Negative for cough.    Cardiovascular:  Negative for chest pain and palpitations.   Gastrointestinal:  Negative for abdominal pain, nausea and vomiting.        Objective   Medical History:  Past Medical History:    Anemia, unspecified    Asymptomatic menopausal state    Chronic obstructive pulmonary disease with (acute) exacerbation    Essential (primary) hypertension    High cholesterol    Low back pain    Major depressive disorder, single episode, moderate    Nicotine dependence, unspecified, uncomplicated    Osteopenia    Other intervertebral disc degeneration, lumbar region    Other long term (current) drug therapy    Solitary pulmonary nodule    Vitamin D deficiency, unspecified     Past Surgical History:    CARDIAC CATHETERIZATION    CARPAL TUNNEL  RELEASE    CHOLECYSTECTOMY    COLONOSCOPY    Normal    EPIDURAL    Procedure: LUMBAR/SACRAL TRANSFORAMINAL EPIDURAL Right L3-4 and RIght L5-S1 47997, 16228;  Surgeon: Shandra Thomas MD;  [place] [facility] [place];  Service: Pain Management;  Laterality: Right;    HYSTERECTOMY    Total    KNEE ARTHROSCOPY    NECK SURGERY      Family History   Problem Relation Age of Onset    Cerebral aneurysm Mother         cause of death    Heart attack Father         cause of death    Esophageal cancer Brother      Social History     Tobacco Use    Smoking status: Former     Packs/day: 1.00     Years: 50.00     Additional pack years: 0.00     Total pack years: 50.00     Types: Cigarettes     Start date:      Quit date: 2023     Years since quittin.0     Passive exposure: Past    Smokeless tobacco: Never    Tobacco comments:     Eileen Alvarez was 23 when she started smoking.  She did stop smoking for some time - about 13 years.  She has been smoking a total of approximately 33 years.  She has smoked less than a pack daily during this time. She currently uses smokless, vapor can/pouches   Substance Use Topics    Alcohol use: Yes     Comment: Socially       Health Maintenance Due   Topic Date Due    TDAP/TD VACCINES (1 - Tdap) Never done    ZOSTER VACCINE (2 of 2) 2022        Immunization History   Administered Date(s) Administered    COVID-19 (MODERNA) BIVALENT 12+YRS 09/10/2022    COVID-19 (MODERNA) Monovalent Original Booster 2022    COVID-19 (PFIZER) Purple Cap Monovalent 2020, 2021, 2021    Fluzone High Dose =>65 Years (Vaxcare ONLY) 10/01/2021, 2022    Hepatitis A 2018    Influenza Quad Vaccine (Inpatient) 2013    Influenza, Unspecified 10/01/2020    Pneumococcal Conjugate 13-Valent (PCV13) 2018    Pneumococcal Conjugate 20-Valent (PCV20) 2023    Pneumococcal Polysaccharide (PPSV23) 2013, 2017    Shingrix 09/10/2022       Allergies   Allergen Reactions     Sulfa Antibiotics Unknown - Low Severity    Aspirin Rash    Atorvastatin Other (See Comments)     Hair loss        Medications:  Current Outpatient Medications on File Prior to Visit   Medication Sig    albuterol (ACCUNEB) 0.63 MG/3ML nebulizer solution Take 3 mL by nebulization Every 4 (Four) Hours As Needed for Wheezing or Shortness of Air.    albuterol sulfate HFA (Ventolin HFA) 108 (90 Base) MCG/ACT inhaler Inhale 2 puffs Every 6 (Six) Hours As Needed for Wheezing.    Budeson-Glycopyrrol-Formoterol (Breztri Aerosphere) 160-9-4.8 MCG/ACT aerosol inhaler Inhale 2 puffs 2 (Two) Times a Day.    budesonide (PULMICORT) 0.5 MG/2ML nebulizer solution Take 2 mL by nebulization Daily.    Calcium Carbonate 1500 (600 Ca) MG tablet Calcium 600 600 mg (1,500 mg) oral tablet take 1 tablet by oral route daily   Suspended    cetirizine (zyrTEC) 10 MG tablet Take 1 tablet by mouth Daily.    colestipol (COLESTID) 1 g tablet Take 1 tablet by mouth Daily.    cyclobenzaprine (FLEXERIL) 5 MG tablet Take 1 tablet by mouth 2 (Two) Times a Day As Needed for Muscle Spasms.    ferrous sulfate 325 (65 FE) MG EC tablet Take 1 tablet by mouth Daily With Breakfast.    fluticasone (FLONASE) 50 MCG/ACT nasal spray 2 sprays into the nostril(s) as directed by provider Daily.    hydroCHLOROthiazide (HYDRODIURIL) 25 MG tablet Take 1 tablet by mouth Daily.    ipratropium-albuterol (DUO-NEB) 0.5-2.5 mg/3 ml nebulizer USE 3 ML VIA NEBULIZER FOUR TIMES DAILY    lidocaine (LIDODERM) 5 % Place 2 patches on the skin as directed by provider Daily.    metoprolol succinate XL (Toprol XL) 50 MG 24 hr tablet Take 1 tablet by mouth 2 (Two) Times a Day.    montelukast (SINGULAIR) 10 MG tablet Take 1 tablet by mouth Every Night.    multivitamin with minerals tablet tablet 1 tablet Daily.    naproxen (NAPROSYN) 250 MG tablet Take 1 tablet by mouth 2 (Two) Times a Day With Meals.    O2 (OXYGEN) Inhale 2 L/min 1 (One) Time.    pantoprazole (PROTONIX) 40 MG EC  "tablet Take 1 tablet by mouth Daily.    pregabalin (LYRICA) 150 MG capsule Take 1 capsule by mouth 2 (Two) Times a Day.    [DISCONTINUED] amLODIPine (NORVASC) 5 MG tablet Take 1 tablet by mouth 2 (Two) Times a Day.    [DISCONTINUED] mirtazapine (REMERON) 30 MG tablet Take 1 tablet by mouth Every Night.    [DISCONTINUED] pregabalin (LYRICA) 100 MG capsule Take 1 capsule by mouth 2 (Two) Times a Day.     No current facility-administered medications on file prior to visit.       Vital Signs:   /66 (BP Location: Right arm, Patient Position: Sitting)   Pulse 86   Temp 98.1 °F (36.7 °C) (Oral)   Ht 162.6 cm (64.02\")   Wt 102 kg (225 lb 3.2 oz)   SpO2 99% Comment: 4 liters  BMI 38.64 kg/m²       Physical Exam:  Physical Exam  Vitals reviewed.   Constitutional:       General: She is not in acute distress.     Appearance: She is not ill-appearing.   Eyes:      Pupils: Pupils are equal, round, and reactive to light.   Neck:      Comments: No thyromegaly  Cardiovascular:      Rate and Rhythm: Normal rate and regular rhythm.   Pulmonary:      Effort: Pulmonary effort is normal.      Comments: Decreased breath sounds are present.  Abdominal:      General: There is no distension.      Palpations: Abdomen is soft.      Tenderness: There is no abdominal tenderness.   Musculoskeletal:      Cervical back: Neck supple.   Lymphadenopathy:      Cervical: No cervical adenopathy.   Skin:     Findings: No lesion or rash.   Neurological:      Mental Status: She is alert.       Result Review   The following data was reviewed by Artur Queevdo MD on 02/02/2024.  Lab Results   Component Value Date    WBC 11.09 (H) 07/03/2023    HGB 8.8 (A) 07/27/2023    HCT 32.9 (L) 07/03/2023    MCV 80.6 07/03/2023     07/03/2023     Lab Results   Component Value Date    GLUCOSE 106 (H) 06/26/2023    BUN 19 06/26/2023    CREATININE 0.74 06/26/2023     06/26/2023    K 4.2 06/26/2023     06/26/2023    CO2 24.0 06/26/2023 "    CALCIUM 10.2 06/26/2023    PROTEINTOT 6.9 06/26/2023    ALBUMIN 4.2 06/26/2023    ALT 9 06/26/2023    AST 10 06/26/2023    ALKPHOS 89 06/26/2023    BILITOT <0.2 06/26/2023    EGFR 86.6 06/26/2023    GLOB 2.7 06/26/2023    AGRATIO 1.6 06/26/2023    BCR 25.7 (H) 06/26/2023    ANIONGAP 10.0 06/26/2023      Lab Results   Component Value Date    CHOL 187 03/08/2023    CHLPL 200 11/14/2019    TRIG 99 03/08/2023    HDL 61 (H) 03/08/2023     (H) 03/08/2023     Lab Results   Component Value Date    TSH 2.909 09/07/2023     Lab Results   Component Value Date    HGBA1C 5.40 03/08/2023     CTA Neck (01/18/2024 15:06)  CTA brain (01/18/2024 15:06)  CT Head Without Contrast (01/18/2024 14:55)  XR Chest 1 View (01/18/2024 14:53)    COMPREHENSIVE METABOLIC PANEL (01/18/2024 15:22)  High Sensitivity Troponin I (01/18/2024 15:22)  PROBNP (01/18/2024 15:22)  MAGNESIUM (01/18/2024 15:22)  Urinalysis, Reflex Microscopic and Culture If Indicated (01/18/2024 14:49)  Urinalysis, Microscopic Only - (01/18/2024 14:49)  Strep A PCR (01/18/2024 14:49)  SARS-CoV2/Influenza A&B RT-PCR (01/18/2024 14:31)  CBC with Auto Diff (01/18/2024 14:29)         Assessment and Plan:   There is no worrisome finding on exam today.  Eileen Alvarez denies any acute issue presently.  Her testing from [facility] has been reviewed in detail as noted above.  For now, we will recommend she continue her current care and medications.  We will tentatively plan to see her back in early April as scheduled, sooner if needed.    Diagnoses and all orders for this visit:    1. Neck pain (Primary)  Comments:  As above.    2. Dyspnea on exertion  Comments:  As above.    3. Essential hypertension  -     amLODIPine (NORVASC) 5 MG tablet; Take 1 tablet by mouth 2 (Two) Times a Day.  Dispense: 180 tablet; Refill: 3    4. Other insomnia  -     mirtazapine (REMERON) 30 MG tablet; Take 1 tablet by mouth Every Night.  Dispense: 90 tablet; Refill: 3    Follow Up  Return in about 8 weeks  (around 4/1/2024) for Recheck, Next scheduled follow up.  Patient was given instructions and counseling regarding her condition or for health maintenance advice. Please see specific information pulled into the AVS if appropriate.

## 2024-02-03 NOTE — PROGRESS NOTES
CARDIOLOGY FOLLOW-UP PROGRESS NOTE        Chief Complaint  Follow-up and Shortness of Breath    Subjective            LisaKim Kaufman presents to [facility] CARDIOLOGY  History of Present Illness    Ms Kaufman is here for a 4-month follow-up visit.  She is still on oxygen 24/7.  Per patient, shortness of breath is stable.  She had no recent episodes of chest pain.  She occasionally feels palpitations.  No recent hospitalizations.      Past History:    (1) Hypertension. (2) Negative for diabetes mellitus or coronary artery disease.     Medical History:  Past Medical History:   Diagnosis Date    Anemia, unspecified     Asymptomatic menopausal state     Chronic obstructive pulmonary disease with (acute) exacerbation     Essential (primary) hypertension     High cholesterol 06/17/2021    Low back pain     Major depressive disorder, single episode, moderate     Nicotine dependence, unspecified, uncomplicated     Osteopenia     Other intervertebral disc degeneration, lumbar region     Other long term (current) drug therapy     Solitary pulmonary nodule     Vitamin D deficiency, unspecified        Surgical History: has a past surgical history that includes Knee arthroscopy (Left); Cholecystectomy; Hysterectomy (1980s); Carpal tunnel release; Neck surgery (01/21/2015); Cardiac catheterization (07/25/2012); Colonoscopy (08/24/2022); and Epidural (Right, 10/25/2023).     Family History: family history includes Cerebral aneurysm in her mother; Esophageal cancer in her brother; Heart attack in her father.     Social History: reports that she quit smoking about 12 months ago. Her smoking use included cigarettes. She started smoking about 51 years ago. She has a 50.00 pack-year smoking history. She has been exposed to tobacco smoke. She has never used smokeless tobacco. She reports current alcohol use. She reports that she does not use drugs.    Allergies: Sulfa antibiotics, Aspirin, and Atorvastatin    Current  Outpatient Medications on File Prior to Visit   Medication Sig    albuterol (ACCUNEB) 0.63 MG/3ML nebulizer solution Take 3 mL by nebulization Every 4 (Four) Hours As Needed for Wheezing or Shortness of Air.    albuterol sulfate HFA (Ventolin HFA) 108 (90 Base) MCG/ACT inhaler Inhale 2 puffs Every 6 (Six) Hours As Needed for Wheezing.    Budeson-Glycopyrrol-Formoterol (Breztri Aerosphere) 160-9-4.8 MCG/ACT aerosol inhaler Inhale 2 puffs 2 (Two) Times a Day.    budesonide (PULMICORT) 0.5 MG/2ML nebulizer solution Take 2 mL by nebulization Daily.    Calcium Carbonate 1500 (600 Ca) MG tablet Calcium 600 600 mg (1,500 mg) oral tablet take 1 tablet by oral route daily   Suspended    cetirizine (zyrTEC) 10 MG tablet Take 1 tablet by mouth Daily.    colestipol (COLESTID) 1 g tablet Take 1 tablet by mouth Daily.    cyclobenzaprine (FLEXERIL) 5 MG tablet Take 1 tablet by mouth 2 (Two) Times a Day As Needed for Muscle Spasms.    ferrous sulfate 325 (65 FE) MG EC tablet Take 1 tablet by mouth Daily With Breakfast.    fluticasone (FLONASE) 50 MCG/ACT nasal spray 2 sprays into the nostril(s) as directed by provider Daily.    hydroCHLOROthiazide (HYDRODIURIL) 25 MG tablet Take 1 tablet by mouth Daily.    ipratropium-albuterol (DUO-NEB) 0.5-2.5 mg/3 ml nebulizer USE 3 ML VIA NEBULIZER FOUR TIMES DAILY    lidocaine (LIDODERM) 5 % Place 2 patches on the skin as directed by provider Daily.    metoprolol succinate XL (Toprol XL) 50 MG 24 hr tablet Take 1 tablet by mouth 2 (Two) Times a Day.    montelukast (SINGULAIR) 10 MG tablet Take 1 tablet by mouth Every Night.    multivitamin with minerals tablet tablet 1 tablet Daily.    naproxen (NAPROSYN) 250 MG tablet Take 1 tablet by mouth 2 (Two) Times a Day With Meals.    O2 (OXYGEN) Inhale 2 L/min 1 (One) Time.    pantoprazole (PROTONIX) 40 MG EC tablet Take 1 tablet by mouth Daily.    pregabalin (LYRICA) 150 MG capsule Take 1 capsule by mouth 2 (Two) Times a Day.     No current  "facility-administered medications on file prior to visit.          Review of Systems   Constitutional:  Positive for fatigue.   Respiratory:  Positive for shortness of breath. Negative for cough and wheezing.    Cardiovascular:  Positive for palpitations. Negative for chest pain and leg swelling.   Gastrointestinal:  Negative for nausea and vomiting.   Neurological:  Negative for dizziness and syncope.        Objective     /71   Pulse 86   Ht 162.6 cm (64\")   Wt 102 kg (224 lb)   BMI 38.45 kg/m²       Physical Exam    General : Alert, awake, no acute distress, on oxygen  Neck : Supple, no carotid bruit, no jugular venous distention  CVS : Regular rate and rhythm, no murmur, rubs or gallops  Lungs: Clear to auscultation bilaterally, no crackles or rhonchi  Abdomen: Soft, nontender, bowel sounds heard in all 4 quadrants  Extremities: Warm, well-perfused, trace edema bilaterally    Result Review :     The following data was reviewed by: Simon Mao MD on 01/31/2024:    CMP          3/8/2023    17:05 6/26/2023    09:18   CMP   Glucose 84  106    BUN 18  19    Creatinine 0.85  0.74    EGFR 73.8  86.6    Sodium 140  140    Potassium 3.9  4.2    Chloride 103  106    Calcium 9.4  10.2    Total Protein 7.2  6.9    Albumin 4.3  4.2    Globulin 2.9  2.7    Total Bilirubin 0.2  <0.2    Alkaline Phosphatase 127  89    AST (SGOT) 21  10    ALT (SGPT) 15  9    Albumin/Globulin Ratio 1.5  1.6    BUN/Creatinine Ratio 21.2  25.7    Anion Gap 10.0  10.0      CBC          6/26/2023    09:18 7/3/2023    16:18 7/27/2023    15:36   CBC   WBC 18.39  11.09     RBC 3.82  4.08     Hemoglobin 10.0  10.5  8.8    Hematocrit 32.8  32.9     MCV 85.9  80.6     MCH 26.2  25.7     MCHC 30.5  31.9     RDW 16.2  15.5     Platelets 435  404       TSH          3/8/2023    17:05 9/7/2023    09:55   TSH   TSH 1.650  2.909          Details          This result is from an external source.             Lipid Panel          3/8/2023    17:05 "   Lipid Panel   Total Cholesterol 187    Triglycerides 99    HDL Cholesterol 61    VLDL Cholesterol 18    LDL Cholesterol  108    LDL/HDL Ratio 1.74           Data reviewed: Cardiology studies        Results for orders placed during the hospital encounter of 08/28/23    Adult Transthoracic Echo Complete W/ Cont if Necessary Per Protocol    Interpretation Summary    Left ventricular systolic function is normal. Calculated left ventricular EF = 60.5%    Sigmoid-shaped ventricular septum is present.    Left ventricular diastolic function is consistent with (grade I) impaired relaxation and age.    Estimated right ventricular systolic pressure from tricuspid regurgitation is moderately elevated (45-55 mmHg).    No hemodynamically significant valvular pathology.                   Assessment and Plan        Diagnoses and all orders for this visit:    1. Shortness of breath (Primary)  Assessment & Plan:  Predominantly driven by lung disease, currently on home oxygen.  Symptoms are stable.  Echocardiogram done last year showed preserved LV function.  She currently has no chest pain.  Will hold off on additional cardiac workup at this time.      2. Essential hypertension  Assessment & Plan:  Blood pressure very well-controlled.  Palpitations are reasonably well-controlled.  Continue amlodipine, hydrochlorothiazide and metoprolol without changes.                Follow Up     Return in about 6 months (around 7/31/2024) for Next scheduled follow up.    Patient was given instructions and counseling regarding her condition or for health maintenance advice. Please see specific information pulled into the AVS if appropriate.

## 2024-02-03 NOTE — ASSESSMENT & PLAN NOTE
Blood pressure very well-controlled.  Palpitations are reasonably well-controlled.  Continue amlodipine, hydrochlorothiazide and metoprolol without changes.

## 2024-02-03 NOTE — ASSESSMENT & PLAN NOTE
Predominantly driven by lung disease, currently on home oxygen.  Symptoms are stable.  Echocardiogram done last year showed preserved LV function.  She currently has no chest pain.  Will hold off on additional cardiac workup at this time.

## 2024-02-07 ENCOUNTER — HOSPITAL ENCOUNTER (OUTPATIENT)
Dept: MRI IMAGING | Facility: HOSPITAL | Age: 74
Discharge: HOME | End: 2024-02-07
Admitting: PHYSICIAN ASSISTANT
Payer: MEDICARE

## 2024-02-07 DIAGNOSIS — M54.16 LUMBAR RADICULOPATHY: ICD-10-CM

## 2024-02-07 DIAGNOSIS — M96.1 POSTLAMINECTOMY SYNDROME: ICD-10-CM

## 2024-02-07 LAB
CREAT BLDA-MCNC: 1 MG/DL (ref 0.6–1.3)
EGFRCR SERPLBLD CKD-EPI 2021: 60.4 ML/MIN/1.73

## 2024-02-07 PROCEDURE — 82565 ASSAY OF CREATININE: CPT

## 2024-02-07 PROCEDURE — 72148 MRI LUMBAR SPINE W/O DYE: CPT

## 2024-02-12 NOTE — PROGRESS NOTES
Please let patient know no signs of disc herniation nor any nerve impingements.  Continues with moderate DDD at multiple levels and arthritis in the joints.  No fractures

## 2024-02-13 DIAGNOSIS — M46.1 BILATERAL SACROILIITIS: ICD-10-CM

## 2024-02-13 DIAGNOSIS — M54.16 LUMBAR RADICULOPATHY: ICD-10-CM

## 2024-02-13 RX ORDER — PREGABALIN 150 MG/1
150 CAPSULE ORAL 2 TIMES DAILY
Qty: 60 CAPSULE | Refills: 1 | Status: SHIPPED | OUTPATIENT
Start: 2024-02-13

## 2024-02-28 ENCOUNTER — OFFICE VISIT (OUTPATIENT)
Dept: PAIN MEDICINE | Facility: CLINIC | Age: 74
End: 2024-02-28
Payer: MEDICARE

## 2024-02-28 VITALS
HEIGHT: 64 IN | BODY MASS INDEX: 38.79 KG/M2 | WEIGHT: 227.2 LBS | OXYGEN SATURATION: 94 % | SYSTOLIC BLOOD PRESSURE: 153 MMHG | HEART RATE: 83 BPM | DIASTOLIC BLOOD PRESSURE: 91 MMHG | RESPIRATION RATE: 20 BRPM | TEMPERATURE: 96.6 F

## 2024-02-28 DIAGNOSIS — M46.1 BILATERAL SACROILIITIS: ICD-10-CM

## 2024-02-28 DIAGNOSIS — M96.1 POSTLAMINECTOMY SYNDROME: ICD-10-CM

## 2024-02-28 DIAGNOSIS — M47.816 LUMBAR FACET ARTHROPATHY: Primary | ICD-10-CM

## 2024-02-28 PROCEDURE — 3080F DIAST BP >= 90 MM HG: CPT | Performed by: PHYSICIAN ASSISTANT

## 2024-02-28 PROCEDURE — 1160F RVW MEDS BY RX/DR IN RCRD: CPT | Performed by: PHYSICIAN ASSISTANT

## 2024-02-28 PROCEDURE — 1125F AMNT PAIN NOTED PAIN PRSNT: CPT | Performed by: PHYSICIAN ASSISTANT

## 2024-02-28 PROCEDURE — 99214 OFFICE O/P EST MOD 30 MIN: CPT | Performed by: PHYSICIAN ASSISTANT

## 2024-02-28 PROCEDURE — 1159F MED LIST DOCD IN RCRD: CPT | Performed by: PHYSICIAN ASSISTANT

## 2024-02-28 PROCEDURE — 3077F SYST BP >= 140 MM HG: CPT | Performed by: PHYSICIAN ASSISTANT

## 2024-02-28 NOTE — PROGRESS NOTES
CHIEF COMPLAINT  BACK PAIN  Pain fluctuates.    Subjective   Eileen Kaufman is a 73 y.o. female  who presents for follow-up.  She has a history of chronic neck and lumbar spine pain.  The patient has noted progressive worsening of primarily right-sided lumbosacral spine pain which radiates into the right posterior buttock and hip and into the posterior aspect of the thigh usually terminating proximal to the knee.  She finds that she has significant pain with sitting for any length of time and is unable to enjoy her grandchildren's basketball games that she is unable to sit on the stands.  Continues with secondary complaints of posterior cervical spine pain that has significantly improved following Flexeril and lidocaine patches.    She is currently using pregabalin 150 mg p.o. twice daily which has been very helpful in helping to reduce neuropathic component of pain.  Denies any adverse effects.    Pain today 8/10 VAS in severity.      Back Pain  This is a chronic problem. The current episode started more than 1 year ago. The problem occurs constantly. The problem is unchanged. The pain is present in the lumbar spine. The quality of the pain is described as aching (Sharp). The pain radiates to the right thigh. The pain is at a severity of 8/10. The pain is severe. The pain is The same all the time. The symptoms are aggravated by standing (Walking/activity). Associated symptoms include leg pain and weakness (legs). Pertinent negatives include no abdominal pain, chest pain, dysuria, fever, headaches or numbness.        PEG Assessment   What number best describes your pain on average in the past week?9  What number best describes how, during the past week, pain has interfered with your enjoyment of life?9  What number best describes how, during the past week, pain has interfered with your general activity?  9    Review of Pertinent Medical Data ---  PROCEDURE:  MRI LUMBAR SPINE WO CONTRAST     COMPARISON: [facility]  MR [place], MRI LUMBAR SPINE WO CONTRAST, 11/03/2021, 11:10.     INDICATIONS:  LUMBAR RADICULOPATHY, POSTLAMINECTOMY     TECHNIQUE:    A variety of imaging planes and parameters were utilized for visualization of suspected   pathology.     FINDINGS:          The alignment is anatomic.  The vertebral body heights appear normal.  The bone marrow signal is   somewhat heterogeneous, nonspecific.  There is disc desiccation at L3-4 through L5-S1.  The conus   terminates at the L1-2 level.  The posterior paravertebral soft tissues are unremarkable.     L1-2:  No spinal canal or neural foramina stenosis.     L2-3:  Mild disc bulge.  Mild bilateral facet arthropathy with ligamentum flavum infolding.  Mild   spinal canal stenosis.  Mild to moderate right and mild left neural foramina stenosis.     L3-4:  Moderate disc bulge.  Moderate bilateral facet arthropathy with ligamentum flavum infolding.    Moderate spinal canal stenosis.  Moderate right and mild-to-moderate left neural foramina   stenosis.     L4-5:  Moderate disc bulge.  Mild right and moderate left facet arthropathy with ligamentum flavum   infolding.  Mild to moderate spinal canal stenosis.  Moderate to severe right and moderate left   neural foramina stenosis.     L5-S1:  Mild disc bulge.  Moderate right and mild left facet arthropathy with ligamentum flavum   infolding.  Mild spinal canal stenosis.  Moderate right and mild-to-moderate left neural foramina   stenosis.     IMPRESSION:               Moderate multilevel degenerative changes of lumbar spine as described above.     PEDRO LOYD MD         Electronically Signed and Approved By: PEDRO LOYD MD on 2/08/2024 at 12:47       The following portions of the patient's history were reviewed and updated as appropriate: allergies, current medications, past family history, past medical history, past social history, past surgical history, and problem list.    Review of Systems   Constitutional:  Negative  "for activity change, fatigue and fever.   HENT:  Negative for congestion.    Respiratory:  Positive for shortness of breath. Negative for cough and chest tightness.    Cardiovascular:  Negative for chest pain.   Gastrointestinal:  Negative for abdominal pain, constipation and diarrhea.   Genitourinary:  Negative for difficulty urinating and dysuria.   Musculoskeletal:  Positive for back pain.   Neurological:  Positive for weakness (legs). Negative for dizziness, light-headedness, numbness and headaches.   Psychiatric/Behavioral:  Positive for sleep disturbance. Negative for agitation and suicidal ideas. The patient is nervous/anxious.      I have reviewed and confirmed the accuracy of the ROS as documented by the MA/LPN/RN TANMAY Lucio  Vitals:    02/28/24 1303   BP: 153/91   BP Location: Left arm   Patient Position: Sitting   Cuff Size: Large Adult   Pulse: 83   Resp: 20   Temp: 96.6 °F (35.9 °C)   TempSrc: Temporal   SpO2: 94%  Comment: 2L   Weight: 103 kg (227 lb 3.2 oz)   Height: 162.6 cm (64.02\")   PainSc:   8         Objective   Physical Exam  Vitals and nursing note reviewed.   Constitutional:       Appearance: Normal appearance. She is obese.   HENT:      Head: Normocephalic.   Pulmonary:      Effort: Pulmonary effort is normal.      Comments: On 2 L of oxygen per minute via nasal cannula  Musculoskeletal:      Lumbar back: Spasms and tenderness (moderate pain to palpation over the bilateral lumbar facet joint spaces, R>L) present. Decreased range of motion. Positive right straight leg raise test.        Back:    Skin:     General: Skin is warm and dry.   Neurological:      General: No focal deficit present.      Mental Status: She is alert and oriented to person, place, and time.      Cranial Nerves: Cranial nerves 2-12 are intact.      Sensory: Sensation is intact.      Motor: Motor function is intact.      Gait: Gait abnormal.   Psychiatric:         Mood and Affect: Mood normal.         Behavior: " Behavior normal.         Thought Content: Thought content normal.         Judgment: Judgment normal.             Assessment & Plan   Diagnoses and all orders for this visit:    1. Lumbar facet arthropathy (Primary)    2. Postlaminectomy syndrome    3. Bilateral sacroiliitis        Eileen Kaufman reports a pain score of 8.  Given her pain assessment as noted, treatment options were discussed and the following options were decided upon as a follow-up plan to address the patient's pain: continuation of current treatment plan for pain, prescription for non-opiod analgesics, and use of non-medical modalities (ice, heat, stretching and/or behavior modifications).      --- Follow-up with Dr. Thomas for discussion of initiation of opiate analgesics.  The patient states that she found the injections to be extremely painful as well as causing increased pain for 2 weeks afterwards and is unsure if she wishes to proceed with any further interventional pain procedures at this time.  --- Discussed the patient's MRI finding with her again on today with recommendation for consideration of right sacroiliac joint injection and/or lumbar medial branch block with plan to proceed to RFA with favorable response.  --- Continue pregabalin 150 mg p.o. twice daily.  She does not require refill on today               HOMER REPORT  As part of the patient's treatment plan, I am prescribing controlled substances. The patient has been made aware of appropriate use of such medications, including potential risk of somnolence, limited ability to drive and/or work safely, and the potential for dependence or overdose. It has also been made clear that these medications are for use by this patient only, without concomitant use of alcohol or other substances unless prescribed.     Patient has completed prescribing agreement detailing terms of continued prescribing of controlled substances, including monitoring HOMER reports, urine drug screening, and pill  counts if necessary. The patient is aware that inappropriate use will results in cessation of prescribing such medications.    As the clinician, I personally reviewed the HOMER from 2/28/2024 while the patient was in the office today.    History and physical exam exhibit continued safe and appropriate use of controlled substances.     Dictated utilizing Dragon dictation.

## 2024-03-07 ENCOUNTER — OFFICE VISIT (OUTPATIENT)
Dept: FAMILY MEDICINE CLINIC | Age: 74
End: 2024-03-07
Payer: MEDICARE

## 2024-03-07 VITALS
OXYGEN SATURATION: 96 % | BODY MASS INDEX: 38.28 KG/M2 | WEIGHT: 224.2 LBS | HEIGHT: 64 IN | DIASTOLIC BLOOD PRESSURE: 78 MMHG | SYSTOLIC BLOOD PRESSURE: 125 MMHG | HEART RATE: 81 BPM

## 2024-03-07 DIAGNOSIS — G89.29 CHRONIC NONINTRACTABLE HEADACHE, UNSPECIFIED HEADACHE TYPE: Primary | ICD-10-CM

## 2024-03-07 DIAGNOSIS — R51.9 CHRONIC NONINTRACTABLE HEADACHE, UNSPECIFIED HEADACHE TYPE: Primary | ICD-10-CM

## 2024-03-07 PROBLEM — J96.01 ACUTE RESPIRATORY FAILURE WITH HYPOXIA: Status: RESOLVED | Noted: 2023-06-26 | Resolved: 2024-03-07

## 2024-03-07 PROCEDURE — 3078F DIAST BP <80 MM HG: CPT | Performed by: FAMILY MEDICINE

## 2024-03-07 PROCEDURE — 1159F MED LIST DOCD IN RCRD: CPT | Performed by: FAMILY MEDICINE

## 2024-03-07 PROCEDURE — 3074F SYST BP LT 130 MM HG: CPT | Performed by: FAMILY MEDICINE

## 2024-03-07 PROCEDURE — 1160F RVW MEDS BY RX/DR IN RCRD: CPT | Performed by: FAMILY MEDICINE

## 2024-03-07 PROCEDURE — 99213 OFFICE O/P EST LOW 20 MIN: CPT | Performed by: FAMILY MEDICINE

## 2024-03-07 RX ORDER — NAPROXEN 250 MG/1
250 TABLET ORAL 2 TIMES DAILY WITH MEALS
Qty: 60 TABLET | Refills: 0 | Status: SHIPPED | OUTPATIENT
Start: 2024-03-07

## 2024-03-07 NOTE — ASSESSMENT & PLAN NOTE
IMAGING REPORTS REVIEWED.  ADVISED MOIST HEAT AND COLD PACKS.  WILL REFILL THE NAPROXEN.  F/U WITH PCP AS NEEDED

## 2024-03-07 NOTE — PROGRESS NOTES
Chief Complaint  Headache (Daily since last week, history of migraines)    Subjective          Eileen Kaufman presents to [facility]  History of Present Illness  --THIS PATIENT'S HISTORY IS SIGNIFICANT FOR CHRONIC NECK AND BACK PAIN FOR WHICH SHE IS FOLLOWED BY PAIN MGMT.  SHE ALSO HAS A PERSISTENT OCCIPITAL HEADACHE WHICH WAXES AND WANES.  WENT TO THE ER A FEW WEEKS AGO WITH A NEGATIVE CT.  WAS GIVEN SOME LOW DISE NAPROXEN WHICH HELPED AT THE TIME BUT SHE RAN OUT AND IT HAS GOTTEN WORSE AGAIN.          Allergies   Allergen Reactions    Sulfa Antibiotics Unknown - Low Severity    Aspirin Rash    Atorvastatin Other (See Comments)     Hair loss        Health Maintenance Due   Topic Date Due    TDAP/TD VACCINES (1 - Tdap) Never done    RSV Vaccine - Adults (1 - 1-dose 60+ series) Never done    ZOSTER VACCINE (2 of 2) 11/05/2022        Current Outpatient Medications on File Prior to Visit   Medication Sig    albuterol (ACCUNEB) 0.63 MG/3ML nebulizer solution Take 3 mL by nebulization Every 4 (Four) Hours As Needed for Wheezing or Shortness of Air.    albuterol sulfate HFA (Ventolin HFA) 108 (90 Base) MCG/ACT inhaler Inhale 2 puffs Every 6 (Six) Hours As Needed for Wheezing.    amLODIPine (NORVASC) 5 MG tablet Take 1 tablet by mouth 2 (Two) Times a Day.    Budeson-Glycopyrrol-Formoterol (Breztri Aerosphere) 160-9-4.8 MCG/ACT aerosol inhaler Inhale 2 puffs 2 (Two) Times a Day.    budesonide (PULMICORT) 0.5 MG/2ML nebulizer solution Take 2 mL by nebulization Daily.    Calcium Carbonate 1500 (600 Ca) MG tablet Calcium 600 600 mg (1,500 mg) oral tablet take 1 tablet by oral route daily   Suspended    cetirizine (zyrTEC) 10 MG tablet Take 1 tablet by mouth Daily.    colestipol (COLESTID) 1 g tablet Take 1 tablet by mouth Daily.    cyclobenzaprine (FLEXERIL) 5 MG tablet Take 1 tablet by mouth 2 (Two) Times a Day As Needed for Muscle Spasms.    ferrous sulfate 325 (65 FE) MG EC tablet Take 1  tablet by mouth Daily With Breakfast.    fluticasone (FLONASE) 50 MCG/ACT nasal spray 2 sprays into the nostril(s) as directed by provider Daily.    hydroCHLOROthiazide (HYDRODIURIL) 25 MG tablet Take 1 tablet by mouth Daily.    ipratropium-albuterol (DUO-NEB) 0.5-2.5 mg/3 ml nebulizer USE 3 ML VIA NEBULIZER FOUR TIMES DAILY    lidocaine (LIDODERM) 5 % Place 2 patches on the skin as directed by provider Daily.    metoprolol succinate XL (Toprol XL) 50 MG 24 hr tablet Take 1 tablet by mouth 2 (Two) Times a Day.    mirtazapine (REMERON) 30 MG tablet Take 1 tablet by mouth Every Night.    montelukast (SINGULAIR) 10 MG tablet Take 1 tablet by mouth Every Night.    multivitamin with minerals tablet tablet 1 tablet Daily.    O2 (OXYGEN) Inhale 2 L/min 1 (One) Time.    pantoprazole (PROTONIX) 40 MG EC tablet Take 1 tablet by mouth Daily.    pregabalin (LYRICA) 150 MG capsule Take 1 capsule by mouth 2 (Two) Times a Day.    [DISCONTINUED] naproxen (NAPROSYN) 250 MG tablet Take 1 tablet by mouth 2 (Two) Times a Day With Meals.     No current facility-administered medications on file prior to visit.       Immunization History   Administered Date(s) Administered    COVID-19 (MODERNA) BIVALENT 12+YRS 09/10/2022    COVID-19 (MODERNA) Monovalent Original Booster 04/14/2022    COVID-19 (PFIZER) Purple Cap Monovalent 12/29/2020, 01/29/2021, 09/30/2021    Fluzone High Dose =>65 Years ([employer] ONLY) 10/01/2021, 11/01/2022    Hepatitis A 07/07/2018    Influenza Quad Vaccine (Inpatient) 09/20/2013    Influenza, Unspecified 10/01/2020    Pneumococcal Conjugate 13-Valent (PCV13) 07/05/2018    Pneumococcal Conjugate 20-Valent (PCV20) 08/31/2023    Pneumococcal Polysaccharide (PPSV23) 03/25/2013, 02/13/2017    Shingrix 09/10/2022       Review of Systems   Constitutional:  Negative for activity change, appetite change, chills, fatigue and fever.   HENT:  Negative for congestion, ear pain, rhinorrhea and sore throat.    Respiratory:  Negative  "for cough and shortness of breath.    Cardiovascular:  Negative for chest pain, palpitations and leg swelling.   Gastrointestinal:  Negative for abdominal pain, constipation, diarrhea, nausea and vomiting.   Musculoskeletal:  Positive for arthralgias, back pain and myalgias.   Psychiatric/Behavioral:  Negative for depressed mood.         Objective     /78 (BP Location: Left arm, Patient Position: Sitting)   Pulse 81   Ht 162.6 cm (64\")   Wt 102 kg (224 lb 3.2 oz)   SpO2 96% Comment: on 2L oxygen  BMI 38.48 kg/m²       Physical Exam  Vitals and nursing note reviewed.   Constitutional:       General: She is not in acute distress.     Appearance: Normal appearance.   Neck:      Comments: DECREASED ROM, TENDER IN THE POSTERIOR NECK AND THE OCCIPITAL AREA  Cardiovascular:      Rate and Rhythm: Normal rate and regular rhythm.      Heart sounds: Normal heart sounds. No murmur heard.  Pulmonary:      Effort: Pulmonary effort is normal.      Breath sounds: Normal breath sounds.   Abdominal:      Palpations: Abdomen is soft.      Tenderness: There is no abdominal tenderness.   Musculoskeletal:      Cervical back: Neck supple. Tenderness present.      Right lower leg: No edema.      Left lower leg: No edema.   Lymphadenopathy:      Cervical: No cervical adenopathy.   Neurological:      General: No focal deficit present.      Mental Status: She is alert.      Cranial Nerves: No cranial nerve deficit.      Coordination: Coordination normal.      Gait: Gait normal.   Psychiatric:         Mood and Affect: Mood normal.         Behavior: Behavior normal.         Result Review :                             Assessment and Plan      Diagnoses and all orders for this visit:    1. Chronic nonintractable headache, unspecified headache type (Primary)  Assessment & Plan:  IMAGING REPORTS REVIEWED.  ADVISED MOIST HEAT AND COLD PACKS.  WILL REFILL THE NAPROXEN.  F/U WITH PCP AS NEEDED     Orders:  -     naproxen (NAPROSYN) 250 MG " tablet; Take 1 tablet by mouth 2 (Two) Times a Day With Meals.  Dispense: 60 tablet; Refill: 0            Follow Up     Return if symptoms worsen or fail to improve.    Patient was given instructions and counseling regarding her condition or for health maintenance advice. Please see specific information pulled into the AVS if appropriate.

## 2024-03-27 ENCOUNTER — READMISSION MANAGEMENT (OUTPATIENT)
Dept: CALL CENTER | Facility: HOSPITAL | Age: 74
End: 2024-03-27
Payer: MEDICARE

## 2024-03-27 NOTE — OUTREACH NOTE
Prep Survey      Flowsheet Row Responses   [facility] facility patient discharged from? Non-[facility]   Is LACE score < 7 ? Non-BH Discharge   Eligibility [facility]   Hospital [facility]   Date of Discharge 03/27/24   Discharge Disposition Home or Self Care   Discharge diagnosis Sepsis   Does the patient have one of the following disease processes/diagnoses(primary or secondary)? Sepsis   Prep survey completed? Yes            Sue COX - Registered Nurse

## 2024-03-28 ENCOUNTER — TRANSITIONAL CARE MANAGEMENT TELEPHONE ENCOUNTER (OUTPATIENT)
Dept: CALL CENTER | Facility: HOSPITAL | Age: 74
End: 2024-03-28
Payer: MEDICARE

## 2024-03-28 DIAGNOSIS — M62.838 MUSCLE SPASM: ICD-10-CM

## 2024-03-28 NOTE — OUTREACH NOTE
Call Center TCM Note      Flowsheet Row Responses   [facility] patient discharged from? Non-BH  [[facility]]   Does the patient have one of the following disease processes/diagnoses(primary or secondary)? Other   TCM attempt successful? No  [verbal release from 2021 lists no names]   Unsuccessful attempts Attempt 1   Call Status Left message            Amanda Mackay RN    3/28/2024, 09:21 EDT

## 2024-03-28 NOTE — OUTREACH NOTE
Call Center TCM Note      Flowsheet Row Responses   [facility] patient discharged from? Non-BH  [[facility]]   Does the patient have one of the following disease processes/diagnoses(primary or secondary)? Other   TCM attempt successful? No   Unsuccessful attempts Attempt 2            Amanda Mackay RN    3/28/2024, 13:23 EDT

## 2024-03-29 ENCOUNTER — TRANSITIONAL CARE MANAGEMENT TELEPHONE ENCOUNTER (OUTPATIENT)
Dept: CALL CENTER | Facility: HOSPITAL | Age: 74
End: 2024-03-29
Payer: MEDICARE

## 2024-03-29 RX ORDER — CYCLOBENZAPRINE HCL 5 MG
5 TABLET ORAL 2 TIMES DAILY PRN
Qty: 60 TABLET | Refills: 1 | Status: SHIPPED | OUTPATIENT
Start: 2024-03-29

## 2024-03-29 NOTE — OUTREACH NOTE
Call Center TCM Note      Flowsheet Row Responses   [facility] patient discharged from? Non-BH  [[facility]]   Does the patient have one of the following disease processes/diagnoses(primary or secondary)? Other   TCM attempt successful? No  [verbal release from 2021 lists no names]   Unsuccessful attempts Attempt 3            Amanda Mackay RN    3/29/2024, 09:29 EDT

## 2024-04-01 ENCOUNTER — OFFICE VISIT (OUTPATIENT)
Dept: FAMILY MEDICINE CLINIC | Age: 74
End: 2024-04-01
Payer: MEDICARE

## 2024-04-01 VITALS
TEMPERATURE: 98.1 F | DIASTOLIC BLOOD PRESSURE: 47 MMHG | HEART RATE: 87 BPM | BODY MASS INDEX: 38.24 KG/M2 | HEIGHT: 64 IN | OXYGEN SATURATION: 100 % | WEIGHT: 224 LBS | SYSTOLIC BLOOD PRESSURE: 108 MMHG

## 2024-04-01 DIAGNOSIS — D64.9 ANEMIA, UNSPECIFIED TYPE: ICD-10-CM

## 2024-04-01 DIAGNOSIS — N30.00 ACUTE CYSTITIS WITHOUT HEMATURIA: Primary | ICD-10-CM

## 2024-04-01 LAB
BACTERIA UR QL AUTO: ABNORMAL /HPF
BILIRUB UR QL STRIP: NEGATIVE
CLARITY UR: ABNORMAL
COLOR UR: YELLOW
GLUCOSE UR STRIP-MCNC: NEGATIVE MG/DL
HGB UR QL STRIP.AUTO: ABNORMAL
KETONES UR QL STRIP: NEGATIVE
LEUKOCYTE ESTERASE UR QL STRIP.AUTO: ABNORMAL
NITRITE UR QL STRIP: NEGATIVE
PH UR STRIP.AUTO: 7 [PH] (ref 5–8)
PROT UR QL STRIP: ABNORMAL
RBC # UR STRIP: ABNORMAL /HPF
REF LAB TEST METHOD: ABNORMAL
SP GR UR STRIP: >=1.03 (ref 1–1.03)
SQUAMOUS #/AREA URNS HPF: ABNORMAL /HPF
UROBILINOGEN UR QL STRIP: ABNORMAL
WBC # UR STRIP: ABNORMAL /HPF
YEAST URNS QL MICRO: ABNORMAL /HPF

## 2024-04-01 PROCEDURE — 87086 URINE CULTURE/COLONY COUNT: CPT | Performed by: FAMILY MEDICINE

## 2024-04-01 PROCEDURE — 81001 URINALYSIS AUTO W/SCOPE: CPT | Performed by: FAMILY MEDICINE

## 2024-04-01 RX ORDER — LANOLIN ALCOHOL/MO/W.PET/CERES
325 CREAM (GRAM) TOPICAL
Qty: 90 TABLET | Refills: 1 | Status: SHIPPED | OUTPATIENT
Start: 2024-04-01

## 2024-04-01 NOTE — PROGRESS NOTES
Eileen Kaufman presents to [facility].    Chief Complaint:  Hospital follow up, urinary tract infection    Transitional Care Management:  -Eileen Alvarez was admitted to [facility] on 3/25/2024 with discharge on 3/27/2024.  -While inpatient, she was cared for by hospitalist service including Dr. Teague.  -Staff from [facility] reach out to her within 48 hours of hospital discharge to arrange follow-up.  -Her problems and medications have been reviewed and reconciled.    Subjective   History of Present Illness:  Eileen Alvarez is being seen today for follow up on urinary tract infection.  She was admitted to [facility] briefly for urinary tract infection.  She was initially given Rocephin while inpatient and then changed over to oral Levaquin.  She has been off of medication for the last few days.  She is not currently having any urinary symptoms.    Review of Systems:  Review of Systems   Constitutional:  Negative for chills and fever.   Respiratory:  Negative for cough and shortness of breath.    Cardiovascular:  Negative for chest pain and palpitations.   Gastrointestinal:  Negative for abdominal pain, nausea and vomiting.      Objective   Medical History:  Past Medical History:    Anemia, unspecified    Asymptomatic menopausal state    Chronic obstructive pulmonary disease with (acute) exacerbation    Essential (primary) hypertension    High cholesterol    Low back pain    Major depressive disorder, single episode, moderate    Nicotine dependence, unspecified, uncomplicated    Osteopenia    Other intervertebral disc degeneration, lumbar region    Other long term (current) drug therapy    Solitary pulmonary nodule    Vitamin D deficiency, unspecified     Past Surgical History:    CARDIAC CATHETERIZATION    CARPAL TUNNEL RELEASE    CHOLECYSTECTOMY    COLONOSCOPY    Normal    EPIDURAL    Procedure: LUMBAR/SACRAL TRANSFORAMINAL EPIDURAL Right L3-4 and RIght L5-S1 99505, 75118;  Surgeon: Shandra Thomas MD;   Location: [facility];  Service: Pain Management;  Laterality: Right;    HYSTERECTOMY    Total    KNEE ARTHROSCOPY    NECK SURGERY      Family History   Problem Relation Age of Onset    Cerebral aneurysm Mother         cause of death    Heart attack Father         cause of death    Esophageal cancer Brother      Social History     Tobacco Use    Smoking status: Former     Current packs/day: 0.00     Average packs/day: 1 pack/day for 50.0 years (50.0 ttl pk-yrs)     Types: Cigarettes     Start date:      Quit date: 2023     Years since quittin.2     Passive exposure: Past    Smokeless tobacco: Never    Tobacco comments:     Eileen Alvarez was 23 when she started smoking.  She did stop smoking for some time - about 13 years.  She has been smoking a total of approximately 33 years.  She has smoked less than a pack daily during this time. She currently uses smokless, vapor can/pouches   Substance Use Topics    Alcohol use: Yes     Comment: Socially       Health Maintenance Due   Topic Date Due    TDAP/TD VACCINES (1 - Tdap) Never done    RSV Vaccine - Adults (1 - 1-dose 60+ series) Never done    DXA SCAN  2022    ZOSTER VACCINE (2 of 2) 2022    LIPID PANEL  2024        Immunization History   Administered Date(s) Administered    COVID-19 (MODERNA) BIVALENT 12+YRS 09/10/2022    COVID-19 (MODERNA) Monovalent Original Booster 2022    COVID-19 (PFIZER) Purple Cap Monovalent 2020, 2021, 2021    Fluzone High Dose =>65 Years ([employer] ONLY) 10/01/2021, 2022    Hepatitis A 2018    Influenza Quad Vaccine (Inpatient) 2013    Influenza, Unspecified 10/01/2020    Pneumococcal Conjugate 13-Valent (PCV13) 2018    Pneumococcal Conjugate 20-Valent (PCV20) 2023    Pneumococcal Polysaccharide (PPSV23) 2013, 2017    Shingrix 09/10/2022       Allergies   Allergen Reactions    Sulfa Antibiotics Unknown - Low Severity    Aspirin Rash    Atorvastatin  Other (See Comments)     Hair loss    Naproxen Other (See Comments)     Urinary tract infection        Medications:  Current Outpatient Medications on File Prior to Visit   Medication Sig    albuterol (ACCUNEB) 0.63 MG/3ML nebulizer solution Take 3 mL by nebulization Every 4 (Four) Hours As Needed for Wheezing or Shortness of Air.    albuterol sulfate HFA (Ventolin HFA) 108 (90 Base) MCG/ACT inhaler Inhale 2 puffs Every 6 (Six) Hours As Needed for Wheezing.    amLODIPine (NORVASC) 5 MG tablet Take 1 tablet by mouth 2 (Two) Times a Day.    Budeson-Glycopyrrol-Formoterol (Breztri Aerosphere) 160-9-4.8 MCG/ACT aerosol inhaler Inhale 2 puffs 2 (Two) Times a Day.    budesonide (PULMICORT) 0.5 MG/2ML nebulizer solution Take 2 mL by nebulization Daily.    Calcium Carbonate 1500 (600 Ca) MG tablet Calcium 600 600 mg (1,500 mg) oral tablet take 1 tablet by oral route daily   Suspended    cetirizine (zyrTEC) 10 MG tablet Take 1 tablet by mouth Daily.    colestipol (COLESTID) 1 g tablet Take 1 tablet by mouth Daily.    cyclobenzaprine (FLEXERIL) 5 MG tablet TAKE 1 TABLET BY MOUTH TWICE DAILY AS NEEDED FOR MUSCLE SPASMS    fluticasone (FLONASE) 50 MCG/ACT nasal spray 2 sprays into the nostril(s) as directed by provider Daily.    hydroCHLOROthiazide (HYDRODIURIL) 25 MG tablet Take 1 tablet by mouth Daily.    ipratropium-albuterol (DUO-NEB) 0.5-2.5 mg/3 ml nebulizer USE 3 ML VIA NEBULIZER FOUR TIMES DAILY    lidocaine (LIDODERM) 5 % Place 2 patches on the skin as directed by provider Daily.    metoprolol succinate XL (Toprol XL) 50 MG 24 hr tablet Take 1 tablet by mouth 2 (Two) Times a Day.    mirtazapine (REMERON) 30 MG tablet Take 1 tablet by mouth Every Night.    montelukast (SINGULAIR) 10 MG tablet Take 1 tablet by mouth Every Night.    multivitamin with minerals tablet tablet 1 tablet Daily.    O2 (OXYGEN) Inhale 2 L/min 1 (One) Time.    pantoprazole (PROTONIX) 40 MG EC tablet Take 1 tablet by mouth Daily.    pregabalin  "(LYRICA) 150 MG capsule Take 1 capsule by mouth 2 (Two) Times a Day.    [DISCONTINUED] ferrous sulfate 325 (65 FE) MG EC tablet Take 1 tablet by mouth Daily With Breakfast.    [DISCONTINUED] naproxen (NAPROSYN) 250 MG tablet Take 1 tablet by mouth 2 (Two) Times a Day With Meals.     No current facility-administered medications on file prior to visit.       Vital Signs:   /47 (BP Location: Right arm, Patient Position: Sitting)   Pulse 87   Temp 98.1 °F (36.7 °C) (Oral)   Ht 162.6 cm (64.02\")   Wt 102 kg (224 lb)   SpO2 100% Comment: 2 liters  BMI 38.43 kg/m²       Physical Exam:  Physical Exam  Vitals reviewed.   Constitutional:       General: She is not in acute distress.     Appearance: She is not ill-appearing.   Eyes:      Pupils: Pupils are equal, round, and reactive to light.   Neck:      Comments: No thyromegaly  Cardiovascular:      Rate and Rhythm: Normal rate and regular rhythm.   Pulmonary:      Effort: Pulmonary effort is normal.      Breath sounds: Normal breath sounds.   Abdominal:      General: There is no distension.      Palpations: Abdomen is soft.      Tenderness: There is no abdominal tenderness.   Musculoskeletal:      Cervical back: Neck supple.   Lymphadenopathy:      Cervical: No cervical adenopathy.   Skin:     Findings: No lesion or rash.   Neurological:      Mental Status: She is alert.         Result Review   The following data was reviewed by Artur Quevedo MD on 04/01/2024.  Lab Results   Component Value Date    WBC 11.09 (H) 07/03/2023    HGB 8.8 (A) 07/27/2023    HCT 32.9 (L) 07/03/2023    MCV 80.6 07/03/2023     07/03/2023     Lab Results   Component Value Date    GLUCOSE 106 (H) 06/26/2023    BUN 19 06/26/2023    CREATININE 1.00 02/07/2024     06/26/2023    K 4.2 06/26/2023     06/26/2023    CO2 24.0 06/26/2023    CALCIUM 10.2 06/26/2023    PROTEINTOT 6.9 06/26/2023    ALBUMIN 4.2 06/26/2023    ALT 9 06/26/2023    AST 10 06/26/2023    ALKPHOS 89 " 06/26/2023    BILITOT <0.2 06/26/2023    EGFR 60.4 02/07/2024    GLOB 2.7 06/26/2023    AGRATIO 1.6 06/26/2023    BCR 25.7 (H) 06/26/2023    ANIONGAP 10.0 06/26/2023      Lab Results   Component Value Date    CHOL 187 03/08/2023    CHLPL 200 11/14/2019    TRIG 99 03/08/2023    HDL 61 (H) 03/08/2023     (H) 03/08/2023     Lab Results   Component Value Date    TSH 2.909 09/07/2023     Lab Results   Component Value Date    HGBA1C 5.40 03/08/2023     CBC - Hemogram ([facility]) (03/27/2024 05:09)  BASIC METABOLIC PANEL (03/27/2024 05:09)  Urinalysis, Reflex Microscopic and Culture If Indicated (03/25/2024 16:54)  Urinalysis, Microscopic Only - (03/25/2024 16:54)  COMPREHENSIVE METABOLIC PANEL (03/25/2024 16:37)     CT Abdomen Pelvis With Contrast (03/25/2024 18:38)          Assessment and Plan:   Today, she was moderately ill with this urinary tract infection.  She responded well to treatment though.  She has been off antibiotics for a few days now.  We will plan to recheck the urine if possible.  No other short-term change is anticipated.    Diagnoses and all orders for this visit:    1. Acute cystitis without hematuria (Primary)  -     Urinalysis With Microscopic - Urine, Clean Catch    2. Anemia, unspecified type  -     ferrous sulfate 325 (65 FE) MG EC tablet; Take 1 tablet by mouth Daily With Breakfast.  Dispense: 90 tablet; Refill: 1    Follow Up  Return in about 6 months (around 10/1/2024) for Recheck, Medicare Wellness.  Patient was given instructions and counseling regarding her condition or for health maintenance advice. Please see specific information pulled into the AVS if appropriate.

## 2024-04-03 LAB — BACTERIA SPEC AEROBE CULT: NO GROWTH

## 2024-04-08 ENCOUNTER — APPOINTMENT (OUTPATIENT)
Dept: CT IMAGING | Facility: HOSPITAL | Age: 74
DRG: 190 | End: 2024-04-08
Payer: MEDICARE

## 2024-04-08 ENCOUNTER — TELEPHONE (OUTPATIENT)
Dept: FAMILY MEDICINE CLINIC | Age: 74
End: 2024-04-08
Payer: MEDICARE

## 2024-04-08 ENCOUNTER — APPOINTMENT (OUTPATIENT)
Dept: GENERAL RADIOLOGY | Facility: HOSPITAL | Age: 74
DRG: 190 | End: 2024-04-08
Payer: MEDICARE

## 2024-04-08 ENCOUNTER — HOSPITAL ENCOUNTER (INPATIENT)
Facility: HOSPITAL | Age: 74
LOS: 2 days | Discharge: HOME | DRG: 190 | End: 2024-04-13
Attending: EMERGENCY MEDICINE | Admitting: STUDENT IN AN ORGANIZED HEALTH CARE EDUCATION/TRAINING PROGRAM
Payer: MEDICARE

## 2024-04-08 DIAGNOSIS — J96.02 ACUTE RESPIRATORY FAILURE WITH HYPOXIA AND HYPERCAPNIA: Primary | ICD-10-CM

## 2024-04-08 DIAGNOSIS — J44.1 COPD WITH ACUTE EXACERBATION: ICD-10-CM

## 2024-04-08 DIAGNOSIS — J96.01 ACUTE RESPIRATORY FAILURE WITH HYPOXIA AND HYPERCAPNIA: Primary | ICD-10-CM

## 2024-04-08 LAB
ALBUMIN SERPL-MCNC: 4 G/DL (ref 3.5–5.2)
ALBUMIN/GLOB SERPL: 1.1 G/DL
ALP SERPL-CCNC: 109 U/L (ref 39–117)
ALT SERPL W P-5'-P-CCNC: 13 U/L (ref 1–33)
ANION GAP SERPL CALCULATED.3IONS-SCNC: 8 MMOL/L (ref 5–15)
AST SERPL-CCNC: 13 U/L (ref 1–32)
ATMOSPHERIC PRESS: 749.3 MMHG
B PARAPERT DNA SPEC QL NAA+PROBE: NOT DETECTED
B PERT DNA SPEC QL NAA+PROBE: NOT DETECTED
BACTERIA UR QL AUTO: ABNORMAL /HPF
BASE EXCESS BLDV CALC-SCNC: 3.2 MMOL/L (ref -2–2)
BASOPHILS # BLD AUTO: 0.13 10*3/MM3 (ref 0–0.2)
BASOPHILS NFR BLD AUTO: 1.2 % (ref 0–1.5)
BDY SITE: ABNORMAL
BILIRUB SERPL-MCNC: 0.2 MG/DL (ref 0–1.2)
BILIRUB UR QL STRIP: NEGATIVE
BUN SERPL-MCNC: 11 MG/DL (ref 8–23)
BUN/CREAT SERPL: 10.9 (ref 7–25)
C PNEUM DNA NPH QL NAA+NON-PROBE: NOT DETECTED
CALCIUM SPEC-SCNC: 9.2 MG/DL (ref 8.6–10.5)
CHLORIDE SERPL-SCNC: 103 MMOL/L (ref 98–107)
CLARITY UR: CLEAR
CO2 BLDA-SCNC: 32.9 MMOL/L (ref 23–27)
CO2 SERPL-SCNC: 30 MMOL/L (ref 22–29)
COLOR UR: YELLOW
CREAT SERPL-MCNC: 1.01 MG/DL (ref 0.57–1)
DEPRECATED RDW RBC AUTO: 48.4 FL (ref 37–54)
DEVICE COMMENT: ABNORMAL
EGFRCR SERPLBLD CKD-EPI 2021: 59.3 ML/MIN/1.73
EOSINOPHIL # BLD AUTO: 0.39 10*3/MM3 (ref 0–0.4)
EOSINOPHIL NFR BLD AUTO: 3.7 % (ref 0.3–6.2)
ERYTHROCYTE [DISTWIDTH] IN BLOOD BY AUTOMATED COUNT: 15.6 % (ref 12.3–15.4)
FLUAV SUBTYP SPEC NAA+PROBE: NOT DETECTED
FLUBV RNA ISLT QL NAA+PROBE: NOT DETECTED
GAS FLOW AIRWAY: 3 LPM
GEN 5 2HR TROPONIN T REFLEX: 7 NG/L
GLOBULIN UR ELPH-MCNC: 3.6 GM/DL
GLUCOSE SERPL-MCNC: 83 MG/DL (ref 65–99)
GLUCOSE UR STRIP-MCNC: NEGATIVE MG/DL
HADV DNA SPEC NAA+PROBE: NOT DETECTED
HCO3 BLDV-SCNC: 31.1 MMOL/L (ref 22–28)
HCOV 229E RNA SPEC QL NAA+PROBE: NOT DETECTED
HCOV HKU1 RNA SPEC QL NAA+PROBE: NOT DETECTED
HCOV NL63 RNA SPEC QL NAA+PROBE: NOT DETECTED
HCOV OC43 RNA SPEC QL NAA+PROBE: NOT DETECTED
HCT VFR BLD AUTO: 39.6 % (ref 34–46.6)
HGB BLD-MCNC: 12.3 G/DL (ref 12–15.9)
HGB UR QL STRIP.AUTO: ABNORMAL
HMPV RNA NPH QL NAA+NON-PROBE: NOT DETECTED
HPIV1 RNA ISLT QL NAA+PROBE: NOT DETECTED
HPIV2 RNA SPEC QL NAA+PROBE: NOT DETECTED
HPIV3 RNA NPH QL NAA+PROBE: NOT DETECTED
HPIV4 P GENE NPH QL NAA+PROBE: NOT DETECTED
HYALINE CASTS UR QL AUTO: ABNORMAL /LPF
IMM GRANULOCYTES # BLD AUTO: 0.04 10*3/MM3 (ref 0–0.05)
IMM GRANULOCYTES NFR BLD AUTO: 0.4 % (ref 0–0.5)
INR PPP: 0.97 (ref 0.9–1.1)
KETONES UR QL STRIP: NEGATIVE
LEUKOCYTE ESTERASE UR QL STRIP.AUTO: ABNORMAL
LYMPHOCYTES # BLD AUTO: 2.43 10*3/MM3 (ref 0.7–3.1)
LYMPHOCYTES NFR BLD AUTO: 22.9 % (ref 19.6–45.3)
M PNEUMO IGG SER IA-ACNC: NOT DETECTED
MAGNESIUM SERPL-MCNC: 1.9 MG/DL (ref 1.6–2.4)
MCH RBC QN AUTO: 26.7 PG (ref 26.6–33)
MCHC RBC AUTO-ENTMCNC: 31.1 G/DL (ref 31.5–35.7)
MCV RBC AUTO: 86.1 FL (ref 79–97)
MODALITY: ABNORMAL
MONOCYTES # BLD AUTO: 0.98 10*3/MM3 (ref 0.1–0.9)
MONOCYTES NFR BLD AUTO: 9.2 % (ref 5–12)
NEUTROPHILS NFR BLD AUTO: 6.66 10*3/MM3 (ref 1.7–7)
NEUTROPHILS NFR BLD AUTO: 62.6 % (ref 42.7–76)
NITRITE UR QL STRIP: NEGATIVE
NRBC BLD AUTO-RTO: 0 /100 WBC (ref 0–0.2)
NT-PROBNP SERPL-MCNC: 303 PG/ML (ref 0–900)
PCO2 BLDV: 60.7 MM HG (ref 41–51)
PH BLDV: 7.32 PH UNITS (ref 7.31–7.41)
PH UR STRIP.AUTO: 7 [PH] (ref 5–8)
PLATELET # BLD AUTO: 320 10*3/MM3 (ref 140–450)
PMV BLD AUTO: 10.5 FL (ref 6–12)
PO2 BLDV: 19 MM HG (ref 35–45)
POTASSIUM SERPL-SCNC: 4.6 MMOL/L (ref 3.5–5.2)
PROT SERPL-MCNC: 7.6 G/DL (ref 6–8.5)
PROT UR QL STRIP: NEGATIVE
PROTHROMBIN TIME: 13.1 SECONDS (ref 11.7–14.2)
RBC # BLD AUTO: 4.6 10*6/MM3 (ref 3.77–5.28)
RBC # UR STRIP: ABNORMAL /HPF
REF LAB TEST METHOD: ABNORMAL
RHINOVIRUS RNA SPEC NAA+PROBE: NOT DETECTED
RSV RNA NPH QL NAA+NON-PROBE: NOT DETECTED
SAO2 % BLDCOV: 24.2 % (ref 45–75)
SARS-COV-2 RNA NPH QL NAA+NON-PROBE: NOT DETECTED
SODIUM SERPL-SCNC: 141 MMOL/L (ref 136–145)
SP GR UR STRIP: 1.01 (ref 1–1.03)
SQUAMOUS #/AREA URNS HPF: ABNORMAL /HPF
TOTAL RATE: 17 BREATHS/MINUTE
TROPONIN T DELTA: -2 NG/L
TROPONIN T SERPL HS-MCNC: 9 NG/L
UROBILINOGEN UR QL STRIP: ABNORMAL
WBC # UR STRIP: ABNORMAL /HPF
WBC NRBC COR # BLD AUTO: 10.63 10*3/MM3 (ref 3.4–10.8)

## 2024-04-08 PROCEDURE — 81001 URINALYSIS AUTO W/SCOPE: CPT | Performed by: EMERGENCY MEDICINE

## 2024-04-08 PROCEDURE — 84484 ASSAY OF TROPONIN QUANT: CPT | Performed by: EMERGENCY MEDICINE

## 2024-04-08 PROCEDURE — 93010 ELECTROCARDIOGRAM REPORT: CPT | Performed by: INTERNAL MEDICINE

## 2024-04-08 PROCEDURE — 99285 EMERGENCY DEPT VISIT HI MDM: CPT

## 2024-04-08 PROCEDURE — 25010000002 METHYLPREDNISOLONE PER 125 MG: Performed by: EMERGENCY MEDICINE

## 2024-04-08 PROCEDURE — 87449 NOS EACH ORGANISM AG IA: CPT

## 2024-04-08 PROCEDURE — 93005 ELECTROCARDIOGRAM TRACING: CPT | Performed by: EMERGENCY MEDICINE

## 2024-04-08 PROCEDURE — 94799 UNLISTED PULMONARY SVC/PX: CPT

## 2024-04-08 PROCEDURE — 25010000002 ONDANSETRON PER 1 MG: Performed by: EMERGENCY MEDICINE

## 2024-04-08 PROCEDURE — 84145 PROCALCITONIN (PCT): CPT | Performed by: HOSPITALIST

## 2024-04-08 PROCEDURE — 83880 ASSAY OF NATRIURETIC PEPTIDE: CPT | Performed by: EMERGENCY MEDICINE

## 2024-04-08 PROCEDURE — 83735 ASSAY OF MAGNESIUM: CPT | Performed by: EMERGENCY MEDICINE

## 2024-04-08 PROCEDURE — 25010000002 MORPHINE PER 10 MG: Performed by: EMERGENCY MEDICINE

## 2024-04-08 PROCEDURE — 71275 CT ANGIOGRAPHY CHEST: CPT

## 2024-04-08 PROCEDURE — 80053 COMPREHEN METABOLIC PANEL: CPT | Performed by: EMERGENCY MEDICINE

## 2024-04-08 PROCEDURE — 25510000001 IOPAMIDOL PER 1 ML: Performed by: EMERGENCY MEDICINE

## 2024-04-08 PROCEDURE — 0202U NFCT DS 22 TRGT SARS-COV-2: CPT | Performed by: EMERGENCY MEDICINE

## 2024-04-08 PROCEDURE — 71045 X-RAY EXAM CHEST 1 VIEW: CPT

## 2024-04-08 PROCEDURE — 82803 BLOOD GASES ANY COMBINATION: CPT

## 2024-04-08 PROCEDURE — 85610 PROTHROMBIN TIME: CPT | Performed by: EMERGENCY MEDICINE

## 2024-04-08 PROCEDURE — 85025 COMPLETE CBC W/AUTO DIFF WBC: CPT | Performed by: EMERGENCY MEDICINE

## 2024-04-08 RX ORDER — MORPHINE SULFATE 2 MG/ML
4 INJECTION, SOLUTION INTRAMUSCULAR; INTRAVENOUS ONCE
Status: COMPLETED | OUTPATIENT
Start: 2024-04-08 | End: 2024-04-08

## 2024-04-08 RX ORDER — IPRATROPIUM BROMIDE AND ALBUTEROL SULFATE 2.5; .5 MG/3ML; MG/3ML
3 SOLUTION RESPIRATORY (INHALATION) ONCE
Status: COMPLETED | OUTPATIENT
Start: 2024-04-08 | End: 2024-04-08

## 2024-04-08 RX ORDER — ONDANSETRON 2 MG/ML
4 INJECTION INTRAMUSCULAR; INTRAVENOUS ONCE
Status: COMPLETED | OUTPATIENT
Start: 2024-04-08 | End: 2024-04-08

## 2024-04-08 RX ORDER — METHYLPREDNISOLONE SODIUM SUCCINATE 125 MG/2ML
125 INJECTION, POWDER, LYOPHILIZED, FOR SOLUTION INTRAMUSCULAR; INTRAVENOUS ONCE
Status: COMPLETED | OUTPATIENT
Start: 2024-04-08 | End: 2024-04-08

## 2024-04-08 RX ADMIN — IOPAMIDOL 95 ML: 755 INJECTION, SOLUTION INTRAVENOUS at 22:34

## 2024-04-08 RX ADMIN — IPRATROPIUM BROMIDE AND ALBUTEROL SULFATE 3 ML: .5; 3 SOLUTION RESPIRATORY (INHALATION) at 21:54

## 2024-04-08 RX ADMIN — MORPHINE SULFATE 4 MG: 2 INJECTION, SOLUTION INTRAMUSCULAR; INTRAVENOUS at 23:36

## 2024-04-08 RX ADMIN — METHYLPREDNISOLONE SODIUM SUCCINATE 125 MG: 125 INJECTION, POWDER, FOR SOLUTION INTRAMUSCULAR; INTRAVENOUS at 21:09

## 2024-04-08 RX ADMIN — ONDANSETRON 4 MG: 2 INJECTION INTRAMUSCULAR; INTRAVENOUS at 23:36

## 2024-04-08 NOTE — TELEPHONE ENCOUNTER
If she has significant new shortness of breath, she should go to the emergency department.  Otherwise, I would be willing to work her in tomorrow if she would like.  Thanks

## 2024-04-08 NOTE — TELEPHONE ENCOUNTER
Pt states both her legs are swollen up to thigh, they feel tight and painful, no weeping of fluid. Please advise.

## 2024-04-09 PROBLEM — N18.30 CKD (CHRONIC KIDNEY DISEASE) STAGE 3, GFR 30-59 ML/MIN: Status: ACTIVE | Noted: 2024-04-09

## 2024-04-09 PROBLEM — N18.2 CKD (CHRONIC KIDNEY DISEASE) STAGE 2, GFR 60-89 ML/MIN: Status: ACTIVE | Noted: 2024-04-09

## 2024-04-09 PROBLEM — J96.20 ACUTE-ON-CHRONIC RESPIRATORY FAILURE: Status: ACTIVE | Noted: 2024-04-09

## 2024-04-09 PROBLEM — J44.1 COPD EXACERBATION: Status: ACTIVE | Noted: 2024-04-09

## 2024-04-09 LAB
ANION GAP SERPL CALCULATED.3IONS-SCNC: 10 MMOL/L (ref 5–15)
BUN SERPL-MCNC: 10 MG/DL (ref 8–23)
BUN/CREAT SERPL: 11.8 (ref 7–25)
CALCIUM SPEC-SCNC: 9.4 MG/DL (ref 8.6–10.5)
CHLORIDE SERPL-SCNC: 103 MMOL/L (ref 98–107)
CO2 SERPL-SCNC: 26 MMOL/L (ref 22–29)
CREAT SERPL-MCNC: 0.85 MG/DL (ref 0.57–1)
DEPRECATED RDW RBC AUTO: 43.1 FL (ref 37–54)
EGFRCR SERPLBLD CKD-EPI 2021: 72.9 ML/MIN/1.73
ERYTHROCYTE [DISTWIDTH] IN BLOOD BY AUTOMATED COUNT: 15 % (ref 12.3–15.4)
GLUCOSE SERPL-MCNC: 155 MG/DL (ref 65–99)
HCT VFR BLD AUTO: 40.4 % (ref 34–46.6)
HGB BLD-MCNC: 13.1 G/DL (ref 12–15.9)
L PNEUMO1 AG UR QL IA: NEGATIVE
MAGNESIUM SERPL-MCNC: 2.2 MG/DL (ref 1.6–2.4)
MCH RBC QN AUTO: 26.6 PG (ref 26.6–33)
MCHC RBC AUTO-ENTMCNC: 32.4 G/DL (ref 31.5–35.7)
MCV RBC AUTO: 81.9 FL (ref 79–97)
MRSA DNA SPEC QL NAA+PROBE: NORMAL
PLATELET # BLD AUTO: 355 10*3/MM3 (ref 140–450)
PMV BLD AUTO: 10.5 FL (ref 6–12)
POTASSIUM SERPL-SCNC: 4.4 MMOL/L (ref 3.5–5.2)
PROCALCITONIN SERPL-MCNC: 0.03 NG/ML (ref 0–0.25)
QT INTERVAL: 367 MS
QTC INTERVAL: 424 MS
RBC # BLD AUTO: 4.93 10*6/MM3 (ref 3.77–5.28)
S PNEUM AG SPEC QL LA: NEGATIVE
SODIUM SERPL-SCNC: 139 MMOL/L (ref 136–145)
TROPONIN T SERPL HS-MCNC: <6 NG/L
WBC NRBC COR # BLD AUTO: 11.04 10*3/MM3 (ref 3.4–10.8)

## 2024-04-09 PROCEDURE — 94760 N-INVAS EAR/PLS OXIMETRY 1: CPT

## 2024-04-09 PROCEDURE — 94799 UNLISTED PULMONARY SVC/PX: CPT

## 2024-04-09 PROCEDURE — 93010 ELECTROCARDIOGRAM REPORT: CPT | Performed by: INTERNAL MEDICINE

## 2024-04-09 PROCEDURE — 94664 DEMO&/EVAL PT USE INHALER: CPT

## 2024-04-09 PROCEDURE — 94660 CPAP INITIATION&MGMT: CPT

## 2024-04-09 PROCEDURE — 94640 AIRWAY INHALATION TREATMENT: CPT

## 2024-04-09 PROCEDURE — G0378 HOSPITAL OBSERVATION PER HR: HCPCS

## 2024-04-09 PROCEDURE — 63710000001 PREDNISONE PER 1 MG: Performed by: NURSE PRACTITIONER

## 2024-04-09 PROCEDURE — 85027 COMPLETE CBC AUTOMATED: CPT | Performed by: NURSE PRACTITIONER

## 2024-04-09 PROCEDURE — 36415 COLL VENOUS BLD VENIPUNCTURE: CPT | Performed by: NURSE PRACTITIONER

## 2024-04-09 PROCEDURE — 80048 BASIC METABOLIC PNL TOTAL CA: CPT | Performed by: NURSE PRACTITIONER

## 2024-04-09 PROCEDURE — 84484 ASSAY OF TROPONIN QUANT: CPT | Performed by: HOSPITALIST

## 2024-04-09 PROCEDURE — 94761 N-INVAS EAR/PLS OXIMETRY MLT: CPT

## 2024-04-09 PROCEDURE — 93005 ELECTROCARDIOGRAM TRACING: CPT | Performed by: HOSPITALIST

## 2024-04-09 PROCEDURE — 83735 ASSAY OF MAGNESIUM: CPT | Performed by: HOSPITALIST

## 2024-04-09 PROCEDURE — 87641 MR-STAPH DNA AMP PROBE: CPT

## 2024-04-09 RX ORDER — CETIRIZINE HYDROCHLORIDE 10 MG/1
10 TABLET ORAL DAILY
Status: DISCONTINUED | OUTPATIENT
Start: 2024-04-09 | End: 2024-04-13 | Stop reason: HOSPADM

## 2024-04-09 RX ORDER — GUAIFENESIN 600 MG/1
600 TABLET, EXTENDED RELEASE ORAL EVERY 12 HOURS SCHEDULED
Status: DISCONTINUED | OUTPATIENT
Start: 2024-04-09 | End: 2024-04-11

## 2024-04-09 RX ORDER — ACETAMINOPHEN 650 MG/1
650 SUPPOSITORY RECTAL EVERY 4 HOURS PRN
Status: DISCONTINUED | OUTPATIENT
Start: 2024-04-09 | End: 2024-04-13 | Stop reason: HOSPADM

## 2024-04-09 RX ORDER — BUDESONIDE AND FORMOTEROL FUMARATE DIHYDRATE 160; 4.5 UG/1; UG/1
2 AEROSOL RESPIRATORY (INHALATION)
Status: DISCONTINUED | OUTPATIENT
Start: 2024-04-09 | End: 2024-04-13 | Stop reason: HOSPADM

## 2024-04-09 RX ORDER — PREGABALIN 75 MG/1
150 CAPSULE ORAL 2 TIMES DAILY
Status: DISCONTINUED | OUTPATIENT
Start: 2024-04-09 | End: 2024-04-13 | Stop reason: HOSPADM

## 2024-04-09 RX ORDER — POLYETHYLENE GLYCOL 3350 17 G/17G
17 POWDER, FOR SOLUTION ORAL DAILY PRN
Status: DISCONTINUED | OUTPATIENT
Start: 2024-04-09 | End: 2024-04-13 | Stop reason: HOSPADM

## 2024-04-09 RX ORDER — MULTIPLE VITAMINS W/ MINERALS TAB 9MG-400MCG
1 TAB ORAL DAILY
Status: DISCONTINUED | OUTPATIENT
Start: 2024-04-09 | End: 2024-04-13 | Stop reason: HOSPADM

## 2024-04-09 RX ORDER — MONTELUKAST SODIUM 10 MG/1
10 TABLET ORAL NIGHTLY
Status: DISCONTINUED | OUTPATIENT
Start: 2024-04-09 | End: 2024-04-13 | Stop reason: HOSPADM

## 2024-04-09 RX ORDER — NITROGLYCERIN 0.4 MG/1
0.4 TABLET SUBLINGUAL
Status: DISCONTINUED | OUTPATIENT
Start: 2024-04-09 | End: 2024-04-13 | Stop reason: HOSPADM

## 2024-04-09 RX ORDER — SODIUM CHLORIDE 0.9 % (FLUSH) 0.9 %
10 SYRINGE (ML) INJECTION EVERY 12 HOURS SCHEDULED
Status: DISCONTINUED | OUTPATIENT
Start: 2024-04-09 | End: 2024-04-13 | Stop reason: HOSPADM

## 2024-04-09 RX ORDER — AZITHROMYCIN 250 MG/1
500 TABLET, FILM COATED ORAL
Status: COMPLETED | OUTPATIENT
Start: 2024-04-09 | End: 2024-04-11

## 2024-04-09 RX ORDER — FERROUS SULFATE 325(65) MG
325 TABLET ORAL
Status: DISCONTINUED | OUTPATIENT
Start: 2024-04-10 | End: 2024-04-13 | Stop reason: HOSPADM

## 2024-04-09 RX ORDER — SODIUM CHLORIDE 0.9 % (FLUSH) 0.9 %
10 SYRINGE (ML) INJECTION AS NEEDED
Status: DISCONTINUED | OUTPATIENT
Start: 2024-04-09 | End: 2024-04-13 | Stop reason: HOSPADM

## 2024-04-09 RX ORDER — BISACODYL 10 MG
10 SUPPOSITORY, RECTAL RECTAL DAILY PRN
Status: DISCONTINUED | OUTPATIENT
Start: 2024-04-09 | End: 2024-04-13 | Stop reason: HOSPADM

## 2024-04-09 RX ORDER — ACETAMINOPHEN 325 MG/1
650 TABLET ORAL EVERY 4 HOURS PRN
Status: DISCONTINUED | OUTPATIENT
Start: 2024-04-09 | End: 2024-04-13 | Stop reason: HOSPADM

## 2024-04-09 RX ORDER — CHOLESTYRAMINE LIGHT 4 G/5.7G
1 POWDER, FOR SUSPENSION ORAL DAILY
Status: DISCONTINUED | OUTPATIENT
Start: 2024-04-09 | End: 2024-04-13 | Stop reason: HOSPADM

## 2024-04-09 RX ORDER — IPRATROPIUM BROMIDE AND ALBUTEROL SULFATE 2.5; .5 MG/3ML; MG/3ML
3 SOLUTION RESPIRATORY (INHALATION)
Status: DISCONTINUED | OUTPATIENT
Start: 2024-04-09 | End: 2024-04-11

## 2024-04-09 RX ORDER — SODIUM CHLORIDE 9 MG/ML
40 INJECTION, SOLUTION INTRAVENOUS AS NEEDED
Status: DISCONTINUED | OUTPATIENT
Start: 2024-04-09 | End: 2024-04-13 | Stop reason: HOSPADM

## 2024-04-09 RX ORDER — CALCIUM CARBONATE 500 MG/1
2 TABLET, CHEWABLE ORAL 2 TIMES DAILY PRN
Status: DISCONTINUED | OUTPATIENT
Start: 2024-04-09 | End: 2024-04-13 | Stop reason: HOSPADM

## 2024-04-09 RX ORDER — AMLODIPINE BESYLATE 5 MG/1
5 TABLET ORAL 2 TIMES DAILY
Status: DISCONTINUED | OUTPATIENT
Start: 2024-04-09 | End: 2024-04-13 | Stop reason: HOSPADM

## 2024-04-09 RX ORDER — CYCLOBENZAPRINE HCL 10 MG
5 TABLET ORAL 2 TIMES DAILY PRN
Status: DISCONTINUED | OUTPATIENT
Start: 2024-04-09 | End: 2024-04-13 | Stop reason: HOSPADM

## 2024-04-09 RX ORDER — METOPROLOL SUCCINATE 50 MG/1
50 TABLET, EXTENDED RELEASE ORAL 2 TIMES DAILY
Status: DISCONTINUED | OUTPATIENT
Start: 2024-04-09 | End: 2024-04-13 | Stop reason: HOSPADM

## 2024-04-09 RX ORDER — ACETAMINOPHEN 160 MG/5ML
650 SOLUTION ORAL EVERY 4 HOURS PRN
Status: DISCONTINUED | OUTPATIENT
Start: 2024-04-09 | End: 2024-04-13 | Stop reason: HOSPADM

## 2024-04-09 RX ORDER — BISACODYL 5 MG/1
5 TABLET, DELAYED RELEASE ORAL DAILY PRN
Status: DISCONTINUED | OUTPATIENT
Start: 2024-04-09 | End: 2024-04-13 | Stop reason: HOSPADM

## 2024-04-09 RX ORDER — ONDANSETRON 4 MG/1
4 TABLET, ORALLY DISINTEGRATING ORAL EVERY 6 HOURS PRN
Status: DISCONTINUED | OUTPATIENT
Start: 2024-04-09 | End: 2024-04-13 | Stop reason: HOSPADM

## 2024-04-09 RX ORDER — FLUTICASONE PROPIONATE 50 MCG
2 SPRAY, SUSPENSION (ML) NASAL DAILY
Status: DISCONTINUED | OUTPATIENT
Start: 2024-04-09 | End: 2024-04-13 | Stop reason: HOSPADM

## 2024-04-09 RX ORDER — ONDANSETRON 2 MG/ML
4 INJECTION INTRAMUSCULAR; INTRAVENOUS EVERY 6 HOURS PRN
Status: DISCONTINUED | OUTPATIENT
Start: 2024-04-09 | End: 2024-04-13 | Stop reason: HOSPADM

## 2024-04-09 RX ORDER — PANTOPRAZOLE SODIUM 40 MG/1
40 TABLET, DELAYED RELEASE ORAL DAILY
Status: DISCONTINUED | OUTPATIENT
Start: 2024-04-09 | End: 2024-04-13 | Stop reason: HOSPADM

## 2024-04-09 RX ORDER — IPRATROPIUM BROMIDE AND ALBUTEROL SULFATE 2.5; .5 MG/3ML; MG/3ML
3 SOLUTION RESPIRATORY (INHALATION) EVERY 4 HOURS PRN
Status: DISCONTINUED | OUTPATIENT
Start: 2024-04-09 | End: 2024-04-13 | Stop reason: HOSPADM

## 2024-04-09 RX ORDER — MIRTAZAPINE 30 MG/1
30 TABLET, FILM COATED ORAL NIGHTLY
Status: DISCONTINUED | OUTPATIENT
Start: 2024-04-09 | End: 2024-04-13 | Stop reason: HOSPADM

## 2024-04-09 RX ORDER — PREDNISONE 20 MG/1
40 TABLET ORAL
Status: COMPLETED | OUTPATIENT
Start: 2024-04-09 | End: 2024-04-12

## 2024-04-09 RX ORDER — AMOXICILLIN 250 MG
2 CAPSULE ORAL 2 TIMES DAILY PRN
Status: DISCONTINUED | OUTPATIENT
Start: 2024-04-09 | End: 2024-04-13 | Stop reason: HOSPADM

## 2024-04-09 RX ADMIN — Medication 10 ML: at 01:41

## 2024-04-09 RX ADMIN — PREGABALIN 150 MG: 75 CAPSULE ORAL at 20:27

## 2024-04-09 RX ADMIN — ACETAMINOPHEN 325MG 650 MG: 325 TABLET ORAL at 06:05

## 2024-04-09 RX ADMIN — GUAIFENESIN 600 MG: 600 TABLET, EXTENDED RELEASE ORAL at 01:34

## 2024-04-09 RX ADMIN — CETIRIZINE HYDROCHLORIDE 10 MG: 10 TABLET ORAL at 09:55

## 2024-04-09 RX ADMIN — GUAIFENESIN 600 MG: 600 TABLET, EXTENDED RELEASE ORAL at 09:55

## 2024-04-09 RX ADMIN — BUDESONIDE AND FORMOTEROL FUMARATE DIHYDRATE 2 PUFF: 160; 4.5 AEROSOL RESPIRATORY (INHALATION) at 19:51

## 2024-04-09 RX ADMIN — CHOLESTYRAMINE 4 G: 4 POWDER, FOR SUSPENSION ORAL at 16:05

## 2024-04-09 RX ADMIN — AMLODIPINE BESYLATE 5 MG: 5 TABLET ORAL at 20:28

## 2024-04-09 RX ADMIN — Medication 1 TABLET: at 16:04

## 2024-04-09 RX ADMIN — IPRATROPIUM BROMIDE AND ALBUTEROL SULFATE 3 ML: .5; 3 SOLUTION RESPIRATORY (INHALATION) at 07:12

## 2024-04-09 RX ADMIN — PREDNISONE 40 MG: 20 TABLET ORAL at 02:08

## 2024-04-09 RX ADMIN — Medication 10 ML: at 09:59

## 2024-04-09 RX ADMIN — ACETAMINOPHEN 650 MG: 160 SOLUTION ORAL at 16:11

## 2024-04-09 RX ADMIN — MONTELUKAST SODIUM 10 MG: 10 TABLET, FILM COATED ORAL at 20:28

## 2024-04-09 RX ADMIN — BUDESONIDE AND FORMOTEROL FUMARATE DIHYDRATE 2 PUFF: 160; 4.5 AEROSOL RESPIRATORY (INHALATION) at 10:32

## 2024-04-09 RX ADMIN — MIRTAZAPINE 30 MG: 30 TABLET, FILM COATED ORAL at 20:29

## 2024-04-09 RX ADMIN — PANTOPRAZOLE SODIUM 40 MG: 40 TABLET, DELAYED RELEASE ORAL at 16:04

## 2024-04-09 RX ADMIN — MONTELUKAST SODIUM 10 MG: 10 TABLET, FILM COATED ORAL at 02:08

## 2024-04-09 RX ADMIN — FLUTICASONE PROPIONATE 2 SPRAY: 50 SPRAY, METERED NASAL at 09:55

## 2024-04-09 RX ADMIN — GUAIFENESIN 600 MG: 600 TABLET, EXTENDED RELEASE ORAL at 20:29

## 2024-04-09 RX ADMIN — PREDNISONE 40 MG: 20 TABLET ORAL at 09:55

## 2024-04-09 RX ADMIN — AZITHROMYCIN 500 MG: 250 TABLET, FILM COATED ORAL at 09:55

## 2024-04-09 RX ADMIN — IPRATROPIUM BROMIDE AND ALBUTEROL SULFATE 3 ML: .5; 3 SOLUTION RESPIRATORY (INHALATION) at 19:46

## 2024-04-09 RX ADMIN — METOPROLOL SUCCINATE 50 MG: 50 TABLET, EXTENDED RELEASE ORAL at 20:29

## 2024-04-09 RX ADMIN — IPRATROPIUM BROMIDE AND ALBUTEROL SULFATE 3 ML: .5; 3 SOLUTION RESPIRATORY (INHALATION) at 12:37

## 2024-04-09 RX ADMIN — Medication 10 ML: at 20:30

## 2024-04-09 NOTE — H&P
Patient Name:  Eileen Kaufman  YOB: 1950  MRN:  [id number]  Admit Date:  4/8/2024  Patient Care Team:  Artur Quevedo MD as PCP - General (Family Medicine)  Neisha Milton APRN as Nurse Practitioner (Nurse Practitioner)  Madelin Slaughter APRN as Nurse Practitioner (Nurse Practitioner)      Subjective   History Present Illness     Chief Complaint   Patient presents with    Leg Swelling    Shortness of Breath       Ms. Kaufman is a 74 y.o. with a history of COPD, chronic hypoxic respiratory failure on 2 L of oxygen at all times, pulmonary nodule, ANGÉLICA, diastolic CHF, HTN, fibromyalgia, HLD, depression, anemia that presents with shortness of breath and lower extremity edema. Initially 76% on 2 L nasal cannula increased to 3 to 4 L nasal cannula to low 90s. She is now on stable on her normal 2-4L without respiratory disress. She denies chest pain, fever chills, or other infectious symptoms. She was wheezing prior to admission despite using her home Albuterol nebulizers 2-3 times a day, Breztri daily. She denies current tobacco abuse.              History of Present Illness    Review of Systems   Constitutional:  Positive for activity change. Negative for appetite change, chills, fatigue, fever and unexpected weight change.   HENT:  Negative for trouble swallowing and voice change.    Eyes: Negative.    Respiratory:  Positive for cough, chest tightness and shortness of breath. Negative for choking.    Cardiovascular:  Negative for chest pain.   Gastrointestinal:  Negative for abdominal distention, abdominal pain, constipation, diarrhea, nausea and vomiting.   Genitourinary:  Negative for dysuria.   Skin:  Negative for color change, pallor and rash.   Neurological:  Negative for dizziness and light-headedness.        Personal History     Past Medical History:   Diagnosis Date    Anemia, unspecified     Asymptomatic menopausal state     Chronic obstructive pulmonary disease with (acute)  exacerbation     Essential (primary) hypertension     High cholesterol 2021    Low back pain     Major depressive disorder, single episode, moderate     Nicotine dependence, unspecified, uncomplicated     Osteopenia     Other intervertebral disc degeneration, lumbar region     Other long term (current) drug therapy     Solitary pulmonary nodule     Vitamin D deficiency, unspecified      Past Surgical History:   Procedure Laterality Date    CARDIAC CATHETERIZATION  2012    CARPAL TUNNEL RELEASE      CHOLECYSTECTOMY      COLONOSCOPY  2022    Normal    EPIDURAL Right 10/25/2023    Procedure: LUMBAR/SACRAL TRANSFORAMINAL EPIDURAL Right L3-4 and RIght L5-S1 73188, 56240;  Surgeon: Shandra Thomas MD;  [place] [facility] [place];  Service: Pain Management;  Laterality: Right;    HYSTERECTOMY  1980s    Total    KNEE ARTHROSCOPY Left     NECK SURGERY  2015     Family History   Problem Relation Age of Onset    Cerebral aneurysm Mother         cause of death    Heart attack Father         cause of death    Esophageal cancer Brother      Social History     Tobacco Use    Smoking status: Former     Current packs/day: 0.00     Average packs/day: 1 pack/day for 50.0 years (50.0 ttl pk-yrs)     Types: Cigarettes     Start date:      Quit date: 2023     Years since quittin.2     Passive exposure: Past    Smokeless tobacco: Never    Tobacco comments:     Eileen Alvarez was 23 when she started smoking.  She did stop smoking for some time - about 13 years.  She has been smoking a total of approximately 33 years.  She has smoked less than a pack daily during this time. She currently uses smokless, vapor can/pouches   Vaping Use    Vaping status: Former    Quit date: 2023    Substances: Nicotine    Devices: Pre-filled or refillable cartridge   Substance Use Topics    Alcohol use: Yes     Comment: Socially    Drug use: Never     No current facility-administered medications on file prior to encounter.      Current Outpatient Medications on File Prior to Encounter   Medication Sig Dispense Refill    albuterol (ACCUNEB) 0.63 MG/3ML nebulizer solution Take 3 mL by nebulization Every 4 (Four) Hours As Needed for Wheezing or Shortness of Air. 1 each 9    albuterol sulfate HFA (Ventolin HFA) 108 (90 Base) MCG/ACT inhaler Inhale 2 puffs Every 6 (Six) Hours As Needed for Wheezing. 8 g 11    amLODIPine (NORVASC) 5 MG tablet Take 1 tablet by mouth 2 (Two) Times a Day. 180 tablet 3    Budeson-Glycopyrrol-Formoterol (Breztri Aerosphere) 160-9-4.8 MCG/ACT aerosol inhaler Inhale 2 puffs 2 (Two) Times a Day. 1 each 5    budesonide (PULMICORT) 0.5 MG/2ML nebulizer solution Take 2 mL by nebulization Daily. 180 mL 1    Calcium Carbonate 1500 (600 Ca) MG tablet Calcium 600 600 mg (1,500 mg) oral tablet take 1 tablet by oral route daily   Suspended      cetirizine (zyrTEC) 10 MG tablet Take 1 tablet by mouth Daily. 90 tablet 3    colestipol (COLESTID) 1 g tablet Take 1 tablet by mouth Daily. 90 tablet 1    cyclobenzaprine (FLEXERIL) 5 MG tablet TAKE 1 TABLET BY MOUTH TWICE DAILY AS NEEDED FOR MUSCLE SPASMS 60 tablet 1    ferrous sulfate 325 (65 FE) MG EC tablet Take 1 tablet by mouth Daily With Breakfast. 90 tablet 1    fluticasone (FLONASE) 50 MCG/ACT nasal spray 2 sprays into the nostril(s) as directed by provider Daily. 18 mL 5    hydroCHLOROthiazide (HYDRODIURIL) 25 MG tablet Take 1 tablet by mouth Daily. 90 tablet 3    ipratropium-albuterol (DUO-NEB) 0.5-2.5 mg/3 ml nebulizer USE 3 ML VIA NEBULIZER FOUR TIMES DAILY 90 mL 5    lidocaine (LIDODERM) 5 % Place 2 patches on the skin as directed by provider Daily. 60 patch 2    metoprolol succinate XL (Toprol XL) 50 MG 24 hr tablet Take 1 tablet by mouth 2 (Two) Times a Day. 180 tablet 3    mirtazapine (REMERON) 30 MG tablet Take 1 tablet by mouth Every Night. 90 tablet 3    montelukast (SINGULAIR) 10 MG tablet Take 1 tablet by mouth Every Night. 90 tablet 3    multivitamin with  minerals tablet tablet 1 tablet Daily.      O2 (OXYGEN) Inhale 2 L/min 1 (One) Time.      pantoprazole (PROTONIX) 40 MG EC tablet Take 1 tablet by mouth Daily. 90 tablet 3    pregabalin (LYRICA) 150 MG capsule Take 1 capsule by mouth 2 (Two) Times a Day. 60 capsule 1     Allergies   Allergen Reactions    Sulfa Antibiotics Unknown - Low Severity    Aspirin Rash    Atorvastatin Other (See Comments)     Hair loss    Naproxen Other (See Comments)     Urinary tract infection       Objective    Objective     Vital Signs  Temp:  [98 °F (36.7 °C)] 98 °F (36.7 °C)  Heart Rate:  [79-93] 92  Resp:  [16-24] 16  BP: (129-143)/() 137/91  SpO2:  [76 %-96 %] 90 %  on  Flow (L/min):  [2-4] 4;   Device (Oxygen Therapy): nasal cannula  Body mass index is 39.03 kg/m².    Physical Exam  Vitals and nursing note reviewed.   Constitutional:       Appearance: She is well-developed. She is ill-appearing (chronically ill appearing but not acute).   HENT:      Head: Normocephalic and atraumatic.   Eyes:      Pupils: Pupils are equal, round, and reactive to light.   Cardiovascular:      Rate and Rhythm: Normal rate and regular rhythm.      Heart sounds: Normal heart sounds.   Pulmonary:      Effort: Pulmonary effort is normal.      Breath sounds: Normal breath sounds. No wheezing.      Comments: Diminished breath sounds. No wheezing. Breathing tx in progress  Abdominal:      General: Bowel sounds are normal. There is no distension or abdominal bruit.      Palpations: Abdomen is soft. Abdomen is not rigid. There is no shifting dullness, fluid wave or pulsatile mass.      Tenderness: There is no abdominal tenderness. There is no guarding.   Musculoskeletal:         General: Normal range of motion.   Skin:     General: Skin is warm and dry.   Neurological:      Mental Status: She is alert and oriented to person, place, and time.   Psychiatric:         Behavior: Behavior normal.         Thought Content: Thought content normal.         Results  Review:  I reviewed the patient's new clinical results.  I reviewed the patient's new imaging results and agree with the interpretation.  I reviewed the patient's other test results and agree with the interpretation  I personally viewed and interpreted the patient's EKG/Telemetry data  Discussed with ED provider.    Lab Results (last 24 hours)       Procedure Component Value Units Date/Time    High Sensitivity Troponin T [[id number]]  (Normal) Collected: 04/08/24 2100    Specimen: Blood Updated: 04/08/24 2136     HS Troponin T 9 ng/L     Narrative:      High Sensitive Troponin T Reference Range:  <14.0 ng/L- Negative Female for AMI  <22.0 ng/L- Negative Male for AMI  >=14 - Abnormal Female indicating possible myocardial injury.  >=22 - Abnormal Male indicating possible myocardial injury.   Clinicians would have to utilize clinical acumen, EKG, Troponin, and serial changes to determine if it is an Acute Myocardial Infarction or myocardial injury due to an underlying chronic condition.         CBC & Differential [[id number]]  (Abnormal) Collected: 04/08/24 2100    Specimen: Blood Updated: 04/08/24 2111    Narrative:      The following orders were created for panel order CBC & Differential.  Procedure                               Abnormality         Status                     ---------                               -----------         ------                     CBC Auto Differential[[id number]]        Abnormal            Final result                 Please view results for these tests on the individual orders.    Comprehensive Metabolic Panel [[id number]]  (Abnormal) Collected: 04/08/24 2100    Specimen: Blood Updated: 04/08/24 2140     Glucose 83 mg/dL      BUN 11 mg/dL      Creatinine 1.01 mg/dL      Sodium 141 mmol/L      Potassium 4.6 mmol/L      Comment: Slight hemolysis detected by analyzer. Result may be falsely elevated.        Chloride 103 mmol/L      CO2 30.0 mmol/L      Calcium 9.2 mg/dL      Total Protein 7.6  g/dL      Albumin 4.0 g/dL      ALT (SGPT) 13 U/L      AST (SGOT) 13 U/L      Comment: Slight hemolysis detected by analyzer. Result may be falsely elevated.        Alkaline Phosphatase 109 U/L      Total Bilirubin 0.2 mg/dL      Globulin 3.6 gm/dL      A/G Ratio 1.1 g/dL      BUN/Creatinine Ratio 10.9     Anion Gap 8.0 mmol/L      eGFR 59.3 mL/min/1.73     Narrative:      GFR Normal >60  Chronic Kidney Disease <60  Kidney Failure <15    The GFR formula is only valid for adults with stable renal function between ages 18 and 70.    Protime-INR [[id number]]  (Normal) Collected: 04/08/24 2100    Specimen: Blood Updated: 04/08/24 2119     Protime 13.1 Seconds      INR 0.97    BNP [[id number]]  (Normal) Collected: 04/08/24 2100    Specimen: Blood Updated: 04/08/24 2135     proBNP 303.0 pg/mL     Narrative:      This assay is used as an aid in the diagnosis of individuals suspected of having heart failure. It can be used as an aid in the diagnosis of acute decompensated heart failure (ADHF) in patients presenting with signs and symptoms of ADHF to the emergency department (ED). In addition, NT-proBNP of <300 pg/mL indicates ADHF is not likely.    Age Range Result Interpretation  NT-proBNP Concentration (pg/mL:      <50             Positive            >450                   Gray                 300-450                    Negative             <300    50-75           Positive            >900                  Gray                300-900                  Negative            <300      >75             Positive            >1800                  Gray                300-1800                  Negative            <300    Magnesium [[id number]]  (Normal) Collected: 04/08/24 2100    Specimen: Blood Updated: 04/08/24 2140     Magnesium 1.9 mg/dL     CBC Auto Differential [[id number]]  (Abnormal) Collected: 04/08/24 2100    Specimen: Blood Updated: 04/08/24 2111     WBC 10.63 10*3/mm3      RBC 4.60 10*6/mm3      Hemoglobin 12.3 g/dL       Hematocrit 39.6 %      MCV 86.1 fL      MCH 26.7 pg      MCHC 31.1 g/dL      RDW 15.6 %      RDW-SD 48.4 fl      MPV 10.5 fL      Platelets 320 10*3/mm3      Neutrophil % 62.6 %      Lymphocyte % 22.9 %      Monocyte % 9.2 %      Eosinophil % 3.7 %      Basophil % 1.2 %      Immature Grans % 0.4 %      Neutrophils, Absolute 6.66 10*3/mm3      Lymphocytes, Absolute 2.43 10*3/mm3      Monocytes, Absolute 0.98 10*3/mm3      Eosinophils, Absolute 0.39 10*3/mm3      Basophils, Absolute 0.13 10*3/mm3      Immature Grans, Absolute 0.04 10*3/mm3      nRBC 0.0 /100 WBC     Respiratory Panel PCR w/COVID-19(SARS-CoV-2) LINDSEY/TOMÁS/TELLY/PAD/COR/ANNEMARIE In-House, NP Swab in UTM/VTM, 2 HR TAT - Swab, Nasopharynx [[id number]]  (Normal) Collected: 04/08/24 2112    Specimen: Swab from Nasopharynx Updated: 04/08/24 2225     ADENOVIRUS, PCR Not Detected     Coronavirus 229E Not Detected     Coronavirus HKU1 Not Detected     Coronavirus NL63 Not Detected     Coronavirus OC43 Not Detected     COVID19 Not Detected     Human Metapneumovirus Not Detected     Human Rhinovirus/Enterovirus Not Detected     Influenza A PCR Not Detected     Influenza B PCR Not Detected     Parainfluenza Virus 1 Not Detected     Parainfluenza Virus 2 Not Detected     Parainfluenza Virus 3 Not Detected     Parainfluenza Virus 4 Not Detected     RSV, PCR Not Detected     Bordetella pertussis pcr Not Detected     Bordetella parapertussis PCR Not Detected     Chlamydophila pneumoniae PCR Not Detected     Mycoplasma pneumo by PCR Not Detected    Narrative:      In the setting of a positive respiratory panel with a viral infection PLUS a negative procalcitonin without other underlying concern for bacterial infection, consider observing off antibiotics or discontinuation of antibiotics and continue supportive care. If the respiratory panel is positive for atypical bacterial infection (Bordetella pertussis, Chlamydophila pneumoniae, or Mycoplasma pneumoniae), consider antibiotic  de-escalation to target atypical bacterial infection.    Blood Gas, Venous - [[id number]]  (Abnormal) Collected: 04/08/24 2155    Specimen: Venous Blood Updated: 04/08/24 2205     Site Left Radial     pH, Venous 7.317 pH Units      pCO2, Venous 60.7 mm Hg      pO2, Venous 19.0 mm Hg      HCO3, Venous 31.1 mmol/L      Base Excess, Venous 3.2 mmol/L      Comment: Serial Number: [id number]:  [id number]        O2 Saturation, Venous 24.2 %      CO2 Content 32.9 mmol/L      Barometric Pressure for Blood Gas 749.3000 mmHg      Modality Cannula     Flow Rate 3.0000 lpm      Rate 17 Breaths/minute      Device Comment sat 91% collected venous by [id number] @ 1003    Urinalysis With Microscopic If Indicated (No Culture) - Urine, Clean Catch [[id number]]  (Abnormal) Collected: 04/08/24 2316    Specimen: Urine, Clean Catch Updated: 04/08/24 2338     Color, UA Yellow     Appearance, UA Clear     pH, UA 7.0     Specific Gravity, UA 1.014     Glucose, UA Negative     Ketones, UA Negative     Bilirubin, UA Negative     Blood, UA Small (1+)     Protein, UA Negative     Leuk Esterase, UA Moderate (2+)     Nitrite, UA Negative     Urobilinogen, UA 0.2 E.U./dL    High Sensitivity Troponin T 2Hr [[id number]]  (Normal) Collected: 04/08/24 2316    Specimen: Blood Updated: 04/08/24 2352     HS Troponin T 7 ng/L      Troponin T Delta -2 ng/L     Narrative:      High Sensitive Troponin T Reference Range:  <14.0 ng/L- Negative Female for AMI  <22.0 ng/L- Negative Male for AMI  >=14 - Abnormal Female indicating possible myocardial injury.  >=22 - Abnormal Male indicating possible myocardial injury.   Clinicians would have to utilize clinical acumen, EKG, Troponin, and serial changes to determine if it is an Acute Myocardial Infarction or myocardial injury due to an underlying chronic condition.         Urinalysis, Microscopic Only - Urine, Clean Catch [[id number]]  (Abnormal) Collected: 04/08/24 2316    Specimen: Urine, Clean Catch Updated: 04/08/24  "2338     RBC, UA 0-2 /HPF      WBC, UA 11-20 /HPF      Bacteria, UA None Seen /HPF      Squamous Epithelial Cells, UA 0-2 /HPF      Hyaline Casts, UA None Seen /LPF      Methodology Automated Microscopy    S. Pneumo Ag Urine or CSF - Urine, Urine, Clean Catch [[id number]]  (Normal) Collected: 04/08/24 2316    Specimen: Urine, Clean Catch Updated: 04/09/24 0730     Strep Pneumo Ag Negative    Legionella Antigen, Urine - Urine, Urine, Clean Catch [[id number]]  (Normal) Collected: 04/08/24 2316    Specimen: Urine, Clean Catch Updated: 04/09/24 0730     LEGIONELLA ANTIGEN, URINE Negative    Procalcitonin [[id number]]  (Normal) Collected: 04/08/24 2316    Specimen: Blood Updated: 04/09/24 0557     Procalcitonin 0.03 ng/mL     Narrative:      As a Marker for Sepsis (Non-Neonates):    1. <0.5 ng/mL represents a low risk of severe sepsis and/or septic shock.  2. >2 ng/mL represents a high risk of severe sepsis and/or septic shock.    As a Marker for Lower Respiratory Tract Infections that require antibiotic therapy:    PCT on Admission    Antibiotic Therapy       6-12 Hrs later    >0.5                Strongly Recommended  >0.25 - <0.5        Recommended   0.1 - 0.25          Discouraged              Remeasure/reassess PCT  <0.1                Strongly Discouraged     Remeasure/reassess PCT    As 28 day mortality risk marker: \"Change in Procalcitonin Result\" (>80% or <=80%) if Day 0 (or Day 1) and Day 4 values are available. Refer to http://www.[facility]-pct-calculator.com    Change in PCT <=80%  A decrease of PCT levels below or equal to 80% defines a positive change in PCT test result representing a higher risk for 28-day all-cause mortality of patients diagnosed with severe sepsis for septic shock.    Change in PCT >80%  A decrease of PCT levels of more than 80% defines a negative change in PCT result representing a lower risk for 28-day all-cause mortality of patients diagnosed with severe sepsis or septic shock.       Basic " Metabolic Panel [[id number]]  (Abnormal) Collected: 04/09/24 0623    Specimen: Blood Updated: 04/09/24 0652     Glucose 155 mg/dL      BUN 10 mg/dL      Creatinine 0.85 mg/dL      Sodium 139 mmol/L      Potassium 4.4 mmol/L      Chloride 103 mmol/L      CO2 26.0 mmol/L      Calcium 9.4 mg/dL      BUN/Creatinine Ratio 11.8     Anion Gap 10.0 mmol/L      eGFR 72.9 mL/min/1.73     Narrative:      GFR Normal >60  Chronic Kidney Disease <60  Kidney Failure <15    The GFR formula is only valid for adults with stable renal function between ages 18 and 70.    CBC (No Diff) [[id number]]  (Abnormal) Collected: 04/09/24 0623    Specimen: Blood Updated: 04/09/24 0643     WBC 11.04 10*3/mm3      RBC 4.93 10*6/mm3      Hemoglobin 13.1 g/dL      Hematocrit 40.4 %      MCV 81.9 fL      MCH 26.6 pg      MCHC 32.4 g/dL      RDW 15.0 %      RDW-SD 43.1 fl      MPV 10.5 fL      Platelets 355 10*3/mm3             Imaging Results (Last 24 Hours)       Procedure Component Value Units Date/Time    CT Angiogram Chest [[id number]] Collected: 04/08/24 2309     Updated: 04/08/24 2310    Narrative:      CT ANGIOGRAM OF THE CHEST MULTIPLE CORONAL, SAGITTAL, AND 3D  RECONSTRUCTIONS     HISTORY: Lower extremity swelling. Shortness of breath. Recent falls.     TECHNIQUE: Radiation dose reduction techniques were utilized, including  automated exposure control and exposure modulation based on body size.   CT angiogram of the chest was performed following the administration of  IV contrast. Coronal, sagittal, and 3-D reconstruction images were  obtained.      COMPARISON: AP chest 04/08/2024, CT chest 06/30/2023.      FINDINGS:  There are no definite coronary arterial calcifications. There is no  intrapulmonary arterial filling defect to diagnose a pulmonary embolus.  There is mild mediastinal and bilateral hilar carol enlargement.  A left  prevascular space node measures 1 cm short axis. AP window node is  mildly enlarged. Right hilar node measures 1.5  cm. It is difficult to  directly compare with the previous noncontrasted exam.     There are mild hazy bilateral lower lobe opacities related to  atelectasis or potential subtle infiltrates. Lung volumes are  diminished. There is no evidence for pulmonary edema or effusion. No  central endotracheal lesion is demonstrated. Imaging through the upper  abdomen demonstrates cholecystectomy clips.       Impression:      1. No evidence for pulmonary thromboembolic disease.  2. Mild mediastinal and bilateral hilar carol enlargement potentially  related to reactive lymph nodes though recommend follow-up evaluation  and this could be performed in 3-4 months with IV contrast to evaluate  for stability in size of lymph nodes.  3. Dependent bilateral lower lobe hazy opacities suspected to represent  atelectasis though mild infiltrates are also in the differential  diagnosis. There is no evidence for perihilar edema or pleural effusion.       XR Chest 1 View [[id number]] Collected: 04/08/24 2146     Updated: 04/08/24 2231    Narrative:      CHEST SINGLE VIEW     HISTORY: Shortness of air with leg swelling.     COMPARISON: CT chest 06/30/2023, PA and lateral chest 04/25/2023.      FINDINGS: Cardiomediastinal silhouette does not appear changed allowing  for portable technique. Aortic vascular calcifications are present.  Lungs appear clear of focal airspace disease and there is no evidence  for pulmonary edema or pleural effusion. Cardiac monitoring leads are  evident.        Impression:      No evidence for active disease in the chest.     This report was finalized on 4/8/2024 10:28 PM by Dr. Dimitri Walsh M.D on Workstation: [id number]               Results for orders placed during the hospital encounter of 08/28/23    Adult Transthoracic Echo Complete W/ Cont if Necessary Per Protocol    Interpretation Summary    Left ventricular systolic function is normal. Calculated left ventricular EF = 60.5%    Sigmoid-shaped  ventricular septum is present.    Left ventricular diastolic function is consistent with (grade I) impaired relaxation and age.    Estimated right ventricular systolic pressure from tricuspid regurgitation is moderately elevated (45-55 mmHg).    No hemodynamically significant valvular pathology.      ECG 12 Lead Dyspnea   Preliminary Result   HEART RATE= 80  bpm   RR Interval= 750  ms   TX Interval= 173  ms   P Horizontal Axis= 10  deg   P Front Axis= 60  deg   QRSD Interval= 95  ms   QT Interval= 367  ms   QTcB= 424  ms   QRS Axis= 18  deg   T Wave Axis= 30  deg   - NORMAL ECG -   Sinus rhythm   Baseline wander in lead(s) V1,V2,V3,V4,V5,V6   Electronically Signed By:    Date and Time of Study: 2024-04-08 20:38:26           Assessment/Plan     Active Hospital Problems    Diagnosis  POA    **Acute exacerbation of chronic obstructive pulmonary disease (COPD) [J44.1]  Yes    Acute-on-chronic respiratory failure [J96.20]  Yes    COPD exacerbation [J44.1]  Yes    CKD (chronic kidney disease) stage 3, GFR 30-59 ml/min [N18.30]  Yes    Dyspnea [R06.00]  Yes    Diastolic dysfunction [I51.89]  Yes    Essential hypertension [I10]  Yes    Fibromyalgia [M79.7]  Yes    High cholesterol [E78.00]  Yes    Sleep apnea [G47.30]  Yes      Resolved Hospital Problems   No resolved problems to display.       Ms. Kaufman is a 74 y.o.   that presents with acute on chronic respiratory failure with hypoxia and COPD exacerbation. She is on 2 to 4 L nasal cannula at home    Acute hypoxic and hypercapnic respiratory failure  COPD exacerbation  CTA chest negative for PE demonstrates bilateral lower lobe opacities, mediastinal and bilateral hilar lymph node enlargement. VBG pH 7.317, pCO2 60.7. Normal WBC, afebrile, Pro-Salvatore. Negative troponin negative BNP  IV steroids and DuoNebs in ER.  Now stable on home supplemental oxygen   low suspicion for acute infection. Oral steroids recommended by pulmonary.  Continue DuoNebs, Mucinex. Azithromycin,  symbicort,  Flutter valve, antihistamines.     Chronic diastolic CHF  Last echocardiogram August 2023 EF 60% grade 1 diastolic dysfunction.euvolemic on exam.     ANGÉLICA noncompliant    CKD2- Mild renal insufficiency on admission creatinine now improved    Recent UTI hospitalized last week to [facility] for a UTI treated with Rocephin and discharged on Levaquin      VTE Prophylaxis - SCDs.  Code Status - Full code.       ESTEFANI Farley  [facility]  04/09/24  08:53 EDT

## 2024-04-09 NOTE — ED PROVIDER NOTES
EMERGENCY DEPARTMENT ENCOUNTER    Room Number:  35/35  Date seen:  4/8/2024  PCP: Artur Quevedo MD  Historian: Patient and family      HPI:  Chief Complaint: Progressive shortness of breath and leg swelling  Context: Eileen Kaufman is a 74 y.o. female who presents to the ED from home for progressive leg swelling and shortness of breath for weeks to months.  The patient is normally on 2 L at home and on arrival to triage she was 76%.  The patient has a history of COPD, hypertension, CHF and diastolic dysfunction.  The patient states she lives in [place] and has been seen there several times for this with no answers to her questions.  She states she has had a mild cough and has felt subjectively hot but no known fevers or chills.  She states she has had some mild intermittent vague chest discomfort as well.  She states that her legs are more swollen than usual but denies calf tenderness.  The patient and her son states they are tired of dealing with [place] and wanted to come up here for another opinion        PAST MEDICAL HISTORY  Active Ambulatory Problems     Diagnosis Date Noted    Allergic rhinitis 07/25/2018    Anxiety 07/25/2018    Sleep apnea 07/25/2018    Cervical spondylosis without myelopathy 12/17/2014    Congestive heart failure 06/17/2021    High cholesterol 06/17/2021    Hypercalcemia 06/17/2021    Essential hypertension 06/17/2021    Lumbar radiculopathy 07/19/2019    Fibromyalgia 06/17/2021    Chronic obstructive pulmonary disease 06/17/2021    Chronic nonintractable headache 06/17/2021    Frequency of micturition 06/17/2021    Other long term (current) drug therapy 02/14/2022    Syncope and collapse 04/22/2022    Bilateral sacroiliitis 04/21/2018    Weakness 06/26/2023    Chronic respiratory failure with hypoxia 08/31/2023    Chronic right-sided low back pain with right-sided sciatica 09/01/2023    Chronic superficial gastritis 09/01/2023    Diastolic dysfunction 09/01/2023     Postlaminectomy syndrome 10/05/2023    Dyspnea 10/12/2023     Resolved Ambulatory Problems     Diagnosis Date Noted    High blood pressure 2021    Acute respiratory failure with hypoxia 2023     Past Medical History:   Diagnosis Date    Anemia, unspecified     Asymptomatic menopausal state     Chronic obstructive pulmonary disease with (acute) exacerbation     Essential (primary) hypertension     Low back pain     Major depressive disorder, single episode, moderate     Nicotine dependence, unspecified, uncomplicated     Osteopenia     Other intervertebral disc degeneration, lumbar region     Solitary pulmonary nodule     Vitamin D deficiency, unspecified          REVIEW OF SYSTEMS  All systems reviewed and negative except for those discussed in HPI.       PAST SURGICAL HISTORY  Past Surgical History:   Procedure Laterality Date    CARDIAC CATHETERIZATION  2012    CARPAL TUNNEL RELEASE      CHOLECYSTECTOMY      COLONOSCOPY  2022    Normal    EPIDURAL Right 10/25/2023    Procedure: LUMBAR/SACRAL TRANSFORAMINAL EPIDURAL Right L3-4 and RIght L5-S1 73854, [id number];  Surgeon: Shandra Thomas MD;  [place] [facility] [place];  Service: Pain Management;  Laterality: Right;    HYSTERECTOMY  1980s    Total    KNEE ARTHROSCOPY Left     NECK SURGERY  2015         FAMILY HISTORY  Family History   Problem Relation Age of Onset    Cerebral aneurysm Mother         cause of death    Heart attack Father         cause of death    Esophageal cancer Brother          SOCIAL HISTORY  Social History     Socioeconomic History    Marital status: Single    Number of children: 2   Tobacco Use    Smoking status: Former     Current packs/day: 0.00     Average packs/day: 1 pack/day for 50.0 years (50.0 ttl pk-yrs)     Types: Cigarettes     Start date:      Quit date: 2023     Years since quittin.2     Passive exposure: Past    Smokeless tobacco: Never    Tobacco comments:     Eileen Alvarez was 23 when she started  smoking.  She did stop smoking for some time - about 13 years.  She has been smoking a total of approximately 33 years.  She has smoked less than a pack daily during this time. She currently uses smokless, vapor can/pouches   Vaping Use    Vaping status: Former    Quit date: 1/1/2023    Substances: Nicotine    Devices: Pre-filled or refillable cartridge   Substance and Sexual Activity    Alcohol use: Yes     Comment: Socially    Drug use: Never    Sexual activity: Defer         ALLERGIES  Sulfa antibiotics, Aspirin, Atorvastatin, and Naproxen      PHYSICAL EXAM  ED Triage Vitals   Temp Heart Rate Resp BP SpO2   -- 04/08/24 2033 04/08/24 2031 -- 04/08/24 2031    84 24  (!) 76 %      Temp src Heart Rate Source Patient Position BP Location FiO2 (%)   -- -- -- -- --              Physical Exam      GENERAL: 74-year-old chronically ill-appearing female in mild respiratory distress  HENT: NCAT: nares patent: Neck supple  EYES: no scleral icterus  CV: regular rhythm, normal rate  RESPIRATORY: Mild respiratory distress with decreased air movement and wheezes and rales in bases bilaterally  ABDOMEN: soft, NTND: Bowel sounds positive  MUSCULOSKELETAL: no deformity: 1+ pedal edema bilaterally with no calf tenderness  NEURO: alert with nonfocal neuro exam  PSYCH:  calm, cooperative  SKIN: warm, dry    Vital signs and nursing notes reviewed.      LAB RESULTS  Recent Results (from the past 24 hour(s))   ECG 12 Lead Dyspnea    Collection Time: 04/08/24  8:38 PM   Result Value Ref Range    QT Interval 367 ms    QTC Interval 424 ms   High Sensitivity Troponin T    Collection Time: 04/08/24  9:00 PM    Specimen: Blood   Result Value Ref Range    HS Troponin T 9 <14 ng/L   Comprehensive Metabolic Panel    Collection Time: 04/08/24  9:00 PM    Specimen: Blood   Result Value Ref Range    Glucose 83 65 - 99 mg/dL    BUN 11 8 - 23 mg/dL    Creatinine 1.01 (H) 0.57 - 1.00 mg/dL    Sodium 141 136 - 145 mmol/L    Potassium 4.6 3.5 - 5.2  mmol/L    Chloride 103 98 - 107 mmol/L    CO2 30.0 (H) 22.0 - 29.0 mmol/L    Calcium 9.2 8.6 - 10.5 mg/dL    Total Protein 7.6 6.0 - 8.5 g/dL    Albumin 4.0 3.5 - 5.2 g/dL    ALT (SGPT) 13 1 - 33 U/L    AST (SGOT) 13 1 - 32 U/L    Alkaline Phosphatase 109 39 - 117 U/L    Total Bilirubin 0.2 0.0 - 1.2 mg/dL    Globulin 3.6 gm/dL    A/G Ratio 1.1 g/dL    BUN/Creatinine Ratio 10.9 7.0 - 25.0    Anion Gap 8.0 5.0 - 15.0 mmol/L    eGFR 59.3 (L) >60.0 mL/min/1.73   Protime-INR    Collection Time: 04/08/24  9:00 PM    Specimen: Blood   Result Value Ref Range    Protime 13.1 11.7 - 14.2 Seconds    INR 0.97 0.90 - 1.10   BNP    Collection Time: 04/08/24  9:00 PM    Specimen: Blood   Result Value Ref Range    proBNP 303.0 0.0 - 900.0 pg/mL   Magnesium    Collection Time: 04/08/24  9:00 PM    Specimen: Blood   Result Value Ref Range    Magnesium 1.9 1.6 - 2.4 mg/dL   CBC Auto Differential    Collection Time: 04/08/24  9:00 PM    Specimen: Blood   Result Value Ref Range    WBC 10.63 3.40 - 10.80 10*3/mm3    RBC 4.60 3.77 - 5.28 10*6/mm3    Hemoglobin 12.3 12.0 - 15.9 g/dL    Hematocrit 39.6 34.0 - 46.6 %    MCV 86.1 79.0 - 97.0 fL    MCH 26.7 26.6 - 33.0 pg    MCHC 31.1 (L) 31.5 - 35.7 g/dL    RDW 15.6 (H) 12.3 - 15.4 %    RDW-SD 48.4 37.0 - 54.0 fl    MPV 10.5 6.0 - 12.0 fL    Platelets 320 140 - 450 10*3/mm3    Neutrophil % 62.6 42.7 - 76.0 %    Lymphocyte % 22.9 19.6 - 45.3 %    Monocyte % 9.2 5.0 - 12.0 %    Eosinophil % 3.7 0.3 - 6.2 %    Basophil % 1.2 0.0 - 1.5 %    Immature Grans % 0.4 0.0 - 0.5 %    Neutrophils, Absolute 6.66 1.70 - 7.00 10*3/mm3    Lymphocytes, Absolute 2.43 0.70 - 3.10 10*3/mm3    Monocytes, Absolute 0.98 (H) 0.10 - 0.90 10*3/mm3    Eosinophils, Absolute 0.39 0.00 - 0.40 10*3/mm3    Basophils, Absolute 0.13 0.00 - 0.20 10*3/mm3    Immature Grans, Absolute 0.04 0.00 - 0.05 10*3/mm3    nRBC 0.0 0.0 - 0.2 /100 WBC   Respiratory Panel PCR w/COVID-19(SARS-CoV-2) LINDSEY/TOMÁS/TELLY/PAD/COR/ANNEMARIE In-House, NP Swab  in UTM/VTM, 2 HR TAT - Swab, Nasopharynx    Collection Time: 04/08/24  9:12 PM    Specimen: Nasopharynx; Swab   Result Value Ref Range    ADENOVIRUS, PCR Not Detected Not Detected    Coronavirus 229E Not Detected Not Detected    Coronavirus HKU1 Not Detected Not Detected    Coronavirus NL63 Not Detected Not Detected    Coronavirus OC43 Not Detected Not Detected    COVID19 Not Detected Not Detected - Ref. Range    Human Metapneumovirus Not Detected Not Detected    Human Rhinovirus/Enterovirus Not Detected Not Detected    Influenza A PCR Not Detected Not Detected    Influenza B PCR Not Detected Not Detected    Parainfluenza Virus 1 Not Detected Not Detected    Parainfluenza Virus 2 Not Detected Not Detected    Parainfluenza Virus 3 Not Detected Not Detected    Parainfluenza Virus 4 Not Detected Not Detected    RSV, PCR Not Detected Not Detected    Bordetella pertussis pcr Not Detected Not Detected    Bordetella parapertussis PCR Not Detected Not Detected    Chlamydophila pneumoniae PCR Not Detected Not Detected    Mycoplasma pneumo by PCR Not Detected Not Detected   Blood Gas, Venous -    Collection Time: 04/08/24  9:55 PM    Specimen: Venous Blood   Result Value Ref Range    Site Left Radial     pH, Venous 7.317 7.310 - 7.410 pH Units    pCO2, Venous 60.7 (H) 41.0 - 51.0 mm Hg    pO2, Venous 19.0 (L) 35.0 - 45.0 mm Hg    HCO3, Venous 31.1 (H) 22.0 - 28.0 mmol/L    Base Excess, Venous 3.2 (H) -2.0 - 2.0 mmol/L    O2 Saturation, Venous 24.2 (L) 45.0 - 75.0 %    CO2 Content 32.9 (H) 23 - 27 mmol/L    Barometric Pressure for Blood Gas 749.3000 mmHg    Modality Cannula     Flow Rate 3.0000 lpm    Rate 17 Breaths/minute    Device Comment sat 91% collected venous by [id number] @ 1003        Ordered the above labs and reviewed the results.        RADIOLOGY  CT Angiogram Chest    Result Date: 4/8/2024  CT ANGIOGRAM OF THE CHEST MULTIPLE CORONAL, SAGITTAL, AND 3D RECONSTRUCTIONS  HISTORY: Lower extremity swelling. Shortness of  breath. Recent falls.  TECHNIQUE: Radiation dose reduction techniques were utilized, including automated exposure control and exposure modulation based on body size. CT angiogram of the chest was performed following the administration of IV contrast. Coronal, sagittal, and 3-D reconstruction images were obtained.  COMPARISON: AP chest 04/08/2024, CT chest 06/30/2023.  FINDINGS: There are no definite coronary arterial calcifications. There is no intrapulmonary arterial filling defect to diagnose a pulmonary embolus. There is mild mediastinal and bilateral hilar carol enlargement.  A left prevascular space node measures 1 cm short axis. AP window node is mildly enlarged. Right hilar node measures 1.5 cm. It is difficult to directly compare with the previous noncontrasted exam.  There are mild hazy bilateral lower lobe opacities related to atelectasis or potential subtle infiltrates. Lung volumes are diminished. There is no evidence for pulmonary edema or effusion. No central endotracheal lesion is demonstrated. Imaging through the upper abdomen demonstrates cholecystectomy clips.      1. No evidence for pulmonary thromboembolic disease. 2. Mild mediastinal and bilateral hilar carol enlargement potentially related to reactive lymph nodes though recommend follow-up evaluation and this could be performed in 3-4 months with IV contrast to evaluate for stability in size of lymph nodes. 3. Dependent bilateral lower lobe hazy opacities suspected to represent atelectasis though mild infiltrates are also in the differential diagnosis. There is no evidence for perihilar edema or pleural effusion.      XR Chest 1 View    Result Date: 4/8/2024  CHEST SINGLE VIEW  HISTORY: Shortness of air with leg swelling.  COMPARISON: CT chest 06/30/2023, PA and lateral chest 04/25/2023.  FINDINGS: Cardiomediastinal silhouette does not appear changed allowing for portable technique. Aortic vascular calcifications are present. Lungs appear clear  of focal airspace disease and there is no evidence for pulmonary edema or pleural effusion. Cardiac monitoring leads are evident.      No evidence for active disease in the chest.  This report was finalized on 4/8/2024 10:28 PM by Dr. Dimitri Walsh M.D on Workstation: [id number]       Ordered the above noted radiological studies. Reviewed by me in PACS.            PROCEDURES  Procedures          MEDICATIONS GIVEN IN ER  Medications   ondansetron (ZOFRAN) injection 4 mg (has no administration in time range)   morphine injection 4 mg (has no administration in time range)   methylPREDNISolone sodium succinate (SOLU-Medrol) injection 125 mg (125 mg Intravenous Given 4/8/24 2109)   ipratropium-albuterol (DUO-NEB) nebulizer solution 3 mL (3 mL Nebulization Given 4/8/24 2154)   iopamidol (ISOVUE-370) 76 % injection 95 mL (95 mL Intravenous Given by Other 4/8/24 2234)             MEDICAL DECISION MAKING, PROGRESS, and CONSULTS    All labs have been independently reviewed by me.  All radiology studies have been reviewed by me and I have also reviewed the radiology report.   EKG's independently viewed and interpreted by me.  Discussion below represents my analysis of pertinent findings related to patient's condition, differential diagnosis, treatment plan and final disposition.      Additional sources:  - Discussed/ obtained information from independent historians: The patient's son is here who states that they are here for second opinion because they keep going back to [place] without any answers    - External (non-ED) record review: Reviewed the patient's admission to the hospital and [place] day from approximately 2 weeks ago where she was discharged with cystitis and a COPD exacerbation    - Chronic or social conditions impacting care: Patient lives in [facility] and reportedly is noncompliant with her BiPAP    - Shared decision making: After shared decision-making discussion to myself, the patient and her  son we agree she needs to be admitted to the hospital for further evaluation and care      Orders placed during this visit:  Orders Placed This Encounter   Procedures    Respiratory Panel PCR w/COVID-19(SARS-CoV-2) LINDSEY/TOMÁS/TELLY/PAD/COR/ANNEMARIE In-House, NP Swab in UTM/VTM, 2 HR TAT - Swab, Nasopharynx    XR Chest 1 View    CT Angiogram Chest    High Sensitivity Troponin T    Comprehensive Metabolic Panel    Protime-INR    Urinalysis With Microscopic If Indicated (No Culture) - Urine, Clean Catch    Blood Gas, Arterial -    BNP    Magnesium    CBC Auto Differential    High Sensitivity Troponin T 2Hr    Blood Gas, Venous -    Monitor Blood Pressure    Continuous Pulse Oximetry    Pulmonology (on-call MD unless specified)    LHA (on-call MD unless specified) Details    ECG 12 Lead Dyspnea    CBC & Differential         Differential diagnosis:  My differential diagnosis includes but is not limited to pneumonia, congestive heart failure, pulmonary embolism, pleural effusion, acute coronary syndrome, chronic obstructive pulmonary disease exacerbation, or pneumothorax.      Independent interpretation of labs, radiology studies, and discussions with consultants:  ED Course as of 04/08/24 2330   Mon Apr 08, 2024 2049 EKG    EKG time: 2038  Rhythm/Rate: Normal sinus rhythm at 80  No Acute Ischemia  Non-Specific ST-T changes    Similar compared to prior on 1/18/2024 at [facility] in [place]    Interpreted Contemporaneously by me.  Independently viewed by me     [GP]   2052 When the patient came into triage her oxygen saturations dropped to 76% on 2 L, now that she is in the room and at rest I have been able to wean her back down to 2 L and her sats are in the low 90s.  I will give her Solu-Medrol and a DuoNeb while obtaining labs and x-rays for further evaluation. [GP]   2158 My independent interpretation of the patient's chest x-ray is increased interstitial markings consistent with CHF versus pneumonia [GP]   2158 The  patient's BNP and initial troponin are negative.  I will obtain a CTA chest and repeat her troponin. [GP]   2159 I am still awaiting the patient's ABG. [GP]   2201 The patient would not hold still and thus we obtained a VBG which showed a pCO2 of 60 a pH of 7.31.  Will give her steroids and nebs at this time. [GP]   2240 The patient's RVP is negative. [GP]   2315 The patient CTA chest shows no PE infiltrates or CHF.  The patient's oxygen saturations did drop to the mid 80s on her 2 L and thus we increased her to 3 L.  I will consult pulmonary. [GP]   2322 I discussed the case with Dr. Yun from pulmonary.  We reviewed the patient's workup and CTA chest.  We have used her hypoxia and hypercapnia.  At this time he does not believe the patient needs to be on BiPAP and feels she is stable for admission to a telemetry bed under the hospitalist service and he will consult. [GP]      ED Course User Index  [GP] Dimitri Guzman MD               DIAGNOSIS  Final diagnoses:   Acute respiratory failure with hypoxia and hypercapnia   COPD with acute exacerbation         DISPOSITION  ADMISSION    Discussed treatment plan and reason for admission with pt/family and admitting physician.  Pt/family voiced understanding of the plan for admission for further testing/treatment as needed.            Latest Documented Vital Signs:  As of 23:30 EDT  BP- 140/90 HR- 87 Temp-   O2 sat- 92%--      --------------------  Please note that portions of this were completed with a voice recognition program.       Note Disclaimer: At [facility], we believe that sharing information builds trust and better relationships. You are receiving this note because you are receiving care at [facility] or recently visited. It is possible you will see health information before a provider has talked with you about it. This kind of information can be easy to misunderstand. To help you fully understand what it means for your health, we urge you to discuss  this note with your provider.             Dimitri Guzman MD  04/10/24 [id number]

## 2024-04-09 NOTE — PROGRESS NOTES
[facility] Clinical Pharmacy Services: Medication Reconciliation     Eileen Kaufman is a 74 y.o. female presenting to [facility] for Acute exacerbation of chronic obstructive pulmonary disease (COPD) [J44.1]  COPD with acute exacerbation [J44.1]  Acute respiratory failure with hypoxia and hypercapnia [J96.01, J96.02]       She  has a past medical history of Anemia, unspecified, Asymptomatic menopausal state, Chronic obstructive pulmonary disease with (acute) exacerbation, Essential (primary) hypertension, High cholesterol (06/17/2021), Low back pain, Major depressive disorder, single episode, moderate, Nicotine dependence, unspecified, uncomplicated, Osteopenia, Other intervertebral disc degeneration, lumbar region, Other long term (current) drug therapy, Solitary pulmonary nodule, and Vitamin D deficiency, unspecified.       Allergies as of 04/08/2024 - Reviewed 04/08/2024   Allergen Reaction Noted    Sulfa antibiotics Unknown - Low Severity 08/25/2017    Aspirin Rash 07/25/2018    Atorvastatin Other (See Comments) 02/24/2023    Naproxen Other (See Comments) 04/01/2024      Medication information was obtained from: Patient   Preferred Pharmacy: [id number] - [place], [place] - [address] AT [facility] [address] [facility] - [phone] [address] [phone] FX    Prior to Admission Medications       Prescriptions Last Dose Informant Patient Reported? Taking?    albuterol (ACCUNEB) 0.63 MG/3ML nebulizer solution   No Yes    Take 3 mL by nebulization Every 4 (Four) Hours As Needed for Wheezing or Shortness of Air.    albuterol sulfate HFA (Ventolin HFA) 108 (90 Base) MCG/ACT inhaler   No Yes    Inhale 2 puffs Every 6 (Six) Hours As Needed for Wheezing.    amLODIPine (NORVASC) 5 MG tablet   No Yes    Take 1 tablet by mouth 2 (Two) Times a Day.    Budeson-Glycopyrrol-Formoterol (Breztri Aerosphere) 160-9-4.8 MCG/ACT aerosol inhaler   No Yes    Inhale 2 puffs 2 (Two) Times a Day.    budesonide  (PULMICORT) 0.5 MG/2ML nebulizer solution   No Yes    Take 2 mL by nebulization Daily.    Calcium Carbonate 1500 (600 Ca) MG tablet Not Taking  Yes No    Calcium 600 600 mg (1,500 mg) oral tablet take 1 tablet by oral route daily   Suspended    Patient not taking:  Reported on 4/9/2024    cetirizine (zyrTEC) 10 MG tablet   No Yes    Take 1 tablet by mouth Daily.    Patient taking differently:  Take 1 tablet by mouth Daily As Needed for Allergies.    colestipol (COLESTID) 1 g tablet   No Yes    Take 1 tablet by mouth Daily.    cyclobenzaprine (FLEXERIL) 5 MG tablet   No Yes    TAKE 1 TABLET BY MOUTH TWICE DAILY AS NEEDED FOR MUSCLE SPASMS    ferrous sulfate 325 (65 FE) MG EC tablet   No Yes    Take 1 tablet by mouth Daily With Breakfast.    fluticasone (FLONASE) 50 MCG/ACT nasal spray   No Yes    2 sprays into the nostril(s) as directed by provider Daily.    Patient taking differently:  2 sprays into the nostril(s) as directed by provider Daily As Needed for Allergies.    hydroCHLOROthiazide (HYDRODIURIL) 25 MG tablet   No Yes    Take 1 tablet by mouth Daily.    ipratropium-albuterol (DUO-NEB) 0.5-2.5 mg/3 ml nebulizer   No Yes    USE 3 ML VIA NEBULIZER FOUR TIMES DAILY    lidocaine (LIDODERM) 5 %   No Yes    Place 2 patches on the skin as directed by provider Daily.    Patient taking differently:  Place 2 patches on the skin as directed by provider Daily As Needed (Back pain).    metoprolol succinate XL (Toprol XL) 50 MG 24 hr tablet   No Yes    Take 1 tablet by mouth 2 (Two) Times a Day.    mirtazapine (REMERON) 30 MG tablet   No Yes    Take 1 tablet by mouth Every Night.    montelukast (SINGULAIR) 10 MG tablet   No Yes    Take 1 tablet by mouth Every Night.    Patient taking differently:  Take 1 tablet by mouth Daily As Needed (Allergies).    multivitamin with minerals tablet tablet   Yes Yes    1 tablet Daily.    O2 (OXYGEN)   Yes Yes    Inhale 2 L/min 1 (One) Time.    pantoprazole (PROTONIX) 40 MG EC tablet   No  Yes    Take 1 tablet by mouth Daily.    pregabalin (LYRICA) 150 MG capsule   No Yes    Take 1 capsule by mouth 2 (Two) Times a Day.          This medication list is complete to the best of my knowledge as of 4/9/2024       Please call if questions.     Melvin Solorio III, PharmD  Clinical Pharmacist

## 2024-04-09 NOTE — PROGRESS NOTES
"  Daily Progress Note.   [facility] CORONARY CARE  4/9/2024    Patient:  Name:  Eileen Kaufman  MRN:  [id number]  1950  74 y.o.  female         CC: soa    Interval History:  Awake alert conversant, tired didn't sleep well overnight  Wears 2l at rest 4 with exertion chronically  No hemoptysis  No chest pain palpitations       Physical Exam:  /91 (BP Location: Right arm, Patient Position: Lying)   Pulse 92   Temp 98 °F (36.7 °C) (Oral)   Resp 16   Ht 162.6 cm (64.02\")   Wt 103 kg (227 lb 8.2 oz)   SpO2 90%   BMI 39.03 kg/m²   Body mass index is 39.03 kg/m².    Intake/Output Summary (Last 24 hours) at 4/9/2024 0841  Last data filed at 4/9/2024 0600  Gross per 24 hour   Intake --   Output 550 ml   Net -550 ml     General appearance: Nontoxic, conversant   Eyes: anicteric sclerae, moist conjunctivae; no lidlag;    HENT: Atraumatic; oropharynx clear with moist mucous membranes    Neck: Trachea midline;  supple   Lungs: no crackles no rhonchi faint wheeze if any diminished bs, with normal respiratory effort and no intercostal retractions  CV: RRR, no rub   Abdomen: +obese Soft, nontender; BS+  Extremities: No peripheral edema or ext lad  Skin: WWP no diffuse visible rash  Psych: Appropriate affect, alert   Neuro: Moves all ext. CN II-XII. Speech intact.    Data Review:  Notable Labs:  Results from last 7 days   Lab Units 04/09/24  0623 04/08/24  2100   WBC 10*3/mm3 11.04* 10.63   HEMOGLOBIN g/dL 13.1 12.3   PLATELETS 10*3/mm3 355 320     Results from last 7 days   Lab Units 04/09/24  0623 04/08/24  2100   SODIUM mmol/L 139 141   POTASSIUM mmol/L 4.4 4.6   CHLORIDE mmol/L 103 103   CO2 mmol/L 26.0 30.0*   BUN mg/dL 10 11   CREATININE mg/dL 0.85 1.01*   GLUCOSE mg/dL 155* 83   CALCIUM mg/dL 9.4 9.2   MAGNESIUM mg/dL  --  1.9   Estimated Creatinine Clearance: 69.9 mL/min (by C-G formula based on SCr of 0.85 mg/dL).    Results from last 7 days   Lab Units 04/09/24  0623 04/08/24  2316 " 04/08/24  2100   AST (SGOT) U/L  --   --  13   ALT (SGPT) U/L  --   --  13   PROCALCITONIN ng/mL  --  0.03  --    PLATELETS 10*3/mm3 355  --  320             Imaging:  Reviewed chest images personally from past 3 days    ASSESSMENT  /  PLAN:  Acute on chronic hypoxic and hypercapnic respiratory failure  Acute COPD exacerbation  Mediastinal/hilar lymph node enlargement  Obstructive sleep apnea  Pulmonary nodule  HFpEF  Obesity    Wears 2-4 liters chronically  continue  [facility]  Steroids  Azithro  Add Symbicort  Duonebs     All issues new to me today.  Prior hospital course, labs and imaging reviewed.  Electronically signed by Bhupinder Rossi MD, 04/09/24, 8:57 AM EDT.

## 2024-04-09 NOTE — CONSULTS
Group: [facility]         CONSULT NOTE    Patient Identification:  Eileen Kaufman  74 y.o.  female  1950  [id number]            Requesting physician: Dr. Guzman    Reason for Consultation: Acute on chronic hypoxic and hypercapnic respiratory failure    History of Present Illness  Eileen Kaufman is a 74 year old female with past medical history of COPD, chronic hypoxic respiratory failure, home O2, pulmonary nodule, ANGÉLICA, chronic dyspnea on exertion, seasonal allergies, HFpEF grade 1 diastolic dysfunction, HTN, fibromyalgia, HLD, depression, anemia who presented to the ED for leg swelling and shortness of breath for weeks to months.  Upon arrival to ED SpO2 was 76% on 2L NC.  She complained of mild cough, no fevers or chills, she has had some mild intermittent chest discomfort.  She was placed on 3L NC with improvement in spO2 mid-low 90's.  She was given DuoNeb and IV Solu-Medrol in ED. CTA chest was negative for PE, dependent bilateral lower lobe hazy opacities representing atelectasis vs. mild infiltrate, she has mild mediastinal and bilateral hilar lymph node enlargement.  VBG in ED showed pH 7.317, pCO2 60.7.  Troponin, BNP negative.  No leukocytosis on CBC.  She was admitted to telemetry per hospitalist service.     She states she has recently been hospitalized for a UTI, she has been home from the hospital for a little over a week.  She states she was doing ok until Saturday when she noticed her legs were swelling more than usual.  She actually came to the hospital today for this.  She denies any sick contacts, she denies any fever or chills.  She states she has a persistent dry cough but it is unproductive, if she has been able to bring anything up it has been clear.  She denies any current cigarette use or vaping.  She is an ex-smoker 0.5 PPD x 40 years and she quit 1 year ago.  She complains of persistent dry cough.  She states she has ANGÉLICA but has not worn her CPAP for 5 to 6 years because it  put out too much moisture around her nose and mouth.  She follows with Dr. Crowe, Pulmonology in [place].  Her last office visit was 1/22/2024, per that office note she wears 2-4 L nasal cannula at home, is on Breztri daily and albuterol nebulizers which she uses 2-3 times a day.  She uses a walker and can only climb 2-3 steps before needing to rest.  She states she has been seen several times in [place] for these same symptoms in the last few months without much improvement.    Spirometry 9/27/23 -mild obstruction fev1 72%, air trapping, severe reduction DLCO    Review of Systems  CONSTITUTIONAL: No fever or chills  EYE:  No new vision changes  EAR:  No change in hearing  CARDIAC: Positive for mild intermittent chest pain  PULMONARY: Positive for exertional dyspnea, non-productive cough  GI:  No diarrhea, hematemesis or hematochezia  RENAL:  No dysuria or urinary frequency  MUSCULOSKELETAL:  No musculoskeletal complaints  ENDOCRINE:  No heat or cold intolerance  INTEGUMENTARY: No skin rashes  NEUROLOGICAL:  No dizziness or confusion.  No seizure activity  PSYCHIATRIC:  No new anxiety or depression  12 system review of systems performed and all else negative    Past Medical History:  Past Medical History:   Diagnosis Date    Anemia, unspecified     Asymptomatic menopausal state     Chronic obstructive pulmonary disease with (acute) exacerbation     Essential (primary) hypertension     High cholesterol 06/17/2021    Low back pain     Major depressive disorder, single episode, moderate     Nicotine dependence, unspecified, uncomplicated     Osteopenia     Other intervertebral disc degeneration, lumbar region     Other long term (current) drug therapy     Solitary pulmonary nodule     Vitamin D deficiency, unspecified        Past Surgical History:  Past Surgical History:   Procedure Laterality Date    CARDIAC CATHETERIZATION  07/25/2012    CARPAL TUNNEL RELEASE      CHOLECYSTECTOMY      COLONOSCOPY   "2022    Normal    EPIDURAL Right 10/25/2023    Procedure: LUMBAR/SACRAL TRANSFORAMINAL EPIDURAL Right L3-4 and RIght L5-S1 63168, 06466;  Surgeon: Shandra Thomas MD;  Location: [facility] MAIN OR;  Service: Pain Management;  Laterality: Right;    HYSTERECTOMY  1980s    Total    KNEE ARTHROSCOPY Left     NECK SURGERY  2015        Home Meds:  (Not in a hospital admission)      Allergies:  Allergies   Allergen Reactions    Sulfa Antibiotics Unknown - Low Severity    Aspirin Rash    Atorvastatin Other (See Comments)     Hair loss    Naproxen Other (See Comments)     Urinary tract infection       Social History:   Social History     Socioeconomic History    Marital status: Single    Number of children: 2   Tobacco Use    Smoking status: Former     Current packs/day: 0.00     Average packs/day: 1 pack/day for 50.0 years (50.0 ttl pk-yrs)     Types: Cigarettes     Start date:      Quit date: 2023     Years since quittin.2     Passive exposure: Past    Smokeless tobacco: Never    Tobacco comments:     Eileen Alvarez was 23 when she started smoking.  She did stop smoking for some time - about 13 years.  She has been smoking a total of approximately 33 years.  She has smoked less than a pack daily during this time. She currently uses smokless, vapor can/pouches   Vaping Use    Vaping status: Former    Quit date: 2023    Substances: Nicotine    Devices: Pre-filled or refillable cartridge   Substance and Sexual Activity    Alcohol use: Yes     Comment: Socially    Drug use: Never    Sexual activity: Defer       Family History:  Family History   Problem Relation Age of Onset    Cerebral aneurysm Mother         cause of death    Heart attack Father         cause of death    Esophageal cancer Brother        Physical Exam:  /90   Pulse 87   Resp 20   Ht 162.6 cm (64.02\")   Wt 102 kg (224 lb 13.9 oz)   SpO2 92%   BMI 38.57 kg/m²  Body mass index is 38.57 kg/m². 92% 102 kg (224 lb 13.9 oz)  Physical " Exam  Constitutional: Elderly female, obese, in no acute distress, on 3L NC  Head: NCAT  Eyes: No pallor, Anicteric conjunctiva, EOMI. PERRLA.  ENMT:  No oral thrush. Moist MM.  NECK: Trachea midline, no thyromegaly, no JVD.  Heart: RRR, no murmur, BLE trace-1+, more so around bilateral knees  Lungs: IMANI +, crackles right posterior base, otherwise clear to auscultation throughout, no wheezing  Abdomen: Soft. No tenderness, guarding or rigidity. No palpable masses.  Extremities: Extremities warm and well perfused. No cyanosis/ clubbing.  All pulses palpable.  Neuro: Conscious, answers appropriately, no gross focal neuro deficits  Psych: Mood and affect appropriate    PPE recommended per [facility] infectious disease Isolation protocol for the current clinical scenario(as mentioned below) was followed.    LABS:  COVID19   Date Value Ref Range Status   04/08/2024 Not Detected Not Detected - Ref. Range Final       Lab Results   Component Value Date    CALCIUM 9.2 04/08/2024    PHOS 3.9 11/14/2019     Results from last 7 days   Lab Units 04/08/24  2100   MAGNESIUM mg/dL 1.9   SODIUM mmol/L 141   POTASSIUM mmol/L 4.6   CHLORIDE mmol/L 103   CO2 mmol/L 30.0*   BUN mg/dL 11   CREATININE mg/dL 1.01*   GLUCOSE mg/dL 83   CALCIUM mg/dL 9.2   WBC 10*3/mm3 10.63   HEMOGLOBIN g/dL 12.3   PLATELETS 10*3/mm3 320   ALT (SGPT) U/L 13   AST (SGOT) U/L 13   PROBNP pg/mL 303.0     Lab Results   Component Value Date    CKTOTAL 203 (H) 03/08/2023    TROPONINT 9 04/08/2024     Results from last 7 days   Lab Units 04/08/24  2100   HSTROP T ng/L 9             Results from last 7 days   Lab Units 04/08/24  2155   FLOW RATE lpm 3.0000   MODALITY  Cannula     Results from last 7 days   Lab Units 04/08/24  2112   ADENOVIRUS DETECTION BY PCR  Not Detected   CORONAVIRUS 229E  Not Detected   CORONAVIRUS HKU1  Not Detected   CORONAVIRUS NL63  Not Detected   CORONAVIRUS OC43  Not Detected   HUMAN METAPNEUMOVIRUS  Not Detected   HUMAN  RHINOVIRUS/ENTEROVIRUS  Not Detected   INFLUENZA B PCR  Not Detected   PARAINFLUENZA 1  Not Detected   PARAINFLUENZA VIRUS 2  Not Detected   PARAINFLUENZA VIRUS 3  Not Detected   PARAINFLUENZA VIRUS 4  Not Detected   BORDETELLA PERTUSSIS PCR  Not Detected   BORDETELLA PARAPERTUSSIS PCR  Not Detected   CHLAMYDOPHILA PNEUMONIAE PCR  Not Detected   MYCOPLAMA PNEUMO PCR  Not Detected   RSV, PCR  Not Detected     Results from last 7 days   Lab Units 04/08/24  2100   INR  0.97         Lab Results   Component Value Date    TSH 2.909 09/07/2023     Estimated Creatinine Clearance: 58.5 mL/min (A) (by C-G formula based on SCr of 1.01 mg/dL (H)).         Imaging: I personally visualized the images of scans/x-rays performed within last 3 days.      Assessment:  Acute on chronic hypoxic and hypercapnic respiratory failure  Acute COPD exacerbation  Mediastinal/hilar lymph node enlargement  Obstructive sleep apnea  Pulmonary nodule  HFpEF  Obesity    Recommendations:  1.  Acute hypoxic and hypercapnic respiratory failure  -Per chart review it appears patient takes O2 off frequently at home and has drops in SpO2.  Appears she wears 2-4L NC at home, cont supplemental O2 to keep sat 88-92%.   -PO steroids, mucinex, scheduled duonebs, azithromycin x 3 days for COPD exacerbation. Sputum culture if able  -Will tolerate some hypercapnia as is likely chronic.  Encouraged CPAP use, she does not remember her home settings, will order for her tonight and adjust pressures for patient comfort, start with nasal pillows per her request    -Avoid sedating medications if at all possible  -Flutter, incentive  -Continue home singulair, zyrtec, flonase    2.  Pulmonary nodule/mediastinal lymph node enlargement  -Mild mediastinal and bilateral hilar carol enlargement, consider high res CT and/or bronchoscopy, possible ILD?  -6 mm right upper lobe nodule, stable    3.  HFpEF  -Appears stable, echo 8/28/2023 LVEF 60.5%, grade 1 diastolic function  -EKG,  BNP, Trop negative      Patient was placed in face mask upon entering room and kept mask on throughout our encounter. I wore full protective equipment throughout this patient encounter including a face mask, gown and gloves. Hand hygiene was performed before donning protective equipment and after removal when leaving the room.    Cecilia Dias, APRN  4/8/2024  23:26 EDT      Much of this encounter note is an electronic transcription/translation of spoken language to printed text using Dragon Software.

## 2024-04-09 NOTE — ED NOTES
Nursing report ED to floor  Eileen Kaufman  74 y.o.  female    HPI :  HPI (Adult)  Stated Reason for Visit: Pt arrived via pv from home w/ walker. Pt c/o SOB & leg swelling x's several months. PT 76% on 2L nc from home. Pt accompanied by son. Pt placed on 3L nc upon arrival & is now 93% on 3L nc.  History Obtained From: patient, family    Chief Complaint  Chief Complaint   Patient presents with    Leg Swelling    Shortness of Breath     Eileen Kaufman is a 74 y.o. female who presents to the ED from home for progressive leg swelling and shortness of breath for weeks to months.  The patient is normally on 2 L at home and on arrival to triage she was 76%.  The patient has a history of COPD, hypertension, CHF and diastolic dysfunction.  The patient states she lives in [place] and has been seen there several times for this with no answers to her questions.  She states she has had a mild cough and has felt subjectively hot but no known fevers or chills.  She states she has had some mild intermittent vague chest discomfort as well.  She states that her legs are more swollen than usual but denies calf tenderness.  The patient and her son states they are tired of dealing with [place] and wanted to come up here for another opinion    Admitting doctor:   Mandeep Rosario MD    Admitting diagnosis:   The primary encounter diagnosis was Acute respiratory failure with hypoxia and hypercapnia. A diagnosis of COPD with acute exacerbation was also pertinent to this visit.    Code status:   Current Code Status       Date Active Code Status Order ID Comments User Context       Not on file            Allergies:   Sulfa antibiotics, Aspirin, Atorvastatin, and Naproxen    Isolation:   Enhanced Droplet/Contact     Intake and Output  No intake or output data in the 24 hours ending 04/08/24 2353    Weight:       04/08/24 2033   Weight: 102 kg (224 lb 13.9 oz)       Most recent vitals:   Vitals:    04/08/24 2131 04/08/24 2154 04/08/24 2301  04/08/24 2336   BP: 143/94  140/90    Pulse: 81 79 87    Resp:  20     Temp:    98 °F (36.7 °C)   TempSrc:    Oral   SpO2: 96% 96% 92%    Weight:       Height:           Active LDAs/IV Access:   Lines, Drains & Airways       Active LDAs       Name Placement date Placement time Site Days    Peripheral IV 04/08/24 2101 Left Antecubital 04/08/24 2101  Antecubital  less than 1                    Labs (abnormal labs have a star):   Labs Reviewed   COMPREHENSIVE METABOLIC PANEL - Abnormal; Notable for the following components:       Result Value    Creatinine 1.01 (*)     CO2 30.0 (*)     eGFR 59.3 (*)     All other components within normal limits    Narrative:     GFR Normal >60  Chronic Kidney Disease <60  Kidney Failure <15    The GFR formula is only valid for adults with stable renal function between ages 18 and 70.   URINALYSIS W/ MICROSCOPIC IF INDICATED (NO CULTURE) - Abnormal; Notable for the following components:    Blood, UA Small (1+) (*)     Leuk Esterase, UA Moderate (2+) (*)     All other components within normal limits   CBC WITH AUTO DIFFERENTIAL - Abnormal; Notable for the following components:    MCHC 31.1 (*)     RDW 15.6 (*)     Monocytes, Absolute 0.98 (*)     All other components within normal limits   BLOOD GAS, VENOUS - Abnormal; Notable for the following components:    pCO2, Venous 60.7 (*)     pO2, Venous 19.0 (*)     HCO3, Venous 31.1 (*)     Base Excess, Venous 3.2 (*)     O2 Saturation, Venous 24.2 (*)     CO2 Content 32.9 (*)     All other components within normal limits   URINALYSIS, MICROSCOPIC ONLY - Abnormal; Notable for the following components:    WBC, UA 11-20 (*)     All other components within normal limits   RESPIRATORY PANEL PCR W/ COVID-19 (SARS-COV-2), NP SWAB IN UTM/VTP, 2 HR TAT - Normal    Narrative:     In the setting of a positive respiratory panel with a viral infection PLUS a negative procalcitonin without other underlying concern for bacterial infection, consider  observing off antibiotics or discontinuation of antibiotics and continue supportive care. If the respiratory panel is positive for atypical bacterial infection (Bordetella pertussis, Chlamydophila pneumoniae, or Mycoplasma pneumoniae), consider antibiotic de-escalation to target atypical bacterial infection.   TROPONIN - Normal    Narrative:     High Sensitive Troponin T Reference Range:  <14.0 ng/L- Negative Female for AMI  <22.0 ng/L- Negative Male for AMI  >=14 - Abnormal Female indicating possible myocardial injury.  >=22 - Abnormal Male indicating possible myocardial injury.   Clinicians would have to utilize clinical acumen, EKG, Troponin, and serial changes to determine if it is an Acute Myocardial Infarction or myocardial injury due to an underlying chronic condition.        PROTIME-INR - Normal   BNP (IN-HOUSE) - Normal    Narrative:     This assay is used as an aid in the diagnosis of individuals suspected of having heart failure. It can be used as an aid in the diagnosis of acute decompensated heart failure (ADHF) in patients presenting with signs and symptoms of ADHF to the emergency department (ED). In addition, NT-proBNP of <300 pg/mL indicates ADHF is not likely.    Age Range Result Interpretation  NT-proBNP Concentration (pg/mL:      <50             Positive            >450                   Gray                 300-450                    Negative             <300    50-75           Positive            >900                  Gray                300-900                  Negative            <300      >75             Positive            >1800                  Gray                300-1800                  Negative            <300   MAGNESIUM - Normal   HIGH SENSITIVITIY TROPONIN T 2HR - Normal    Narrative:     High Sensitive Troponin T Reference Range:  <14.0 ng/L- Negative Female for AMI  <22.0 ng/L- Negative Male for AMI  >=14 - Abnormal Female indicating possible myocardial injury.  >=22 - Abnormal  Male indicating possible myocardial injury.   Clinicians would have to utilize clinical acumen, EKG, Troponin, and serial changes to determine if it is an Acute Myocardial Infarction or myocardial injury due to an underlying chronic condition.        BLOOD GAS, ARTERIAL   CBC AND DIFFERENTIAL    Narrative:     The following orders were created for panel order CBC & Differential.  Procedure                               Abnormality         Status                     ---------                               -----------         ------                     CBC Auto Differential[[id number]]        Abnormal            Final result                 Please view results for these tests on the individual orders.       EKG:   ECG 12 Lead Dyspnea   Preliminary Result   HEART RATE= 80  bpm   RR Interval= 750  ms   IN Interval= 173  ms   P Horizontal Axis= 10  deg   P Front Axis= 60  deg   QRSD Interval= 95  ms   QT Interval= 367  ms   QTcB= 424  ms   QRS Axis= 18  deg   T Wave Axis= 30  deg   - NORMAL ECG -   Sinus rhythm   Baseline wander in lead(s) V1,V2,V3,V4,V5,V6   Electronically Signed By:    Date and Time of Study: 2024-04-08 20:38:26          Meds given in ED:   Medications   methylPREDNISolone sodium succinate (SOLU-Medrol) injection 125 mg (125 mg Intravenous Given 4/8/24 2109)   ipratropium-albuterol (DUO-NEB) nebulizer solution 3 mL (3 mL Nebulization Given 4/8/24 2154)   iopamidol (ISOVUE-370) 76 % injection 95 mL (95 mL Intravenous Given by Other 4/8/24 2234)   ondansetron (ZOFRAN) injection 4 mg (4 mg Intravenous Given 4/8/24 2336)   morphine injection 4 mg (4 mg Intravenous Given 4/8/24 2336)       Imaging results:  CT Angiogram Chest    Result Date: 4/8/2024  1. No evidence for pulmonary thromboembolic disease. 2. Mild mediastinal and bilateral hilar carol enlargement potentially related to reactive lymph nodes though recommend follow-up evaluation and this could be performed in 3-4 months with IV contrast to  evaluate for stability in size of lymph nodes. 3. Dependent bilateral lower lobe hazy opacities suspected to represent atelectasis though mild infiltrates are also in the differential diagnosis. There is no evidence for perihilar edema or pleural effusion.      XR Chest 1 View    Result Date: 2024  No evidence for active disease in the chest.  This report was finalized on 2024 10:28 PM by Dr. Dimitri Walsh M.D on Workstation: [id number]       Ambulatory status:   - standby with walker    Social issues:   Social History     Socioeconomic History    Marital status: Single    Number of children: 2   Tobacco Use    Smoking status: Former     Current packs/day: 0.00     Average packs/day: 1 pack/day for 50.0 years (50.0 ttl pk-yrs)     Types: Cigarettes     Start date:      Quit date: 2023     Years since quittin.2     Passive exposure: Past    Smokeless tobacco: Never    Tobacco comments:     Eileen Alvarez was 23 when she started smoking.  She did stop smoking for some time - about 13 years.  She has been smoking a total of approximately 33 years.  She has smoked less than a pack daily during this time. She currently uses smokless, vapor can/pouches   Vaping Use    Vaping status: Former    Quit date: 2023    Substances: Nicotine    Devices: Pre-filled or refillable cartridge   Substance and Sexual Activity    Alcohol use: Yes     Comment: Socially    Drug use: Never    Sexual activity: Defer       Peripheral Neurovascular  Peripheral Neurovascular (Adult)  Peripheral Neurovascular WDL: .WDL except, neurovascular assessment lower, pulse assessment  Pulse Assessment: dorsalis pedis  LLE Neurovascular Assessment  Temperature LLE: warm  Color LLE: no discoloration  Sensation LLE: tenderness present, no tingling, no numbness  RLE Neurovascular Assessment  Temperature RLE: warm  Color RLE: no discoloration  Sensation RLE: tenderness present, no numbness, no tingling    Neuro Cognitive  Neuro Cognitive  (Adult)  Cognitive/Neuro/Behavioral WDL: WDL    Learning  Learning Assessment (Adult)  Learning Readiness and Ability: no barriers identified  Education Provided  Person Taught: patient    Respiratory  Respiratory WDL  Respiratory WDL: .WDL except, rhythm/pattern  Rhythm/Pattern, Respiratory: shortness of breath  Cough Frequency: infrequent  Breath Sounds  Breath Sounds: All Fields  All Lung Fields Breath Sounds: Anterior:, Lateral:, diminished, equal bilaterally  Breath Sounds Post-Respiratory Treatment  Breath Sounds Posttreatment All Fields: All Fields  Breath Sounds Posttreatment All Fields: no change    Abdominal Pain       Pain Assessments  Pain (Adult)  (0-10) Pain Rating: Rest: 10  Pain Location: extremity  Pain Side/Orientation: bilateral, lower    NIH Stroke Scale       Shama Boo RN  04/08/24 23:53 EDT     Call [facility]ama #[phone] with any questions

## 2024-04-09 NOTE — ED NOTES
Pt arrived via pv from home w/ walker. Pt c/o SOB & leg swelling x's several months. PT 76% on 2L nc from home. Pt accompanied by son. Pt placed on 3L nc upon arrival & is now 93% on 3L nc.

## 2024-04-09 NOTE — PLAN OF CARE
Goal Outcome Evaluation:      Patient on 4L NC; maintaining O2 sats > 88%. No complaints of SOA. Complaining of bilateral leg pain. No other concerns at this time.                                         Visit Type: Follow up: Susana Quiñones MD    Chief Complaint   Patient presents with   • Office Visit     Post op     Dana Carrillo is a 2 month old male with a history of moderate penile concealment, s/p clamp circumcision on 2/7/23 by Dr Javed, here today for post operative check. There were no complications at the time of the procedure.  He also has small bilateral hydroceles.     Mom states that he did well after the procedure. She feels like it has been healing well. She notices that the scrotum fluctuates in size during the day but her  felt that it was normal.        History reviewed. No pertinent past medical history.  Past Surgical History:   Procedure Laterality Date   • No past surgeries       Current Outpatient Medications   Medication Sig Dispense Refill   • simethicone (MYLICON) 40 MG/0.6ML drops Take 40 mg by mouth 4 times daily as needed.     • cholecalciferol (VITAMIN D) 10 mcg (400 units)/mL oral liquid Take 1 mL by mouth daily.       No current facility-administered medications for this visit.     No Known Allergies    History reviewed. No pertinent family history.    Social History     Tobacco Use   • Smoking status: Not on file   • Smokeless tobacco: Not on file   Substance Use Topics   • Alcohol use: Not on file     Patient's medications, allergies, past medical, surgical, social and family histories were reviewed and updated as appropriate.    Review Of Systems:  Constitutional: Normal  Gastrointestinal: As per HPI  Genitourinary: As per HPI      Physical Exam  Vitals:    03/06/23 0905   Weight: (!) 5.8 kg (12 lb 12.6 oz)     Gen: well-appearing, in no distress; well-nourished  Neuro/Psych   - Behavior/orientation: age-appropriate   - Mood/affect: normal  HEENT   - Ears: normal, symmetrical   - Head: atraumatic, normocephalic   - Neck: normal  Skin: no rashes  Resp: unlabored respiratory effort  CV: extremities warm and well perfused  Abd: no scars; soft, non-distended, with no  palpable masses/hernias or tenderness; no HSM  Neuro: moving all 4 extremities  Male :    - Penis: circumcised phallus without twisting or curvature, no adhesions or skin bridges   - Urethral meatus: orthotopic, normal in caliber   - Scrotum:  small right sided hydrocele    - Testes: bilaterally descended testes, nontender without masses   - Epididymis: normal, bilaterally           Assessment / Plan  Diagnoses and all orders for this visit:  Concealed penis  Congenital hydrocele  Follow-up examination after urological surgery       Gerardo is a 2 month old s/p clamp circumcision on 2/7/23. Healing very well. He also has a history of small bilateral hydroceles.    -Reassured that the circumcision has healed very well without adhesions or skin bridges  -Reviewed signs of a hernia and recommend prompt evaluation prn sxs  -Discussed that hydroceles often resolve spontaneously within first 6-12 months of life; if they persist we typically wait until 2-2yo for surgical repair      F/u at 3 year old         I reviewed the above recommendations with Gerardo's mother who expressed understanding and agreed with this plan.    Norma Ruiz PA-C  Pediatric Urology  [facility]

## 2024-04-10 ENCOUNTER — APPOINTMENT (OUTPATIENT)
Dept: CARDIOLOGY | Facility: HOSPITAL | Age: 74
DRG: 190 | End: 2024-04-10
Payer: MEDICARE

## 2024-04-10 LAB
ANION GAP SERPL CALCULATED.3IONS-SCNC: 10.3 MMOL/L (ref 5–15)
BUN SERPL-MCNC: 19 MG/DL (ref 8–23)
BUN/CREAT SERPL: 29.2 (ref 7–25)
CALCIUM SPEC-SCNC: 9.3 MG/DL (ref 8.6–10.5)
CHLORIDE SERPL-SCNC: 104 MMOL/L (ref 98–107)
CO2 SERPL-SCNC: 24.7 MMOL/L (ref 22–29)
CREAT SERPL-MCNC: 0.65 MG/DL (ref 0.57–1)
DEPRECATED RDW RBC AUTO: 48 FL (ref 37–54)
EGFRCR SERPLBLD CKD-EPI 2021: 93.7 ML/MIN/1.73
ERYTHROCYTE [DISTWIDTH] IN BLOOD BY AUTOMATED COUNT: 15.9 % (ref 12.3–15.4)
GLUCOSE SERPL-MCNC: 117 MG/DL (ref 65–99)
HCT VFR BLD AUTO: 36.7 % (ref 34–46.6)
HGB BLD-MCNC: 11.4 G/DL (ref 12–15.9)
MCH RBC QN AUTO: 26.1 PG (ref 26.6–33)
MCHC RBC AUTO-ENTMCNC: 31.1 G/DL (ref 31.5–35.7)
MCV RBC AUTO: 84.2 FL (ref 79–97)
PLATELET # BLD AUTO: 333 10*3/MM3 (ref 140–450)
PMV BLD AUTO: 10.6 FL (ref 6–12)
POTASSIUM SERPL-SCNC: 4.2 MMOL/L (ref 3.5–5.2)
QT INTERVAL: 332 MS
QTC INTERVAL: 448 MS
RBC # BLD AUTO: 4.36 10*6/MM3 (ref 3.77–5.28)
SODIUM SERPL-SCNC: 139 MMOL/L (ref 136–145)
WBC NRBC COR # BLD AUTO: 11.35 10*3/MM3 (ref 3.4–10.8)

## 2024-04-10 PROCEDURE — 93306 TTE W/DOPPLER COMPLETE: CPT

## 2024-04-10 PROCEDURE — 80048 BASIC METABOLIC PNL TOTAL CA: CPT | Performed by: NURSE PRACTITIONER

## 2024-04-10 PROCEDURE — 94799 UNLISTED PULMONARY SVC/PX: CPT

## 2024-04-10 PROCEDURE — 25510000001 PERFLUTREN (DEFINITY) 8.476 MG IN SODIUM CHLORIDE (PF) 0.9 % 10 ML INJECTION: Performed by: INTERNAL MEDICINE

## 2024-04-10 PROCEDURE — 94660 CPAP INITIATION&MGMT: CPT

## 2024-04-10 PROCEDURE — 25010000002 FUROSEMIDE PER 20 MG: Performed by: INTERNAL MEDICINE

## 2024-04-10 PROCEDURE — G0378 HOSPITAL OBSERVATION PER HR: HCPCS

## 2024-04-10 PROCEDURE — 25010000002 ENOXAPARIN PER 10 MG: Performed by: HOSPITALIST

## 2024-04-10 PROCEDURE — 97165 OT EVAL LOW COMPLEX 30 MIN: CPT

## 2024-04-10 PROCEDURE — 93306 TTE W/DOPPLER COMPLETE: CPT | Performed by: INTERNAL MEDICINE

## 2024-04-10 PROCEDURE — 85027 COMPLETE CBC AUTOMATED: CPT | Performed by: NURSE PRACTITIONER

## 2024-04-10 PROCEDURE — 99222 1ST HOSP IP/OBS MODERATE 55: CPT | Performed by: INTERNAL MEDICINE

## 2024-04-10 PROCEDURE — 97535 SELF CARE MNGMENT TRAINING: CPT

## 2024-04-10 PROCEDURE — 63710000001 PREDNISONE PER 1 MG: Performed by: NURSE PRACTITIONER

## 2024-04-10 PROCEDURE — 94761 N-INVAS EAR/PLS OXIMETRY MLT: CPT

## 2024-04-10 PROCEDURE — 97110 THERAPEUTIC EXERCISES: CPT

## 2024-04-10 PROCEDURE — 97162 PT EVAL MOD COMPLEX 30 MIN: CPT

## 2024-04-10 RX ORDER — ENOXAPARIN SODIUM 100 MG/ML
40 INJECTION SUBCUTANEOUS NIGHTLY
Status: DISCONTINUED | OUTPATIENT
Start: 2024-04-10 | End: 2024-04-13 | Stop reason: HOSPADM

## 2024-04-10 RX ORDER — FUROSEMIDE 10 MG/ML
40 INJECTION INTRAMUSCULAR; INTRAVENOUS ONCE
Status: COMPLETED | OUTPATIENT
Start: 2024-04-10 | End: 2024-04-10

## 2024-04-10 RX ADMIN — Medication 10 ML: at 09:05

## 2024-04-10 RX ADMIN — ENOXAPARIN SODIUM 40 MG: 100 INJECTION SUBCUTANEOUS at 20:17

## 2024-04-10 RX ADMIN — BUDESONIDE AND FORMOTEROL FUMARATE DIHYDRATE 2 PUFF: 160; 4.5 AEROSOL RESPIRATORY (INHALATION) at 19:28

## 2024-04-10 RX ADMIN — IPRATROPIUM BROMIDE AND ALBUTEROL SULFATE 3 ML: .5; 3 SOLUTION RESPIRATORY (INHALATION) at 10:56

## 2024-04-10 RX ADMIN — AZITHROMYCIN 500 MG: 250 TABLET, FILM COATED ORAL at 09:55

## 2024-04-10 RX ADMIN — AMLODIPINE BESYLATE 5 MG: 5 TABLET ORAL at 09:54

## 2024-04-10 RX ADMIN — PERFLUTREN 2 ML: 6.52 INJECTION, SUSPENSION INTRAVENOUS at 17:34

## 2024-04-10 RX ADMIN — CHOLESTYRAMINE 4 G: 4 POWDER, FOR SUSPENSION ORAL at 09:56

## 2024-04-10 RX ADMIN — PREGABALIN 150 MG: 75 CAPSULE ORAL at 20:23

## 2024-04-10 RX ADMIN — IPRATROPIUM BROMIDE AND ALBUTEROL SULFATE 3 ML: .5; 3 SOLUTION RESPIRATORY (INHALATION) at 06:54

## 2024-04-10 RX ADMIN — IPRATROPIUM BROMIDE AND ALBUTEROL SULFATE 3 ML: .5; 3 SOLUTION RESPIRATORY (INHALATION) at 19:30

## 2024-04-10 RX ADMIN — PANTOPRAZOLE SODIUM 40 MG: 40 TABLET, DELAYED RELEASE ORAL at 09:56

## 2024-04-10 RX ADMIN — GUAIFENESIN 600 MG: 600 TABLET, EXTENDED RELEASE ORAL at 20:23

## 2024-04-10 RX ADMIN — PREDNISONE 40 MG: 20 TABLET ORAL at 09:55

## 2024-04-10 RX ADMIN — GUAIFENESIN 600 MG: 600 TABLET, EXTENDED RELEASE ORAL at 09:56

## 2024-04-10 RX ADMIN — FUROSEMIDE 40 MG: 10 INJECTION, SOLUTION INTRAMUSCULAR; INTRAVENOUS at 09:04

## 2024-04-10 RX ADMIN — CETIRIZINE HYDROCHLORIDE 10 MG: 10 TABLET ORAL at 09:54

## 2024-04-10 RX ADMIN — FLUTICASONE PROPIONATE 2 SPRAY: 50 SPRAY, METERED NASAL at 09:04

## 2024-04-10 RX ADMIN — Medication 10 ML: at 20:20

## 2024-04-10 RX ADMIN — METOPROLOL SUCCINATE 50 MG: 50 TABLET, EXTENDED RELEASE ORAL at 20:22

## 2024-04-10 RX ADMIN — METOPROLOL SUCCINATE 50 MG: 50 TABLET, EXTENDED RELEASE ORAL at 09:55

## 2024-04-10 RX ADMIN — Medication 1 TABLET: at 09:55

## 2024-04-10 RX ADMIN — MIRTAZAPINE 30 MG: 30 TABLET, FILM COATED ORAL at 20:22

## 2024-04-10 RX ADMIN — PREGABALIN 150 MG: 75 CAPSULE ORAL at 09:56

## 2024-04-10 RX ADMIN — MONTELUKAST SODIUM 10 MG: 10 TABLET, FILM COATED ORAL at 20:23

## 2024-04-10 RX ADMIN — BUDESONIDE AND FORMOTEROL FUMARATE DIHYDRATE 2 PUFF: 160; 4.5 AEROSOL RESPIRATORY (INHALATION) at 06:55

## 2024-04-10 RX ADMIN — FERROUS SULFATE TAB 325 MG (65 MG ELEMENTAL FE) 325 MG: 325 (65 FE) TAB at 09:56

## 2024-04-10 RX ADMIN — AMLODIPINE BESYLATE 5 MG: 5 TABLET ORAL at 20:23

## 2024-04-10 NOTE — PLAN OF CARE
Goal Outcome Evaluation:  Plan of Care Reviewed With: patient        Progress: no change  Outcome Evaluation: vitals stable.  pt denied pain.  meds given per orders.  external catheter in place.  unable to obtain sputum culture at this time.  will continue to monitor.

## 2024-04-10 NOTE — PROGRESS NOTES
"[facility]: Enoxaparin Consult    LisaKim Kaufman has a pharmacy consult to dose prophylactic enoxaparin per Dr. Seymour's request.     Indication: VTE Prophylaxis  Home Anticoagulation: none     Relevant clinical data and objective history reviewed:  74 y.o. female 162.6 cm (64.02\") 103 kg (227 lb 8.2 oz)   Body mass index is 39.03 kg/m².   Results from last 7 days   Lab Units 04/10/24  0526   PLATELETS 10*3/mm3 333     Estimated Creatinine Clearance: 91.4 mL/min (by C-G formula based on SCr of 0.65 mg/dL).    Assessment/Plan    Will start patient on 40mg subcutaneous every 24 hours, adjusted for renal function. Consult order will be discontinued but pharmacy will continue to follow.     Melvina Galeana, PharmD  Clinical Pharmacist    "

## 2024-04-10 NOTE — PLAN OF CARE
Goal Outcome Evaluation:  Plan of Care Reviewed With: patient           Outcome Evaluation: Pt is a 74 y.o female admitted 4/9 with SOB, BLE swelling for several months, acute on chronic respiratory failure with hypoxia, COPD exacerbation. She has a hx of COPD, chronic hypoxic respiratory failure on 2 L of oxygen at all times, pulmonary nodule, ANGÉLICA, diastolic CHF, HTN, fibromyalgia, HLD, depression, anemia, recent hospital stay discharged last week with UTI. Pt reports she is typically independent using a rolling walker at home, she lives with her daughter and is alone at times when daughter is at work. Pt is pleasant and agreeable, able to walk with CG/SBA with a rolling walker on 4L o2, she did not have SOA with activity, she will benefit from PT to maxiimize function for safe discharge home.      Anticipated Discharge Disposition (PT): home with assist

## 2024-04-10 NOTE — PROGRESS NOTES
"  Daily Progress Note.   [facility] CORONARY CARE  4/10/2024    Patient:  Name:  Eileen Kaufman  MRN:  [id number]  1950  74 y.o.  female         CC: soa    Interval History:  Awake alert conversant  Afebrile  On cpap overnight 4-6 liters during day  She feels like her breathing is about the same no chest pain no pleuritic chest pain no hemoptysis no significant purulent sputum.  No lethargy no confusion  She reports increased lower extremity swelling over the past months to years    Physical Exam:  /74 (BP Location: Right arm, Patient Position: Lying)   Pulse 79   Temp 97.6 °F (36.4 °C) (Axillary)   Resp 16   Ht 162.6 cm (64.02\")   Wt 103 kg (227 lb 8.2 oz)   SpO2 96%   BMI 39.03 kg/m²   Body mass index is 39.03 kg/m².    Intake/Output Summary (Last 24 hours) at 4/10/2024 0713  Last data filed at 4/10/2024 0617  Gross per 24 hour   Intake 660 ml   Output 1850 ml   Net -1190 ml     General appearance: Nontoxic, conversant   Eyes: anicteric sclerae, moist conjunctivae; no lidlag;    HENT: Atraumatic; oropharynx clear with moist mucous membranes    Neck: Trachea midline;  supple   Lungs: no crackles no rhonchi no wheezediminished bs, with normal respiratory effort and no intercostal retractions  CV: RRR, no rub   Abdomen: +obese Soft, nontender; BS+  Extremities: Trace peripheral edema or ext lad  Skin: WWP no diffuse visible rash  Psych: Appropriate affect, alert   Neuro: Moves all ext. CN II-XII. Speech intact.    Data Review:  Notable Labs:  Results from last 7 days   Lab Units 04/10/24  0526 04/09/24  0623 04/08/24  2100   WBC 10*3/mm3 11.35* 11.04* 10.63   HEMOGLOBIN g/dL 11.4* 13.1 12.3   PLATELETS 10*3/mm3 333 355 320     Results from last 7 days   Lab Units 04/10/24  0526 04/09/24  1810 04/09/24  0623 04/08/24  2100   SODIUM mmol/L 139  --  139 141   POTASSIUM mmol/L 4.2  --  4.4 4.6   CHLORIDE mmol/L 104  --  103 103   CO2 mmol/L 24.7  --  26.0 30.0*   BUN mg/dL 19  --  10 11 "   CREATININE mg/dL 0.65  --  0.85 1.01*   GLUCOSE mg/dL 117*  --  155* 83   CALCIUM mg/dL 9.3  --  9.4 9.2   MAGNESIUM mg/dL  --  2.2  --  1.9   Estimated Creatinine Clearance: 91.4 mL/min (by C-G formula based on SCr of 0.65 mg/dL).    Results from last 7 days   Lab Units 04/10/24  0526 04/09/24  0623 04/08/24  2316 04/08/24  2100   AST (SGOT) U/L  --   --   --  13   ALT (SGPT) U/L  --   --   --  13   PROCALCITONIN ng/mL  --   --  0.03  --    PLATELETS 10*3/mm3 333 355  --  320             Imaging:  Reviewed chest images personally from past 3 days    ASSESSMENT  /  PLAN:  Acute on chronic hypoxic and hypercapnic respiratory failure  Acute COPD exacerbation  Mediastinal/hilar lymph node enlargement  Obstructive sleep apnea  Pulmonary nodule outpatient follow-up with her pulmonologist locally as outpatient  HFpEF  Obesity    Wears 2-4 liters chronically  continue  Follow up imaging with her pulmonologist in [facility].  Steroids  Azithro  Continue Symbicort  Duonebs  She reports lower extremity swelling that is new and bothersome for her.  She has new oxygen requirements with some faint haziness at the bases we will give a trial of Lasix x 1 today states this helps.  CPAP at night    Patient is telemetry status     Electronically signed by Bhupinder Rossi MD, 04/10/24, 8:19 AM EDT.           Walk in

## 2024-04-10 NOTE — PLAN OF CARE
Goal Outcome Evaluation:  Plan of Care Reviewed With: patient           Outcome Evaluation: Pt is a 74 y.o female admitted 4/9 with SOB, BLE swelling for several months, acute on chronic respiratory failure with hypoxia, COPD exacerbation. She has a hx of COPD, chronic hypoxic respiratory failure on 2 L of oxygen at all times, pulmonary nodule, ANGÉLICA, diastolic CHF, HTN, fibromyalgia, HLD, depression, anemia, recent hospital stay discharged last week with UTI. Pt reports she is typically independent with most ADLs using rwx. She does report since increased swelling she has been having an increased difficulty with walking and dressing. She is able to mobilize to bathroom, stand to brush teeth, complete her own toileting without assistance. She does have difficulty with getting dressed. She attempted to don/doff socks but even after extra time and rest breaks she was only able to complete 50% of task. She is interested in a sock aid/LB AE to increase her independence with ADLs. OT to bring next session to educate pt. She appears close to her baseline, SPO2 >90% throughout standing activity/mobility on 4L today. Notified RN/MD.      Anticipated Discharge Disposition (OT): home with assist

## 2024-04-10 NOTE — THERAPY EVALUATION
Patient Name: Eileen Kaufman  : 1950    MRN: [id number]                              Today's Date: 4/10/2024       Admit Date: 2024    Visit Dx:     ICD-10-CM ICD-9-CM   1. Acute respiratory failure with hypoxia and hypercapnia  J96.01 518.81    J96.02    2. COPD with acute exacerbation  J44.1 491.21     Patient Active Problem List   Diagnosis    Allergic rhinitis    Anxiety    Sleep apnea    Cervical spondylosis without myelopathy    Congestive heart failure    High cholesterol    Hypercalcemia    Essential hypertension    Lumbar radiculopathy    Fibromyalgia    Chronic obstructive pulmonary disease    Chronic nonintractable headache    Frequency of micturition    Other long term (current) drug therapy    Syncope and collapse    Bilateral sacroiliitis    Weakness    Chronic respiratory failure with hypoxia    Chronic right-sided low back pain with right-sided sciatica    Chronic superficial gastritis    Diastolic dysfunction    Postlaminectomy syndrome    Dyspnea    Acute exacerbation of chronic obstructive pulmonary disease (COPD)    Acute-on-chronic respiratory failure    COPD exacerbation    CKD (chronic kidney disease) stage 2, GFR 60-89 ml/min     Past Medical History:   Diagnosis Date    Anemia, unspecified     Asymptomatic menopausal state     Chronic obstructive pulmonary disease with (acute) exacerbation     Essential (primary) hypertension     High cholesterol 2021    Low back pain     Major depressive disorder, single episode, moderate     Nicotine dependence, unspecified, uncomplicated     Osteopenia     Other intervertebral disc degeneration, lumbar region     Other long term (current) drug therapy     Solitary pulmonary nodule     Vitamin D deficiency, unspecified      Past Surgical History:   Procedure Laterality Date    CARDIAC CATHETERIZATION  2012    CARPAL TUNNEL RELEASE      CHOLECYSTECTOMY      COLONOSCOPY  2022    Normal    EPIDURAL Right 10/25/2023    Procedure:  LUMBAR/SACRAL TRANSFORAMINAL EPIDURAL Right L3-4 and RIght L5-S1 43488, 02607;  Surgeon: Shandra Thomas MD;  Location: [facility];  Service: Pain Management;  Laterality: Right;    HYSTERECTOMY  1980s    Total    KNEE ARTHROSCOPY Left     NECK SURGERY  01/21/2015      General Information       Row Name 04/10/24 1638          Physical Therapy Time and Intention    Document Type evaluation  -     Mode of Treatment physical therapy  -PC       Row Name 04/10/24 1638          General Information    Patient Profile Reviewed yes  -PC     Existing Precautions/Restrictions fall;oxygen therapy device and L/min  -PC     Barriers to Rehab medically complex;previous functional deficit  -       Row Name 04/10/24 1638          Living Environment    People in Home child(samantha), adult  -PC       Row Name 04/10/24 1638          Home Main Entrance    Number of Stairs, Main Entrance two  -PC     Stair Railings, Main Entrance railings safe and in good condition  -PC       Row Name 04/10/24 1638          Stairs Within Home, Primary    Number of Stairs, Within Home, Primary none  -PC       Row Name 04/10/24 1638          Cognition    Orientation Status (Cognition) oriented x 4  -PC       Row Name 04/10/24 1638          Safety Issues, Functional Mobility    Impairments Affecting Function (Mobility) strength;endurance/activity tolerance  -PC               User Key  (r) = Recorded By, (t) = Taken By, (c) = Cosigned By      Initials Name Provider Type    PC Vannessa Dale, PT Physical Therapist                   Mobility       Row Name 04/10/24 1639          Bed Mobility    Bed Mobility sit-supine  -PC     Sit-Supine Baca (Bed Mobility) contact guard  -       Row Name 04/10/24 1639          Sit-Stand Transfer    Sit-Stand Baca (Transfers) standby assist  -       Row Name 04/10/24 1639          Gait/Stairs (Locomotion)    Baca Level (Gait) standby assist;contact guard  -     Assistive Device (Gait) walker,  front-wheeled  -PC     Distance in Feet (Gait) 50  -PC     Deviations/Abnormal Patterns (Gait) base of support, wide;stride length decreased;gait speed decreased  -PC               User Key  (r) = Recorded By, (t) = Taken By, (c) = Cosigned By      Initials Name Provider Type    PC Vannessa Dale, PT Physical Therapist                   Obj/Interventions       Row Name 04/10/24 1640          Range of Motion Comprehensive    General Range of Motion bilateral lower extremity ROM WNL  -PC       Row Name 04/10/24 1640          Strength Comprehensive (MMT)    Comment, General Manual Muscle Testing (MMT) Assessment WFL B LEs  -PC       Row Name 04/10/24 1640          Balance    Static Sitting Balance independent  -PC     Static Standing Balance standby assist  -PC     Dynamic Standing Balance standby assist  -PC     Position/Device Used, Standing Balance walker, rolling  -PC               User Key  (r) = Recorded By, (t) = Taken By, (c) = Cosigned By      Initials Name Provider Type    PC Vannessa Dale, PT Physical Therapist                   Goals/Plan       Row Name 04/10/24 1643          Bed Mobility Goal 1 (PT)    Activity/Assistive Device (Bed Mobility Goal 1, PT) sit to supine/supine to sit  -PC     Morton Level/Cues Needed (Bed Mobility Goal 1, PT) supervision required  -PC     Time Frame (Bed Mobility Goal 1, PT) 1 week  -PC       Row Name 04/10/24 1643          Transfer Goal 1 (PT)    Activity/Assistive Device (Transfer Goal 1, PT) sit-to-stand/stand-to-sit  -PC     Morton Level/Cues Needed (Transfer Goal 1, PT) supervision required  -PC     Time Frame (Transfer Goal 1, PT) 1 week  -PC       Row Name 04/10/24 1643          Gait Training Goal 1 (PT)    Activity/Assistive Device (Gait Training Goal 1, PT) gait (walking locomotion);assistive device use  -PC     Morton Level (Gait Training Goal 1, PT) supervision required  -PC     Distance (Gait Training Goal 1, PT) 50 ft  -PC     Time Frame  (Gait Training Goal 1, PT) 1 week  -PC       Row Name 04/10/24 1643          Therapy Assessment/Plan (PT)    Planned Therapy Interventions (PT) bed mobility training;transfer training;strengthening;gait training  -               User Key  (r) = Recorded By, (t) = Taken By, (c) = Cosigned By      Initials Name Provider Type    PC Vannessa Dale, PT Physical Therapist                   Clinical Impression       Row Name 04/10/24 1640          Pain    Pretreatment Pain Rating 0/10 - no pain  -PC       Row Name 04/10/24 1640          Plan of Care Review    Plan of Care Reviewed With patient  -PC     Outcome Evaluation Pt is a 74 y.o female admitted 4/9 with SOB, BLE swelling for several months, acute on chronic respiratory failure with hypoxia, COPD exacerbation. She has a hx of COPD, chronic hypoxic respiratory failure on 2 L of oxygen at all times, pulmonary nodule, ANGÉLICA, diastolic CHF, HTN, fibromyalgia, HLD, depression, anemia, recent hospital stay discharged last week with UTI. Pt reports she is typically independent using a rolling walker at home, she lives with her daughter and is alone at times when daughter is at work. Pt is pleasant and agreeable, able to walk with CG/SBA with a rolling walker on 4L o2, she did not have SOA with activity, she will benefit from PT to maxiimize function for safe discharge home.  -PC       Row Name 04/10/24 1640          Therapy Assessment/Plan (PT)    Criteria for Skilled Interventions Met (PT) yes;meets criteria  -PC     Therapy Frequency (PT) 5 times/wk  -PC       Row Name 04/10/24 1640          Vital Signs    Pre SpO2 (%) 93  -PC     O2 Delivery Pre Treatment supplemental O2  -PC     Post SpO2 (%) 92  -PC     O2 Delivery Post Treatment supplemental O2  -PC       Row Name 04/10/24 1640          Positioning and Restraints    Pre-Treatment Position sitting in chair/recliner  -PC     Post Treatment Position bed  -PC     In Bed supine;call light within reach;encouraged to call  for assist;exit alarm on  -PC               User Key  (r) = Recorded By, (t) = Taken By, (c) = Cosigned By      Initials Name Provider Type    PC Vannessa Dale, PT Physical Therapist                   Outcome Measures       Row Name 04/10/24 1645 04/10/24 0800       How much help from another person do you currently need...    Turning from your back to your side while in flat bed without using bedrails? 3  -PC 3  -BR    Moving from lying on back to sitting on the side of a flat bed without bedrails? 3  -PC 3  -BR    Moving to and from a bed to a chair (including a wheelchair)? 3  -PC 3  -BR    Standing up from a chair using your arms (e.g., wheelchair, bedside chair)? 3  -PC 3  -BR    Climbing 3-5 steps with a railing? 2  -PC 2  -BR    To walk in hospital room? 3  -PC 3  -BR    AM-PAC 6 Clicks Score (PT) 17  -PC 17  -BR    Highest Level of Mobility Goal 5 --> Static standing  -PC 5 --> Static standing  -BR      Row Name 04/10/24 0848          Functional Assessment    Outcome Measure Options AM-PAC 6 Clicks Daily Activity (OT)  -               User Key  (r) = Recorded By, (t) = Taken By, (c) = Cosigned By      Initials Name Provider Type    PC Vannessa Dale, PT Physical Therapist    Anita Whitman, OT Occupational Therapist    Annita Balderas, RN Registered Nurse                                 Physical Therapy Education       Title: PT OT SLP Therapies (In Progress)       Topic: Physical Therapy (In Progress)       Point: Mobility training (Done)       Learning Progress Summary             Patient Acceptance, E,D, DU by PC at 4/10/2024 1645                         Point: Home exercise program (Not Started)       Learner Progress:  Not documented in this visit.              Point: Body mechanics (Done)       Learning Progress Summary             Patient Acceptance, E,D, DU by PC at 4/10/2024 1645                         Point: Precautions (Done)       Learning Progress Summary             Patient  Acceptance, E,D, DU by PC at 4/10/2024 1645                                         User Key       Initials Effective Dates Name Provider Type Discipline     06/16/21 -  Vannessa Dale PT Physical Therapist PT                  PT Recommendation and Plan  Planned Therapy Interventions (PT): bed mobility training, transfer training, strengthening, gait training  Plan of Care Reviewed With: patient  Outcome Evaluation: Pt is a 74 y.o female admitted 4/9 with SOB, BLE swelling for several months, acute on chronic respiratory failure with hypoxia, COPD exacerbation. She has a hx of COPD, chronic hypoxic respiratory failure on 2 L of oxygen at all times, pulmonary nodule, ANGÉLICA, diastolic CHF, HTN, fibromyalgia, HLD, depression, anemia, recent hospital stay discharged last week with UTI. Pt reports she is typically independent using a rolling walker at home, she lives with her daughter and is alone at times when daughter is at work. Pt is pleasant and agreeable, able to walk with CG/SBA with a rolling walker on 4L o2, she did not have SOA with activity, she will benefit from PT to maxiimize function for safe discharge home.     Time Calculation:         PT Charges       Row Name 04/10/24 1646             Time Calculation    Start Time 1420  -PC      Stop Time 1444  -PC      Time Calculation (min) 24 min  -PC      PT Received On 04/10/24  -PC      PT - Next Appointment 04/11/24  -      PT Goal Re-Cert Due Date 04/17/24  -                User Key  (r) = Recorded By, (t) = Taken By, (c) = Cosigned By      Initials Name Provider Type    PC Vannessa Dale PT Physical Therapist                  Therapy Charges for Today       Code Description Service Date Service Provider Modifiers Qty    31428101350 HC PT EVAL MOD COMPLEXITY 2 4/10/2024 Vannessa Dale, PT GP 1    99804916058 HC PT THER PROC EA 15 MIN 4/10/2024 Vannessa Dale, PT GP 1            PT G-Codes  Outcome Measure Options: AM-PAC 6 Clicks Daily Activity  (OT)  AM-PAC 6 Clicks Score (PT): 17  AM-PAC 6 Clicks Score (OT): 19  PT Discharge Summary  Anticipated Discharge Disposition (PT): home with assist    Vannessa Dale, PT  4/10/2024

## 2024-04-10 NOTE — PROGRESS NOTES
[facility]     LOS: 0 days     Subjective:    CC:Leg Swelling and Shortness of Breath    DIET:  Diet Order   Procedures    NPO Diet NPO Type: Strict NPO     Breathing is slightly better, still SOA, tachycardia is better    Objective:    Vital Signs:  Temp:  [97.6 °F (36.4 °C)-98.3 °F (36.8 °C)] 97.7 °F (36.5 °C)  Heart Rate:  [] 86  Resp:  [14-28] 16  BP: (109-122)/(68-84) 122/84    SpO2:  [88 %-96 %] 91 %  on  Flow (L/min):  [4-6] 4;   Device (Oxygen Therapy): nasal cannula  Body mass index is 39.03 kg/m².    Physical Exam  Constitutional:       Appearance: Normal appearance.   HENT:      Head: Normocephalic and atraumatic.   Cardiovascular:      Rate and Rhythm: Normal rate and regular rhythm.      Heart sounds: No murmur heard.     No friction rub.   Pulmonary:      Effort: Pulmonary effort is normal.      Breath sounds: Wheezing present.   Abdominal:      General: Bowel sounds are normal. There is no distension.      Palpations: Abdomen is soft.      Tenderness: There is no abdominal tenderness.   Skin:     General: Skin is warm and dry.   Neurological:      Mental Status: She is alert.   Psychiatric:         Mood and Affect: Mood normal.         Behavior: Behavior normal.         Results Review:    Glucose   Date Value Ref Range Status   04/10/2024 117 (H) 65 - 99 mg/dL Final   04/09/2024 155 (H) 65 - 99 mg/dL Final   04/08/2024 83 65 - 99 mg/dL Final     Results from last 7 days   Lab Units 04/10/24  0526   WBC 10*3/mm3 11.35*   HEMOGLOBIN g/dL 11.4*   HEMATOCRIT % 36.7   PLATELETS 10*3/mm3 333     Results from last 7 days   Lab Units 04/10/24  0526 04/09/24  0623 04/08/24  2100   SODIUM mmol/L 139   < > 141   POTASSIUM mmol/L 4.2   < > 4.6   CHLORIDE mmol/L 104   < > 103   CO2 mmol/L 24.7   < > 30.0*   BUN mg/dL 19   < > 11   CREATININE mg/dL 0.65   < > 1.01*   CALCIUM mg/dL 9.3   < > 9.2   BILIRUBIN mg/dL  --   --  0.2   ALK PHOS U/L  --   --  109   ALT (SGPT) U/L  --   --  " 13   AST (SGOT) U/L  --   --  13   GLUCOSE mg/dL 117*   < > 83    < > = values in this interval not displayed.     Results from last 7 days   Lab Units 04/08/24  2100   INR  0.97     Results from last 7 days   Lab Units 04/09/24  1810   MAGNESIUM mg/dL 2.2     Results from last 7 days   Lab Units 04/09/24  1810 04/08/24  2316 04/08/24  2100   HSTROP T ng/L <6 7 9     Cultures:  No results found for: \"BLOODCX\", \"URINECX\", \"WOUNDCX\", \"MRSACX\", \"RESPCX\", \"STOOLCX\"    I have reviewed daily medications and changes in CPOE    Scheduled meds  amLODIPine, 5 mg, Oral, BID  azithromycin, 500 mg, Oral, Q24H  budesonide-formoterol, 2 puff, Inhalation, BID - RT  cetirizine, 10 mg, Oral, Daily  cholestyramine light, 1 packet, Oral, Daily  enoxaparin, 40 mg, Subcutaneous, Nightly  ferrous sulfate, 325 mg, Oral, Daily With Breakfast  fluticasone, 2 spray, Nasal, Daily  guaiFENesin, 600 mg, Oral, Q12H  ipratropium-albuterol, 3 mL, Nebulization, Q6H While Awake - RT  metoprolol succinate XL, 50 mg, Oral, BID  mirtazapine, 30 mg, Oral, Nightly  montelukast, 10 mg, Oral, Nightly  multivitamin with minerals, 1 tablet, Oral, Daily  pantoprazole, 40 mg, Oral, Daily  predniSONE, 40 mg, Oral, Daily With Breakfast  pregabalin, 150 mg, Oral, BID  sodium chloride, 10 mL, Intravenous, Q12H           PRN meds    acetaminophen **OR** acetaminophen **OR** acetaminophen    senna-docusate sodium **AND** polyethylene glycol **AND** bisacodyl **AND** bisacodyl    calcium carbonate    cyclobenzaprine    ipratropium-albuterol    nitroglycerin    ondansetron ODT **OR** ondansetron    sodium chloride    sodium chloride        Acute exacerbation of chronic obstructive pulmonary disease (COPD)    Sleep apnea    High cholesterol    Essential hypertension    Fibromyalgia    Diastolic dysfunction    Dyspnea    Acute-on-chronic respiratory failure    COPD exacerbation    CKD (chronic kidney disease) stage 2, GFR 60-89 ml/min        Assessment/Plan:    Ms. " Mandeep is a 74 y.o.   that presents with acute on chronic respiratory failure with hypoxia and COPD exacerbation. She is on 2 to 4 L nasal cannula at home     Acute hypoxic and hypercapnic respiratory failure  -on 4 liters, normally on 2 liters at home    COPD exacerbation  CTA chest negative for PE demonstrates bilateral lower lobe opacities, mediastinal and bilateral hilar lymph node enlargement. VBG pH 7.317, pCO2 60.7. Normal WBC, afebrile, Pro-Salvatore. Negative troponin negative BNP  -IV steroids and DuoNebs in ER.    - prednisone 40 qday.  -Continue DuoNebs, Mucinex. Azithromycin, symbicort,  Flutter valve     Chest tightness yesterday, cardiology consult  -troponins normal  -EKG no changes  -cardiology consult    Chronic diastolic CHF  -Last echocardiogram August 2023 EF 60% grade 1 diastolic dysfunction.euvolemic on exam.      ANGÉLICA noncompliant  -cpap used last night     CKD2- Mild renal insufficiency on admission creatinine now improved     Recent UTI hospitalized last week to [facility] for a UTI treated with Rocephin and discharged on Levaquin      Spenser Seymour MD  04/10/24  08:37 EDT

## 2024-04-10 NOTE — THERAPY EVALUATION
Patient Name: Eileen Kaufman  : 1950    MRN: [id number]                              Today's Date: 4/10/2024       Admit Date: 2024    Visit Dx:     ICD-10-CM ICD-9-CM   1. Acute respiratory failure with hypoxia and hypercapnia  J96.01 518.81    J96.02    2. COPD with acute exacerbation  J44.1 491.21     Patient Active Problem List   Diagnosis    Allergic rhinitis    Anxiety    Sleep apnea    Cervical spondylosis without myelopathy    Congestive heart failure    High cholesterol    Hypercalcemia    Essential hypertension    Lumbar radiculopathy    Fibromyalgia    Chronic obstructive pulmonary disease    Chronic nonintractable headache    Frequency of micturition    Other long term (current) drug therapy    Syncope and collapse    Bilateral sacroiliitis    Weakness    Chronic respiratory failure with hypoxia    Chronic right-sided low back pain with right-sided sciatica    Chronic superficial gastritis    Diastolic dysfunction    Postlaminectomy syndrome    Dyspnea    Acute exacerbation of chronic obstructive pulmonary disease (COPD)    Acute-on-chronic respiratory failure    COPD exacerbation    CKD (chronic kidney disease) stage 2, GFR 60-89 ml/min     Past Medical History:   Diagnosis Date    Anemia, unspecified     Asymptomatic menopausal state     Chronic obstructive pulmonary disease with (acute) exacerbation     Essential (primary) hypertension     High cholesterol 2021    Low back pain     Major depressive disorder, single episode, moderate     Nicotine dependence, unspecified, uncomplicated     Osteopenia     Other intervertebral disc degeneration, lumbar region     Other long term (current) drug therapy     Solitary pulmonary nodule     Vitamin D deficiency, unspecified      Past Surgical History:   Procedure Laterality Date    CARDIAC CATHETERIZATION  2012    CARPAL TUNNEL RELEASE      CHOLECYSTECTOMY      COLONOSCOPY  2022    Normal    EPIDURAL Right 10/25/2023    Procedure:  LUMBAR/SACRAL TRANSFORAMINAL EPIDURAL Right L3-4 and RIght L5-S1 25606, 09842;  Surgeon: Shandra Thomas MD;  Location: [facility] MAIN OR;  Service: Pain Management;  Laterality: Right;    HYSTERECTOMY  1980s    Total    KNEE ARTHROSCOPY Left     NECK SURGERY  01/21/2015      General Information       Row Name 04/10/24 0840          OT Time and Intention    Document Type evaluation  -     Mode of Treatment occupational therapy;individual therapy  -       Row Name 04/10/24 0840          General Information    Patient Profile Reviewed yes  -SM     Prior Level of Function independent:;ADL's  sometimes daughter helps with socks/shoes  -     Existing Precautions/Restrictions fall;oxygen therapy device and L/min  -       Row Name 04/10/24 0840          Occupational Profile    Environmental Supports and Barriers (Occupational Profile) home DME includes shower chair, rwx, pt reports recently lost her home pulse ox  -       Row Name 04/10/24 0840          Living Environment    People in Home child(samantha), adult  -       Row Name 04/10/24 0840          Cognition    Orientation Status (Cognition) oriented x 4  -       Row Name 04/10/24 0840          Safety Issues, Functional Mobility    Impairments Affecting Function (Mobility) endurance/activity tolerance;strength;shortness of breath  -               User Key  (r) = Recorded By, (t) = Taken By, (c) = Cosigned By      Initials Name Provider Type    SM Anita Langley OT Occupational Therapist                     Mobility/ADL's       Row Name 04/10/24 0841          Bed Mobility    Bed Mobility supine-sit  -     Supine-Sit [place] (Bed Mobility) standby assist  -       Row Name 04/10/24 0841          Transfers    Transfers sit-stand transfer;toilet transfer  -       Row Name 04/10/24 0841          Sit-Stand Transfer    Sit-Stand [place] (Transfers) standby assist  -       Row Name 04/10/24 0841          Toilet Transfer    [place] Level  (Toilet Transfer) standby assist  -     Assistive Device (Toilet Transfer) walker, front-wheeled;commode, 3-in-1  -       Row Name 04/10/24 0841          Functional Mobility    Functional Mobility- Ind. Level standby assist  -     Functional Mobility- Device walker, front-wheeled  -     Functional Mobility- Comment in room, mild NAVA, SPO2 at 91 %  -[facility] Name 04/10/24 0841          Activities of Daily Living    BADL Assessment/Intervention lower body dressing;grooming;toileting  -[facility] Name 04/10/24 0841          Lower Body Dressing Assessment/Training    [place] Level (Lower Body Dressing) don;socks;moderate assist (50% patient effort)  -     Position (Lower Body Dressing) supported sitting  -[facility] Name 04/10/24 0841          Grooming Assessment/Training    [place] Level (Grooming) oral care regimen;standby assist  -     Position (Grooming) sink side;supported standing  -[facility] Name 04/10/24 0841          Toileting Assessment/Training    [place] Level (Toileting) supervision  -               User Key  (r) = Recorded By, (t) = Taken By, (c) = Cosigned By      Initials Name Provider Type    SM Anita Langley OT Occupational Therapist                   Obj/Interventions       Row Name 04/10/24 0842          Range of Motion Comprehensive    General Range of Motion bilateral upper extremity ROM WFL  -[facility] Name 04/10/24 0842          Strength Comprehensive (MMT)    Comment, General Manual Muscle Testing (MMT) Assessment WFL for ADLs  -[facility] Name 04/10/24 0842          Motor Skills    Motor Skills functional endurance  -     Functional Endurance fair  -[facility] Name 04/10/24 0842          Balance    Balance Assessment sitting static balance;standing static balance;standing dynamic balance  -     Static Sitting Balance independent  -     Position, Sitting Balance unsupported  -     Static Standing Balance standby assist  -     Dynamic  Standing Balance standby assist  -     Position/Device Used, Standing Balance supported;walker, rolling  -SM               User Key  (r) = Recorded By, (t) = Taken By, (c) = Cosigned By      Initials Name Provider Type    Anita Whitman OT Occupational Therapist                   Goals/Plan       Row Name 04/10/24 0869          Dressing Goal 1 (OT)    Activity/Device (Dressing Goal 1, OT) lower body dressing;reacher;sock-aid  -SM     [place]/Cues Needed (Dressing Goal 1, OT) modified independence  -SM     Time Frame (Dressing Goal 1, OT) short term goal (STG);by discharge  -SM     Progress/Outcome (Dressing Goal 1, OT) goal ongoing  -SM       Row Name 04/10/24 0847          Therapy Assessment/Plan (OT)    Planned Therapy Interventions (OT) adaptive equipment training;patient/caregiver education/training;occupation/activity based interventions  -SM               User Key  (r) = Recorded By, (t) = Taken By, (c) = Cosigned By      Initials Name Provider Type    Anita Whitman OT Occupational Therapist                   Clinical Impression       Row Name 04/10/24 0860          Pain Assessment    Pretreatment Pain Rating 0/10 - no pain  -SM     Posttreatment Pain Rating 0/10 - no pain  -SM       Row Name 04/10/24 0843          Plan of Care Review    Plan of Care Reviewed With patient  -SM     Outcome Evaluation Pt is a 74 y.o female admitted 4/9 with SOB, BLE swelling for several months, acute on chronic respiratory failure with hypoxia, COPD exacerbation. She has a hx of COPD, chronic hypoxic respiratory failure on 2 L of oxygen at all times, pulmonary nodule, ANGÉLICA, diastolic CHF, HTN, fibromyalgia, HLD, depression, anemia, recent hospital stay discharged last week with UTI. Pt reports she is typically independent with most ADLs using rwx. She does report since increased swelling she has been having an increased difficulty with walking and dressing. She is able to mobilize to bathroom, stand to brush  teeth, complete her own toileting without assistance. She does have difficulty with getting dressed. She attempted to don/doff socks but even after extra time and rest breaks she was only able to complete 50% of task. She is interested in a sock aid/LB AE to increase her independence with ADLs. OT to bring next session to educate pt. She appears close to her baseline, SPO2 >90% throughout standing activity/mobility on 4L today. Notified RN/MD.  -       Row Name 04/10/24 0843          Therapy Assessment/Plan (OT)    Rehab Potential (OT) good, to achieve stated therapy goals  -     Criteria for Skilled Therapeutic Interventions Met (OT) yes;skilled treatment is necessary  -     Therapy Frequency (OT) --  anticipate X1-2 more sessions then dc.  -       Row Name 04/10/24 0843          Therapy Plan Review/Discharge Plan (OT)    Anticipated Discharge Disposition (OT) home with assist  -       Row Name 04/10/24 0843          Vital Signs    Pretreatment Heart Rate (beats/min) 89  -SM     Posttreatment Heart Rate (beats/min) 87  -SM     Pre SpO2 (%) 91  -SM     O2 Delivery Pre Treatment supplemental O2  -SM     Intra SpO2 (%) 95  -SM     O2 Delivery Intra Treatment supplemental O2  -SM     Post SpO2 (%) 92  -SM     O2 Delivery Post Treatment supplemental O2  -SM       Row Name 04/10/24 0843          Positioning and Restraints    Pre-Treatment Position in bed  -SM     Post Treatment Position chair  -SM     In Chair reclined;call light within reach;encouraged to call for assist;exit alarm on;notified nsg  -SM               User Key  (r) = Recorded By, (t) = Taken By, (c) = Cosigned By      Initials Name Provider Type    Anita Whitman, OT Occupational Therapist                   Outcome Measures       Row Name 04/10/24 0848          How much help from another is currently needed...    Putting on and taking off regular lower body clothing? 2  -SM     Bathing (including washing, rinsing, and drying) 3  -SM      Toileting (which includes using toilet bed pan or urinal) 3  -SM     Putting on and taking off regular upper body clothing 3  -SM     Taking care of personal grooming (such as brushing teeth) 4  -SM     Eating meals 4  -SM     AM-PAC 6 Clicks Score (OT) 19  -SM       Row Name 04/10/24 0848          Functional Assessment    Outcome Measure Options AM-PAC 6 Clicks Daily Activity (OT)  -               User Key  (r) = Recorded By, (t) = Taken By, (c) = Cosigned By      Initials Name Provider Type     Anita Langley OT Occupational Therapist                    Occupational Therapy Education       Title: PT OT SLP Therapies (In Progress)       Topic: Occupational Therapy (In Progress)       Point: ADL training (Done)       Description:   Instruct learner(s) on proper safety adaptation and remediation techniques during self care or transfers.   Instruct in proper use of assistive devices.                  Learning Progress Summary             Patient Acceptance, E, VU by  at 4/10/2024 0848    Comment: OT goals/POC                         Point: Home exercise program (Not Started)       Description:   Instruct learner(s) on appropriate technique for monitoring, assisting and/or progressing therapeutic exercises/activities.                  Learner Progress:  Not documented in this visit.              Point: Precautions (Not Started)       Description:   Instruct learner(s) on prescribed precautions during self-care and functional transfers.                  Learner Progress:  Not documented in this visit.              Point: Body mechanics (Not Started)       Description:   Instruct learner(s) on proper positioning and spine alignment during self-care, functional mobility activities and/or exercises.                  Learner Progress:  Not documented in this visit.                              User Key       Initials Effective Dates Name Provider Type [facility] 04/02/20 -  Anita Langley OT Occupational  Therapist OT                  OT Recommendation and Plan  Planned Therapy Interventions (OT): adaptive equipment training, patient/caregiver education/training, occupation/activity based interventions  Therapy Frequency (OT):  (anticipate X1-2 more sessions then dc.)  Plan of Care Review  Plan of Care Reviewed With: patient  Outcome Evaluation: Pt is a 74 y.o female admitted 4/9 with SOB, BLE swelling for several months, acute on chronic respiratory failure with hypoxia, COPD exacerbation. She has a hx of COPD, chronic hypoxic respiratory failure on 2 L of oxygen at all times, pulmonary nodule, ANGÉLICA, diastolic CHF, HTN, fibromyalgia, HLD, depression, anemia, recent hospital stay discharged last week with UTI. Pt reports she is typically independent with most ADLs using rwx. She does report since increased swelling she has been having an increased difficulty with walking and dressing. She is able to mobilize to bathroom, stand to brush teeth, complete her own toileting without assistance. She does have difficulty with getting dressed. She attempted to don/doff socks but even after extra time and rest breaks she was only able to complete 50% of task. She is interested in a sock aid/LB AE to increase her independence with ADLs. OT to bring next session to educate pt. She appears close to her baseline, SPO2 >90% throughout standing activity/mobility on 4L today. Notified RN/MD.     Time Calculation:   Evaluation Complexity (OT)  Review Occupational Profile/Medical/Therapy History Complexity: brief/low complexity  Assessment, Occupational Performance/Identification of Deficit Complexity: 1-3 performance deficits  Clinical Decision Making Complexity (OT): problem focused assessment/low complexity  Overall Complexity of Evaluation (OT): low complexity     Time Calculation- OT       Row Name 04/10/24 0849             Time Calculation- OT    OT Start Time 0814  -      OT Stop Time 0833  -      OT Time Calculation (min) 19  min  -SM      Total Timed Code Minutes- OT 13 minute(s)  -SM      OT Received On 04/10/24  -      OT - Next Appointment 04/11/24  -      OT Goal Re-Cert Due Date 04/17/24  -         Timed Charges    42150 - OT Self Care/Mgmt Minutes 13  -SM         Total Minutes    Timed Charges Total Minutes 13  -SM       Total Minutes 13  -SM                User Key  (r) = Recorded By, (t) = Taken By, (c) = Cosigned By      Initials Name Provider Type     Anita Langley OT Occupational Therapist                  Therapy Charges for Today       Code Description Service Date Service Provider Modifiers Qty    [id number]  OT SELF CARE/MGMT/TRAIN EA 15 MIN 4/10/2024 Anita Langley OT GO 1    [id number]  OT EVAL LOW COMPLEXITY 2 4/10/2024 Anita Langley OT GO 1                 Anita Langley OT  4/10/2024

## 2024-04-10 NOTE — CONSULTS
Date of Consultation: 04/10/24    Referral Provider: Eddie Seymour MD    Reason for Consultation: Chest tightness.    Encounter Provider: Eliud Abarca MD    Group of Service: [facility]     Patient Name: Eileen Kaufman    :1950    Chief complaint: Shortness of breath, edema.    History of Present Illness:      This is a very pleasant 74 year-old female who follows with Dr. Mao. She has a past medical history significant for COPD as well as chronic hypoxic respiratory failure on 2 liters of oxygen at all times, obstructive sleep apnea, chronic diastolic congestive heart failure, hypertension, hyperlipidemia, and anemia.     She was last seen In the office on 2024 by Dr. Mao. At that time, her main complaint was shortness of breath. He felt that it was predominantly driven by her lung disease and opted to hold off on cardiac workup. She did have an echocardiogram done in 2023 that showed preserved LV EF of 61%.     She presented to the emergency department on 2024 with complaints of increased shortness of breath and lower extremity edema. She was initially 76% on her normal 2 liters of oxygen and had to be increased to 4 liters to maintain a saturation in the low 90s.  She is currently being treated for a COPD exacerbation, as well as likely diastolic CHF.  She did have some chest tightness earlier this morning, but none now.  She states that she folic she could just get a deep breath, and there was a bandlike sensation around her chest.  Her EKG showed sinus tachycardia without changes.  Her high-sensitivity troponin has been negative.  She did have a CT angiogram of the chest in the ER which showed no PE, mild mediastinal and hilar lymph node enlargement, and atelectasis bilaterally at the bases.    ECHO 2023    Left ventricular systolic function is normal. Calculated left ventricular EF = 60.5%    Sigmoid-shaped ventricular septum is present.    Left  ventricular diastolic function is consistent with (grade I) impaired relaxation and age.    Estimated right ventricular systolic pressure from tricuspid regurgitation is moderately elevated (45-55 mmHg).    No hemodynamically significant valvular pathology.    Past Medical History:   Diagnosis Date    Anemia, unspecified     Asymptomatic menopausal state     Chronic obstructive pulmonary disease with (acute) exacerbation     Essential (primary) hypertension     High cholesterol 06/17/2021    Low back pain     Major depressive disorder, single episode, moderate     Nicotine dependence, unspecified, uncomplicated     Osteopenia     Other intervertebral disc degeneration, lumbar region     Other long term (current) drug therapy     Solitary pulmonary nodule     Vitamin D deficiency, unspecified          Past Surgical History:   Procedure Laterality Date    CARDIAC CATHETERIZATION  07/25/2012    CARPAL TUNNEL RELEASE      CHOLECYSTECTOMY      COLONOSCOPY  08/24/2022    Normal    EPIDURAL Right 10/25/2023    Procedure: LUMBAR/SACRAL TRANSFORAMINAL EPIDURAL Right L3-4 and RIght L5-S1 08945, 76220;  Surgeon: Shandra Thomas MD;  Location: [facility] [place] MAIN OR;  Service: Pain Management;  Laterality: Right;    HYSTERECTOMY  1980s    Total    KNEE ARTHROSCOPY Left     NECK SURGERY  01/21/2015         Allergies   Allergen Reactions    Sulfa Antibiotics Unknown - Low Severity    Aspirin Rash    Atorvastatin Other (See Comments)     Hair loss    Naproxen Other (See Comments)     Urinary tract infection         No current facility-administered medications on file prior to encounter.     Current Outpatient Medications on File Prior to Encounter   Medication Sig Dispense Refill    albuterol (ACCUNEB) 0.63 MG/3ML nebulizer solution Take 3 mL by nebulization Every 4 (Four) Hours As Needed for Wheezing or Shortness of Air. 1 each 9    albuterol sulfate HFA (Ventolin HFA) 108 (90 Base) MCG/ACT inhaler Inhale 2 puffs Every 6 (Six) Hours As  Needed for Wheezing. 8 g 11    amLODIPine (NORVASC) 5 MG tablet Take 1 tablet by mouth 2 (Two) Times a Day. 180 tablet 3    Budeson-Glycopyrrol-Formoterol (Breztri Aerosphere) 160-9-4.8 MCG/ACT aerosol inhaler Inhale 2 puffs 2 (Two) Times a Day. 1 each 5    budesonide (PULMICORT) 0.5 MG/2ML nebulizer solution Take 2 mL by nebulization Daily. 180 mL 1    cetirizine (zyrTEC) 10 MG tablet Take 1 tablet by mouth Daily. (Patient taking differently: Take 1 tablet by mouth Daily As Needed for Allergies.) 90 tablet 3    colestipol (COLESTID) 1 g tablet Take 1 tablet by mouth Daily. 90 tablet 1    cyclobenzaprine (FLEXERIL) 5 MG tablet TAKE 1 TABLET BY MOUTH TWICE DAILY AS NEEDED FOR MUSCLE SPASMS 60 tablet 1    ferrous sulfate 325 (65 FE) MG EC tablet Take 1 tablet by mouth Daily With Breakfast. 90 tablet 1    fluticasone (FLONASE) 50 MCG/ACT nasal spray 2 sprays into the nostril(s) as directed by provider Daily. (Patient taking differently: 2 sprays into the nostril(s) as directed by provider Daily As Needed for Allergies.) 18 mL 5    hydroCHLOROthiazide (HYDRODIURIL) 25 MG tablet Take 1 tablet by mouth Daily. 90 tablet 3    ipratropium-albuterol (DUO-NEB) 0.5-2.5 mg/3 ml nebulizer USE 3 ML VIA NEBULIZER FOUR TIMES DAILY 90 mL 5    lidocaine (LIDODERM) 5 % Place 2 patches on the skin as directed by provider Daily. (Patient taking differently: Place 2 patches on the skin as directed by provider Daily As Needed (Back pain).) 60 patch 2    metoprolol succinate XL (Toprol XL) 50 MG 24 hr tablet Take 1 tablet by mouth 2 (Two) Times a Day. 180 tablet 3    mirtazapine (REMERON) 30 MG tablet Take 1 tablet by mouth Every Night. 90 tablet 3    montelukast (SINGULAIR) 10 MG tablet Take 1 tablet by mouth Every Night. (Patient taking differently: Take 1 tablet by mouth Daily As Needed (Allergies).) 90 tablet 3    multivitamin with minerals tablet tablet 1 tablet Daily.      O2 (OXYGEN) Inhale 2 L/min 1 (One) Time.      pantoprazole  (PROTONIX) 40 MG EC tablet Take 1 tablet by mouth Daily. 90 tablet 3    pregabalin (LYRICA) 150 MG capsule Take 1 capsule by mouth 2 (Two) Times a Day. 60 capsule 1    Calcium Carbonate 1500 (600 Ca) MG tablet Calcium 600 600 mg (1,500 mg) oral tablet take 1 tablet by oral route daily   Suspended (Patient not taking: Reported on 2024)           Social History     Socioeconomic History    Marital status: Single    Number of children: 2   Tobacco Use    Smoking status: Former     Current packs/day: 0.00     Average packs/day: 1 pack/day for 50.0 years (50.0 ttl pk-yrs)     Types: Cigarettes     Start date:      Quit date: 2023     Years since quittin.2     Passive exposure: Past    Smokeless tobacco: Never    Tobacco comments:     Eileen Alvarez was 23 when she started smoking.  She did stop smoking for some time - about 13 years.  She has been smoking a total of approximately 33 years.  She has smoked less than a pack daily during this time. She currently uses smokless, vapor can/pouches   Vaping Use    Vaping status: Former    Quit date: 2023    Substances: Nicotine    Devices: Pre-filled or refillable cartridge   Substance and Sexual Activity    Alcohol use: Yes     Comment: Socially    Drug use: Never    Sexual activity: Defer         Family History   Problem Relation Age of Onset    Cerebral aneurysm Mother         cause of death    Heart attack Father         cause of death    Esophageal cancer Brother        REVIEW OF SYSTEMS:   Pertinent positives are noted in the HPI above.  Otherwise, all other systems were reviewed, and are negative.     Objective:     Vitals:    04/10/24 0734 04/10/24 0954 04/10/24 1056 04/10/24 1126   BP: 122/84 117/77  112/73   BP Location: Right arm   Right arm   Patient Position: Lying   Lying   Pulse: 86 89 89 86   Resp: 16  18 19   Temp: 97.7 °F (36.5 °C)   97.9 °F (36.6 °C)   TempSrc: Oral   Oral   SpO2: 91%  94% 92%   Weight:       Height:         Body mass index is  "39.03 kg/m².  Flowsheet Rows      Flowsheet Row First Filed Value   Admission Height 162.6 cm (64.02\") Documented at 04/08/2024 2033   Admission Weight 102 kg (224 lb 13.9 oz) Documented at 04/08/2024 2033             General:    No acute distress, alert and oriented x4, pleasant                   Head:    Normocephalic, atraumatic.   Eyes:          Conjunctivae and sclerae normal, no icterus.   Throat:   No oral lesions, no thrush, oral mucosa moist.    Neck:   Supple, trachea midline.   Lungs:     Decreased breath sounds bilaterally.    Heart:    Regular rhythm and normal rate.  No murmurs, gallops, or rubs noted.   Abdomen:     Soft, non-tender, non-distended, positive bowel sounds.    Extremities:   Trace edema of the lower extremities.    Pulses:   Pulses palpable and equal bilaterally.    Skin:   No bleeding or rash.   Neuro:   Non-focal.  Moves all extremities well.    Psychiatric:   Normal mood and affect.         Lab Review:                Results from last 7 days   Lab Units 04/10/24  0526   SODIUM mmol/L 139   POTASSIUM mmol/L 4.2   CHLORIDE mmol/L 104   CO2 mmol/L 24.7   BUN mg/dL 19   CREATININE mg/dL 0.65   GLUCOSE mg/dL 117*   CALCIUM mg/dL 9.3     Results from last 7 days   Lab Units 04/09/24  1810 04/08/24  2316 04/08/24  2100   HSTROP T ng/L <6 7 9     Results from last 7 days   Lab Units 04/10/24  0526   WBC 10*3/mm3 11.35*   HEMOGLOBIN g/dL 11.4*   HEMATOCRIT % 36.7   PLATELETS 10*3/mm3 333     Results from last 7 days   Lab Units 04/08/24  2100   INR  0.97         Results from last 7 days   Lab Units 04/09/24  1810   MAGNESIUM mg/dL 2.2           EKG (reviewed by me personally):    4/9/2024 4/25/2023          Assessment:   1.  Acute on chronic hypoxic and hypercapnic respiratory failure  2.  COPD with acute exacerbation  3.  Acute on chronic diastolic CHF, secondary to #2  4.  Chest tightness with negative troponin  5.  Sinus tachycardia, resolved  6.  Mild mediastinal and hilar lymph node " enlargement, likely reactive  7.  Obesity, complicating all aspects of care  8.  Obstructive sleep apnea, on CPAP in the hospital    Plan:       I suspect that the chest tightness is more related to the COPD exacerbation.  Her EKG showed no ischemic changes, and her troponin has been negative, which is reassuring.    I suspect that she does have a mild component of acute on chronic diastolic CHF.  She has some trace lower extremity edema, and she has had chronic diastolic CHF in the past.  I agree with the Lasix 40 mg IV this morning per Dr. Rossi.  We can reevaluate her volume status tomorrow morning.    I will check an echocardiogram to ensure that her ejection fraction is still intact.  I have a low suspicion that her chest discomfort is from ischemia.  If there are any wall motion abnormalities on the echocardiogram, could potentially proceed with an ischemic evaluation.  For now, I am going to hold off on this.    Cardiology will continue to follow.    Thank you very much for this consult.    Zach Abarca MD

## 2024-04-11 ENCOUNTER — TELEPHONE (OUTPATIENT)
Dept: FAMILY MEDICINE CLINIC | Age: 74
End: 2024-04-11
Payer: MEDICARE

## 2024-04-11 LAB
ANION GAP SERPL CALCULATED.3IONS-SCNC: 11 MMOL/L (ref 5–15)
AORTIC DIMENSIONLESS INDEX: 0.8 (DI)
ASCENDING AORTA: 2.9 CM
BASOPHILS # BLD AUTO: 0.03 10*3/MM3 (ref 0–0.2)
BASOPHILS NFR BLD AUTO: 0.3 % (ref 0–1.5)
BH CV ECHO MEAS - ACS: 2.17 CM
BH CV ECHO MEAS - AO MAX PG: 8.1 MMHG
BH CV ECHO MEAS - AO MEAN PG: 4.5 MMHG
BH CV ECHO MEAS - AO ROOT DIAM: 2.6 CM
BH CV ECHO MEAS - AO V2 MAX: 142.5 CM/SEC
BH CV ECHO MEAS - AO V2 VTI: 25.3 CM
BH CV ECHO MEAS - AVA(I,D): 2.32 CM2
BH CV ECHO MEAS - EDV(CUBED): 97.9 ML
BH CV ECHO MEAS - EDV(MOD-SP2): 46 ML
BH CV ECHO MEAS - EDV(MOD-SP4): 63 ML
BH CV ECHO MEAS - EF(MOD-BP): 64.9 %
BH CV ECHO MEAS - EF(MOD-SP2): 63 %
BH CV ECHO MEAS - EF(MOD-SP4): 61.9 %
BH CV ECHO MEAS - ESV(CUBED): 28.2 ML
BH CV ECHO MEAS - ESV(MOD-SP2): 17 ML
BH CV ECHO MEAS - ESV(MOD-SP4): 24 ML
BH CV ECHO MEAS - FS: 34 %
BH CV ECHO MEAS - IVS/LVPW: 0.92 CM
BH CV ECHO MEAS - IVSD: 1.06 CM
BH CV ECHO MEAS - LA DIMENSION: 3.5 CM
BH CV ECHO MEAS - LAT PEAK E' VEL: 10.9 CM/SEC
BH CV ECHO MEAS - LV DIASTOLIC VOL/BSA (35-75): 30.5 CM2
BH CV ECHO MEAS - LV MASS(C)D: 183.3 GRAMS
BH CV ECHO MEAS - LV MAX PG: 3.9 MMHG
BH CV ECHO MEAS - LV MEAN PG: 1.72 MMHG
BH CV ECHO MEAS - LV SYSTOLIC VOL/BSA (12-30): 11.6 CM2
BH CV ECHO MEAS - LV V1 MAX: 99 CM/SEC
BH CV ECHO MEAS - LV V1 VTI: 19.5 CM
BH CV ECHO MEAS - LVIDD: 4.6 CM
BH CV ECHO MEAS - LVIDS: 3 CM
BH CV ECHO MEAS - LVOT AREA: 3 CM2
BH CV ECHO MEAS - LVOT DIAM: 1.96 CM
BH CV ECHO MEAS - LVPWD: 1.15 CM
BH CV ECHO MEAS - MED PEAK E' VEL: 6.1 CM/SEC
BH CV ECHO MEAS - MV A DUR: 0.14 SEC
BH CV ECHO MEAS - MV A MAX VEL: 98.1 CM/SEC
BH CV ECHO MEAS - MV DEC SLOPE: 231.1 CM/SEC2
BH CV ECHO MEAS - MV DEC TIME: 281 SEC
BH CV ECHO MEAS - MV E MAX VEL: 64.7 CM/SEC
BH CV ECHO MEAS - MV E/A: 0.66
BH CV ECHO MEAS - MV MAX PG: 5.2 MMHG
BH CV ECHO MEAS - MV MEAN PG: 2.7 MMHG
BH CV ECHO MEAS - MV P1/2T: 101.5 MSEC
BH CV ECHO MEAS - MV V2 VTI: 21.3 CM
BH CV ECHO MEAS - MVA(P1/2T): 2.17 CM2
BH CV ECHO MEAS - MVA(VTI): 2.8 CM2
BH CV ECHO MEAS - PA ACC TIME: 0.16 SEC
BH CV ECHO MEAS - PA V2 MAX: 85.5 CM/SEC
BH CV ECHO MEAS - PULM A REVS DUR: 0.16 SEC
BH CV ECHO MEAS - PULM A REVS VEL: 36 CM/SEC
BH CV ECHO MEAS - PULM DIAS VEL: 22.7 CM/SEC
BH CV ECHO MEAS - PULM S/D: 1.7
BH CV ECHO MEAS - PULM SYS VEL: 38.6 CM/SEC
BH CV ECHO MEAS - QP/QS: 0.54
BH CV ECHO MEAS - RAP SYSTOLE: 3 MMHG
BH CV ECHO MEAS - RV MAX PG: 1.26 MMHG
BH CV ECHO MEAS - RV V1 MAX: 56.1 CM/SEC
BH CV ECHO MEAS - RV V1 VTI: 9.2 CM
BH CV ECHO MEAS - RVOT DIAM: 2.09 CM
BH CV ECHO MEAS - RVSP: 35.2 MMHG
BH CV ECHO MEAS - SI(MOD-SP2): 14 ML/M2
BH CV ECHO MEAS - SI(MOD-SP4): 18.9 ML/M2
BH CV ECHO MEAS - SUP REN AO DIAM: 2.1 CM
BH CV ECHO MEAS - SV(LVOT): 58.6 ML
BH CV ECHO MEAS - SV(MOD-SP2): 29 ML
BH CV ECHO MEAS - SV(MOD-SP4): 39 ML
BH CV ECHO MEAS - SV(RVOT): 31.8 ML
BH CV ECHO MEAS - TAPSE (>1.6): 1.8 CM
BH CV ECHO MEAS - TR MAX PG: 32.2 MMHG
BH CV ECHO MEAS - TR MAX VEL: 283.7 CM/SEC
BH CV ECHO MEASUREMENTS AVERAGE E/E' RATIO: 7.61
BH CV XLRA - RV BASE: 2.8 CM
BH CV XLRA - RV LENGTH: 5.8 CM
BH CV XLRA - RV MID: 1.97 CM
BH CV XLRA - TDI S': 9.1 CM/SEC
BUN SERPL-MCNC: 21 MG/DL (ref 8–23)
BUN/CREAT SERPL: 25.3 (ref 7–25)
CALCIUM SPEC-SCNC: 9 MG/DL (ref 8.6–10.5)
CHLORIDE SERPL-SCNC: 105 MMOL/L (ref 98–107)
CO2 SERPL-SCNC: 25 MMOL/L (ref 22–29)
CREAT SERPL-MCNC: 0.83 MG/DL (ref 0.57–1)
DEPRECATED RDW RBC AUTO: 48.8 FL (ref 37–54)
EGFRCR SERPLBLD CKD-EPI 2021: 75 ML/MIN/1.73
EOSINOPHIL # BLD AUTO: 0.08 10*3/MM3 (ref 0–0.4)
EOSINOPHIL NFR BLD AUTO: 0.7 % (ref 0.3–6.2)
ERYTHROCYTE [DISTWIDTH] IN BLOOD BY AUTOMATED COUNT: 15.8 % (ref 12.3–15.4)
GLUCOSE SERPL-MCNC: 83 MG/DL (ref 65–99)
HCT VFR BLD AUTO: 39.6 % (ref 34–46.6)
HGB BLD-MCNC: 11.9 G/DL (ref 12–15.9)
IMM GRANULOCYTES # BLD AUTO: 0.08 10*3/MM3 (ref 0–0.05)
IMM GRANULOCYTES NFR BLD AUTO: 0.7 % (ref 0–0.5)
LEFT ATRIUM VOLUME INDEX: 19 ML/M2
LYMPHOCYTES # BLD AUTO: 2.35 10*3/MM3 (ref 0.7–3.1)
LYMPHOCYTES NFR BLD AUTO: 20.2 % (ref 19.6–45.3)
MCH RBC QN AUTO: 25.8 PG (ref 26.6–33)
MCHC RBC AUTO-ENTMCNC: 30.1 G/DL (ref 31.5–35.7)
MCV RBC AUTO: 85.9 FL (ref 79–97)
MONOCYTES # BLD AUTO: 1.19 10*3/MM3 (ref 0.1–0.9)
MONOCYTES NFR BLD AUTO: 10.2 % (ref 5–12)
NEUTROPHILS NFR BLD AUTO: 67.9 % (ref 42.7–76)
NEUTROPHILS NFR BLD AUTO: 7.9 10*3/MM3 (ref 1.7–7)
NRBC BLD AUTO-RTO: 0 /100 WBC (ref 0–0.2)
PLATELET # BLD AUTO: 365 10*3/MM3 (ref 140–450)
PMV BLD AUTO: 11 FL (ref 6–12)
POTASSIUM SERPL-SCNC: 4.1 MMOL/L (ref 3.5–5.2)
RBC # BLD AUTO: 4.61 10*6/MM3 (ref 3.77–5.28)
SINUS: 3 CM
SODIUM SERPL-SCNC: 141 MMOL/L (ref 136–145)
STJ: 2.7 CM
WBC NRBC COR # BLD AUTO: 11.63 10*3/MM3 (ref 3.4–10.8)

## 2024-04-11 PROCEDURE — 97530 THERAPEUTIC ACTIVITIES: CPT

## 2024-04-11 PROCEDURE — 94664 DEMO&/EVAL PT USE INHALER: CPT

## 2024-04-11 PROCEDURE — 85025 COMPLETE CBC W/AUTO DIFF WBC: CPT | Performed by: INTERNAL MEDICINE

## 2024-04-11 PROCEDURE — 80048 BASIC METABOLIC PNL TOTAL CA: CPT | Performed by: INTERNAL MEDICINE

## 2024-04-11 PROCEDURE — 63710000001 PREDNISONE PER 1 MG: Performed by: NURSE PRACTITIONER

## 2024-04-11 PROCEDURE — 94761 N-INVAS EAR/PLS OXIMETRY MLT: CPT

## 2024-04-11 PROCEDURE — 94660 CPAP INITIATION&MGMT: CPT

## 2024-04-11 PROCEDURE — 99232 SBSQ HOSP IP/OBS MODERATE 35: CPT

## 2024-04-11 PROCEDURE — 25010000002 ENOXAPARIN PER 10 MG: Performed by: HOSPITALIST

## 2024-04-11 PROCEDURE — 94799 UNLISTED PULMONARY SVC/PX: CPT

## 2024-04-11 PROCEDURE — 97535 SELF CARE MNGMENT TRAINING: CPT

## 2024-04-11 RX ORDER — GUAIFENESIN 600 MG/1
1200 TABLET, EXTENDED RELEASE ORAL EVERY 12 HOURS SCHEDULED
Status: DISCONTINUED | OUTPATIENT
Start: 2024-04-11 | End: 2024-04-13 | Stop reason: HOSPADM

## 2024-04-11 RX ORDER — IPRATROPIUM BROMIDE AND ALBUTEROL SULFATE 2.5; .5 MG/3ML; MG/3ML
3 SOLUTION RESPIRATORY (INHALATION)
Status: DISCONTINUED | OUTPATIENT
Start: 2024-04-11 | End: 2024-04-13 | Stop reason: HOSPADM

## 2024-04-11 RX ORDER — FUROSEMIDE 40 MG/1
40 TABLET ORAL DAILY
Status: DISCONTINUED | OUTPATIENT
Start: 2024-04-11 | End: 2024-04-13 | Stop reason: HOSPADM

## 2024-04-11 RX ADMIN — PREGABALIN 150 MG: 75 CAPSULE ORAL at 09:16

## 2024-04-11 RX ADMIN — METOPROLOL SUCCINATE 50 MG: 50 TABLET, EXTENDED RELEASE ORAL at 09:16

## 2024-04-11 RX ADMIN — AMLODIPINE BESYLATE 5 MG: 5 TABLET ORAL at 09:07

## 2024-04-11 RX ADMIN — ENOXAPARIN SODIUM 40 MG: 100 INJECTION SUBCUTANEOUS at 20:12

## 2024-04-11 RX ADMIN — MIRTAZAPINE 30 MG: 30 TABLET, FILM COATED ORAL at 20:11

## 2024-04-11 RX ADMIN — FLUTICASONE PROPIONATE 2 SPRAY: 50 SPRAY, METERED NASAL at 09:11

## 2024-04-11 RX ADMIN — Medication 1 TABLET: at 09:02

## 2024-04-11 RX ADMIN — MONTELUKAST SODIUM 10 MG: 10 TABLET, FILM COATED ORAL at 20:10

## 2024-04-11 RX ADMIN — AMLODIPINE BESYLATE 5 MG: 5 TABLET ORAL at 20:10

## 2024-04-11 RX ADMIN — GUAIFENESIN 600 MG: 600 TABLET, EXTENDED RELEASE ORAL at 09:15

## 2024-04-11 RX ADMIN — Medication 10 ML: at 09:08

## 2024-04-11 RX ADMIN — GUAIFENESIN 1200 MG: 600 TABLET, EXTENDED RELEASE ORAL at 20:11

## 2024-04-11 RX ADMIN — IPRATROPIUM BROMIDE AND ALBUTEROL SULFATE 3 ML: .5; 3 SOLUTION RESPIRATORY (INHALATION) at 21:16

## 2024-04-11 RX ADMIN — PANTOPRAZOLE SODIUM 40 MG: 40 TABLET, DELAYED RELEASE ORAL at 09:16

## 2024-04-11 RX ADMIN — METOPROLOL SUCCINATE 50 MG: 50 TABLET, EXTENDED RELEASE ORAL at 20:11

## 2024-04-11 RX ADMIN — PREDNISONE 40 MG: 20 TABLET ORAL at 09:05

## 2024-04-11 RX ADMIN — BUDESONIDE AND FORMOTEROL FUMARATE DIHYDRATE 2 PUFF: 160; 4.5 AEROSOL RESPIRATORY (INHALATION) at 07:19

## 2024-04-11 RX ADMIN — PREGABALIN 150 MG: 75 CAPSULE ORAL at 20:11

## 2024-04-11 RX ADMIN — FUROSEMIDE 40 MG: 40 TABLET ORAL at 14:39

## 2024-04-11 RX ADMIN — CETIRIZINE HYDROCHLORIDE 10 MG: 10 TABLET ORAL at 09:09

## 2024-04-11 RX ADMIN — FERROUS SULFATE TAB 325 MG (65 MG ELEMENTAL FE) 325 MG: 325 (65 FE) TAB at 09:03

## 2024-04-11 RX ADMIN — CHOLESTYRAMINE 4 G: 4 POWDER, FOR SUSPENSION ORAL at 14:41

## 2024-04-11 RX ADMIN — Medication 10 ML: at 20:14

## 2024-04-11 RX ADMIN — BUDESONIDE AND FORMOTEROL FUMARATE DIHYDRATE 2 PUFF: 160; 4.5 AEROSOL RESPIRATORY (INHALATION) at 21:27

## 2024-04-11 RX ADMIN — IPRATROPIUM BROMIDE AND ALBUTEROL SULFATE 3 ML: .5; 3 SOLUTION RESPIRATORY (INHALATION) at 11:17

## 2024-04-11 RX ADMIN — AZITHROMYCIN 500 MG: 250 TABLET, FILM COATED ORAL at 09:10

## 2024-04-11 RX ADMIN — IPRATROPIUM BROMIDE AND ALBUTEROL SULFATE 3 ML: .5; 3 SOLUTION RESPIRATORY (INHALATION) at 07:13

## 2024-04-11 NOTE — PLAN OF CARE
Goal Outcome Evaluation:  Plan of Care Reviewed With: patient           Outcome Evaluation: Pt is seen today by OT to address goals for LBD with AE. OT educated pt today in reacher and sock aid use. Pt does report she was able to complete with less assistance today. She needs some cues for technique. Pt up in chair throughout session, on 4L. OT noted purewick connected. Pt up to bathroom SBA last OT session, encourage nsg to continue to assist with mobility and promote up to bathroom for functional strength to faciliate safe dc home.      Anticipated Discharge Disposition (OT): home with assist

## 2024-04-11 NOTE — TELEPHONE ENCOUNTER
Spoke with pt. She requested  write order for Bipap machine. Inf her  doesn't write orders for those and that she would need to reach out to her pulmonologist.

## 2024-04-11 NOTE — THERAPY TREATMENT NOTE
Patient Name: Eileen Kaufman  : 1950    MRN: [id number]                              Today's Date: 2024       Admit Date: 2024    Visit Dx:     ICD-10-CM ICD-9-CM   1. Acute respiratory failure with hypoxia and hypercapnia  J96.01 518.81    J96.02    2. COPD with acute exacerbation  J44.1 491.21     Patient Active Problem List   Diagnosis    Allergic rhinitis    Anxiety    Sleep apnea    Cervical spondylosis without myelopathy    Congestive heart failure    High cholesterol    Hypercalcemia    Essential hypertension    Lumbar radiculopathy    Fibromyalgia    Chronic obstructive pulmonary disease    Chronic nonintractable headache    Frequency of micturition    Other long term (current) drug therapy    Syncope and collapse    Bilateral sacroiliitis    Weakness    Chronic respiratory failure with hypoxia    Chronic right-sided low back pain with right-sided sciatica    Chronic superficial gastritis    Diastolic dysfunction    Postlaminectomy syndrome    Dyspnea    Acute exacerbation of chronic obstructive pulmonary disease (COPD)    Acute-on-chronic respiratory failure    COPD exacerbation    CKD (chronic kidney disease) stage 2, GFR 60-89 ml/min     Past Medical History:   Diagnosis Date    Anemia, unspecified     Asymptomatic menopausal state     Chronic obstructive pulmonary disease with (acute) exacerbation     Essential (primary) hypertension     High cholesterol 2021    Low back pain     Major depressive disorder, single episode, moderate     Nicotine dependence, unspecified, uncomplicated     Osteopenia     Other intervertebral disc degeneration, lumbar region     Other long term (current) drug therapy     Solitary pulmonary nodule     Vitamin D deficiency, unspecified      Past Surgical History:   Procedure Laterality Date    CARDIAC CATHETERIZATION  2012    CARPAL TUNNEL RELEASE      CHOLECYSTECTOMY      COLONOSCOPY  2022    Normal    EPIDURAL Right 10/25/2023    Procedure:  LUMBAR/SACRAL TRANSFORAMINAL EPIDURAL Right L3-4 and RIght L5-S1 04991, 49027;  Surgeon: Shandra Thomas MD;  Location: [facility] [place] MAIN OR;  Service: Pain Management;  Laterality: Right;    HYSTERECTOMY  1980s    Total    KNEE ARTHROSCOPY Left     NECK SURGERY  01/21/2015      General Information       Row Name 04/11/24 1604          Physical Therapy Time and Intention    Document Type therapy note (daily note)  -SV     Mode of Treatment individual therapy;physical therapy  -SV       Row Name 04/11/24 1608          General Information    Patient Profile Reviewed yes  -SV               User Key  (r) = Recorded By, (t) = Taken By, (c) = Cosigned By      Initials Name Provider Type    SV Chelsey Murdock, PT Physical Therapist                   Mobility       Row Name 04/11/24 1720          Bed Mobility    Comment, (Bed Mobility) NT due to uic  -SV       Row Name 04/11/24 1720          Sit-Stand Transfer    Sit-Stand Cameron (Transfers) standby assist  -SV     Assistive Device (Sit-Stand Transfers) walker, front-wheeled  -SV       Row Name 04/11/24 1720          Gait/Stairs (Locomotion)    Cameron Level (Gait) standby assist  -SV     Assistive Device (Gait) walker, front-wheeled  -SV     Distance in Feet (Gait) 75  35' on 3 L with desat to 88%. O2 increased to 4 L , 35' seated rest then 75' O2 at 90% following ambulation  -SV     Comment, (Gait/Stairs) amb x3 with seated rest periods  -SV               User Key  (r) = Recorded By, (t) = Taken By, (c) = Cosigned By      Initials Name Provider Type    SV Chelsey Murdock, PT Physical Therapist                   Obj/Interventions       Row Name 04/11/24 1722          Motor Skills    Therapeutic Exercise --  encouraged PLB/energy conservation,  -SV               User Key  (r) = Recorded By, (t) = Taken By, (c) = Cosigned By      Initials Name Provider Type    Chelsey Rascon, PT Physical Therapist                   Goals/Plan    No documentation.                   Clinical Impression       [facility] Name 04/11/24 1723          Pain    Pretreatment Pain Rating 0/10 - no pain  -SV     Posttreatment Pain Rating 0/10 - no pain  -SV       [facility] Name 04/11/24 1723          Vital Signs    Pre SpO2 (%) 93  -SV     O2 Delivery Pre Treatment supplemental O2  -SV     Intra SpO2 (%) 88  O2 increased from 3 to 4 L during ambulation/PT today then O2 > 90%  -SV     O2 Delivery Intra Treatment supplemental O2  -SV     Post SpO2 (%) 92  3L  -SV     O2 Delivery Post Treatment supplemental O2  -SV     Pre Patient Position Sitting  -SV     Intra Patient Position Standing  -SV     Post Patient Position Sitting  -SV       [facility] Name 04/11/24 1723          Positioning and Restraints    Pre-Treatment Position sitting in chair/recliner  -SV     Post Treatment Position chair  -SV     In Chair notified nsg;reclined;call light within reach;encouraged to call for assist;exit alarm on  -SV               User Key  (r) = Recorded By, (t) = Taken By, (c) = Cosigned By      Initials Name Provider Type    SV Chelsey Murdock, PT Physical Therapist                   Outcome Measures       [facility] Name 04/11/24 1724          How much help from another person do you currently need...    Turning from your back to your side while in flat bed without using bedrails? 4  -SV     Moving from lying on back to sitting on the side of a flat bed without bedrails? 3  -SV     Moving to and from a bed to a chair (including a wheelchair)? 3  -SV     Standing up from a chair using your arms (e.g., wheelchair, bedside chair)? 3  -SV     Climbing 3-5 steps with a railing? 2  -SV     To walk in hospital room? 3  -SV     AM-PAC 6 Clicks Score (PT) 18  -SV     Highest Level of Mobility Goal 6 --> Walk 10 steps or more  -SV       Row Name 04/11/24 1311          Functional Assessment    Outcome Measure Options AM-PAC 6 Clicks Daily Activity (OT)  -SM               User Key  (r) = Recorded By, (t) = Taken By, (c) = Cosigned By      Initials Name  Provider Type    SV Chelsey Murdock, PT Physical Therapist    Anita Whitman, OT Occupational Therapist                                 Physical Therapy Education       Title: PT OT SLP Therapies (In Progress)       Topic: Physical Therapy (In Progress)       Point: Mobility training (Done)       Learning Progress Summary             Patient Acceptance, E,D, DU by PC at 4/10/2024 1645                         Point: Home exercise program (Not Started)       Learner Progress:  Not documented in this visit.              Point: Body mechanics (Done)       Learning Progress Summary             Patient Acceptance, E,D, DU by PC at 4/10/2024 1645                         Point: Precautions (Done)       Learning Progress Summary             Patient Acceptance, E,D, DU by PC at 4/10/2024 1645                                         User Key       Initials Effective Dates Name Provider Type Discipline     06/16/21 -  Vannessa Dale PT Physical Therapist PT                  PT Recommendation and Plan           Time Calculation:         PT Charges       Row Name 04/11/24 1727             Time Calculation    Start Time 1604  -SV      Stop Time 1629  -SV      Time Calculation (min) 25 min  -      PT Received On 04/11/24  -      PT - Next Appointment 04/12/24  -                User Key  (r) = Recorded By, (t) = Taken By, (c) = Cosigned By      Initials Name Provider Type    SV Chelsey Murdock, PT Physical Therapist                  Therapy Charges for Today       Code Description Service Date Service Provider Modifiers Qty    03489892708 HC PT THERAPEUTIC ACT EA 15 MIN 4/11/2024 Chelsey Murdock, PT GP 2            PT G-Codes  Outcome Measure Options: AM-PAC 6 Clicks Daily Activity (OT)  AM-PAC 6 Clicks Score (PT): 18  AM-PAC 6 Clicks Score (OT): 21       Chelsey Murdock PT  4/11/2024

## 2024-04-11 NOTE — PLAN OF CARE
Goal Outcome Evaluation:  Plan of Care Reviewed With: patient        Progress: no change  Outcome Evaluation: vitals stable.  pt denied pain.  meds given per orders.  external catheter in place.  ECHO completed yesterday.  unable to obtain sputum culture at this time.  will continue to monitor.

## 2024-04-11 NOTE — PROGRESS NOTES
[facility]  [phone]  Dr. Sreekanth Ruiz    Subjective:  LOS: 0    Chief Complaint: COPD    Sitting in a chair.  On low-flow oxygen.  Has a CPAP at home but does not use regularly.  Quit smoking 1 year ago.    Objective   Vital Signs past 24hrs  Temp range: Temp (24hrs), Av °F (36.7 °C), Min:97.3 °F (36.3 °C), Max:98.5 °F (36.9 °C)    BP range: BP: (112-131)/(73-89) 125/76  Pulse range: Heart Rate:  [60-96] 65  Resp rate range: Resp:  [10-28] 27  Device (Oxygen Therapy): nasal cannulaFlow (L/min):  [4] 4  Oxygen range:SpO2:  [90 %-100 %] 98 %   Mechanical Ventilator:     Physical Exam  Constitutional:       Appearance: She is obese.   Eyes:      Pupils: Pupils are equal, round, and reactive to light.   Cardiovascular:      Rate and Rhythm: Normal rate and regular rhythm.      Heart sounds: No murmur heard.  Pulmonary:      Effort: Pulmonary effort is normal.      Breath sounds: Normal breath sounds.   Abdominal:      General: Bowel sounds are normal.      Palpations: Abdomen is soft. There is no mass.      Tenderness: There is no abdominal tenderness.   Musculoskeletal:         General: No swelling.   Neurological:      Mental Status: She is alert.       Results Review:    I have reviewed the laboratory and imaging data since the last note by [facility] physician.  My annotations are noted in assessment and plan.      Result Review:  I have personally reviewed the results from last note by [facility] physician to 2024 10:45 EDT and agree with these findings:  []  Laboratory list / accordion  []  Microbiology  []  Radiology  []  EKG/Telemetry   []  Cardiology/Vascular   []  Pathology  []  Old records  []  Other:    Medication Review:  I have reviewed the current MAR.  My annotations are noted in assessment and plan.    amLODIPine, 5 mg, Oral, BID  budesonide-formoterol, 2 puff, Inhalation, BID - RT  cetirizine, 10 mg, Oral, Daily  cholestyramine light, 1 packet, Oral, Daily  enoxaparin, 40 mg,  Subcutaneous, Nightly  ferrous sulfate, 325 mg, Oral, Daily With Breakfast  fluticasone, 2 spray, Nasal, Daily  guaiFENesin, 600 mg, Oral, Q12H  ipratropium-albuterol, 3 mL, Nebulization, Q6H While Awake - RT  metoprolol succinate XL, 50 mg, Oral, BID  mirtazapine, 30 mg, Oral, Nightly  montelukast, 10 mg, Oral, Nightly  multivitamin with minerals, 1 tablet, Oral, Daily  pantoprazole, 40 mg, Oral, Daily  predniSONE, 40 mg, Oral, Daily With Breakfast  pregabalin, 150 mg, Oral, BID  sodium chloride, 10 mL, Intravenous, Q12H           Lines, Drains & Airways       Active LDAs       Name Placement date Placement time Site Days    Peripheral IV 04/08/24 2101 Left Antecubital 04/08/24 2101  Antecubital  2    External Urinary Catheter 04/09/24  0200  --  2                  Isolation status: No active isolations    Dietary Orders (From admission, onward)       Start     Ordered    04/10/24 0917  Diet: Regular/House; Fluid Consistency: Thin (IDDSI 0)  Diet Effective Now        References:    Diet Order [place]   Question Answer Comment   Diets: Regular/House    Fluid Consistency: Thin (IDDSI 0)        04/10/24 0916                    PCCM Problems  Acute on chronic hypoxic and hypercapnic respiratory failure  Acute COPD exacerbation  Acute HFpEF  Bibasilar atelectasis  Mediastinal and hilar lymphadenopathy, requires follow-up  Obstructive sleep apnea  Obesity        THESE ARE NEW MEDICAL PROBLEMS TO ME.    Plan of Treatment    Appears close to baseline.  Recommended she stay compliant with her oxygen.  Recommended she speak with her pulmonologist and eat around and consider switching to BiPAP or noninvasive ventilation device at night.    No further wheezing.  Resume home bronchodilator regimen on discharge.    Acute HFpEF and improved, management per cardiology.    Recommend 3-month CT chest with IV contrast for follow-up of mediastinal lymphadenopathy.  Likely reactive but needs monitoring.    She will follow-up with  her pulmonologist in [facility].    Sreekanth Ruiz MD  04/11/24  10:45 EDT      Part of this note may be an electronic transcription/translation of spoken language to printed text using the Dragon Dictation System.

## 2024-04-11 NOTE — TELEPHONE ENCOUNTER
Caller: Eileen Kaufman    Relationship to patient: Self    Best call back number: [phone]    Patient is needing: PATIENT CALLED REQUESTING TO SPEAK WITH GOMEZ. PATIENT DID NOT GIVE ANY DETAILS BUT REQUESTED A CALL BACK BEFORE CLOSING TODAY.

## 2024-04-11 NOTE — THERAPY TREATMENT NOTE
Patient Name: Eileen Kaufman  : 1950    MRN: [id number]                              Today's Date: 2024       Admit Date: 2024    Visit Dx:     ICD-10-CM ICD-9-CM   1. Acute respiratory failure with hypoxia and hypercapnia  J96.01 518.81    J96.02    2. COPD with acute exacerbation  J44.1 491.21     Patient Active Problem List   Diagnosis    Allergic rhinitis    Anxiety    Sleep apnea    Cervical spondylosis without myelopathy    Congestive heart failure    High cholesterol    Hypercalcemia    Essential hypertension    Lumbar radiculopathy    Fibromyalgia    Chronic obstructive pulmonary disease    Chronic nonintractable headache    Frequency of micturition    Other long term (current) drug therapy    Syncope and collapse    Bilateral sacroiliitis    Weakness    Chronic respiratory failure with hypoxia    Chronic right-sided low back pain with right-sided sciatica    Chronic superficial gastritis    Diastolic dysfunction    Postlaminectomy syndrome    Dyspnea    Acute exacerbation of chronic obstructive pulmonary disease (COPD)    Acute-on-chronic respiratory failure    COPD exacerbation    CKD (chronic kidney disease) stage 2, GFR 60-89 ml/min     Past Medical History:   Diagnosis Date    Anemia, unspecified     Asymptomatic menopausal state     Chronic obstructive pulmonary disease with (acute) exacerbation     Essential (primary) hypertension     High cholesterol 2021    Low back pain     Major depressive disorder, single episode, moderate     Nicotine dependence, unspecified, uncomplicated     Osteopenia     Other intervertebral disc degeneration, lumbar region     Other long term (current) drug therapy     Solitary pulmonary nodule     Vitamin D deficiency, unspecified      Past Surgical History:   Procedure Laterality Date    CARDIAC CATHETERIZATION  2012    CARPAL TUNNEL RELEASE      CHOLECYSTECTOMY      COLONOSCOPY  2022    Normal    EPIDURAL Right 10/25/2023    Procedure:  LUMBAR/SACRAL TRANSFORAMINAL EPIDURAL Right L3-4 and RIght L5-S1 80019, 88779;  Surgeon: Shandra Thomas MD;  [place] [facility] [place]  Service: Pain Management;  Laterality: Right;    HYSTERECTOMY  1980s    Total    KNEE ARTHROSCOPY Left     NECK SURGERY  01/21/2015      General Information       Row Name 04/11/24 1306          OT Time and Intention    Document Type therapy note (daily note)  -     Mode of Treatment occupational therapy;individual therapy  -       Row Name 04/11/24 1306          General Information    Patient Profile Reviewed yes  -     Existing Precautions/Restrictions fall;oxygen therapy device and L/min  -       Row Name 04/11/24 1306          Cognition    Orientation Status (Cognition) oriented x 4  -       Row Name 04/11/24 1306          Safety Issues, Functional Mobility    Impairments Affecting Function (Mobility) strength;endurance/activity tolerance;shortness of breath  -               User Key  (r) = Recorded By, (t) = Taken By, (c) = Cosigned By      Initials Name Provider Type     Anita Langley OT Occupational Therapist                     Mobility/ADL's       Row Name 04/11/24 1307          Toilet Transfer    [place] Level (Toilet Transfer) standby assist  -       Row Name 04/11/24 1307          Functional Mobility    Functional Mobility- Comment not tested today, pt just got to chair with nsg, up SBA for ADLs last session  -       Row Name 04/11/24 1307          Lower Body Dressing Assessment/Training    [place] Level (Lower Body Dressing) doff;don;shoes/slippers;pants/bottoms;contact guard assist  -     Assistive Devices (Lower Body Dressing) sock-aid;reacher  -     Position (Lower Body Dressing) supported sitting;supported standing  -               User Key  (r) = Recorded By, (t) = Taken By, (c) = Cosigned By      Initials Name Provider Type    Anita Whitman OT Occupational Therapist                   Obj/Interventions    No  documentation.                  Goals/Plan    No documentation.                  Clinical Impression       Row Name 04/11/24 1308          Pain Assessment    Pretreatment Pain Rating 0/10 - no pain  -SM     Posttreatment Pain Rating 0/10 - no pain  -SM       Row Name 04/11/24 1308          Plan of Care Review    Plan of Care Reviewed With patient  -SM     Outcome Evaluation Pt is seen today by OT to address goals for LBD with AE. OT educated pt today in reacher and sock aid use. Pt does report she was able to complete with less assistance today. She needs some cues for technique. Pt up in chair throughout session, on 4L. OT noted purewick connected. Pt up to bathroom SBA last OT session, encourage nsg to continue to assist with mobility and promote up to bathroom for functional strength to faciliate safe dc home.  -SM       Row Name 04/11/24 1308          Therapy Plan Review/Discharge Plan (OT)    Anticipated Discharge Disposition (OT) home with assist  -SM       Row Name 04/11/24 1308          Vital Signs    O2 Delivery Pre Treatment supplemental O2  -SM     Intra SpO2 (%) --  desats to 85% with leaning forward to don socks, wuick recovery, otherwise > 90%  -SM     O2 Delivery Intra Treatment supplemental O2  -SM     O2 Delivery Post Treatment supplemental O2  -SM       Row Name 04/11/24 1308          Positioning and Restraints    Pre-Treatment Position sitting in chair/recliner  -SM     Post Treatment Position chair  -SM     In Chair reclined;call light within reach;encouraged to call for assist  -SM               User Key  (r) = Recorded By, (t) = Taken By, (c) = Cosigned By      Initials Name Provider Type    SM Anita Langley, GONZALO Occupational Therapist                   Outcome Measures       Row Name 04/11/24 1311          How much help from another is currently needed...    Putting on and taking off regular lower body clothing? 3  -SM     Bathing (including washing, rinsing, and drying) 3  -SM      Toileting (which includes using toilet bed pan or urinal) 3  -SM     Putting on and taking off regular upper body clothing 4  -SM     Taking care of personal grooming (such as brushing teeth) 4  -SM     Eating meals 4  -SM     AM-PAC 6 Clicks Score (OT) 21  -SM       Row Name 04/11/24 1311          Functional Assessment    Outcome Measure Options AM-PAC 6 Clicks Daily Activity (OT)  -               User Key  (r) = Recorded By, (t) = Taken By, (c) = Cosigned By      Initials Name Provider Type     Anita Langley OT Occupational Therapist                    Occupational Therapy Education       Title: PT OT SLP Therapies (In Progress)       Topic: Occupational Therapy (In Progress)       Point: ADL training (Done)       Description:   Instruct learner(s) on proper safety adaptation and remediation techniques during self care or transfers.   Instruct in proper use of assistive devices.                  Learning Progress Summary             Patient Acceptance, E, VU by  at 4/10/2024 0848    Comment: OT goals/POC                         Point: Home exercise program (Not Started)       Description:   Instruct learner(s) on appropriate technique for monitoring, assisting and/or progressing therapeutic exercises/activities.                  Learner Progress:  Not documented in this visit.              Point: Precautions (Not Started)       Description:   Instruct learner(s) on prescribed precautions during self-care and functional transfers.                  Learner Progress:  Not documented in this visit.              Point: Body mechanics (Not Started)       Description:   Instruct learner(s) on proper positioning and spine alignment during self-care, functional mobility activities and/or exercises.                  Learner Progress:  Not documented in this visit.                              User Key       Initials Effective Dates Name Provider Type [facility] 04/02/20 -  Anita Langley OT Occupational  Therapist OT                  OT Recommendation and Plan  Planned Therapy Interventions (OT): adaptive equipment training, patient/caregiver education/training, occupation/activity based interventions  Therapy Frequency (OT):  (anticipate X1-2 more sessions then dc.)  Plan of Care Review  Plan of Care Reviewed With: patient  Outcome Evaluation: Pt is seen today by OT to address goals for LBD with AE. OT educated pt today in reacher and sock aid use. Pt does report she was able to complete with less assistance today. She needs some cues for technique. Pt up in chair throughout session, on 4L. OT noted purewick connected. Pt up to bathroom SBA last OT session, encourage nsg to continue to assist with mobility and promote up to bathroom for functional strength to faciliate safe dc home.     Time Calculation:   Evaluation Complexity (OT)  Review Occupational Profile/Medical/Therapy History Complexity: brief/low complexity  Assessment, Occupational Performance/Identification of Deficit Complexity: 1-3 performance deficits  Clinical Decision Making Complexity (OT): problem focused assessment/low complexity  Overall Complexity of Evaluation (OT): low complexity     Time Calculation- OT       Row Name 04/11/24 1311             Time Calculation- OT    OT Start Time 1133  -SM      OT Stop Time 1146  -SM      OT Time Calculation (min) 13 min  -SM      OT Received On 04/11/24  -SM      OT - Next Appointment 04/12/24  -         Timed Charges    85291 - OT Self Care/Mgmt Minutes 13  -SM         Total Minutes    Timed Charges Total Minutes 13  -SM       Total Minutes 13  -SM                User Key  (r) = Recorded By, (t) = Taken By, (c) = Cosigned By      Initials Name Provider Type     Anita Langley OT Occupational Therapist                  Therapy Charges for Today       Code Description Service Date Service Provider Modifiers Qty    40866555026  OT SELF CARE/MGMT/TRAIN EA 15 MIN 4/10/2024 Anita Langley OT GO 1     [id number] HC OT EVAL LOW COMPLEXITY 2 4/10/2024 Anita Langley OT GO 1    13858924613 HC OT SELF CARE/MGMT/TRAIN EA 15 MIN 4/11/2024 Anita Langley OT GO 1                 Anita Langley OT  4/11/2024

## 2024-04-11 NOTE — PLAN OF CARE
Problem: Adult Inpatient Plan of Care  Goal: Plan of Care Review  Outcome: Ongoing, Progressing  Flowsheets (Taken 4/11/2024 1730)  Progress: improving  Plan of Care Reviewed With: patient  Outcome Evaluation: Awake alert OOB to chair. Washed up. feeding self. attempting to wean O2 for potential dc. Pt with flat affect. Denies c/o  Goal: Patient-Specific Goal (Individualized)  Outcome: Ongoing, Progressing  Goal: Absence of Hospital-Acquired Illness or Injury  Outcome: Ongoing, Progressing  Intervention: Identify and Manage Fall Risk  Recent Flowsheet Documentation  Taken 4/11/2024 1600 by Zuri Marino RN  Safety Promotion/Fall Prevention:   safety round/check completed   clutter free environment maintained  Taken 4/11/2024 1400 by Zuri Marino RN  Safety Promotion/Fall Prevention:   safety round/check completed   clutter free environment maintained  Taken 4/11/2024 1200 by Zuri Marino RN  Safety Promotion/Fall Prevention:   safety round/check completed   clutter free environment maintained  Taken 4/11/2024 1000 by Zuri Marino RN  Safety Promotion/Fall Prevention:   safety round/check completed   clutter free environment maintained  Intervention: Prevent and Manage VTE (Venous Thromboembolism) Risk  Recent Flowsheet Documentation  Taken 4/11/2024 1600 by Zuri Marino RN  Activity Management: up in chair  Taken 4/11/2024 1400 by Zuri Marino RN  Activity Management: up in chair  Taken 4/11/2024 1200 by Zuri Marino RN  Activity Management:   ambulated in room   up in chair  Taken 4/11/2024 1000 by Zuir Marino RN  Activity Management:   bedrest   up in chair  Taken 4/11/2024 0800 by Zuri Marino RN  VTE Prevention/Management:   bilateral   sequential compression devices on  Intervention: Prevent Infection  Recent Flowsheet Documentation  Taken 4/11/2024 1600 by Zuri Marino RN  Infection Prevention:   environmental surveillance performed   hand hygiene promoted   rest/sleep promoted   single  patient room provided  Taken 4/11/2024 1400 by Zuri Marino RN  Infection Prevention:   environmental surveillance performed   hand hygiene promoted   rest/sleep promoted   single patient room provided  Taken 4/11/2024 1200 by Zuri Marino RN  Infection Prevention:   environmental surveillance performed   hand hygiene promoted   rest/sleep promoted   single patient room provided  Taken 4/11/2024 1000 by Zuri Marino RN  Infection Prevention:   environmental surveillance performed   hand hygiene promoted   rest/sleep promoted   single patient room provided  Goal: Optimal Comfort and Wellbeing  Outcome: Ongoing, Progressing  Intervention: Provide Person-Centered Care  Recent Flowsheet Documentation  Taken 4/11/2024 0800 by Zuri Marino RN  Trust Relationship/Rapport:   care explained   choices provided   emotional support provided   empathic listening provided   questions encouraged  Goal: Readiness for Transition of Care  Outcome: Ongoing, Progressing     Problem: Adjustment to Illness COPD (Chronic Obstructive Pulmonary Disease)  Goal: Optimal Chronic Illness Coping  Outcome: Ongoing, Progressing  Intervention: Support and Optimize Psychosocial Response  Recent Flowsheet Documentation  Taken 4/11/2024 0800 by Zuri Marino RN  Supportive Measures:   active listening utilized   counseling provided   decision-making supported  Family/Support System Care: support provided     Problem: Functional Ability Impaired COPD (Chronic Obstructive Pulmonary Disease)  Goal: Optimal Level of Functional McDonald  Outcome: Ongoing, Progressing  Intervention: Optimize Functional Ability  Recent Flowsheet Documentation  Taken 4/11/2024 1600 by Zuri Marino RN  Activity Management: up in chair  Taken 4/11/2024 1400 by Zuri Marino RN  Activity Management: up in chair  Taken 4/11/2024 1200 by Zuri Marino RN  Activity Management:   ambulated in room   up in chair  Taken 4/11/2024 1000 by Zuri Marino RN  Activity  Management:   bedrest   up in chair  Taken 4/11/2024 0800 by Zuri Marino RN  Environmental Support: calm environment promoted     Problem: Infection COPD (Chronic Obstructive Pulmonary Disease)  Goal: Absence of Infection Signs and Symptoms  Outcome: Ongoing, Progressing     Problem: Oral Intake Inadequate COPD (Chronic Obstructive Pulmonary Disease)  Goal: Improved Nutrition Intake  Outcome: Ongoing, Progressing     Problem: Respiratory Compromise COPD (Chronic Obstructive Pulmonary Disease)  Goal: Effective Oxygenation and Ventilation  Outcome: Ongoing, Progressing  Intervention: Promote Airway Secretion Clearance  Recent Flowsheet Documentation  Taken 4/11/2024 1600 by Zuri Marino RN  Activity Management: up in chair  Taken 4/11/2024 1400 by Zuri Marino RN  Activity Management: up in chair  Taken 4/11/2024 1200 by Zuri Marino RN  Activity Management:   ambulated in room   up in chair  Taken 4/11/2024 1000 by Zuri Marino RN  Activity Management:   bedrest   up in chair  Taken 4/11/2024 0800 by Zuri Marino RN  Breathing Techniques/Airway Clearance: deep/controlled cough encouraged  Cough And Deep Breathing: done independently per patient  Intervention: Optimize Oxygenation and Ventilation  Recent Flowsheet Documentation  Taken 4/11/2024 0800 by Zuri Marino RN  Airway/Ventilation Management: airway patency maintained     Problem: COPD (Chronic Obstructive Pulmonary Disease) Comorbidity  Goal: Maintenance of COPD Symptom Control  Outcome: Ongoing, Progressing  Intervention: Maintain COPD-Symptom Control  Recent Flowsheet Documentation  Taken 4/11/2024 1600 by Zuri Marino RN  Medication Review/Management: medications reviewed  Taken 4/11/2024 1400 by Zuri Marino RN  Medication Review/Management: medications reviewed  Taken 4/11/2024 1200 by Zuri Marino RN  Medication Review/Management: medications reviewed  Taken 4/11/2024 1000 by Zuri Marino RN  Medication Review/Management:  medications reviewed  Taken 4/11/2024 0800 by Zuri Marino RN  Supportive Measures:   active listening utilized   counseling provided   decision-making supported     Problem: Obstructive Sleep Apnea Risk or Actual Comorbidity Management  Goal: Unobstructed Breathing During Sleep  Outcome: Ongoing, Progressing     Problem: Noninvasive Ventilation Acute  Goal: Effective Unassisted Ventilation and Oxygenation  Outcome: Ongoing, Progressing  Intervention: Monitor and Manage Noninvasive Ventilation  Recent Flowsheet Documentation  Taken 4/11/2024 0800 by Zuri Marino RN  Airway/Ventilation Management: airway patency maintained     Problem: Fall Injury Risk  Goal: Absence of Fall and Fall-Related Injury  Outcome: Ongoing, Progressing  Intervention: Identify and Manage Contributors  Recent Flowsheet Documentation  Taken 4/11/2024 1600 by Zuri Marino RN  Medication Review/Management: medications reviewed  Taken 4/11/2024 1400 by Zuri Marino RN  Medication Review/Management: medications reviewed  Taken 4/11/2024 1200 by Zuri Marino RN  Medication Review/Management: medications reviewed  Taken 4/11/2024 1000 by Zuri Marino RN  Medication Review/Management: medications reviewed  Intervention: Promote Injury-Free Environment  Recent Flowsheet Documentation  Taken 4/11/2024 1600 by Zuri Marino RN  Safety Promotion/Fall Prevention:   safety round/check completed   clutter free environment maintained  Taken 4/11/2024 1400 by Zuri Marino RN  Safety Promotion/Fall Prevention:   safety round/check completed   clutter free environment maintained  Taken 4/11/2024 1200 by Zuri Marino RN  Safety Promotion/Fall Prevention:   safety round/check completed   clutter free environment maintained  Taken 4/11/2024 1000 by Zuri Marino RN  Safety Promotion/Fall Prevention:   safety round/check completed   clutter free environment maintained     Problem: Skin Injury Risk Increased  Goal: Skin Health and  Integrity  Outcome: Ongoing, Progressing   Goal Outcome Evaluation:  Plan of Care Reviewed With: patient        Progress: improving  Outcome Evaluation: Awake alert OOB to chair. Washed up. feeding self. attempting to wean O2 for potential dc. Pt with flat affect. Denies c/o    Pt ambulated pt in room. Reported that had to increase o2 due to desat when ambulation. In chair. O2 decreased back down to 3l

## 2024-04-11 NOTE — PROGRESS NOTES
[facility]     LOS: 0 days     Subjective:    CC:Leg Swelling and Shortness of Breath    DIET:  Diet Order   Procedures    Diet: Regular/House; Fluid Consistency: Thin (IDDSI 0)     Breathing is slightly better, still SOA, tachycardia is better    Objective:    Vital Signs:  Temp:  [97.3 °F (36.3 °C)-98.5 °F (36.9 °C)] 98 °F (36.7 °C)  Heart Rate:  [60-96] 77  Resp:  [10-29] 22  BP: (115-131)/(73-88) 125/86    SpO2:  [92 %-100 %] 95 %  on  Flow (L/min):  [4] 4;   Device (Oxygen Therapy): nasal cannula  Body mass index is 38.96 kg/m².    Physical Exam  Constitutional:       Appearance: Normal appearance.   HENT:      Head: Normocephalic and atraumatic.   Cardiovascular:      Rate and Rhythm: Normal rate and regular rhythm.      Heart sounds: No murmur heard.     No friction rub.   Pulmonary:      Effort: Pulmonary effort is normal.      Breath sounds: Wheezing present.   Abdominal:      General: Bowel sounds are normal. There is no distension.      Palpations: Abdomen is soft.      Tenderness: There is no abdominal tenderness.   Skin:     General: Skin is warm and dry.   Neurological:      Mental Status: She is alert.   Psychiatric:         Mood and Affect: Mood normal.         Behavior: Behavior normal.         Results Review:    Glucose   Date Value Ref Range Status   04/11/2024 83 65 - 99 mg/dL Final   04/10/2024 117 (H) 65 - 99 mg/dL Final   04/09/2024 155 (H) 65 - 99 mg/dL Final   04/08/2024 83 65 - 99 mg/dL Final     Results from last 7 days   Lab Units 04/11/24  0624   WBC 10*3/mm3 11.63*   HEMOGLOBIN g/dL 11.9*   HEMATOCRIT % 39.6   PLATELETS 10*3/mm3 365     Results from last 7 days   Lab Units 04/11/24  0624 04/09/24  0623 04/08/24  2100   SODIUM mmol/L 141   < > 141   POTASSIUM mmol/L 4.1   < > 4.6   CHLORIDE mmol/L 105   < > 103   CO2 mmol/L 25.0   < > 30.0*   BUN mg/dL 21   < > 11   CREATININE mg/dL 0.83   < > 1.01*   CALCIUM mg/dL 9.0   < > 9.2   BILIRUBIN mg/dL  --   --   "0.2   ALK PHOS U/L  --   --  109   ALT (SGPT) U/L  --   --  13   AST (SGOT) U/L  --   --  13   GLUCOSE mg/dL 83   < > 83    < > = values in this interval not displayed.     Results from last 7 days   Lab Units 04/08/24  2100   INR  0.97     Results from last 7 days   Lab Units 04/09/24  1810   MAGNESIUM mg/dL 2.2     Results from last 7 days   Lab Units 04/09/24  1810 04/08/24  2316 04/08/24  2100   HSTROP T ng/L <6 7 9     Cultures:  No results found for: \"BLOODCX\", \"URINECX\", \"WOUNDCX\", \"MRSACX\", \"RESPCX\", \"STOOLCX\"    I have reviewed daily medications and changes in CPOE    Scheduled meds  amLODIPine, 5 mg, Oral, BID  budesonide-formoterol, 2 puff, Inhalation, BID - RT  cetirizine, 10 mg, Oral, Daily  cholestyramine light, 1 packet, Oral, Daily  enoxaparin, 40 mg, Subcutaneous, Nightly  ferrous sulfate, 325 mg, Oral, Daily With Breakfast  fluticasone, 2 spray, Nasal, Daily  furosemide, 40 mg, Oral, Daily  guaiFENesin, 1,200 mg, Oral, Q12H  ipratropium-albuterol, 3 mL, Nebulization, 4x Daily - RT  metoprolol succinate XL, 50 mg, Oral, BID  mirtazapine, 30 mg, Oral, Nightly  montelukast, 10 mg, Oral, Nightly  multivitamin with minerals, 1 tablet, Oral, Daily  pantoprazole, 40 mg, Oral, Daily  predniSONE, 40 mg, Oral, Daily With Breakfast  pregabalin, 150 mg, Oral, BID  sodium chloride, 10 mL, Intravenous, Q12H           PRN meds    acetaminophen **OR** acetaminophen **OR** acetaminophen    senna-docusate sodium **AND** polyethylene glycol **AND** bisacodyl **AND** bisacodyl    calcium carbonate    cyclobenzaprine    ipratropium-albuterol    nitroglycerin    ondansetron ODT **OR** ondansetron    sodium chloride    sodium chloride        Acute exacerbation of chronic obstructive pulmonary disease (COPD)    Sleep apnea    High cholesterol    Essential hypertension    Fibromyalgia    Diastolic dysfunction    Dyspnea    Acute-on-chronic respiratory failure    COPD exacerbation    CKD (chronic kidney disease) stage 2, " GFR 60-89 ml/min        Assessment/Plan:    Ms. Kaufman is a 74 y.o.   th pateint presents with acute on chronic respiratory failure with hypoxia and COPD exacerbation. She is on 2 to 4 L nasal cannula at home. Currently on 4, and we will try to wean     Acute hypoxic and hypercapnic respiratory failure  -wean oxygen    COPD exacerbation  CTA chest negative for PE demonstrates bilateral lower lobe opacities, mediastinal and bilateral hilar lymph node enlargement. VBG pH 7.317, pCO2 60.7. Normal WBC, afebrile, Pro-Salvatore. Negative troponin negative BNP  -IV steroids and DuoNebs in ER.    - prednisone 40 qday.  -Continue DuoNebs, Mucinex. Azithromycin, symbicort,  Flutter valve     Chest tightness 4/9  -troponins normal  -EKG no changes  -cardiology has seen and echo with nl Ef and borderline concentric hypertrophy    Chronic diastolic CHF  -Last echocardiogram August 2023 EF 60% grade 1 diastolic dysfunction.euvolemic on exam.      ANGÉLICA noncompliant  -cpap used last night  -pulmonary recommends bipap at home over cpap     CKD2- Mild renal insufficiency on admission creatinine now improved     Recent UTI hospitalized last week to [facility] for a UTI treated with Rocephin and discharged on Levaquin      Spenser Seymour MD  04/11/24  16:08 EDT

## 2024-04-11 NOTE — PLAN OF CARE
Goal Outcome Evaluation:         Pt alert, agreeable , uic and on 3 L at rest. Pt STS with rwx and sba. She amb 35' on 3 L with desat to 88%. Pt O2 increased to 4 L with another 35' with sba/cga with rwx. O2 briefly decreased to 90%. After seated rest, third trial pt amb 75' with rwx and cga/sba. Pt  O2 90% upon return to recliner. No lob direct lob noted but pt does appear with guarded, slow gait and is soa on O2. Recommend pt remove pure wic in am in prep to return home at d/c and amb to/from BR with  nsg asst prn.

## 2024-04-11 NOTE — PROGRESS NOTES
LOS: 0 days   Patient Care Team:  Artur Quevedo MD as PCP - General (Family Medicine)  Neisha Milton APRN as Nurse Practitioner (Nurse Practitioner)  Madelin Slaughter APRN as Nurse Practitioner (Nurse Practitioner)    Chief Complaint: follow up shortness of breath, edema     Interval History: She is resting in bed. She denies chest pain or discomfort, palpitations, or shortness of breath.     Vital Signs:  Temp:  [97.3 °F (36.3 °C)-98.5 °F (36.9 °C)] 97.3 °F (36.3 °C)  Heart Rate:  [60-96] 65  Resp:  [10-28] 27  BP: (112-131)/(73-89) 125/76    Intake/Output Summary (Last 24 hours) at 4/11/2024 1053  Last data filed at 4/11/2024 1027  Gross per 24 hour   Intake 960 ml   Output 4050 ml   Net -3090 ml     Physical Exam  Vitals reviewed.   Constitutional:       General: She is not in acute distress.  HENT:      Head: Normocephalic.      Nose: Nose normal.   Eyes:      Extraocular Movements: Extraocular movements intact.      Pupils: Pupils are equal, round, and reactive to light.   Cardiovascular:      Rate and Rhythm: Normal rate and regular rhythm.      Pulses: Normal pulses.      Heart sounds: Normal heart sounds. Heart sounds not distant. No murmur heard.     No friction rub. No gallop. No S3 or S4 sounds.   Pulmonary:      Effort: Pulmonary effort is normal.      Breath sounds: Decreased breath sounds present.   Abdominal:      General: Abdomen is flat. Bowel sounds are normal.      Palpations: Abdomen is soft.      Tenderness: There is no abdominal tenderness.   Skin:     General: Skin is warm and dry.   Neurological:      General: No focal deficit present.      Mental Status: She is alert and oriented to person, place, and time. Mental status is at baseline.   Psychiatric:         Mood and Affect: Mood normal.         Behavior: Behavior normal.         Results Review:    Results from last 7 days   Lab Units 04/11/24  0624   SODIUM mmol/L 141   POTASSIUM mmol/L 4.1   CHLORIDE mmol/L 105   CO2 mmol/L  25.0   BUN mg/dL 21   CREATININE mg/dL 0.83   GLUCOSE mg/dL 83   CALCIUM mg/dL 9.0     Results from last 7 days   Lab Units 04/09/24  1810 04/08/24  2316 04/08/24  2100   HSTROP T ng/L <6 7 9     Results from last 7 days   Lab Units 04/11/24  0624   WBC 10*3/mm3 11.63*   HEMOGLOBIN g/dL 11.9*   HEMATOCRIT % 39.6   PLATELETS 10*3/mm3 365     Results from last 7 days   Lab Units 04/08/24  2100   INR  0.97         Results from last 7 days   Lab Units 04/09/24  1810   MAGNESIUM mg/dL 2.2         Echocardiogram 4/10/2024   Left ventricular systolic function is normal. Calculated left ventricular EF = 64.9%    Left ventricular wall thickness is consistent with borderline concentric hypertrophy.    Left ventricular diastolic function is consistent with (grade I) impaired relaxation.    Estimated right ventricular systolic pressure from tricuspid regurgitation is mildly elevated (35-45 mmHg). Calculated right ventricular systolic pressure from tricuspid regurgitation is 35 mmHg.    Mild pulmonary hypertension is present.  I reviewed the patient's new clinical results.        Assessment & Plan:  Acute on chronic hypoxic and hypercapnic respiratory failure  She was on 4L NC when I saw her (baseline 2L)   COPD with acute exacerbation  Acute on chronic diastolic CHF, secondary to #2  Lower extremity edema has improved today, will transition to oral diuretics today.   Chest tightness with negative troponin  I suspect this is likely related to her COPD exacerbation. Her echocardiogram yesterday showed an EF of 65%, left ventricular diastolic function consistent with grade I impaired relaxation, mild pulmonary hypertension, and no significant valvular or wall motion abnormalities.   Would not pursue an ischemic evaluation at this time.   Sinus tachycardia, resolved.   Mild mediastinal and hilar lymph node enlargement, likely reactive  Obesity, complicating all aspects of care  Obstructive sleep apnea, on CPAP in the  [facility], APRN  04/11/24  10:53 EDT

## 2024-04-12 ENCOUNTER — APPOINTMENT (OUTPATIENT)
Dept: GENERAL RADIOLOGY | Facility: HOSPITAL | Age: 74
DRG: 190 | End: 2024-04-12
Payer: MEDICARE

## 2024-04-12 ENCOUNTER — TELEPHONE (OUTPATIENT)
Dept: FAMILY MEDICINE CLINIC | Age: 74
End: 2024-04-12
Payer: MEDICARE

## 2024-04-12 LAB — NT-PROBNP SERPL-MCNC: 49.9 PG/ML (ref 0–900)

## 2024-04-12 PROCEDURE — 94664 DEMO&/EVAL PT USE INHALER: CPT

## 2024-04-12 PROCEDURE — 94799 UNLISTED PULMONARY SVC/PX: CPT

## 2024-04-12 PROCEDURE — 97530 THERAPEUTIC ACTIVITIES: CPT

## 2024-04-12 PROCEDURE — 83880 ASSAY OF NATRIURETIC PEPTIDE: CPT | Performed by: NURSE PRACTITIONER

## 2024-04-12 PROCEDURE — 94760 N-INVAS EAR/PLS OXIMETRY 1: CPT

## 2024-04-12 PROCEDURE — 99232 SBSQ HOSP IP/OBS MODERATE 35: CPT | Performed by: NURSE PRACTITIONER

## 2024-04-12 PROCEDURE — 94660 CPAP INITIATION&MGMT: CPT

## 2024-04-12 PROCEDURE — 63710000001 PREDNISONE PER 1 MG: Performed by: NURSE PRACTITIONER

## 2024-04-12 PROCEDURE — 71046 X-RAY EXAM CHEST 2 VIEWS: CPT

## 2024-04-12 PROCEDURE — 25010000002 ENOXAPARIN PER 10 MG: Performed by: HOSPITALIST

## 2024-04-12 PROCEDURE — 94761 N-INVAS EAR/PLS OXIMETRY MLT: CPT

## 2024-04-12 RX ORDER — PREDNISONE 20 MG/1
20 TABLET ORAL
Status: DISCONTINUED | OUTPATIENT
Start: 2024-04-13 | End: 2024-04-13 | Stop reason: HOSPADM

## 2024-04-12 RX ADMIN — BUDESONIDE AND FORMOTEROL FUMARATE DIHYDRATE 2 PUFF: 160; 4.5 AEROSOL RESPIRATORY (INHALATION) at 19:23

## 2024-04-12 RX ADMIN — GUAIFENESIN 1200 MG: 600 TABLET, EXTENDED RELEASE ORAL at 08:05

## 2024-04-12 RX ADMIN — METOPROLOL SUCCINATE 50 MG: 50 TABLET, EXTENDED RELEASE ORAL at 08:05

## 2024-04-12 RX ADMIN — MIRTAZAPINE 30 MG: 30 TABLET, FILM COATED ORAL at 20:44

## 2024-04-12 RX ADMIN — FLUTICASONE PROPIONATE 2 SPRAY: 50 SPRAY, METERED NASAL at 08:05

## 2024-04-12 RX ADMIN — Medication 10 ML: at 08:05

## 2024-04-12 RX ADMIN — PANTOPRAZOLE SODIUM 40 MG: 40 TABLET, DELAYED RELEASE ORAL at 08:05

## 2024-04-12 RX ADMIN — FERROUS SULFATE TAB 325 MG (65 MG ELEMENTAL FE) 325 MG: 325 (65 FE) TAB at 08:05

## 2024-04-12 RX ADMIN — Medication 1 TABLET: at 08:05

## 2024-04-12 RX ADMIN — PREDNISONE 40 MG: 20 TABLET ORAL at 08:05

## 2024-04-12 RX ADMIN — ENOXAPARIN SODIUM 40 MG: 100 INJECTION SUBCUTANEOUS at 20:45

## 2024-04-12 RX ADMIN — ACETAMINOPHEN 325MG 650 MG: 325 TABLET ORAL at 17:07

## 2024-04-12 RX ADMIN — PREGABALIN 150 MG: 75 CAPSULE ORAL at 08:05

## 2024-04-12 RX ADMIN — IPRATROPIUM BROMIDE AND ALBUTEROL SULFATE 3 ML: .5; 3 SOLUTION RESPIRATORY (INHALATION) at 19:22

## 2024-04-12 RX ADMIN — CHOLESTYRAMINE 4 G: 4 POWDER, FOR SUSPENSION ORAL at 08:05

## 2024-04-12 RX ADMIN — IPRATROPIUM BROMIDE AND ALBUTEROL SULFATE 3 ML: .5; 3 SOLUTION RESPIRATORY (INHALATION) at 07:57

## 2024-04-12 RX ADMIN — BUDESONIDE AND FORMOTEROL FUMARATE DIHYDRATE 2 PUFF: 160; 4.5 AEROSOL RESPIRATORY (INHALATION) at 07:58

## 2024-04-12 RX ADMIN — FUROSEMIDE 40 MG: 40 TABLET ORAL at 08:05

## 2024-04-12 RX ADMIN — IPRATROPIUM BROMIDE AND ALBUTEROL SULFATE 3 ML: .5; 3 SOLUTION RESPIRATORY (INHALATION) at 11:37

## 2024-04-12 RX ADMIN — GUAIFENESIN 1200 MG: 600 TABLET, EXTENDED RELEASE ORAL at 20:44

## 2024-04-12 RX ADMIN — Medication 10 ML: at 20:45

## 2024-04-12 RX ADMIN — MONTELUKAST SODIUM 10 MG: 10 TABLET, FILM COATED ORAL at 20:44

## 2024-04-12 RX ADMIN — AMLODIPINE BESYLATE 5 MG: 5 TABLET ORAL at 08:05

## 2024-04-12 RX ADMIN — IPRATROPIUM BROMIDE AND ALBUTEROL SULFATE 3 ML: .5; 3 SOLUTION RESPIRATORY (INHALATION) at 16:06

## 2024-04-12 RX ADMIN — AMLODIPINE BESYLATE 5 MG: 5 TABLET ORAL at 20:44

## 2024-04-12 RX ADMIN — METOPROLOL SUCCINATE 50 MG: 50 TABLET, EXTENDED RELEASE ORAL at 20:44

## 2024-04-12 RX ADMIN — PREGABALIN 150 MG: 75 CAPSULE ORAL at 20:45

## 2024-04-12 RX ADMIN — CETIRIZINE HYDROCHLORIDE 10 MG: 10 TABLET ORAL at 08:05

## 2024-04-12 NOTE — THERAPY TREATMENT NOTE
Patient Name: Eileen Kaufman  : 1950    MRN: [id number]                              Today's Date: 2024       Admit Date: 2024    Visit Dx:     ICD-10-CM ICD-9-CM   1. Acute respiratory failure with hypoxia and hypercapnia  J96.01 518.81    J96.02    2. COPD with acute exacerbation  J44.1 491.21     Patient Active Problem List   Diagnosis    Allergic rhinitis    Anxiety    Sleep apnea    Cervical spondylosis without myelopathy    Congestive heart failure    High cholesterol    Hypercalcemia    Essential hypertension    Lumbar radiculopathy    Fibromyalgia    Chronic obstructive pulmonary disease    Chronic nonintractable headache    Frequency of micturition    Other long term (current) drug therapy    Syncope and collapse    Bilateral sacroiliitis    Weakness    Chronic respiratory failure with hypoxia    Chronic right-sided low back pain with right-sided sciatica    Chronic superficial gastritis    Diastolic dysfunction    Postlaminectomy syndrome    Dyspnea    Acute exacerbation of chronic obstructive pulmonary disease (COPD)    Acute-on-chronic respiratory failure    COPD exacerbation    CKD (chronic kidney disease) stage 2, GFR 60-89 ml/min     Past Medical History:   Diagnosis Date    Anemia, unspecified     Asymptomatic menopausal state     Chronic obstructive pulmonary disease with (acute) exacerbation     Essential (primary) hypertension     High cholesterol 2021    Low back pain     Major depressive disorder, single episode, moderate     Nicotine dependence, unspecified, uncomplicated     Osteopenia     Other intervertebral disc degeneration, lumbar region     Other long term (current) drug therapy     Solitary pulmonary nodule     Vitamin D deficiency, unspecified      Past Surgical History:   Procedure Laterality Date    CARDIAC CATHETERIZATION  2012    CARPAL TUNNEL RELEASE      CHOLECYSTECTOMY      COLONOSCOPY  2022    Normal    EPIDURAL Right 10/25/2023    Procedure:  LUMBAR/SACRAL TRANSFORAMINAL EPIDURAL Right L3-4 and RIght L5-S1 15089, 69321;  Surgeon: Shandra Thomas MD;  Location: [facility] MAIN OR;  Service: Pain Management;  Laterality: Right;    HYSTERECTOMY  1980s    Total    KNEE ARTHROSCOPY Left     NECK SURGERY  01/21/2015      General Information       Row Name 04/12/24 1553          Physical Therapy Time and Intention    Document Type therapy note (daily note)  -EM     Mode of Treatment individual therapy;physical therapy  -EM       Row Name 04/12/24 1553          General Information    Existing Precautions/Restrictions fall;oxygen therapy device and L/min  2L  -EM               User Key  (r) = Recorded By, (t) = Taken By, (c) = Cosigned By      Initials Name Provider Type    EM Marnie Sanches PT Physical Therapist                   Mobility       Row Name 04/12/24 1554          Bed Mobility    Supine-Sit Hanson (Bed Mobility) supervision  -EM     Assistive Device (Bed Mobility) head of bed elevated  -EM       Row Name 04/12/24 1554          Sit-Stand Transfer    Sit-Stand Hanson (Transfers) standby assist  -EM     Assistive Device (Sit-Stand Transfers) walker, front-wheeled  -EM       Row Name 04/12/24 1555          Gait/Stairs (Locomotion)    Hanson Level (Gait) contact guard  -EM     Assistive Device (Gait) walker, front-wheeled  -EM     Distance in Feet (Gait) 140  -EM     Deviations/Abnormal Patterns (Gait) gait speed decreased  -EM     Comment, (Gait/Stairs) steady, on 2L o2 throughout ambulation, pt c/o mild SOA towards end of ambulation  -EM               User Key  (r) = Recorded By, (t) = Taken By, (c) = Cosigned By      Initials Name Provider Type    EM Marnie Sanches PT Physical Therapist                   Obj/Interventions    No documentation.                  Goals/Plan    No documentation.                  Clinical Impression       Row Name 04/12/24 4510          Pain    Pretreatment Pain Rating 0/10 - no pain  -EM        Row Name 04/12/24 1555          Plan of Care Review    Plan of Care Reviewed With patient  -EM     Outcome Evaluation Pt in supine, awake and alert, agreeable to therapy. Patient performed bed mobility with supervision, sit to stand with SBA, ambulated 140 feet with rwx and CGA on 2L o2 today, sats at 92% upon return to room. Pt demonstrates good progress with mobility today, anticipate d/c home soon.  -EM       Row Name 04/12/24 1555          Positioning and Restraints    Pre-Treatment Position in bed  -EM     Post Treatment Position chair  -EM     In Chair reclined;call light within reach;exit alarm on  -EM               User Key  (r) = Recorded By, (t) = Taken By, (c) = Cosigned By      Initials Name Provider Type    Marnie Sauceda PT Physical Therapist                   Outcome Measures       Row Name 04/12/24 1557 04/12/24 0812       How much help from another person do you currently need...    Turning from your back to your side while in flat bed without using bedrails? 4  -EM 4  -AG    Moving from lying on back to sitting on the side of a flat bed without bedrails? 3  -EM 3  -AG    Moving to and from a bed to a chair (including a wheelchair)? 3  -EM 3  -AG    Standing up from a chair using your arms (e.g., wheelchair, bedside chair)? 3  -EM 3  -AG    Climbing 3-5 steps with a railing? 3  -EM 3  -AG    To walk in hospital room? 3  -EM 3  -AG    AM-PAC 6 Clicks Score (PT) 19  -EM 19  -AG    Highest Level of Mobility Goal 6 --> Walk 10 steps or more  -EM 6 --> Walk 10 steps or more  -AG              User Key  (r) = Recorded By, (t) = Taken By, (c) = Cosigned By      Initials Name Provider Type    Marnie Sauceda PT Physical Therapist    Ana Valadez RN Registered Nurse                                 Physical Therapy Education       Title: PT OT SLP Therapies (In Progress)       Topic: Physical Therapy (In Progress)       Point: Mobility training (Done)       Learning Progress Summary              Patient Acceptance, E, VU by EM at 4/12/2024 1557    Acceptance, E,D, DU by PC at 4/10/2024 1645                         Point: Home exercise program (Not Started)       Learner Progress:  Not documented in this visit.              Point: Body mechanics (Done)       Learning Progress Summary             Patient Acceptance, E,D, DU by PC at 4/10/2024 1645                         Point: Precautions (Done)       Learning Progress Summary             Patient Acceptance, E,D, DU by PC at 4/10/2024 1645                                         User Key       Initials Effective Dates Name Provider Type Discipline    PC 06/16/21 -  Vannessa Dale PT Physical Therapist PT    EM 06/16/21 -  Marnie Sanches PT Physical Therapist PT                  PT Recommendation and Plan     Plan of Care Reviewed With: patient  Outcome Evaluation: Pt in supine, awake and alert, agreeable to therapy. Patient performed bed mobility with supervision, sit to stand with SBA, ambulated 140 feet with rwx and CGA on 2L o2 today, sats at 92% upon return to room. Pt demonstrates good progress with mobility today, anticipate d/c home soon.     Time Calculation:         PT Charges       Row Name 04/12/24 1558             Time Calculation    Start Time 1450  -EM      Stop Time 1506  -EM      Time Calculation (min) 16 min  -EM      PT Received On 04/12/24  -EM      PT - Next Appointment 04/15/24  -EM         Time Calculation- PT    Total Timed Code Minutes- PT 16 minute(s)  -EM         Timed Charges    11970 - PT Therapeutic Activity Minutes 16  -EM         Total Minutes    Timed Charges Total Minutes 16  -EM       Total Minutes 16  -EM                User Key  (r) = Recorded By, (t) = Taken By, (c) = Cosigned By      Initials Name Provider Type    EM Marnie Sanches PT Physical Therapist                  Therapy Charges for Today       Code Description Service Date Service Provider Modifiers Qty    26982830657 HC PT THERAPEUTIC  ACT EA 15 MIN 4/12/2024 Marnie Sanches, PT GP 1            PT G-Codes  Outcome Measure Options: AM-PAC 6 Clicks Daily Activity (OT)  AM-PAC 6 Clicks Score (PT): 19  AM-PAC 6 Clicks Score (OT): 21       Marnie Sanches, PT  4/12/2024

## 2024-04-12 NOTE — PROGRESS NOTES
"    Patient Name: Eileen Kaufman  :1950  74 y.o.      Patient Care Team:  Artur Quevedo MD as PCP - General (Family Medicine)  Neisha Milton APRN as Nurse Practitioner (Nurse Practitioner)  Madelin Slaughter APRN as Nurse Practitioner (Nurse Practitioner)    Chief Complaint: follow up shortness of breath    Interval History: still pretty short of breath today. Good UOP.        Objective   Vital Signs  Temp:  [97.6 °F (36.4 °C)-98.4 °F (36.9 °C)] 98.2 °F (36.8 °C)  Heart Rate:  [59-84] 72  Resp:  [14-28] 18  BP: (110-134)/(72-86) 128/83    Intake/Output Summary (Last 24 hours) at 2024 1415  Last data filed at 2024 0603  Gross per 24 hour   Intake 870 ml   Output 1875 ml   Net -1005 ml     Flowsheet Rows      Flowsheet Row First Filed Value   Admission Height 162.6 cm (64.02\") Documented at 2024   Admission Weight 102 kg (224 lb 13.9 oz) Documented at 2024            Physical Exam:   General Appearance:    Alert, cooperative, in no acute distress   Lungs:     Clear to auscultation.  Normal respiratory effort and rate.      Heart:    Regular rhythm and normal rate, normal S1 and S2, no murmurs, gallops or rubs.     Chest Wall:    No abnormalities observed   Abdomen:     Soft, nontender, positive bowel sounds.     Extremities:   no cyanosis, clubbing or edema.  No marked joint deformities.  Adequate musculoskeletal strength.       Results Review:    Results from last 7 days   Lab Units 24  0624   SODIUM mmol/L 141   POTASSIUM mmol/L 4.1   CHLORIDE mmol/L 105   CO2 mmol/L 25.0   BUN mg/dL 21   CREATININE mg/dL 0.83   GLUCOSE mg/dL 83   CALCIUM mg/dL 9.0     Results from last 7 days   Lab Units 24  1810 24  2316 24  2100   HSTROP T ng/L <6 7 9     Results from last 7 days   Lab Units 24  0624   WBC 10*3/mm3 11.63*   HEMOGLOBIN g/dL 11.9*   HEMATOCRIT % 39.6   PLATELETS 10*3/mm3 365     Results from last 7 days   Lab Units 24  2100   INR  " 0.97     Results from last 7 days   Lab Units 04/09/24  1810   MAGNESIUM mg/dL 2.2                   Medication Review:   amLODIPine, 5 mg, Oral, BID  budesonide-formoterol, 2 puff, Inhalation, BID - RT  cetirizine, 10 mg, Oral, Daily  cholestyramine light, 1 packet, Oral, Daily  enoxaparin, 40 mg, Subcutaneous, Nightly  ferrous sulfate, 325 mg, Oral, Daily With Breakfast  fluticasone, 2 spray, Nasal, Daily  furosemide, 40 mg, Oral, Daily  guaiFENesin, 1,200 mg, Oral, Q12H  ipratropium-albuterol, 3 mL, Nebulization, 4x Daily - RT  metoprolol succinate XL, 50 mg, Oral, BID  mirtazapine, 30 mg, Oral, Nightly  montelukast, 10 mg, Oral, Nightly  multivitamin with minerals, 1 tablet, Oral, Daily  pantoprazole, 40 mg, Oral, Daily  pregabalin, 150 mg, Oral, BID  sodium chloride, 10 mL, Intravenous, Q12H              Assessment & Plan   Acute on chronic hypoxic and hypercapnic respiratory failure  COPD with acute exacerbation  Acute on chronic diastolic heart failure , good UOP on oral diuretics. proBNP on 4/8/24 was normal.   Chest tightness, negative HS troponin reassuring. EKG unchanged. Echocardiogram with normal LVEF and no regional wall motion abnormalities  Sinus tachycardia , resolved  Obstructive sleep apnea, CPAP in the hospital.   Mediastinal lymphadenopathy , felt to be reactive. Plans for repeat CT chest in 3 months.     Over all seems to be doing better. Good UOP on oral diuretics. She does feel more short of breath today. Not wheezing. Will repeat a CXR and BNP.     ESTEFANI Buckley  [facility]  04/12/24  14:15 EDT.

## 2024-04-12 NOTE — CASE MANAGEMENT/SOCIAL WORK
Discharge Planning Assessment  [facility]     Patient Name: Eileen Kaufman  MRN: [id number]  Today's Date: 4/12/2024    Admit Date: 4/8/2024    Plan: Return home with liz follow for HH needs at discharge   Discharge Needs Assessment       Row Name 04/12/24 1205       Living Environment    People in Home child(samantha), adult    Current Living Arrangements home    Potentially Unsafe Housing Conditions none    Primary Care Provided by self    Provides Primary Care For no one    Family Caregiver if Needed child(samantha), adult    Quality of Family Relationships helpful;involved;supportive    Able to Return to Prior Arrangements yes       Resource/Environmental Concerns    Resource/Environmental Concerns none    Transportation Concerns none       Transition Planning    Patient/Family Anticipates Transition to home with family    Patient/Family Anticipated Services at Transition home health care    Transportation Anticipated family or friend will provide       Discharge Needs Assessment    Readmission Within the Last 30 Days no previous admission in last 30 days    Equipment Currently Used at Home oxygen;walker, rolling;nebulizer    Concerns to be Addressed no discharge needs identified;denies needs/concerns at this time    Anticipated Changes Related to Illness none    Equipment Needed After Discharge none    Provided Post Acute Provider List? N/A    Provided Post Acute Provider Quality & Resource List? N/A    Patient's Choice of Community Agency(s) [facility]                   Discharge Plan       Row Name 04/12/24 1206       Plan    Plan Return home with liz follow for HH needs at discharge    Patient/Family in Agreement with Plan yes    Plan Comments IMM noted. [facility] met with the patient, her daughter, and her son at bedside with the patient’s verbal permission. The patient confirmed she lives at home with her daughter in a 1-level home. She confirmed her PCP is Artur Quevedo. She said she has worked with  [facility] and [facility] home health in the past. She denied any SNF history. She said she has home oxygen through [employer] and uses a rolling walker. Her pharmacy is [facility] in [place], [place]. She said there are 2 steps at the front door to enter the home without any handrails. Family to transport at discharge. CCP to follow. CD, CSW.                  Continued Care and Services - Admitted Since 4/8/2024    No active coordination exists for this encounter.          Demographic Summary       Row Name 04/12/24 1202       General Information    Admission Type inpatient    Arrived From emergency department    Required Notices Provided Important Message from Medicare    Referral Source admission list    Reason for Consult discharge planning    Preferred Language English                   Functional Status       Row Name 04/12/24 1205       Functional Status    Usual Activity Tolerance moderate    Current Activity Tolerance moderate       Functional Status, IADL    Medications assistive equipment    Meal Preparation assistive equipment    Housekeeping assistive equipment    Laundry assistive equipment    Shopping assistive equipment       Mental Status    General Appearance WDL WDL       Mental Status Summary    Recent Changes in Mental Status/Cognitive Functioning no changes       Employment/    Employment Status retired                   Psychosocial    No documentation.                  Abuse/Neglect    No documentation.                  Legal    No documentation.                  Substance Abuse    No documentation.                  Patient Forms    No documentation.

## 2024-04-12 NOTE — PLAN OF CARE
Goal Outcome Evaluation:  Plan of Care Reviewed With: patient           Outcome Evaluation: Pt in supine, awake and alert, agreeable to therapy. Patient performed bed mobility with supervision, sit to stand with SBA, ambulated 140 feet with rwx and CGA on 2L o2 today, sats at 92% upon return to room. Pt demonstrates good progress with mobility today, anticipate d/c home soon.

## 2024-04-12 NOTE — PROGRESS NOTES
[facility]     LOS: 1 day     Subjective:    CC:Leg Swelling and Shortness of Breath    DIET:  Diet Order   Procedures    Diet: Regular/House; Fluid Consistency: Thin (IDDSI 0)     Breathing is much improved and tachycardia has resolved    Objective:    Vital Signs:  Temp:  [97.6 °F (36.4 °C)-98.4 °F (36.9 °C)] 98.1 °F (36.7 °C)  Heart Rate:  [59-84] 79  Resp:  [14-28] 20  BP: (110-140)/(72-90) 140/90    SpO2:  [90 %-98 %] 92 %  on  Flow (L/min):  [2-4] 2;   Device (Oxygen Therapy): nasal cannula  Body mass index is 38.96 kg/m².    Physical Exam  Constitutional:       Appearance: Normal appearance.   HENT:      Head: Normocephalic and atraumatic.   Cardiovascular:      Rate and Rhythm: Normal rate and regular rhythm.      Heart sounds: No murmur heard.     No friction rub.   Pulmonary:      Effort: Pulmonary effort is normal.      Breath sounds: Wheezing present.   Abdominal:      General: Bowel sounds are normal. There is no distension.      Palpations: Abdomen is soft.      Tenderness: There is no abdominal tenderness.   Skin:     General: Skin is warm and dry.   Neurological:      Mental Status: She is alert.   Psychiatric:         Mood and Affect: Mood normal.         Behavior: Behavior normal.         Results Review:    Glucose   Date Value Ref Range Status   04/11/2024 83 65 - 99 mg/dL Final   04/10/2024 117 (H) 65 - 99 mg/dL Final     Results from last 7 days   Lab Units 04/11/24  0624   WBC 10*3/mm3 11.63*   HEMOGLOBIN g/dL 11.9*   HEMATOCRIT % 39.6   PLATELETS 10*3/mm3 365     Results from last 7 days   Lab Units 04/11/24  0624 04/09/24  0623 04/08/24  2100   SODIUM mmol/L 141   < > 141   POTASSIUM mmol/L 4.1   < > 4.6   CHLORIDE mmol/L 105   < > 103   CO2 mmol/L 25.0   < > 30.0*   BUN mg/dL 21   < > 11   CREATININE mg/dL 0.83   < > 1.01*   CALCIUM mg/dL 9.0   < > 9.2   BILIRUBIN mg/dL  --   --  0.2   ALK PHOS U/L  --   --  109   ALT (SGPT) U/L  --   --  13   AST (SGOT) U/L  --  "  --  13   GLUCOSE mg/dL 83   < > 83    < > = values in this interval not displayed.     Results from last 7 days   Lab Units 04/08/24  2100   INR  0.97     Results from last 7 days   Lab Units 04/09/24  1810   MAGNESIUM mg/dL 2.2     Results from last 7 days   Lab Units 04/09/24  1810 04/08/24  2316 04/08/24  2100   HSTROP T ng/L <6 7 9     Cultures:  No results found for: \"BLOODCX\", \"URINECX\", \"WOUNDCX\", \"MRSACX\", \"RESPCX\", \"STOOLCX\"    I have reviewed daily medications and changes in CPOE    Scheduled meds  amLODIPine, 5 mg, Oral, BID  budesonide-formoterol, 2 puff, Inhalation, BID - RT  cetirizine, 10 mg, Oral, Daily  cholestyramine light, 1 packet, Oral, Daily  enoxaparin, 40 mg, Subcutaneous, Nightly  ferrous sulfate, 325 mg, Oral, Daily With Breakfast  fluticasone, 2 spray, Nasal, Daily  furosemide, 40 mg, Oral, Daily  guaiFENesin, 1,200 mg, Oral, Q12H  ipratropium-albuterol, 3 mL, Nebulization, 4x Daily - RT  metoprolol succinate XL, 50 mg, Oral, BID  mirtazapine, 30 mg, Oral, Nightly  montelukast, 10 mg, Oral, Nightly  multivitamin with minerals, 1 tablet, Oral, Daily  pantoprazole, 40 mg, Oral, Daily  pregabalin, 150 mg, Oral, BID  sodium chloride, 10 mL, Intravenous, Q12H           PRN meds    acetaminophen **OR** acetaminophen **OR** acetaminophen    senna-docusate sodium **AND** polyethylene glycol **AND** bisacodyl **AND** bisacodyl    calcium carbonate    cyclobenzaprine    ipratropium-albuterol    nitroglycerin    ondansetron ODT **OR** ondansetron    sodium chloride    sodium chloride        Acute exacerbation of chronic obstructive pulmonary disease (COPD)    Sleep apnea    High cholesterol    Essential hypertension    Fibromyalgia    Diastolic dysfunction    Dyspnea    Acute-on-chronic respiratory failure    COPD exacerbation    CKD (chronic kidney disease) stage 2, GFR 60-89 ml/min        Assessment/Plan:    Ms. Kaufman is a 74 y.o.   the patient presents with acute on chronic respiratory failure " with hypoxia and COPD exacerbation. She is on 2 to 4 L nasal cannula at home. Currently on 2, and we will try to wean     Acute hypoxic and hypercapnic respiratory failure  -wean oxygen    COPD exacerbation  CTA chest negative for PE demonstrates bilateral lower lobe opacities, mediastinal and bilateral hilar lymph node enlargement. VBG pH 7.317, pCO2 60.7. Normal WBC, afebrile, Pro-Salvatore. Negative troponin negative BNP  -IV steroids and DuoNebs in ER.    - prednisone 40 qday reduce to 20 mg in am.  -Continue DuoNebs, Mucinex. Azithromycin, symbicort,  Flutter valve     Chest tightness 4/9  -troponins normal  -EKG no changes  -cardiology has seen and echo with nl Ef and borderline concentric hypertrophy  -cardiology repeating a cxr and BNP    Acute on Chronic diastolic CHF  -Last echocardiogram August 2023 EF 60% grade 1 diastolic dysfunction.euvolemic on exam.   -started on lasix and seen by cardiology     ANGÉLICA noncompliant  -cpap used last night  -pulmonary recommends bipap at home over cpap     CKD2- Mild renal insufficiency on admission creatinine now improved     Recent UTI hospitalized last week to [facility] for a UTI treated with Rocephin and discharged on Levaquin      Spenser Seymour MD  04/12/24  16:57 EDT

## 2024-04-12 NOTE — PROGRESS NOTES
[facility]  [phone]  Dr. Sreekanth Ruiz    Subjective:  LOS: 1    Chief Complaint: COPD    Denies new respiratory complaints. Uses 2L O2 at home. Has a CPAP at home but does not use regularly.  Quit smoking 1 year ago.    Objective   Vital Signs past 24hrs  Temp range: Temp (24hrs), Av.1 °F (36.7 °C), Min:97.6 °F (36.4 °C), Max:98.4 °F (36.9 °C)    BP range: BP: (110-134)/(72-86) 128/83  Pulse range: Heart Rate:  [59-84] 72  Resp rate range: Resp:  [14-28] 18  Device (Oxygen Therapy): nasal cannulaFlow (L/min):  [2-4] 2  Oxygen range:SpO2:  [90 %-98 %] 94 %   Mechanical Ventilator:     Physical Exam  Constitutional:       Appearance: She is obese.   Eyes:      Pupils: Pupils are equal, round, and reactive to light.   Cardiovascular:      Rate and Rhythm: Normal rate and regular rhythm.      Heart sounds: No murmur heard.  Pulmonary:      Effort: Pulmonary effort is normal.      Breath sounds: Normal breath sounds.   Abdominal:      General: Bowel sounds are normal.      Palpations: Abdomen is soft. There is no mass.      Tenderness: There is no abdominal tenderness.   Musculoskeletal:         General: No swelling.   Neurological:      Mental Status: She is alert.       Results Review:    I have reviewed the laboratory and imaging data since the last note by [facility] physician.  My annotations are noted in assessment and plan.      Result Review:  I have personally reviewed the results from last note by [facility] physician to 2024 13:47 EDT and agree with these findings:  []  Laboratory list / accordion  []  Microbiology  []  Radiology  []  EKG/Telemetry   []  Cardiology/Vascular   []  Pathology  []  Old records  []  Other:    Medication Review:  I have reviewed the current MAR.  My annotations are noted in assessment and plan.    amLODIPine, 5 mg, Oral, BID  budesonide-formoterol, 2 puff, Inhalation, BID - RT  cetirizine, 10 mg, Oral, Daily  cholestyramine light, 1 packet, Oral, Daily  enoxaparin,  40 mg, Subcutaneous, Nightly  ferrous sulfate, 325 mg, Oral, Daily With Breakfast  fluticasone, 2 spray, Nasal, Daily  furosemide, 40 mg, Oral, Daily  guaiFENesin, 1,200 mg, Oral, Q12H  ipratropium-albuterol, 3 mL, Nebulization, 4x Daily - RT  metoprolol succinate XL, 50 mg, Oral, BID  mirtazapine, 30 mg, Oral, Nightly  montelukast, 10 mg, Oral, Nightly  multivitamin with minerals, 1 tablet, Oral, Daily  pantoprazole, 40 mg, Oral, Daily  pregabalin, 150 mg, Oral, BID  sodium chloride, 10 mL, Intravenous, Q12H           Lines, Drains & Airways       Active LDAs       Name Placement date Placement time Site Days    Peripheral IV 04/08/24 2101 Left Antecubital 04/08/24 2101  Antecubital  2    External Urinary Catheter 04/09/24  0200  --  2                  Isolation status: No active isolations    Dietary Orders (From admission, onward)       Start     Ordered    04/10/24 0917  Diet: Regular/House; Fluid Consistency: Thin (IDDSI 0)  Diet Effective Now        References:    Diet Order [place]   Question Answer Comment   Diets: Regular/House    Fluid Consistency: Thin (IDDSI 0)        04/10/24 0916                    PCCM Problems  Acute on chronic hypoxic and hypercapnic respiratory failure  Acute COPD exacerbation  Acute HFpEF  Bibasilar atelectasis  Mediastinal and hilar lymphadenopathy, requires follow-up  Obstructive sleep apnea  Obesity          Plan of Treatment    Appears close to baseline.  Recommended she stay compliant with her oxygen.  Recommended she speak with her pulmonologist in [place] and consider switching to BiPAP or noninvasive ventilation device at night.   Will trial BIPAP settings tonight. 16/8 with backup rate 8.    No further wheezing.  Resume home bronchodilator regimen on discharge.    Acute HFpEF and improved, management per cardiology.    Recommend 3-month CT chest with IV contrast for follow-up of mediastinal lymphadenopathy.  Likely reactive but needs monitoring.    She will follow-up  with her pulmonologist in [facility].    Sreekanth Ruiz MD  04/12/24  13:47 EDT      Part of this note may be an electronic transcription/translation of spoken language to printed text using the Dragon Dictation System.

## 2024-04-12 NOTE — TELEPHONE ENCOUNTER
"Patient's son, Mitch Guidry dropped off FMLA forms to be completed to care for patient. I asked how many days he is requesting and he said \"a couple of days a week or something.\" Please advise.  "

## 2024-04-13 ENCOUNTER — READMISSION MANAGEMENT (OUTPATIENT)
Dept: CALL CENTER | Facility: HOSPITAL | Age: 74
End: 2024-04-13
Payer: MEDICARE

## 2024-04-13 VITALS
OXYGEN SATURATION: 100 % | RESPIRATION RATE: 20 BRPM | HEART RATE: 83 BPM | WEIGHT: 227 LBS | DIASTOLIC BLOOD PRESSURE: 82 MMHG | TEMPERATURE: 98.2 F | SYSTOLIC BLOOD PRESSURE: 121 MMHG | HEIGHT: 64 IN | BODY MASS INDEX: 38.76 KG/M2

## 2024-04-13 LAB
ANION GAP SERPL CALCULATED.3IONS-SCNC: 13.8 MMOL/L (ref 5–15)
BUN SERPL-MCNC: 26 MG/DL (ref 8–23)
BUN/CREAT SERPL: 26.5 (ref 7–25)
CALCIUM SPEC-SCNC: 9.9 MG/DL (ref 8.6–10.5)
CHLORIDE SERPL-SCNC: 103 MMOL/L (ref 98–107)
CO2 SERPL-SCNC: 22.2 MMOL/L (ref 22–29)
CREAT SERPL-MCNC: 0.98 MG/DL (ref 0.57–1)
EGFRCR SERPLBLD CKD-EPI 2021: 61.5 ML/MIN/1.73
GLUCOSE BLDC GLUCOMTR-MCNC: 180 MG/DL (ref 70–130)
GLUCOSE SERPL-MCNC: 192 MG/DL (ref 65–99)
POTASSIUM SERPL-SCNC: 4.3 MMOL/L (ref 3.5–5.2)
SODIUM SERPL-SCNC: 139 MMOL/L (ref 136–145)

## 2024-04-13 PROCEDURE — 94799 UNLISTED PULMONARY SVC/PX: CPT

## 2024-04-13 PROCEDURE — 99232 SBSQ HOSP IP/OBS MODERATE 35: CPT | Performed by: INTERNAL MEDICINE

## 2024-04-13 PROCEDURE — 80048 BASIC METABOLIC PNL TOTAL CA: CPT | Performed by: HOSPITALIST

## 2024-04-13 PROCEDURE — 94660 CPAP INITIATION&MGMT: CPT

## 2024-04-13 PROCEDURE — 82948 REAGENT STRIP/BLOOD GLUCOSE: CPT

## 2024-04-13 PROCEDURE — 94664 DEMO&/EVAL PT USE INHALER: CPT

## 2024-04-13 PROCEDURE — 63710000001 PREDNISONE PER 1 MG: Performed by: HOSPITALIST

## 2024-04-13 PROCEDURE — 94760 N-INVAS EAR/PLS OXIMETRY 1: CPT

## 2024-04-13 PROCEDURE — 94761 N-INVAS EAR/PLS OXIMETRY MLT: CPT

## 2024-04-13 RX ORDER — PREDNISONE 20 MG/1
10 TABLET ORAL
Qty: 3 TABLET | Refills: 0 | Status: SHIPPED | OUTPATIENT
Start: 2024-04-14

## 2024-04-13 RX ADMIN — PREGABALIN 150 MG: 75 CAPSULE ORAL at 08:44

## 2024-04-13 RX ADMIN — IPRATROPIUM BROMIDE AND ALBUTEROL SULFATE 3 ML: .5; 3 SOLUTION RESPIRATORY (INHALATION) at 07:17

## 2024-04-13 RX ADMIN — METOPROLOL SUCCINATE 50 MG: 50 TABLET, EXTENDED RELEASE ORAL at 08:38

## 2024-04-13 RX ADMIN — IPRATROPIUM BROMIDE AND ALBUTEROL SULFATE 3 ML: .5; 3 SOLUTION RESPIRATORY (INHALATION) at 16:08

## 2024-04-13 RX ADMIN — IPRATROPIUM BROMIDE AND ALBUTEROL SULFATE 3 ML: .5; 3 SOLUTION RESPIRATORY (INHALATION) at 10:49

## 2024-04-13 RX ADMIN — FLUTICASONE PROPIONATE 2 SPRAY: 50 SPRAY, METERED NASAL at 08:37

## 2024-04-13 RX ADMIN — BUDESONIDE AND FORMOTEROL FUMARATE DIHYDRATE 2 PUFF: 160; 4.5 AEROSOL RESPIRATORY (INHALATION) at 07:21

## 2024-04-13 RX ADMIN — FUROSEMIDE 40 MG: 40 TABLET ORAL at 08:37

## 2024-04-13 RX ADMIN — FERROUS SULFATE TAB 325 MG (65 MG ELEMENTAL FE) 325 MG: 325 (65 FE) TAB at 08:36

## 2024-04-13 RX ADMIN — Medication 1 TABLET: at 08:39

## 2024-04-13 RX ADMIN — PANTOPRAZOLE SODIUM 40 MG: 40 TABLET, DELAYED RELEASE ORAL at 08:43

## 2024-04-13 RX ADMIN — CETIRIZINE HYDROCHLORIDE 10 MG: 10 TABLET ORAL at 08:35

## 2024-04-13 RX ADMIN — AMLODIPINE BESYLATE 5 MG: 5 TABLET ORAL at 08:34

## 2024-04-13 RX ADMIN — CHOLESTYRAMINE 4 G: 4 POWDER, FOR SUSPENSION ORAL at 08:36

## 2024-04-13 RX ADMIN — GUAIFENESIN 1200 MG: 600 TABLET, EXTENDED RELEASE ORAL at 08:43

## 2024-04-13 RX ADMIN — Medication 10 ML: at 08:40

## 2024-04-13 RX ADMIN — PREDNISONE 20 MG: 20 TABLET ORAL at 08:39

## 2024-04-13 NOTE — OUTREACH NOTE
Prep Survey      Flowsheet Row Responses   [facility] patient discharged from? [place]   Is LACE score < 7 ? No   Eligibility [facility]   Date of Admission 04/08/24   Date of Discharge 04/13/24   Discharge Disposition Home or Self Care   Discharge diagnosis Acute exacerbation of chronic obstructive pulmonary disease (COPD)   Does the patient have one of the following disease processes/diagnoses(primary or secondary)? COPD   Does the patient have Home health ordered? No   Is there a DME ordered? No   Medication alerts for this patient see AVS   Prep survey completed? Yes            Linda ABDULLAHI - Registered Nurse

## 2024-04-13 NOTE — PROGRESS NOTES
"CC: Congestive heart failure      Interval History: No new acute events overnight      Vital Signs  Temp:  [97.9 °F (36.6 °C)-99.4 °F (37.4 °C)] 97.9 °F (36.6 °C)  Heart Rate:  [63-86] 77  Resp:  [10-27] 18  BP: (115-140)/(75-90) 115/75  No intake or output data in the 24 hours ending 04/13/24 1053  Flowsheet Rows      Flowsheet Row First Filed Value   Admission Height 162.6 cm (64.02\") Documented at 04/08/2024 2033   Admission Weight 102 kg (224 lb 13.9 oz) Documented at 04/08/2024 2033            PHYSICAL EXAM:  General: On supplemental oxygen from nasal cannula  Resp:NL Rate, symmetric chest expansion,unlabored, clear  CV:NL rate and rhythm, NL PMI, NL S1 and S2, no Murmur, no gallop, no rub, No JVD.   ABD:Nl sounds, no masses or tenderness, nondistended, no guarding or rebound  Neuro: alert,cooperative and oriented  Extr:Normal pedal pulses, No edema or cyanosis, moves all extremities      Results Review:    Results from last 7 days   Lab Units 04/11/24  0624   SODIUM mmol/L 141   POTASSIUM mmol/L 4.1   CHLORIDE mmol/L 105   CO2 mmol/L 25.0   BUN mg/dL 21   CREATININE mg/dL 0.83   GLUCOSE mg/dL 83   CALCIUM mg/dL 9.0     Results from last 7 days   Lab Units 04/09/24  1810 04/08/24  2316 04/08/24  2100   HSTROP T ng/L <6 7 9     Results from last 7 days   Lab Units 04/11/24  0624   WBC 10*3/mm3 11.63*   HEMOGLOBIN g/dL 11.9*   HEMATOCRIT % 39.6   PLATELETS 10*3/mm3 365     Results from last 7 days   Lab Units 04/08/24  2100   INR  0.97         Results from last 7 days   Lab Units 04/09/24  1810   MAGNESIUM mg/dL 2.2         I reviewed the patient's new clinical results.  I personally viewed and interpreted the patient's EKG/Telemetry data        Medication Review:   Meds reviewed           Assessment & Plan  Acute on chronic hypoxic and hypercapnic respiratory failure  COPD with acute exacerbation  Acute on chronic diastolic heart failure , good UOP on oral diuretics  Chest tightness, negative HS troponin " reassuring. EKG unchanged. Echocardiogram with normal LVEF and no regional wall motion abnormalities  Sinus tachycardia , resolved  Obstructive sleep apnea, CPAP in the hospital.   Mediastinal lymphadenopathy , felt to be reactive. Plans for repeat CT chest in 3 months.     No new acute events overnight.  Continue current cardiac care.  I have discussed patient with her nurse.  Cardiology will sign off.    Jorje Carreon MD  04/13/24  10:53 EDT

## 2024-04-13 NOTE — PLAN OF CARE
Goal Outcome Evaluation:  Plan of Care Reviewed With: patient        Progress: no change  Outcome Evaluation: Pt tolerated CPAP for sleep. NAD. VSS. Plan of care ongoing.

## 2024-04-13 NOTE — PROGRESS NOTES
LOS: 2 days   Patient Care Team:  Artur Quevedo MD as PCP - General (Family Medicine)  Neisha Milton APRN as Nurse Practitioner (Nurse Practitioner)  Madelin Slaughter APRN as Nurse Practitioner (Nurse Practitioner)    Subjective     Patient new to me today picking up pulmonary coverage from Dr. Ruiz I have reviewed his and multiple other records.  We are following for COPD patient has a history of COPD obstructive sleep apnea obesity with some mediastinal and hilar lymphadenopathy who presents with acute on chronic hypoxic and hypercapnic respiratory failure.  This seems to have been exacerbated by acute heart failure with preserved ejection fraction  Dr. Ruiz tried the patient on bilevel ventilation last night she did not tolerate it they made several changes and actually ended up weaning her down to CPAP at 5 cm she actually tolerated the CPAP well I am not sure how effective it 5 cm she does like the mask interface she has the over the mouth under the nose mask he can take hours home and try and use it.  I told her she probably should get a follow-up appointment pretty soon with her pulmonologist in [facility] use her Pap machine and they can get a download and see how it is working.  Did stressed the need to treat her sleep apnea with her heart failure.  Her oxygenation is doing pretty good I would probably she has oxygen at home use it continuously for a little while see how she does and then when she follows up with her pulmonologist they can do some exercise oximetry and check her out to see if she still needs it she will probably need a nocturnal oximetry on her Pap machine as well.  She says she is feeling good she is sitting up in a chair on room air and wants to go home.  Review of Systems:          Objective     Vital Signs  Vital Sign Min/Max for last 24 hours  Temp  Min: 97.9 °F (36.6 °C)  Max: 99.4 °F (37.4 °C)   BP  Min: 115/75  Max: 129/85   Pulse  Min: 63  Max: 86   Resp  Min: 10   Max: 20   SpO2  Min: 92 %  Max: 99 %   Flow (L/min)  Min: 2  Max: 2   No data recorded        Ventilator/Non-Invasive Ventilation Settings (From admission, onward)       Start     Ordered    04/12/24 1349  NIPPV-IPPV / BIPAP / CPAP  At Bedtime & As Needed-RT        Comments: Adjust CPAP pressures for pt comfort.  She would like to try nasal pillow   Question Answer Comment   Indication Sleep Apnea    Type BIPAP    IPAP 16    EPAP 8    Tidal Volume (mL) 500    Breath Rate 8    Titrate Oxygen for SpO2 90 - 95%        04/12/24 1348    04/09/24 0036  NIPPV-IPPV / BIPAP / CPAP  Until Discontinued,   Status:  Canceled        Comments: Adjust CPAP pressures for pt comfort.  She would like to try nasal pillow   Question Answer Comment   Indication Sleep Apnea    Type CPAP    Pressure 10    Titrate Oxygen for SpO2 90 - 95%        04/09/24 0038                                 Body mass index is 38.96 kg/m².  I/O last 3 completed shifts:  In: 420 [P.O.:420]  Out: 675 [Urine:675]  No intake/output data recorded.        Physical Exam:  General Appearance: Well-developed obese black female she is sitting in a chair in no apparent distress on room air  Eyes: Conjunctiva are clear and anicteric  ENT: His membranes are moist no exudates  Neck: Trachea midline no jugular venous distention  Lungs: Clear no dullness nonlabored symmetric expansion  Cardiac: Regular rate rhythm no murmur  Abdomen: B soft no palpable hepatosplenomegaly or masses  : Not examined  Musculoskeletal: Mild thoracic kyphosis  Skin: Warm and dry no jaundice no petechiae  Neuro: Alert and cooperative grossly intact  Extremities/P Vascular: Clubbing no cyanosis no edema palpable radial and dorsalis pedis pulses  MSE: She is very pleasant and cooperative       Labs:  Results from last 7 days   Lab Units 04/11/24  0624 04/10/24  0526 04/09/24  1810 04/09/24  0623 04/08/24  2100   GLUCOSE mg/dL 83 117*  --  155* 83   SODIUM mmol/L 141 139  --  139 141   POTASSIUM  mmol/L 4.1 4.2  --  4.4 4.6   MAGNESIUM mg/dL  --   --  2.2  --  1.9   CO2 mmol/L 25.0 24.7  --  26.0 30.0*   CHLORIDE mmol/L 105 104  --  103 103   ANION GAP mmol/L 11.0 10.3  --  10.0 8.0   CREATININE mg/dL 0.83 0.65  --  0.85 1.01*   BUN mg/dL 21 19  --  10 11   BUN / CREAT RATIO  25.3* 29.2*  --  11.8 10.9   CALCIUM mg/dL 9.0 9.3  --  9.4 9.2   ALK PHOS U/L  --   --   --   --  109   TOTAL PROTEIN g/dL  --   --   --   --  7.6   ALT (SGPT) U/L  --   --   --   --  13   AST (SGOT) U/L  --   --   --   --  13   BILIRUBIN mg/dL  --   --   --   --  0.2   ALBUMIN g/dL  --   --   --   --  4.0   GLOBULIN gm/dL  --   --   --   --  3.6     Estimated Creatinine Clearance: 71.6 mL/min (by C-G formula based on SCr of 0.83 mg/dL).      Results from last 7 days   Lab Units 04/11/24  0624 04/10/24  0526 04/09/24  0623 04/08/24  2100   WBC 10*3/mm3 11.63* 11.35* 11.04* 10.63   RBC 10*6/mm3 4.61 4.36 4.93 4.60   HEMOGLOBIN g/dL 11.9* 11.4* 13.1 12.3   HEMATOCRIT % 39.6 36.7 40.4 39.6   MCV fL 85.9 84.2 81.9 86.1   MCH pg 25.8* 26.1* 26.6 26.7   MCHC g/dL 30.1* 31.1* 32.4 31.1*   RDW % 15.8* 15.9* 15.0 15.6*   RDW-SD fl 48.8 48.0 43.1 48.4   MPV fL 11.0 10.6 10.5 10.5   PLATELETS 10*3/mm3 365 333 355 320   NEUTROPHIL % % 67.9  --   --  62.6   LYMPHOCYTE % % 20.2  --   --  22.9   MONOCYTES % % 10.2  --   --  9.2   EOSINOPHIL % % 0.7  --   --  3.7   BASOPHIL % % 0.3  --   --  1.2   IMM GRAN % % 0.7*  --   --  0.4   NEUTROS ABS 10*3/mm3 7.90*  --   --  6.66   LYMPHS ABS 10*3/mm3 2.35  --   --  2.43   MONOS ABS 10*3/mm3 1.19*  --   --  0.98*   EOS ABS 10*3/mm3 0.08  --   --  0.39   BASOS ABS 10*3/mm3 0.03  --   --  0.13   IMMATURE GRANS (ABS) 10*3/mm3 0.08*  --   --  0.04   NRBC /100 WBC 0.0  --   --  0.0         Results from last 7 days   Lab Units 04/09/24  1810 04/08/24  2316 04/08/24  2100   HSTROP T ng/L <6 7 9     Results from last 7 days   Lab Units 04/12/24  1519 04/08/24  2100   PROBNP pg/mL 49.9 303.0         Results from last  7 days   Lab Units 04/08/24 2316   PROCALCITONIN ng/mL 0.03     Results from last 7 days   Lab Units 04/08/24  2100   INR  0.97     Microbiology Results (last 10 days)       Procedure Component Value - Date/Time    MRSA Screen, PCR (Inpatient) - Swab, Nares [[id number]]  (Normal) Collected: 04/09/24 2033    Lab Status: Final result Specimen: Swab from Nares Updated: 04/09/24 2154     MRSA PCR No MRSA Detected    Narrative:      The negative predictive value of this diagnostic test is high and should only be used to consider de-escalating anti-MRSA therapy. A positive result may indicate colonization with MRSA and must be correlated clinically.    S. Pneumo Ag Urine or CSF - Urine, Urine, Clean Catch [[id number]]  (Normal) Collected: 04/08/24 2316    Lab Status: Final result Specimen: Urine, Clean Catch Updated: 04/09/24 0730     Strep Pneumo Ag Negative    Legionella Antigen, Urine - Urine, Urine, Clean Catch [[id number]]  (Normal) Collected: 04/08/24 2316    Lab Status: Final result Specimen: Urine, Clean Catch Updated: 04/09/24 0730     LEGIONELLA ANTIGEN, URINE Negative    Respiratory Panel PCR w/COVID-19(SARS-CoV-2) [facility]OM[facility]/TELLY/PAD/COR/ANNEMARIE In-House, NP Swab in UTM/VTM, 2 HR TAT - Swab, Nasopharynx [[id number]]  (Normal) Collected: 04/08/24 2112    Lab Status: Final result Specimen: Swab from Nasopharynx Updated: 04/08/24 2225     ADENOVIRUS, PCR Not Detected     Coronavirus 229E Not Detected     Coronavirus HKU1 Not Detected     Coronavirus NL63 Not Detected     Coronavirus OC43 Not Detected     COVID19 Not Detected     Human Metapneumovirus Not Detected     Human Rhinovirus/Enterovirus Not Detected     Influenza A PCR Not Detected     Influenza B PCR Not Detected     Parainfluenza Virus 1 Not Detected     Parainfluenza Virus 2 Not Detected     Parainfluenza Virus 3 Not Detected     Parainfluenza Virus 4 Not Detected     RSV, PCR Not Detected     Bordetella pertussis pcr Not Detected     Bordetella parapertussis  PCR Not Detected     Chlamydophila pneumoniae PCR Not Detected     Mycoplasma pneumo by PCR Not Detected    Narrative:      In the setting of a positive respiratory panel with a viral infection PLUS a negative procalcitonin without other underlying concern for bacterial infection, consider observing off antibiotics or discontinuation of antibiotics and continue supportive care. If the respiratory panel is positive for atypical bacterial infection (Bordetella pertussis, Chlamydophila pneumoniae, or Mycoplasma pneumoniae), consider antibiotic de-escalation to target atypical bacterial infection.                amLODIPine, 5 mg, Oral, BID  budesonide-formoterol, 2 puff, Inhalation, BID - RT  cetirizine, 10 mg, Oral, Daily  cholestyramine light, 1 packet, Oral, Daily  enoxaparin, 40 mg, Subcutaneous, Nightly  ferrous sulfate, 325 mg, Oral, Daily With Breakfast  fluticasone, 2 spray, Nasal, Daily  furosemide, 40 mg, Oral, Daily  guaiFENesin, 1,200 mg, Oral, Q12H  ipratropium-albuterol, 3 mL, Nebulization, 4x Daily - RT  metoprolol succinate XL, 50 mg, Oral, BID  mirtazapine, 30 mg, Oral, Nightly  montelukast, 10 mg, Oral, Nightly  multivitamin with minerals, 1 tablet, Oral, Daily  pantoprazole, 40 mg, Oral, Daily  predniSONE, 20 mg, Oral, Daily With Breakfast  pregabalin, 150 mg, Oral, BID  sodium chloride, 10 mL, Intravenous, Q12H           Diagnostics:  XR Chest PA & Lateral    Result Date: 4/12/2024  XR CHEST PA AND LATERAL-4/12/2024  HISTORY: Shortness of breath.  Heart size is within normal limits. There are some minimal patchy atelectasis or infiltrate in the right infrahilar region. There may be some minimal atelectasis or infiltrate in the left lung base as well.  Lungs are otherwise relatively clear. There is some aortic calcification.  No pneumothorax is seen.      1. Mild right infrahilar and left basilar atelectasis or mild pneumonia.   This report was finalized on 4/12/2024 6:41 PM by Dr. Jason Thapa,  M.D on Workstation: [id number]      Adult Transthoracic Echo Complete W/ Cont if Necessary Per Protocol    Result Date: 4/11/2024    Left ventricular systolic function is normal. Calculated left ventricular EF = 64.9%   Left ventricular wall thickness is consistent with borderline concentric hypertrophy.   Left ventricular diastolic function is consistent with (grade I) impaired relaxation.   Estimated right ventricular systolic pressure from tricuspid regurgitation is mildly elevated (35-45 mmHg). Calculated right ventricular systolic pressure from tricuspid regurgitation is 35 mmHg.   Mild pulmonary hypertension is present.     CT Angiogram Chest    Result Date: 4/9/2024  CT ANGIOGRAM OF THE CHEST MULTIPLE CORONAL, SAGITTAL, AND 3D RECONSTRUCTIONS  HISTORY: Lower extremity swelling. Shortness of breath. Recent falls.  TECHNIQUE: Radiation dose reduction techniques were utilized, including automated exposure control and exposure modulation based on body size. CT angiogram of the chest was performed following the administration of IV contrast. Coronal, sagittal, and 3-D reconstruction images were obtained.  COMPARISON: AP chest 04/08/2024, CT chest 06/30/2023.  FINDINGS: There are no definite coronary arterial calcifications. There is no intrapulmonary arterial filling defect to diagnose a pulmonary embolus. There is mild mediastinal and bilateral hilar carol enlargement.  A left prevascular space node measures 1 cm short axis. AP window node is mildly enlarged. Right hilar node measures 1.5 cm. It is difficult to directly compare with the previous noncontrasted exam.  There are mild hazy bilateral lower lobe opacities related to atelectasis or potential subtle infiltrates. Lung volumes are diminished. There is no evidence for pulmonary edema or effusion. No central endotracheal lesion is demonstrated. Imaging through the upper abdomen demonstrates cholecystectomy clips.      1. No evidence for pulmonary  thromboembolic disease. 2. Mild mediastinal and bilateral hilar carol enlargement potentially related to reactive lymph nodes though recommend follow-up evaluation and this could be performed in 3-4 months with IV contrast to evaluate for stability in size of lymph nodes. 3. Dependent bilateral lower lobe hazy opacities suspected to represent atelectasis though mild infiltrates are also in the differential diagnosis. There is no evidence for perihilar edema or pleural effusion.  This report was finalized on 4/9/2024 3:21 PM by Dr. Dimitri Walsh M.D on Workstation: [id number]      XR Chest 1 View    Result Date: 4/8/2024  CHEST SINGLE VIEW  HISTORY: Shortness of air with leg swelling.  COMPARISON: CT chest 06/30/2023, PA and lateral chest 04/25/2023.  FINDINGS: Cardiomediastinal silhouette does not appear changed allowing for portable technique. Aortic vascular calcifications are present. Lungs appear clear of focal airspace disease and there is no evidence for pulmonary edema or pleural effusion. Cardiac monitoring leads are evident.      No evidence for active disease in the chest.  This report was finalized on 4/8/2024 10:28 PM by Dr. Dimitri Walsh M.D on Workstation: [id number]      CT Abdomen Pelvis With Contrast    Result Date: 3/25/2024  CT ABDOMEN AND PELVIS WITH IV CONTRAST HISTORY:  Generalized abdominal tenderness, greatest in the lower abdomen. TECHNIQUE: Thin-section axial images from the lung bases through the symphysis pubis were performed with the administration of intravenous contrast. This study was performed with techniques to keep radiation doses as low as reasonably achievable, (ALARA). Individualized dose reduction techniques using automated exposure control or adjustment of mA and/or kV according to the patient size were employed. COMPARISON: None FINDINGS: There is basilar atelectasis/fibrosis. The gallbladder is surgically absent. The liver, spleen, pancreas, and adrenal glands  "demonstrate normal enhancement. The organ contours are normal. There is no evidence of a mass. The kidneys demonstrate symmetrical enhancement and excretion. There are no renal stones or hydronephrosis. The renal contours are normal. There are vascular calcifications of the aorta and iliac vessels. The urinary bladder is unremarkable. The GI tract demonstrates no obstruction. There is no bowel wall thickening. There is mild scattered stool. The appendix is not visualized. There are no secondary findings of appendicitis. There is no free air or free fluid.    1. The solid organs and kidneys appear unremarkable. 2. The GI tract demonstrates no obstruction. 3. There is no free air, free fluid or inflammatory process. Images reviewed, interpreted, dictated and electronically signed by Petros Zabala MD Voice transcription technology ([employer]) is used for the dictation of this note and \"sound-alike\" words might be erroneously placed despite reviewing this note for accuracy. Errors in dictation may reflect use of voice recognition software and not all errors in transcription may have been detected prior to signing.    Results for orders placed during the hospital encounter of 04/08/24    Adult Transthoracic Echo Complete W/ Cont if Necessary Per Protocol    Interpretation Summary    Left ventricular systolic function is normal. Calculated left ventricular EF = 64.9%    Left ventricular wall thickness is consistent with borderline concentric hypertrophy.    Left ventricular diastolic function is consistent with (grade I) impaired relaxation.    Estimated right ventricular systolic pressure from tricuspid regurgitation is mildly elevated (35-45 mmHg). Calculated right ventricular systolic pressure from tricuspid regurgitation is 35 mmHg.    Mild pulmonary hypertension is present.      Reviewed yesterday's chest x-ray looks like there is a little area of linear atelectasis in the left base    Active Hospital Problems    " Diagnosis  POA    **Acute exacerbation of chronic obstructive pulmonary disease (COPD) [J44.1]  Yes    Acute-on-chronic respiratory failure [J96.20]  Yes    COPD exacerbation [J44.1]  Yes    CKD (chronic kidney disease) stage 2, GFR 60-89 ml/min [N18.2]  Unknown    Dyspnea [R06.00]  Yes    Diastolic dysfunction [I51.89]  Yes    Essential hypertension [I10]  Yes    Fibromyalgia [M79.7]  Yes    High cholesterol [E78.00]  Yes    Sleep apnea [G47.30]  Yes      Resolved Hospital Problems   No resolved problems to display.         Assessment & Plan     Acute on chronic hypoxemic and hypercapnic respiratory failure much improved she is oxygenating very well on 2 L she may be able to wean further  COPD with acute exacerbation she is on DuoNebs 4 times daily and budesonide twice a day at home I suggest she talk to her pulmonologist possibly about going to a long-acting bronchodilators such as formoterol and Yupelri but for now she can resume her home regimen at discharge  Acute heart failure preserved ejection fraction  Obstructive sleep apnea PAP noncompliant they started him tried noninvasive ventilation last night with the BiPAP she did not tolerate the pressures they made several adjustments in which she actually tolerated with CPAP of 5 cm.  We recommend she go home use her home CPAP machine she did like our interface and she can take the mask home with her.  I recommend she get a follow-up as soon as possible with her pulmonologist in [facility] to work on her PAP settings they can get a download with a few days of use and see how she is doing.  Mediastinal and hilar lymphadenopathy she will need follow-up CT scan in several months she follows with the pulmonologist in detail and she can follow-up with them if that is not available she can certainly follow-up with Dr. Ruiz.  These may all just be slightly enlarged due to CHF but needs follow-up  Obesity weight loss would be markedly  beneficial  Hypertension  Hyperlipidemia    Plan for disposition: Okay from a pulmonary standpoint to discharge home will see as needed    Melvin Rodriguez Jr, MD  04/13/24  13:47 EDT    Time:

## 2024-04-13 NOTE — DISCHARGE SUMMARY
[facility]  [phone]    DISCHARGE SUMMARY  [facility]    Patient Identification:  Name: Eileen Kaufman  Age: 74 y.o.  Sex: female  :  1950  MRN: [id number]  Primary Care Physician: Artur Quevedo MD    Admit date: 2024  Discharge date and time:      Discharge Diagnoses:  Acute exacerbation of chronic obstructive pulmonary disease (COPD)    Sleep apnea    High cholesterol    Essential hypertension    Fibromyalgia    Diastolic dysfunction    Dyspnea    Acute-on-chronic respiratory failure    COPD exacerbation    CKD (chronic kidney disease) stage 2, GFR 60-89 ml/min       History of present illness from H&P:    Ms. Kaufman is a 74 y.o. with a history of COPD, chronic hypoxic respiratory failure on 2 L of oxygen at all times, pulmonary nodule, ANGÉLICA, diastolic CHF, HTN, fibromyalgia, HLD, depression, anemia that presents with shortness of breath and lower extremity edema. Initially 76% on 2 L nasal cannula increased to 3 to 4 L nasal cannula to low 90s. She is now on stable on her normal 2-4L without respiratory disress. She denies chest pain, fever chills, or other infectious symptoms. She was wheezing prior to admission despite using her home Albuterol nebulizers 2-3 times a day, Breztri daily. She denies current tobacco abuse.     Hospital Course:     Ms. Kaufman is a 74 y.o.   the patient presents with acute on chronic respiratory failure with hypoxia and COPD exacerbation. She is on 2 to 4 L nasal cannula at home. Currently on 2, and we will try to wean     Acute hypoxic and hypercapnic respiratory failure  -wean oxygen     COPD exacerbation  CTA chest negative for PE demonstrates bilateral lower lobe opacities, mediastinal and bilateral hilar lymph node enlargement. VBG pH 7.317, pCO2 60.7. Normal WBC, afebrile, Pro-Salvatore. Negative troponin negative BNP  -IV steroids and DuoNebs in ER.    - prednisone given during the hospital stay and would  taper on discharge. 10 mg x 3 days then stop  -Azithromycin x 3 days here   -she is restart her brestri on discharge, albuterol MN tid scheduled 2 days then prn      Chest tightness 4/9  -troponins normal  -EKG no changes  -cardiology has seen and echo with nl Ef and borderline concentric hypertrophy  -cardiology ted signed off     Acute on Chronic diastolic CHF  -Last echocardiogram August 2023 EF 60% grade 1 diastolic dysfunction.euvolemic on exam.   -started on lasix here and she can continue with HCTZ at home     ANGÉLICA noncompliant  -pulmonary recommends bipap at home over cpap on d/c and follow up with PMD     CKD2- Mild renal insufficiency on admission creatinine now improved     Recent UTI hospitalized last week to [facility] for a UTI treated with Rocephin and discharged on Levaquin     Mediastinal and hilar lymphadenopathy she will need follow-up CT scan in 3 months she follows with the pulmonologist (discussed with patient) in [facility] and she can follow-up with Dr Ruiz if that is not available  The lymph nodes  may all just be slightly enlarged due to CHF but needs follow-up     The patient was seen and examined on the day of discharge.    Consults:   Consults       Date and Time Order Name Status Description    4/9/2024  5:28 PM Inpatient Cardiology Consult Completed     4/8/2024 11:22 PM [facility] (on-call MD unless specified) Details      4/8/2024 11:15 PM Pulmonology (on-call MD unless specified)              Results from last 7 days   Lab Units 04/11/24  0624   WBC 10*3/mm3 11.63*   HEMOGLOBIN g/dL 11.9*   HEMATOCRIT % 39.6   PLATELETS 10*3/mm3 365       Results from last 7 days   Lab Units 04/11/24  0624   SODIUM mmol/L 141   POTASSIUM mmol/L 4.1   CHLORIDE mmol/L 105   CO2 mmol/L 25.0   BUN mg/dL 21   CREATININE mg/dL 0.83   GLUCOSE mg/dL 83   CALCIUM mg/dL 9.0       Significant Diagnostic Studies:   WBC   Date Value Ref Range Status   04/11/2024 11.63 (H) 3.40 - 10.80 10*3/mm3 Final     Hemoglobin  "  Date Value Ref Range Status   04/11/2024 11.9 (L) 12.0 - 15.9 g/dL Final     Hematocrit   Date Value Ref Range Status   04/11/2024 39.6 34.0 - 46.6 % Final     Platelets   Date Value Ref Range Status   04/11/2024 365 140 - 450 10*3/mm3 Final     Sodium   Date Value Ref Range Status   04/11/2024 141 136 - 145 mmol/L Final     Potassium   Date Value Ref Range Status   04/11/2024 4.1 3.5 - 5.2 mmol/L Final     Chloride   Date Value Ref Range Status   04/11/2024 105 98 - 107 mmol/L Final     CO2   Date Value Ref Range Status   04/11/2024 25.0 22.0 - 29.0 mmol/L Final     BUN   Date Value Ref Range Status   04/11/2024 21 8 - 23 mg/dL Final     Creatinine   Date Value Ref Range Status   04/11/2024 0.83 0.57 - 1.00 mg/dL Final     Glucose   Date Value Ref Range Status   04/11/2024 83 65 - 99 mg/dL Final     Calcium   Date Value Ref Range Status   04/11/2024 9.0 8.6 - 10.5 mg/dL Final     No results found for: \"AST\", \"ALT\", \"ALKPHOS\"  No results found for: \"APTT\", \"INR\"  No results found for: \"COLORU\", \"CLARITYU\", \"SPECGRAV\", \"PHUR\", \"PROTEINUR\", \"GLUCOSEU\", \"KETONESU\", \"BLOODU\", \"NITRITE\", \"LEUKOCYTESUR\", \"BILIRUBINUR\", \"UROBILINOGEN\", \"RBCUA\", \"WBCUA\", \"BACTERIA\", \"UACOMMENT\"  No results found for: \"TROPONINT\", \"TROPONINI\", \"BNP\"  No components found for: \"HGBA1C;2\"  No components found for: \"TSH;2\"    Imaging Results (All)       Procedure Component Value Units Date/Time    XR Chest PA & Lateral [[id number]] Collected: 04/12/24 1840     Updated: 04/12/24 1844    Narrative:      XR CHEST PA AND LATERAL-4/12/2024     HISTORY: Shortness of breath.     Heart size is within normal limits. There are some minimal patchy  atelectasis or infiltrate in the right infrahilar region. There may be  some minimal atelectasis or infiltrate in the left lung base as well.     Lungs are otherwise relatively clear. There is some aortic  calcification.     No pneumothorax is seen.       Impression:      1. Mild right infrahilar and left " basilar atelectasis or mild pneumonia.        This report was finalized on 4/12/2024 6:41 PM by Dr. Jason Thapa M.D on Workstation: [id number]       CT Angiogram Chest [[id number]] Collected: 04/08/24 2309     Updated: 04/09/24 1524    Narrative:      CT ANGIOGRAM OF THE CHEST MULTIPLE CORONAL, SAGITTAL, AND 3D  RECONSTRUCTIONS     HISTORY: Lower extremity swelling. Shortness of breath. Recent falls.     TECHNIQUE: Radiation dose reduction techniques were utilized, including  automated exposure control and exposure modulation based on body size.   CT angiogram of the chest was performed following the administration of  IV contrast. Coronal, sagittal, and 3-D reconstruction images were  obtained.      COMPARISON: AP chest 04/08/2024, CT chest 06/30/2023.      FINDINGS:  There are no definite coronary arterial calcifications. There is no  intrapulmonary arterial filling defect to diagnose a pulmonary embolus.  There is mild mediastinal and bilateral hilar carol enlargement.  A left  prevascular space node measures 1 cm short axis. AP window node is  mildly enlarged. Right hilar node measures 1.5 cm. It is difficult to  directly compare with the previous noncontrasted exam.     There are mild hazy bilateral lower lobe opacities related to  atelectasis or potential subtle infiltrates. Lung volumes are  diminished. There is no evidence for pulmonary edema or effusion. No  central endotracheal lesion is demonstrated. Imaging through the upper  abdomen demonstrates cholecystectomy clips.       Impression:      1. No evidence for pulmonary thromboembolic disease.  2. Mild mediastinal and bilateral hilar carol enlargement potentially  related to reactive lymph nodes though recommend follow-up evaluation  and this could be performed in 3-4 months with IV contrast to evaluate  for stability in size of lymph nodes.  3. Dependent bilateral lower lobe hazy opacities suspected to represent  atelectasis though mild  "infiltrates are also in the differential  diagnosis. There is no evidence for perihilar edema or pleural effusion.     This report was finalized on 4/9/2024 3:21 PM by Dr. Dimitri Walsh M.D on Workstation: [id number]       XR Chest 1 View [[id number]] Collected: 04/08/24 2146     Updated: 04/08/24 2231    Narrative:      CHEST SINGLE VIEW     HISTORY: Shortness of air with leg swelling.     COMPARISON: CT chest 06/30/2023, PA and lateral chest 04/25/2023.      FINDINGS: Cardiomediastinal silhouette does not appear changed allowing  for portable technique. Aortic vascular calcifications are present.  Lungs appear clear of focal airspace disease and there is no evidence  for pulmonary edema or pleural effusion. Cardiac monitoring leads are  evident.        Impression:      No evidence for active disease in the chest.     This report was finalized on 4/8/2024 10:28 PM by Dr. Dimitri Walsh M.D on Workstation: [id number]           No results found for: \"SITE\", \"ALLENTEST\", \"PHART\", \"IZP0LPC\", \"PO2ART\", \"IEY9JRF\", \"BASEEXCESS\", \"K1MDQSEM\", \"HGBBG\", \"HCTABG\", \"OXYHEMOGLOBI\", \"METHHGBN\", \"CARBOXYHGB\", \"CO2CT\", \"BAROMETRIC\", \"MODALITY\", \"FIO2\"       Discharge Medications        New Medications        Instructions Start Date   predniSONE 20 MG tablet  Commonly known as: DELTASONE   10 mg, Oral, Daily With Breakfast   Start Date: April 14, 2024            Changes to Medications        Instructions Start Date   albuterol 0.63 MG/3ML nebulizer solution  Commonly known as: ACCUNEB  What changed: Another medication with the same name was removed. Continue taking this medication, and follow the directions you see here.   0.63 mg, Nebulization, Every 4 Hours PRN      cetirizine 10 MG tablet  Commonly known as: zyrTEC  What changed:   when to take this  reasons to take this   10 mg, Oral, Daily      fluticasone 50 MCG/ACT nasal spray  Commonly known as: FLONASE  What changed:   when to take this  reasons to take this   2 " sprays, Nasal, Daily      lidocaine 5 %  Commonly known as: LIDODERM  What changed:   when to take this  reasons to take this   2 patches, Transdermal, Every 24 Hours      montelukast 10 MG tablet  Commonly known as: SINGULAIR  What changed:   when to take this  reasons to take this   10 mg, Oral, Nightly             Continue These Medications        Instructions Start Date   amLODIPine 5 MG tablet  Commonly known as: NORVASC   5 mg, Oral, 2 Times Daily      Breztri Aerosphere 160-9-4.8 MCG/ACT aerosol inhaler  Generic drug: Budeson-Glycopyrrol-Formoterol   2 puffs, Inhalation, 2 Times Daily      budesonide 0.5 MG/2ML nebulizer solution  Commonly known as: PULMICORT   0.5 mg, Nebulization, Daily - RT      colestipol 1 g tablet  Commonly known as: COLESTID   1 g, Oral, Daily      cyclobenzaprine 5 MG tablet  Commonly known as: FLEXERIL   5 mg, Oral, 2 Times Daily PRN      ferrous sulfate 325 (65 FE) MG EC tablet   325 mg, Oral, Daily With Breakfast      hydroCHLOROthiazide 25 MG tablet   25 mg, Oral, Daily      metoprolol succinate XL 50 MG 24 hr tablet  Commonly known as: Toprol XL   50 mg, Oral, 2 Times Daily      mirtazapine 30 MG tablet  Commonly known as: REMERON   30 mg, Oral, Nightly      multivitamin with minerals tablet tablet   1 tablet, Daily      O2  Commonly known as: OXYGEN   2 L/min, Inhalation, Once      pantoprazole 40 MG EC tablet  Commonly known as: PROTONIX   40 mg, Oral, Daily      pregabalin 150 MG capsule  Commonly known as: LYRICA   150 mg, Oral, 2 Times Daily             Stop These Medications      Calcium Carbonate 1500 (600 Ca) MG tablet     ipratropium-albuterol 0.5-2.5 mg/3 ml nebulizer  Commonly known as: DUO-NEB                Patient Instructions:       Future Appointments   Date Time Provider Department Center   4/18/2024  2:20 PM Shandra Thomas MD [facility] PM EAS[facility]   6/10/2024  3:45 PM Janet Crowe MD [facility] P[facility]   7/31/2024  2:00 PM [facility]    10/31/2024  1:30 PM Artur Quevedo MD [facility] PC [facility]         Follow-up Information       Artur Quevedo MD .    Specialty: Family Medicine  Contact information:  [address]  [place] [id number] [phone]                             Discharge Order (From admission, onward)       Start     Ordered    04/13/24 1422  Discharge patient  Once        Expected Discharge Date: 04/13/24   Discharge Disposition: Home or Self Care   Physician of Record for Attribution - Please select from Treatment Team: SPENSER SEYMOUR [[id number]]   Review needed by CMO to determine Physician of Record: No      Question Answer Comment   Physician of Record for Attribution - Please select from Treatment Team SPENSER SEYMOUR    Review needed by CMO to determine Physician of Record No        04/13/24 1430                    Diet Order   Procedures    Diet: Regular/House; Fluid Consistency: Thin (IDDSI 0)       TEST  RESULTS PENDING AT DISCHARGE        Discharge instructions:  Follow up with your primary care provider in 1 weeks with a cbc and cmp         Total time spent discharging patient including evaluation, post hospitalization follow up,  medication and post hospitalization instructions and education, total time exceeds 30 minutes.    Signed:  Spenser Seymour MD  4/13/2024  14:31 EDT

## 2024-04-14 NOTE — CASE MANAGEMENT/SOCIAL WORK
Case Management Discharge Note      Final Note: dc home    Provided Post Acute Provider List?: N/A  Provided Post Acute Provider Quality & Resource List?: N/A    Selected Continued Care - Discharged on 4/13/2024 Admission date: 4/8/2024 - Discharge disposition: Home or Self Care      Destination    No services have been selected for the patient.                Durable Medical Equipment    No services have been selected for the patient.                Dialysis/Infusion    No services have been selected for the patient.                Home Medical Care    No services have been selected for the patient.                Therapy    No services have been selected for the patient.                Community Resources    No services have been selected for the patient.                Community & DME    No services have been selected for the patient.                         Final Discharge Disposition Code: 01 - home or self-care

## 2024-04-15 ENCOUNTER — TRANSITIONAL CARE MANAGEMENT TELEPHONE ENCOUNTER (OUTPATIENT)
Dept: CALL CENTER | Facility: HOSPITAL | Age: 74
End: 2024-04-15
Payer: MEDICARE

## 2024-04-15 NOTE — OUTREACH NOTE
Call Center TCM Note      Flowsheet Row Responses   [facility] patient discharged from? [place]   Does the patient have one of the following disease processes/diagnoses(primary or secondary)? COPD   TCM attempt successful? Yes   Call start time 1620   Call end time 1623   Discharge diagnosis Acute exacerbation of chronic obstructive pulmonary disease (COPD)   Person spoke with today (if not patient) and relationship patient   Meds reviewed with patient/caregiver? Yes   Is the patient having any side effects they believe may be caused by any medication additions or changes? No   Does the patient have all medications ordered at discharge? Yes   Is the patient taking all medications as directed (includes completed medication regime)? Yes   Comments Scheduled a hospital followup on 4/23/2024 with Dr. Quevedo.   Does the patient have an appointment with their PCP within 7-14 days of discharge? Yes   Psychosocial issues? No   Did the patient receive a copy of their discharge instructions? Yes   Nursing interventions Reviewed instructions with patient   What is the patient's perception of their health status since discharge? Improving   Nursing Interventions Nurse provided patient education   If the patient is a current smoker, are they able to teach back resources for cessation? Not a smoker   Is the patient/caregiver able to teach back the hierarchy of who to call/visit for symptoms/problems? PCP, Specialist, Home health nurse, Urgent Care, ED, 911 Yes   TCM call completed? Yes   Call end time 1623   Would this patient benefit from a Referral to Amb Social Work? No   Is the patient interested in additional calls from an ambulatory ? No            Moises El RN    4/15/2024, 16:23 EDT

## 2024-04-15 NOTE — OUTREACH NOTE
Call Center TCM Note      Flowsheet Row Responses   [facility] patient discharged from? [place]   Does the patient have one of the following disease processes/diagnoses(primary or secondary)? COPD   TCM attempt successful? No  [verbal release from 2021 lists no names]   Unsuccessful attempts Attempt 1   Call Status Left message            Amanda Mackay RN    4/15/2024, 10:17 EDT

## 2024-04-16 NOTE — PROGRESS NOTES
The patient has a pain history of the following:  Chronic low back pain  Lumbar radiculopathy  Lumbar spondylosis  Postlaminectomy syndrome  Bilateral sacroiliitis   Fibromyalgia      Previous interventions that the patient has received include:   Right L3-4 and right L5-S1 transforaminal epidural steroid injection 10/25/2023-no relief  Previous Epidural steroid injections 1-2 years ago, lasted ~1 month      Pain medications include:  Cyclobenzaprine  Lyrica 150mg BID- helps some   Lidocaine patch    Previously: CBD     Other conservative modalities which the patient reports using include:  Physical Therapy: yes  Chiropractor: no  Massage Therapy: no  TENS: no   Neck or back surgery: yes  Past pain management: yes  Heated blanket  Ice     Past Significant Surgical History:  Neck and back surgery without fusions (probably discectomy)    HPI:     CHIEF COMPLAINT   Back pain  Right leg pain  Patient stated that she is just going through the same things and aching at the moment. Patient stated a that she was in the hospital  last week for SOB and congested heart failure.    Subjective   Eileen Kaufman is a 74 y.o. female  who presents for follow-up.  She has a history of right-sided low back pain that radiates into the right posterior buttock/hip and into the posterior aspect of the thigh, not extending past her knee.  Her back pain is equal to her leg pain.  She describes the pain as constant, sharp, and achy.  At her previous visits we recommended updating her MRI, and increasing her pregabalin to 150 mg twice daily.  She also complained of cervical spine pain that significantly improved following Flexeril and lidocaine patches.  She is interested in discussing initiating opioid medication to help manage her pain.  Consideration was given for right sacroiliac joint injection and/or right lumbar medial branch blocks with RFA, however she is hesitant to repeat an injection due to increased pain after previous  injections.    Since her last visit she was hospitalized with respiratory failure as well as UTI/sepsis.  She is on baseline O2.     History of Present Illness  Ms. Kaufman presents today with continued complaints of right-sided low back pain that radiates into the right hip region.  Sitting makes the pain worse.      She states again today that she is not interested in pursuing any further injection therapy.  She states that she has been multiple pain clinics in the past and has undergone multiple types of injections and has not had any significant lasting relief.  She discusses that she was very uncomfortable during her previous injection with me.  She asks today if she can have prescription pain medication.  She continues taking Lyrica 150 mg twice daily, cyclobenzaprine, lidocaine patches, she states that she takes over-the-counter Tylenol and NSAIDs (and probably takes too much of them).       PEG Assessment   What number best describes your pain on average in the past week?9-10  What number best describes how, during the past week, pain has interfered with your enjoyment of life?9  What number best describes how, during the past week, pain has interfered with your general activity?  9    REVIEW OF PERTINENT MEDICAL DATA  PROCEDURE:MRI LUMBAR SPINE WO CONTRAST     COMPARISON:[facility], , MRI LUMBAR SPINE WO CONTRAST, 11/03/2021, 11:10.     INDICATIONS:LUMBAR RADICULOPATHY, POSTLAMINECTOMY     TECHNIQUE:    A variety of imaging planes and parameters were utilized for visualization of suspected   pathology.     FINDINGS:          The alignment is anatomic.  The vertebral body heights appear normal.  The bone marrow signal is   somewhat heterogeneous, nonspecific.  There is disc desiccation at L3-4 through L5-S1.  The conus   terminates at the L1-2 level.  The posterior paravertebral soft tissues are unremarkable.     L1-2:  No spinal canal or neural foramina stenosis.     L2-3:  Mild disc bulge.   Mild bilateral facet arthropathy with ligamentum flavum infolding.  Mild   spinal canal stenosis.  Mild to moderate right and mild left neural foramina stenosis.     L3-4:  Moderate disc bulge.  Moderate bilateral facet arthropathy with ligamentum flavum infolding.    Moderate spinal canal stenosis.  Moderate right and mild-to-moderate left neural foramina   stenosis.     L4-5:  Moderate disc bulge.  Mild right and moderate left facet arthropathy with ligamentum flavum   infolding.  Mild to moderate spinal canal stenosis.  Moderate to severe right and moderate left   neural foramina stenosis.     L5-S1:  Mild disc bulge.  Moderate right and mild left facet arthropathy with ligamentum flavum   infolding.  Mild spinal canal stenosis.  Moderate right and mild-to-moderate left neural foramina   stenosis.     IMPRESSION:               Moderate multilevel degenerative changes of lumbar spine as described above.               PEDRO LOYD MD         Electronically Signed and Approved By: PEDRO LOYD MD on 2/08/2024 at 12:47            The following portions of the patient's history were reviewed and updated as appropriate: allergies, current medications, past family history, past medical history, past social history, past surgical history, and problem list.    Review of Systems   Constitutional:  Positive for activity change and fatigue.   HENT:  Positive for congestion.    Respiratory:  Positive for shortness of breath.    Gastrointestinal:  Negative for abdominal pain, constipation and diarrhea.   Genitourinary:  Negative for difficulty urinating and dysuria.   Musculoskeletal:  Positive for back pain.   Neurological:  Negative for dizziness, weakness, light-headedness, numbness and headaches.   Psychiatric/Behavioral:  Negative for agitation, sleep disturbance and suicidal ideas. The patient is nervous/anxious.      I have reviewed and confirmed the accuracy of the ROS as documented by the MA/SUMI/LAURY ZHU  "MD William    Vitals:    04/18/24 1418   BP: 100/70   BP Location: Right arm   Patient Position: Sitting   Cuff Size: Large Adult   Pulse: 99   Temp: 96.9 °F (36.1 °C)   SpO2: 91%   Weight: 106 kg (232 lb 9.6 oz)   Height: 162.6 cm (64\")   PainSc:   9         Objective   Physical Exam  Constitutional:       General: She is not in acute distress.  Pulmonary:      Effort: Pulmonary effort is normal. No respiratory distress.   Skin:     General: Skin is warm and dry.   Neurological:      Mental Status: She is alert.   Psychiatric:         Mood and Affect: Mood normal.         Behavior: Behavior normal.         Thought Content: Thought content normal.             Assessment & Plan   Diagnoses and all orders for this visit:    1. Lumbar facet arthropathy (Primary)    2. Postlaminectomy syndrome    3. Bilateral sacroiliitis    4. Lumbar radiculopathy    5. Fibromyalgia    6. Chronic right-sided low back pain with right-sided sciatica    7. Chronic pain syndrome          -Explained that the best thing I can offer for relief of her pain is to try a different type of injection.  I explained that I will not Sly her to do this and I understand if she chooses to not move forward with injection therapy.  -Explained that I do not feel comfortable initiating opioid therapy for her.  I am concerned about her oxygen requirement and recent hospitalization for respiratory failure.  I explained that opioids can make hypercapnia and hypoxemia worse.  She was very unhappy with this response.  -We discussed medical marijuana.  I explained that that is not something we prescribe or write letters for in our clinic.      --- Follow-up 6 months for medication management with Lyrica.           HOMER REPORT    As part of the patient's treatment plan, I am prescribing controlled substances. The patient has been made aware of appropriate use of such medications, including potential risk of somnolence, limited ability to drive and/or work " safely, and the potential for dependence or overdose. It has also bee made clear that these medications are for use by this patient only, without concomitant use of alcohol or other substances unless prescribed.     As the clinician, I personally reviewed the HOMER from 4/18/24 .    History and physical exam exhibit continued safe and appropriate use of controlled substances.       Shandra Thomas MD  Pain Management    EMR Dragon/Transcription disclaimer:   Much of this encounter note is an electronic transcription/translation of spoken language to printed text. The electronic translation of spoken language may permit erroneous, or at times, nonsensical words or phrases to be inadvertently transcribed; Although I have reviewed the note for such errors, some may still exist.

## 2024-04-18 ENCOUNTER — OFFICE VISIT (OUTPATIENT)
Dept: PAIN MEDICINE | Facility: CLINIC | Age: 74
End: 2024-04-18
Payer: MEDICARE

## 2024-04-18 VITALS
WEIGHT: 232.6 LBS | BODY MASS INDEX: 39.71 KG/M2 | OXYGEN SATURATION: 91 % | SYSTOLIC BLOOD PRESSURE: 100 MMHG | TEMPERATURE: 96.9 F | HEIGHT: 64 IN | HEART RATE: 99 BPM | DIASTOLIC BLOOD PRESSURE: 70 MMHG

## 2024-04-18 DIAGNOSIS — M96.1 POSTLAMINECTOMY SYNDROME: ICD-10-CM

## 2024-04-18 DIAGNOSIS — M54.41 CHRONIC RIGHT-SIDED LOW BACK PAIN WITH RIGHT-SIDED SCIATICA: ICD-10-CM

## 2024-04-18 DIAGNOSIS — M54.16 LUMBAR RADICULOPATHY: ICD-10-CM

## 2024-04-18 DIAGNOSIS — M47.816 LUMBAR FACET ARTHROPATHY: Primary | ICD-10-CM

## 2024-04-18 DIAGNOSIS — G89.4 CHRONIC PAIN SYNDROME: ICD-10-CM

## 2024-04-18 DIAGNOSIS — G89.29 CHRONIC RIGHT-SIDED LOW BACK PAIN WITH RIGHT-SIDED SCIATICA: ICD-10-CM

## 2024-04-18 DIAGNOSIS — M79.7 FIBROMYALGIA: ICD-10-CM

## 2024-04-18 DIAGNOSIS — M46.1 BILATERAL SACROILIITIS: ICD-10-CM

## 2024-04-23 ENCOUNTER — OFFICE VISIT (OUTPATIENT)
Dept: FAMILY MEDICINE CLINIC | Age: 74
End: 2024-04-23
Payer: MEDICARE

## 2024-04-23 VITALS
BODY MASS INDEX: 39.57 KG/M2 | OXYGEN SATURATION: 100 % | WEIGHT: 231.8 LBS | TEMPERATURE: 98.4 F | HEART RATE: 88 BPM | HEIGHT: 64 IN | SYSTOLIC BLOOD PRESSURE: 111 MMHG | DIASTOLIC BLOOD PRESSURE: 63 MMHG

## 2024-04-23 DIAGNOSIS — J44.1 COPD EXACERBATION: Primary | ICD-10-CM

## 2024-04-23 DIAGNOSIS — G47.33 OBSTRUCTIVE SLEEP APNEA: ICD-10-CM

## 2024-04-23 DIAGNOSIS — I50.32 CHRONIC DIASTOLIC (CONGESTIVE) HEART FAILURE: ICD-10-CM

## 2024-04-23 DIAGNOSIS — R59.0 MEDIASTINAL LYMPHADENOPATHY: ICD-10-CM

## 2024-04-23 PROCEDURE — 99214 OFFICE O/P EST MOD 30 MIN: CPT | Performed by: FAMILY MEDICINE

## 2024-04-23 PROCEDURE — 3078F DIAST BP <80 MM HG: CPT | Performed by: FAMILY MEDICINE

## 2024-04-23 PROCEDURE — 1111F DSCHRG MED/CURRENT MED MERGE: CPT | Performed by: FAMILY MEDICINE

## 2024-04-23 PROCEDURE — 1160F RVW MEDS BY RX/DR IN RCRD: CPT | Performed by: FAMILY MEDICINE

## 2024-04-23 PROCEDURE — 3074F SYST BP LT 130 MM HG: CPT | Performed by: FAMILY MEDICINE

## 2024-04-23 PROCEDURE — 1159F MED LIST DOCD IN RCRD: CPT | Performed by: FAMILY MEDICINE

## 2024-04-23 NOTE — PROGRESS NOTES
Primary Care Provider  Artur Quevedo MD   Referring Provider  No ref. provider found      Patient Complaint  Hospital Follow Up Visit and Shortness of Breath      Subjective          Eileen Kaufman presents to [facility]      History of Presenting Illness  Eileen Kaufman is a 74 y.o. female patient of Dr. Crowe with chronic hypoxic respiratory failure, COPD, sleep apnea, CKD, diastolic heart failure, and tobacco use in remission, here for hospital follow-up.    Patient was hospitalized at [facility] 4/8/2024 through 4/13/2024 for COPD exacerbation.  She presented to the emergency department with shortness of breath, was found to be hypoxic, O2 saturation 76% on her home 2 L supplemental oxygen.  Patient's O2 saturation improved after turning oxygen up to 4 L.  Her infectious workup was negative.  Chest CT showed bilateral lower lobe opacities thought to represent atelectasis or pneumonia.  There was also mild mediastinal and bilateral hilar lymphadenopathy possibly reactive.  Patient's symptoms began improve with scheduled bronchodilators and IV steroids and antibiotics.  She was able to be weaned back down to 2 L supplemental oxygen.  She was discharged home in stable condition with instructions to continue her usual Breztri, as well as complete course of azithromycin and prednisone taper.  Per pulmonology recommendation, we will repeat her CT scan in 3 months to check for resolution of lymphadenopathy.  She would also benefit from PAP therapy, previously noncompliant with CPAP but patient did tolerate BiPAP at low pressures in the hospital.    Patient states she is feeling better since being discharged from the hospital.  Her last office visit was with Dr. Crowe in January 2024.  Patient reports having completed the azithromycin and prednisone that were prescribed at hospital discharge.  She denies any fevers or chills.  She feels she is back  to her baseline, with mild exertional dyspnea that improves with rest.  Patient continues to use Breztri twice a day as well as albuterol as needed.  She wears her usual 2 L supplemental oxygen at night and as needed during the day.  Patient has worn a CPAP in the past but does not tolerate the constant pressure well.  Her last sleep study was about 10 years ago as far as she can recall.  She is a former smoker, quit about a year ago, 50 pack years.  Patient denies any productive cough, hemoptysis, swollen lymph nodes, weight loss, or night sweats.  Overall, patient is doing well and has no additional concerns at this time.  Patient is able to perform ADLs without difficulty.  I have personally reviewed the review of systems, past family, social, medical and surgical histories; and agree with their findings.      Review of Systems    Review of Systems   Constitutional:  Negative for activity change, chills, fatigue, fever, unexpected weight gain and unexpected weight loss.   HENT:  Negative for congestion, ear discharge, ear pain, mouth sores, postnasal drip, rhinorrhea, sinus pressure, sore throat, swollen glands and trouble swallowing.    Eyes:  Negative for blurred vision, pain, discharge, itching and visual disturbance.   Respiratory:  Positive for shortness of breath (with exertion). Negative for apnea, cough, chest tightness, wheezing and stridor.    Cardiovascular:  Negative for chest pain, palpitations and leg swelling.   Gastrointestinal:  Negative for abdominal distention, abdominal pain, constipation, diarrhea, nausea, vomiting, GERD and indigestion.   Musculoskeletal:  Negative for arthralgias, joint swelling and myalgias.   Skin:  Negative for color change.   Neurological:  Negative for dizziness, weakness, light-headedness and headache.      Sleep: Negative for Excessive daytime sleepiness  Negative for morning headaches  Negative for Snoring      Family History   Problem Relation Age of Onset     Cerebral aneurysm Mother         cause of death    Heart attack Father         cause of death    Esophageal cancer Brother         Social History     Socioeconomic History    Marital status: Single    Number of children: 2   Tobacco Use    Smoking status: Former     Current packs/day: 0.00     Average packs/day: 1 pack/day for 50.0 years (50.0 ttl pk-yrs)     Types: Cigarettes     Start date:      Quit date: 2023     Years since quittin.2     Passive exposure: Past    Smokeless tobacco: Never    Tobacco comments:     Eileen Alvarez was 23 when she started smoking.  She did stop smoking for some time - about 13 years.  She has been smoking a total of approximately 33 years.  She has smoked less than a pack daily during this time. She currently uses smokless, vapor can/pouches   Vaping Use    Vaping status: Former    Quit date: 2023    Substances: Nicotine    Devices: Pre-filled or refillable cartridge   Substance and Sexual Activity    Alcohol use: Yes     Comment: Socially    Drug use: Never    Sexual activity: Defer        Past Medical History:   Diagnosis Date    Anemia, unspecified     Asymptomatic menopausal state     Chronic obstructive pulmonary disease with (acute) exacerbation     Essential (primary) hypertension     High cholesterol 2021    Low back pain     Major depressive disorder, single episode, moderate     Nicotine dependence, unspecified, uncomplicated     Osteopenia     Other intervertebral disc degeneration, lumbar region     Other long term (current) drug therapy     Solitary pulmonary nodule     Vitamin D deficiency, unspecified         Immunization History   Administered Date(s) Administered    Arexvy (RSV, Adults 60+ yrs) 2024    COVID-19 (MODERNA) BIVALENT 12+YRS 09/10/2022    COVID-19 (MODERNA) Monovalent Original Booster 2022    COVID-19 (PFIZER) Purple Cap Monovalent 2020, 2021, 2021    Fluzone High Dose =>65 Years ([employer] ONLY) 10/01/2021,  11/01/2022    Hepatitis A 07/07/2018    Influenza Quad Vaccine (Inpatient) 09/20/2013    Influenza, Unspecified 10/01/2020    Pneumococcal Conjugate 13-Valent (PCV13) 07/05/2018    Pneumococcal Conjugate 20-Valent (PCV20) 08/31/2023    Pneumococcal Polysaccharide (PPSV23) 03/25/2013, 02/13/2017    Shingrix 09/10/2022    Tdap 04/23/2024       Allergies   Allergen Reactions    Sulfa Antibiotics Unknown - Low Severity    Aspirin Rash    Atorvastatin Other (See Comments)     Hair loss    Naproxen Other (See Comments)     Urinary tract infection          Current Outpatient Medications:     albuterol (ACCUNEB) 0.63 MG/3ML nebulizer solution, Take 3 mL by nebulization Every 4 (Four) Hours As Needed for Wheezing or Shortness of Air., Disp: 1 each, Rfl: 9    amLODIPine (NORVASC) 5 MG tablet, Take 1 tablet by mouth 2 (Two) Times a Day., Disp: 180 tablet, Rfl: 3    Budeson-Glycopyrrol-Formoterol (Breztri Aerosphere) 160-9-4.8 MCG/ACT aerosol inhaler, Inhale 2 puffs 2 (Two) Times a Day., Disp: 1 each, Rfl: 5    budesonide (PULMICORT) 0.5 MG/2ML nebulizer solution, Take 2 mL by nebulization Daily., Disp: 180 mL, Rfl: 1    cetirizine (zyrTEC) 10 MG tablet, Take 1 tablet by mouth Daily., Disp: 90 tablet, Rfl: 3    colestipol (COLESTID) 1 g tablet, Take 1 tablet by mouth Daily., Disp: 90 tablet, Rfl: 1    cyclobenzaprine (FLEXERIL) 5 MG tablet, TAKE 1 TABLET BY MOUTH TWICE DAILY AS NEEDED FOR MUSCLE SPASMS, Disp: 60 tablet, Rfl: 1    ferrous sulfate 325 (65 FE) MG EC tablet, Take 1 tablet by mouth Daily With Breakfast., Disp: 90 tablet, Rfl: 1    fluticasone (FLONASE) 50 MCG/ACT nasal spray, 2 sprays into the nostril(s) as directed by provider Daily., Disp: 18 mL, Rfl: 5    hydroCHLOROthiazide (HYDRODIURIL) 25 MG tablet, Take 1 tablet by mouth Daily., Disp: 90 tablet, Rfl: 3    lidocaine (LIDODERM) 5 %, Place 2 patches on the skin as directed by provider Daily., Disp: 60 patch, Rfl: 2    metoprolol succinate XL (Toprol XL) 50 MG 24  "hr tablet, Take 1 tablet by mouth 2 (Two) Times a Day., Disp: 180 tablet, Rfl: 3    mirtazapine (REMERON) 30 MG tablet, Take 1 tablet by mouth Every Night., Disp: 90 tablet, Rfl: 3    montelukast (SINGULAIR) 10 MG tablet, Take 1 tablet by mouth Every Night., Disp: 90 tablet, Rfl: 3    multivitamin with minerals tablet tablet, 1 tablet Daily., Disp: , Rfl:     O2 (OXYGEN), Inhale 2 L/min 1 (One) Time., Disp: , Rfl:     pantoprazole (PROTONIX) 40 MG EC tablet, Take 1 tablet by mouth Daily., Disp: 90 tablet, Rfl: 3    pregabalin (LYRICA) 150 MG capsule, Take 1 capsule by mouth 2 (Two) Times a Day., Disp: 60 capsule, Rfl: 1    Budeson-Glycopyrrol-Formoterol (Breztri Aerosphere) 160-9-4.8 MCG/ACT aerosol inhaler, Inhale 2 puffs 2 (Two) Times a Day for 1 day., Disp: 4 each, Rfl: 0     Objective     Vital Signs:   /67 (BP Location: Left arm, Patient Position: Sitting, Cuff Size: Large Adult)   Pulse 83   Temp 98.4 °F (36.9 °C) (Oral)   Resp 18   Ht 162.6 cm (64\")   Wt 106 kg (233 lb 9.6 oz)   SpO2 96% Comment: 2L PULSE DOSE  BMI 40.10 kg/m²     Physical Exam  Constitutional:       General: She is not in acute distress.     Appearance: Normal appearance. She is obese. She is not ill-appearing.   HENT:      Right Ear: Tympanic membrane and ear canal normal.      Left Ear: Tympanic membrane and ear canal normal.      Nose: Nose normal.      Mouth/Throat:      Mouth: Mucous membranes are moist.      Pharynx: Oropharynx is clear.   Eyes:      Extraocular Movements: Extraocular movements intact.      Conjunctiva/sclera: Conjunctivae normal.      Pupils: Pupils are equal, round, and reactive to light.   Cardiovascular:      Rate and Rhythm: Normal rate and regular rhythm.      Pulses: Normal pulses.      Heart sounds: Normal heart sounds.   Pulmonary:      Effort: Pulmonary effort is normal. No respiratory distress.      Breath sounds: Normal breath sounds. No stridor. No wheezing, rhonchi or rales.   Abdominal:      " General: Bowel sounds are normal.      Palpations: Abdomen is soft.   Musculoskeletal:         General: No swelling. Normal range of motion.      Cervical back: Normal range of motion and neck supple.      Right lower leg: No edema.      Left lower leg: No edema.   Skin:     General: Skin is warm and dry.   Neurological:      General: No focal deficit present.      Mental Status: She is alert and oriented to person, place, and time.      Motor: No weakness.   Psychiatric:         Mood and Affect: Mood normal.         Behavior: Behavior normal.       Result Review :   I have personally reviewed patient's labs and images.  I also reviewed Dr. Crowe's last office note 1/22/2024, Dr. Rodriguez's hospital note 4/13/2024, and Dr. Seymour's hospital discharge summary 4/13/2024.            Diagnoses and all orders for this visit:    1. Chronic respiratory failure with hypoxia (Primary)    2. Chronic obstructive pulmonary disease, unspecified COPD type    3. Thoracic lymphadenopathy  -     CT Chest With Contrast Diagnostic; Future    4. ANGÉLICA (obstructive sleep apnea)  -     PAP Therapy    5. Dyspnea on exertion    6. Tobacco abuse, in remission    7. Obesity (BMI 30-39.9)       Impression and Plan    -PFTs 9/27/2023 showed mild obstructive defect without significant response to bronchodilator.  Normal lung volumes, DLCO severely reduced 26% of predicted.  -Low-dose CT done 6/30/2023 showed no new or suspicious nodules, recommend continuing annual screening  -CTA chest 4/8/2024 negative for pulmonary embolism.  There was mild mediastinal and bilateral hilar lymph node enlargement, possibly reactive.  Recommend follow-up CT with contrast in 3 to 4 months to reevaluate, will order today for July 2024.  -Echocardiogram 4/10/2024 EF 64.9%, left ventricular diastolic function consistent with grade 1 impaired relaxation.  Estimated RVSP mildly elevated 35 to 45 mmHg.  -Auto BiPAP ordered today per pulmonologist at [facility]  recommendation, sent to [facility], will plan to do compliance at her next visit  -Continue wearing 3-4 L supplemental oxygen to keep O2 saturation between 88 to 92%  -Continue using Breztri 2 puffs twice daily, reminded patient to rinse mouth after each use  -Continue using albuterol inhaler or albuterol nebulizer treatments as needed  -Continue taking Singulair and Zyrtec for seasonal allergies  -Continue following up with Dr. Mao with cardiology for management of diastolic heart failure  -Patient to keep regularly scheduled appointment with Dr. Crowe 6/10/2024    Smoking status: Reviewed  Vaccination status: Patient reports she is up-to-date with her flu, pneumonia, and Covid vaccines.  Patient is advised to continue to follow CDC recommendations such as social distancing wearing a mask and washing hands for at least 20 seconds.  Medications personally reviewed    Follow Up   No follow-ups on file.  Patient was given instructions and counseling regarding her condition or for health maintenance advice. Please see specific information pulled into the AVS if appropriate.

## 2024-04-23 NOTE — PROGRESS NOTES
Eileen Kaufman presents to [facility].    Chief Complaint:  Hospital follow up, COPD exacerbation    Transitional Care Management:  -Eileen Alvarez was admitted to [facility] on 4/8/2024 with discharge on 4/13/2024.  -While inpatient, she was cared for by the hospitalist service including Dr. Seymour.  She was apparently also seen by cardiology while inpatient.  -Staff from [facility] reach out to her within 48 hours of hospital discharge to arrange follow-up.  -Her problems and medications have been reviewed and reconciled.    Subjective   History of Present Illness:  Eileen Alvarez is being seen today for follow up on her care.  She was admitted to [facility] for apparent COPD exacerbation as above.  According to the discharge summary, she was placed on IV steroids and nebulizer treatments along with supplemental oxygen.  She did have some improving course while there and was discharged home on azithromycin and a prednisone taper.  She has been back home now for about 10 days.  As she remains on 2 L of supplemental oxygen at this time along with her current breathing treatments.  Her oxygen saturation today is 100% on 2 L.    There was also concern for acute on chronic diastolic heart failure while inpatient.  She was placed on IV Lasix as well and prescribed hydrochlorothiazide for discharge.    Regarding sleep apnea, it was recommended that she move forward with treatment.  She will need to follow-up with pulmonology about this.    It is also of note that she had some enlarged lymph nodes on CT scan of the chest.  Follow-up testing was recommended for her in about 90 days    Review of Systems:  Review of Systems   Constitutional:  Negative for chills and fever.   Respiratory:  Positive for shortness of breath. Negative for cough.    Cardiovascular:  Negative for chest pain and palpitations.   Gastrointestinal:  Negative for abdominal pain, nausea and vomiting.      Objective    Medical History:  Past Medical History:    Anemia, unspecified    Asymptomatic menopausal state    Chronic obstructive pulmonary disease with (acute) exacerbation    Essential (primary) hypertension    High cholesterol    Low back pain    Major depressive disorder, single episode, moderate    Nicotine dependence, unspecified, uncomplicated    Osteopenia    Other intervertebral disc degeneration, lumbar region    Other long term (current) drug therapy    Solitary pulmonary nodule    Vitamin D deficiency, unspecified     Past Surgical History:    CARDIAC CATHETERIZATION    CARPAL TUNNEL RELEASE    CHOLECYSTECTOMY    COLONOSCOPY    Normal    EPIDURAL    Procedure: LUMBAR/SACRAL TRANSFORAMINAL EPIDURAL Right L3-4 and RIght L5-S1 47149, 59750;  Surgeon: Shandra Thomas MD;  Location: [facility];  Service: Pain Management;  Laterality: Right;    HYSTERECTOMY    Total    KNEE ARTHROSCOPY    NECK SURGERY      Family History   Problem Relation Age of Onset    Cerebral aneurysm Mother         cause of death    Heart attack Father         cause of death    Esophageal cancer Brother      Social History     Tobacco Use    Smoking status: Former     Current packs/day: 0.00     Average packs/day: 1 pack/day for 50.0 years (50.0 ttl pk-yrs)     Types: Cigarettes     Start date:      Quit date: 2023     Years since quittin.2     Passive exposure: Past    Smokeless tobacco: Never    Tobacco comments:     Eileen Alvarez was 23 when she started smoking.  She did stop smoking for some time - about 13 years.  She has been smoking a total of approximately 33 years.  She has smoked less than a pack daily during this time. She currently uses smokless, vapor can/pouches   Substance Use Topics    Alcohol use: Yes     Comment: Socially       Health Maintenance Due   Topic Date Due    TDAP/TD VACCINES (1 - Tdap) Never done    RSV Vaccine - Adults (1 - 1-dose 60+ series) Never done    DXA SCAN  2022    ZOSTER VACCINE (2 of 2)  11/05/2022    COVID-19 Vaccine (6 - 2023-24 season) 09/01/2023    LIPID PANEL  03/08/2024        Immunization History   Administered Date(s) Administered    COVID-19 (MODERNA) BIVALENT 12+YRS 09/10/2022    COVID-19 (MODERNA) Monovalent Original Booster 04/14/2022    COVID-19 (PFIZER) Purple Cap Monovalent 12/29/2020, 01/29/2021, 09/30/2021    Fluzone High Dose =>65 Years ([employer] ONLY) 10/01/2021, 11/01/2022    Hepatitis A 07/07/2018    Influenza Quad Vaccine (Inpatient) 09/20/2013    Influenza, Unspecified 10/01/2020    Pneumococcal Conjugate 13-Valent (PCV13) 07/05/2018    Pneumococcal Conjugate 20-Valent (PCV20) 08/31/2023    Pneumococcal Polysaccharide (PPSV23) 03/25/2013, 02/13/2017    Shingrix 09/10/2022       Allergies   Allergen Reactions    Sulfa Antibiotics Unknown - Low Severity    Aspirin Rash    Atorvastatin Other (See Comments)     Hair loss    Naproxen Other (See Comments)     Urinary tract infection        Medications:  Current Outpatient Medications on File Prior to Visit   Medication Sig    albuterol (ACCUNEB) 0.63 MG/3ML nebulizer solution Take 3 mL by nebulization Every 4 (Four) Hours As Needed for Wheezing or Shortness of Air.    amLODIPine (NORVASC) 5 MG tablet Take 1 tablet by mouth 2 (Two) Times a Day.    Budeson-Glycopyrrol-Formoterol (Breztri Aerosphere) 160-9-4.8 MCG/ACT aerosol inhaler Inhale 2 puffs 2 (Two) Times a Day.    budesonide (PULMICORT) 0.5 MG/2ML nebulizer solution Take 2 mL by nebulization Daily.    cetirizine (zyrTEC) 10 MG tablet Take 1 tablet by mouth Daily.    colestipol (COLESTID) 1 g tablet Take 1 tablet by mouth Daily.    cyclobenzaprine (FLEXERIL) 5 MG tablet TAKE 1 TABLET BY MOUTH TWICE DAILY AS NEEDED FOR MUSCLE SPASMS    ferrous sulfate 325 (65 FE) MG EC tablet Take 1 tablet by mouth Daily With Breakfast.    fluticasone (FLONASE) 50 MCG/ACT nasal spray 2 sprays into the nostril(s) as directed by provider Daily.    hydroCHLOROthiazide (HYDRODIURIL) 25 MG tablet Take  "1 tablet by mouth Daily.    lidocaine (LIDODERM) 5 % Place 2 patches on the skin as directed by provider Daily.    metoprolol succinate XL (Toprol XL) 50 MG 24 hr tablet Take 1 tablet by mouth 2 (Two) Times a Day.    mirtazapine (REMERON) 30 MG tablet Take 1 tablet by mouth Every Night.    montelukast (SINGULAIR) 10 MG tablet Take 1 tablet by mouth Every Night.    multivitamin with minerals tablet tablet 1 tablet Daily.    O2 (OXYGEN) Inhale 2 L/min 1 (One) Time.    pantoprazole (PROTONIX) 40 MG EC tablet Take 1 tablet by mouth Daily.    predniSONE (DELTASONE) 20 MG tablet Take 0.5 tablets by mouth Daily With Breakfast.    pregabalin (LYRICA) 150 MG capsule Take 1 capsule by mouth 2 (Two) Times a Day.     No current facility-administered medications on file prior to visit.       Vital Signs:   /63 (BP Location: Right arm, Patient Position: Sitting)   Pulse 88   Temp 98.4 °F (36.9 °C) (Oral)   Ht 162.6 cm (64.02\")   Wt 105 kg (231 lb 12.8 oz)   SpO2 100% Comment: 2 liters  BMI 39.77 kg/m²       Physical Exam:  Physical Exam  Vitals reviewed.   Constitutional:       General: She is not in acute distress.     Appearance: She is obese. She is not ill-appearing.   Eyes:      Pupils: Pupils are equal, round, and reactive to light.   Neck:      Comments: No thyromegaly  Cardiovascular:      Rate and Rhythm: Normal rate and regular rhythm.   Pulmonary:      Effort: Pulmonary effort is normal.      Breath sounds: Normal breath sounds.   Abdominal:      General: There is no distension.      Palpations: Abdomen is soft.      Tenderness: There is no abdominal tenderness.   Musculoskeletal:      Cervical back: Neck supple.   Lymphadenopathy:      Cervical: No cervical adenopathy.   Skin:     Findings: No lesion or rash.   Neurological:      Mental Status: She is alert.       Result Review   The following data was reviewed by Artur Quevedo MD on 04/23/2024.  Lab Results   Component Value Date    WBC 11.63 (H) " 04/11/2024    HGB 11.9 (L) 04/11/2024    HCT 39.6 04/11/2024    MCV 85.9 04/11/2024     04/11/2024     Lab Results   Component Value Date    GLUCOSE 192 (H) 04/13/2024    BUN 26 (H) 04/13/2024    CREATININE 0.98 04/13/2024     04/13/2024    K 4.3 04/13/2024     04/13/2024    CO2 22.2 04/13/2024    CALCIUM 9.9 04/13/2024    PROTEINTOT 7.6 04/08/2024    ALBUMIN 4.0 04/08/2024    ALT 13 04/08/2024    AST 13 04/08/2024    ALKPHOS 109 04/08/2024    BILITOT 0.2 04/08/2024    EGFR 61.5 04/13/2024    GLOB 3.6 04/08/2024    AGRATIO 1.1 04/08/2024    BCR 26.5 (H) 04/13/2024    ANIONGAP 13.8 04/13/2024      Lab Results   Component Value Date    CHOL 187 03/08/2023    CHLPL 200 11/14/2019    TRIG 99 03/08/2023    HDL 61 (H) 03/08/2023     (H) 03/08/2023     Lab Results   Component Value Date    TSH 2.909 09/07/2023     Lab Results   Component Value Date    HGBA1C 5.40 03/08/2023     COMPREHENSIVE METABOLIC PANEL (04/22/2024 13:14)  Folate (04/22/2024 13:14)  Vitamin B12 (04/22/2024 13:14)  CBC with Automated Diff (04/22/2024 13:14)  RETICULOCYTES (04/22/2024 13:14)  FERRITIN (04/22/2024 13:14)  Iron and TIBC (04/22/2024 13:14)    XR Chest PA & Lateral (04/12/2024 18:27)  CT Angiogram Chest (04/08/2024 22:34)  XR Chest 1 View (04/08/2024 21:06)    Adult Transthoracic Echo Complete W/ Cont if Necessary Per Protocol (04/10/2024 17:25)          Assessment and Plan:   Today, we have reviewed her care.  She seems well today.  We had some discussion about her oxygen supplementation.  I do not see an opportunity now for us to try to wean oxygen.  She is under the impression that she should have been weaned off of oxygen by this time.  Also, there was a request that her sleep apnea treatment be changed.  Some finding of mediastinal lymphadenopathy was also present.  She has a scheduled follow-up visit with pulmonology, but that is about 2 months off.  We will ask for near term follow-up regarding these issues.   Also, we will see if cardiology can see her sooner than the end of July given the change in status.  Today she seems well and is euvolemic.  I did review her testing as above, and there is no specific change in medication that I would recommend today.  Tentatively, she will follow-up with me again at the end of October, but we would be happy to see her sooner if needed.  Home health will continue to see her as well.      Diagnoses and all orders for this visit:    1. COPD exacerbation (Primary)  -     Ambulatory Referral to Pulmonology    2. Obstructive sleep apnea  -     Ambulatory Referral to Pulmonology    3. Mediastinal lymphadenopathy  -     Ambulatory Referral to Pulmonology    4. Chronic diastolic (congestive) heart failure  -     Ambulatory Referral to Cardiology    Follow Up  Return in about 6 months (around 10/31/2024) for Recheck, Next scheduled follow up.  Patient was given instructions and counseling regarding her condition or for health maintenance advice. Please see specific information pulled into the AVS if appropriate.

## 2024-04-23 NOTE — Clinical Note
Please confirm that [facility] is seeing her.  I do want PT and OT to see her as well for ongoing leg pain and some weakness in the legs.  Thanks.

## 2024-04-24 ENCOUNTER — READMISSION MANAGEMENT (OUTPATIENT)
Dept: CALL CENTER | Facility: HOSPITAL | Age: 74
End: 2024-04-24
Payer: MEDICARE

## 2024-04-24 NOTE — OUTREACH NOTE
COPD/PN Week 1 Survey      Flowsheet Row Responses   [facility] patient discharged from? [place]   Does the patient have one of the following disease processes/diagnoses(primary or secondary)? COPD   Call start time 1635   Call end time 1638   Discharge diagnosis Acute exacerbation of chronic obstructive pulmonary disease (COPD)   Person spoke with today (if not patient) and relationship patient   Meds reviewed with patient/caregiver? Yes   Is the patient having any side effects they believe may be caused by any medication additions or changes? No   Does the patient have all medications ordered at discharge? Yes   Is the patient taking all medications as directed (includes completed medication regime)? Yes   Does the patient have a primary care provider?  Yes   Does the patient have an appointment with their PCP or specialist within 7 days of discharge? Yes   Has the patient kept scheduled appointments due by today? Yes   Pulse Ox monitoring Intermittent   Pulse Ox device source Patient   O2 Sat: education provided Sat levels, Monitoring frequency, When to seek care   Psychosocial issues? No   Did the patient receive a copy of their discharge instructions? Yes   Nursing interventions Reviewed instructions with patient   What is the patient's perception of their health status since discharge? Improving   Nursing Interventions Nurse provided patient education   Are the patient's immunizations up to date?  Yes   If the patient is a current smoker, are they able to teach back resources for cessation? Not a smoker   Is the patient/caregiver able to teach back the hierarchy of who to call/visit for symptoms/problems? PCP, Specialist, Home health nurse, Urgent Care, ED, 911 Yes   Is the patient able to teach back COPD zones? Yes   Nursing interventions Education provided on various zones   Patient reports what zone on this call? Green Zone   Green Zone Reports doing well   Green Zone interventions: Take daily  medications, Use oxygen as prescribed, Continue regular exercise/diet plan   Is the patient interested in additional calls from an ambulatory ? No   Would this patient benefit from a Referral to [facility] Social Work? No   Wrap up additional comments pt doing ok. No concerns voiced today   Call end time 1771            YANIQUE KO - Registered Nurse

## 2024-04-26 ENCOUNTER — OFFICE VISIT (OUTPATIENT)
Dept: PULMONOLOGY | Facility: CLINIC | Age: 74
End: 2024-04-26
Payer: MEDICARE

## 2024-04-26 ENCOUNTER — TELEPHONE (OUTPATIENT)
Dept: PULMONOLOGY | Facility: CLINIC | Age: 74
End: 2024-04-26

## 2024-04-26 VITALS
RESPIRATION RATE: 18 BRPM | SYSTOLIC BLOOD PRESSURE: 108 MMHG | HEART RATE: 83 BPM | BODY MASS INDEX: 39.88 KG/M2 | DIASTOLIC BLOOD PRESSURE: 67 MMHG | TEMPERATURE: 98.4 F | WEIGHT: 233.6 LBS | HEIGHT: 64 IN | OXYGEN SATURATION: 96 %

## 2024-04-26 DIAGNOSIS — G47.33 OSA (OBSTRUCTIVE SLEEP APNEA): ICD-10-CM

## 2024-04-26 DIAGNOSIS — R06.09 DYSPNEA ON EXERTION: ICD-10-CM

## 2024-04-26 DIAGNOSIS — R59.0 THORACIC LYMPHADENOPATHY: ICD-10-CM

## 2024-04-26 DIAGNOSIS — E66.9 OBESITY (BMI 30-39.9): ICD-10-CM

## 2024-04-26 DIAGNOSIS — J96.11 CHRONIC RESPIRATORY FAILURE WITH HYPOXIA: Primary | ICD-10-CM

## 2024-04-26 DIAGNOSIS — F17.201 TOBACCO ABUSE, IN REMISSION: ICD-10-CM

## 2024-04-26 DIAGNOSIS — J44.9 CHRONIC OBSTRUCTIVE PULMONARY DISEASE, UNSPECIFIED COPD TYPE: ICD-10-CM

## 2024-04-26 RX ORDER — BUDESONIDE, GLYCOPYRROLATE, AND FORMOTEROL FUMARATE 160; 9; 4.8 UG/1; UG/1; UG/1
2 AEROSOL, METERED RESPIRATORY (INHALATION) 2 TIMES DAILY
Qty: 4 EACH | Refills: 0 | COMMUNITY
Start: 2024-04-26 | End: 2024-04-27

## 2024-04-26 NOTE — TELEPHONE ENCOUNTER
Called and spoke with Glenda Finney. She searched 4 different systems and she found patient had cpap on resmed but no compliance for 90 days. ELISA

## 2024-04-29 ENCOUNTER — TELEPHONE (OUTPATIENT)
Dept: PULMONOLOGY | Facility: CLINIC | Age: 74
End: 2024-04-29
Payer: MEDICARE

## 2024-04-29 NOTE — TELEPHONE ENCOUNTER
I do not have a sleep study on file to go with the ANGÉLICA Diagnosis.  A Bipap has been ordered, I will send an order with PFT and ABG to pts DME.  I did call [employer] and they do not have pt on service.  I have called and left a message for pt to call back.  I need to know where her sleep study was done and her DME.

## 2024-04-29 NOTE — TELEPHONE ENCOUNTER
Called and spoke with pt.  She had a sleep study at [facility] over 10 years ago.  She said she got it from [employer] but hasn't used it in years.  With this pt has had a break in service and will need a new sleep study.  She may qualify for a NIPPV with her PFT and ABG being what they are.  Please advise as to what youd like to do next.  Thanks

## 2024-04-30 DIAGNOSIS — M54.16 LUMBAR RADICULOPATHY: ICD-10-CM

## 2024-04-30 DIAGNOSIS — M46.1 BILATERAL SACROILIITIS: ICD-10-CM

## 2024-04-30 DIAGNOSIS — G47.33 OSA (OBSTRUCTIVE SLEEP APNEA): Primary | ICD-10-CM

## 2024-04-30 RX ORDER — PREGABALIN 150 MG/1
150 CAPSULE ORAL 2 TIMES DAILY
Qty: 60 CAPSULE | Refills: 5 | Status: SHIPPED | OUTPATIENT
Start: 2024-04-30

## 2024-04-30 NOTE — TELEPHONE ENCOUNTER
Pt is aware and agreeable.  I did explain that sleep will get an auth first.  After that they will schedule her and have sleep study done.  She will then get a call from us for new pap.

## 2024-05-02 ENCOUNTER — READMISSION MANAGEMENT (OUTPATIENT)
Dept: CALL CENTER | Facility: HOSPITAL | Age: 74
End: 2024-05-02
Payer: MEDICARE

## 2024-05-02 ENCOUNTER — OUTSIDE FACILITY SERVICE (OUTPATIENT)
Dept: FAMILY MEDICINE CLINIC | Age: 74
End: 2024-05-02
Payer: MEDICARE

## 2024-05-02 NOTE — OUTREACH NOTE
COPD/PN Week 3 Survey      Flowsheet Row Responses   [facility] facility patient discharged from? [place]   Does the patient have one of the following disease processes/diagnoses(primary or secondary)? COPD   Week 3 attempt successful? No   Unsuccessful attempts Attempt 1            Melina MANTILLA - Registered Nurse

## 2024-05-07 ENCOUNTER — OFFICE VISIT (OUTPATIENT)
Dept: CARDIOLOGY | Facility: CLINIC | Age: 74
End: 2024-05-07
Payer: MEDICARE

## 2024-05-07 VITALS
HEIGHT: 64 IN | BODY MASS INDEX: 39.95 KG/M2 | SYSTOLIC BLOOD PRESSURE: 126 MMHG | WEIGHT: 234 LBS | DIASTOLIC BLOOD PRESSURE: 72 MMHG | HEART RATE: 85 BPM

## 2024-05-07 DIAGNOSIS — I50.32 CHRONIC DIASTOLIC CONGESTIVE HEART FAILURE: ICD-10-CM

## 2024-05-07 DIAGNOSIS — I10 ESSENTIAL HYPERTENSION: Primary | ICD-10-CM

## 2024-05-07 NOTE — PROGRESS NOTES
Chief Complaint  Congestive Heart Failure, Hospital Follow Up Visit (1 MONTH), and Shortness of Breath    Subjective        History of Present Illness  Eileen Kaufman presents to [facility]   Ms. Kaufman is a 74-year-old female patient coming in for cardiac follow-up.  She was hospitalized in the last month for acute on chronic respiratory failure.  She was treated for both COPD and heart failure exacerbations.  She did have some complaints of chest tightness, however grandmothers were negative, and echocardiogram showed normal LV function.  Primarily wearing her oxygen at night now.  She is currently still using a CPAP, but is waiting for BiPAP delivery to transition to this.  She was diuresed with IV f Lasix during the hospital stay, she was discharged home on hydrochlorothiazide.  Although she has chronic shortness of breath, overall she feels like she has improved over the last month.  She has no complaints of chest pain or chest pressure, and denies any lightheadedness or dizziness.  Tolerating medication regiment without any issue.        Past History:     (1) Hypertension. (2) Negative for diabetes mellitus or coronary artery disease.       Past Medical History:   Diagnosis Date    Anemia, unspecified     Asymptomatic menopausal state     Chronic obstructive pulmonary disease with (acute) exacerbation     Essential (primary) hypertension     High cholesterol 06/17/2021    Low back pain     Major depressive disorder, single episode, moderate     Nicotine dependence, unspecified, uncomplicated     Osteopenia     Other intervertebral disc degeneration, lumbar region     Other long term (current) drug therapy     Solitary pulmonary nodule     Vitamin D deficiency, unspecified        Allergies   Allergen Reactions    Sulfa Antibiotics Unknown - Low Severity    Aspirin Rash    Atorvastatin Other (See Comments)     Hair loss    Naproxen Other (See Comments)     Urinary tract infection         Past Surgical History:   Procedure Laterality Date    CARDIAC CATHETERIZATION  07/25/2012    CARPAL TUNNEL RELEASE      CHOLECYSTECTOMY      COLONOSCOPY  08/24/2022    Normal    EPIDURAL Right 10/25/2023    Procedure: LUMBAR/SACRAL TRANSFORAMINAL EPIDURAL Right L3-4 and RIght L5-S1 85533, 67821;  Surgeon: Shandra Thomas MD;  Location: [facility] [place] MAIN OR;  Service: Pain Management;  Laterality: Right;    HYSTERECTOMY  1980s    Total    KNEE ARTHROSCOPY Left     NECK SURGERY  01/21/2015        Social History  She  reports that she quit smoking about 16 months ago. Her smoking use included cigarettes. She started smoking about 51 years ago. She has a 50 pack-year smoking history. She has been exposed to tobacco smoke. She has never used smokeless tobacco. She reports current alcohol use. She reports that she does not use drugs.    Family History  Her family history includes Cerebral aneurysm in her mother; Esophageal cancer in her brother; Heart attack in her father.       Current Outpatient Medications on File Prior to Visit   Medication Sig    albuterol (ACCUNEB) 0.63 MG/3ML nebulizer solution Take 3 mL by nebulization Every 4 (Four) Hours As Needed for Wheezing or Shortness of Air.    amLODIPine (NORVASC) 5 MG tablet Take 1 tablet by mouth 2 (Two) Times a Day.    Budeson-Glycopyrrol-Formoterol (Breztri Aerosphere) 160-9-4.8 MCG/ACT aerosol inhaler Inhale 2 puffs 2 (Two) Times a Day.    budesonide (PULMICORT) 0.5 MG/2ML nebulizer solution Take 2 mL by nebulization Daily.    cetirizine (zyrTEC) 10 MG tablet Take 1 tablet by mouth Daily.    colestipol (COLESTID) 1 g tablet Take 1 tablet by mouth Daily.    cyclobenzaprine (FLEXERIL) 5 MG tablet TAKE 1 TABLET BY MOUTH TWICE DAILY AS NEEDED FOR MUSCLE SPASMS    ferrous sulfate 325 (65 FE) MG EC tablet Take 1 tablet by mouth Daily With Breakfast.    fluticasone (FLONASE) 50 MCG/ACT nasal spray 2 sprays into the nostril(s) as directed by provider Daily.     "hydroCHLOROthiazide (HYDRODIURIL) 25 MG tablet Take 1 tablet by mouth Daily.    lidocaine (LIDODERM) 5 % Place 2 patches on the skin as directed by provider Daily.    metoprolol succinate XL (Toprol XL) 50 MG 24 hr tablet Take 1 tablet by mouth 2 (Two) Times a Day.    mirtazapine (REMERON) 30 MG tablet Take 1 tablet by mouth Every Night.    montelukast (SINGULAIR) 10 MG tablet Take 1 tablet by mouth Every Night.    multivitamin with minerals tablet tablet 1 tablet Daily.    O2 (OXYGEN) Inhale 2 L/min 1 (One) Time.    pantoprazole (PROTONIX) 40 MG EC tablet Take 1 tablet by mouth Daily.    pregabalin (LYRICA) 150 MG capsule TAKE 1 CAPSULE BY MOUTH TWICE DAILY     No current facility-administered medications on file prior to visit.         Review of Systems   See HPI.        Objective   Vitals:    05/07/24 1315   BP: 126/72   Pulse: 85   Weight: 106 kg (234 lb)   Height: 162.6 cm (64\")         Physical Exam  General : Alert, awake, no acute distress  Neck : Supple, no carotid bruit, no jugular venous distention  CVS : Regular rate and rhythm, no murmur, rubs or gallops  Lungs: Clear to auscultation bilaterally, no crackles or rhonchi  Abdomen: Soft, nontender, bowel sounds active  Extremities: Warm, well-perfused, no pedal edema      Result Review     The following data was reviewed by ESTEFANI Wallace  proBNP   Date Value Ref Range Status   04/12/2024 49.9 0.0 - 900.0 pg/mL Final     CMP          4/10/2024    05:26 4/11/2024    06:24 4/13/2024    14:59   CMP   Glucose 117  83  192    BUN 19  21  26    Creatinine 0.65  0.83  0.98    EGFR 93.7  75.0  61.5    Sodium 139  141  139    Potassium 4.2  4.1  4.3    Chloride 104  105  103    Calcium 9.3  9.0  9.9    BUN/Creatinine Ratio 29.2  25.3  26.5    Anion Gap 10.3  11.0  13.8      CBC w/diff          4/9/2024    06:23 4/10/2024    05:26 4/11/2024    06:24   CBC w/Diff   WBC 11.04  11.35  11.63    RBC 4.93  4.36  4.61    Hemoglobin 13.1  11.4  11.9    Hematocrit 40.4 " " 36.7  39.6    MCV 81.9  84.2  85.9    MCH 26.6  26.1  25.8    MCHC 32.4  31.1  30.1    RDW 15.0  15.9  15.8    Platelets 355  333  365    Neutrophil Rel %   67.9    Immature Granulocyte Rel %   0.7    Lymphocyte Rel %   20.2    Monocyte Rel %   10.2    Eosinophil Rel %   0.7    Basophil Rel %   0.3       Lab Results   Component Value Date    TSH 2.909 09/07/2023      Lab Results   Component Value Date    FREET4 0.80 09/07/2023      No results found for: \"DDIMERQUANT\"  Magnesium   Date Value Ref Range Status   04/09/2024 2.2 1.6 - 2.4 mg/dL Final      No results found for: \"DIGOXIN\"   Lab Results   Component Value Date    TROPONINT <6 04/09/2024               Results for orders placed during the hospital encounter of 04/08/24    Adult Transthoracic Echo Complete W/ Cont if Necessary Per Protocol    Interpretation Summary    Left ventricular systolic function is normal. Calculated left ventricular EF = 64.9%    Left ventricular wall thickness is consistent with borderline concentric hypertrophy.    Left ventricular diastolic function is consistent with (grade I) impaired relaxation.    Estimated right ventricular systolic pressure from tricuspid regurgitation is mildly elevated (35-45 mmHg). Calculated right ventricular systolic pressure from tricuspid regurgitation is 35 mmHg.    Mild pulmonary hypertension is present.             Assessment and Plan   Diagnoses and all orders for this visit:    1. Essential hypertension (Primary)  Blood pressure is well-controlled.  Continue current medication regiment with amlodipine, metoprolol, and hydrochlorothiazide.      2. Chronic diastolic congestive heart failure  She was given IV diuretics during recent hospitalization, but her condition was mostly driven by severe underlying respiratory disease.  Continue current regiment with metoprolol, and hydrochlorothiazide.          Follow Up   Return in about 6 months (around 11/7/2024) for with Dr. Mao (Move July " appt).    Patient was given instructions and counseling regarding her condition or for health maintenance advice. Please see specific information pulled into the AVS if appropriate.     Signed,  Madelin Slaughter, ESTEFANI  05/07/2024     Dictated Utilizing Dragon Dictation: Please note that portions of this note were completed with a voice recognition program.  Part of this note may be an electronic transcription/translation of spoken language to printed text using the Dragon Dictation System.

## 2024-06-10 ENCOUNTER — OFFICE VISIT (OUTPATIENT)
Dept: PULMONOLOGY | Facility: CLINIC | Age: 74
End: 2024-06-10
Payer: MEDICARE

## 2024-06-10 VITALS
SYSTOLIC BLOOD PRESSURE: 92 MMHG | BODY MASS INDEX: 39.27 KG/M2 | HEART RATE: 80 BPM | DIASTOLIC BLOOD PRESSURE: 73 MMHG | OXYGEN SATURATION: 95 % | WEIGHT: 230 LBS | HEIGHT: 64 IN | RESPIRATION RATE: 18 BRPM | TEMPERATURE: 98 F

## 2024-06-10 DIAGNOSIS — J96.11 CHRONIC RESPIRATORY FAILURE WITH HYPOXIA: Primary | ICD-10-CM

## 2024-06-10 DIAGNOSIS — J44.9 CHRONIC OBSTRUCTIVE PULMONARY DISEASE, UNSPECIFIED COPD TYPE: ICD-10-CM

## 2024-06-10 PROCEDURE — 99214 OFFICE O/P EST MOD 30 MIN: CPT | Performed by: STUDENT IN AN ORGANIZED HEALTH CARE EDUCATION/TRAINING PROGRAM

## 2024-06-10 PROCEDURE — 1159F MED LIST DOCD IN RCRD: CPT | Performed by: STUDENT IN AN ORGANIZED HEALTH CARE EDUCATION/TRAINING PROGRAM

## 2024-06-10 PROCEDURE — 3078F DIAST BP <80 MM HG: CPT | Performed by: STUDENT IN AN ORGANIZED HEALTH CARE EDUCATION/TRAINING PROGRAM

## 2024-06-10 PROCEDURE — 1160F RVW MEDS BY RX/DR IN RCRD: CPT | Performed by: STUDENT IN AN ORGANIZED HEALTH CARE EDUCATION/TRAINING PROGRAM

## 2024-06-10 PROCEDURE — 3074F SYST BP LT 130 MM HG: CPT | Performed by: STUDENT IN AN ORGANIZED HEALTH CARE EDUCATION/TRAINING PROGRAM

## 2024-06-10 NOTE — PROGRESS NOTES
Primary Care Provider  Artur Quevedo MD   Referring Provider  No ref. provider found      Patient Complaint  Follow-up (4 Months) and Chronic respiratory failure with hypoxia      Subjective          Eileen Kaufman presents to [facility]      History of Presenting Illness  Eileen Kaufman is a 74 y.o. female with history of COPD, chronic hypoxic respiratory failure on supplemental oxygen here as a follow-up.    Interval update: Patient is here today, doing okay.  Her blood pressure is on the soft side 92/73.  She reports having low blood pressure recently.  From a pulmonary standpoint her breathing is normal.  She is on 2 L nasal cannula at home.  SpO2 95% today.  She wanted to know if she can wean her oxygen down.  I informed her that as long as her SpO2 is greater than 90% she can try to wean her oxygen down as tolerated.  She reports chronic cough and chronic dyspnea on exertion with clear sputum production.  She remains a former smoker.  Patient had a CT angio done back in April for shortness of breath which did not show PE.  There were some mild mediastinal and hilar lymphadenopathy noted are likely reactive.  She denies fever, chills, hemoptysis, sick contact.    Review of Systems  Constitutional:  No fever. No chills. No weakness.  Eyes: No pain, erythema, or discharge. No blurring of vision.  ENT:  No sore throat, URI symptoms.   Cardiovascular:  No chest pain. No palpitations. No lower extremity edema.  Respiratory:  No shortness of breath; +chronic cough, pleuritic chest pain. No hemoptysis. +chronic dyspnea.   GI:  Normal appetite. No nausea, vomiting, diarrhea. No pain. No melena.  Musculoskeletal:  No arthralgias or myalgias.  Neurologic:  No headache. No weakness.    Family History   Problem Relation Age of Onset    Cerebral aneurysm Mother         cause of death    Heart attack Father         cause of death    Esophageal cancer Brother          Social History     Socioeconomic History    Marital status: Single    Number of children: 2   Tobacco Use    Smoking status: Former     Current packs/day: 0.00     Average packs/day: 1 pack/day for 50.0 years (50.0 ttl pk-yrs)     Types: Cigarettes     Start date:      Quit date: 2023     Years since quittin.3     Passive exposure: Past    Smokeless tobacco: Never    Tobacco comments:     Eileen Alvarez was 23 when she started smoking.  She did stop smoking for some time - about 13 years.  She has been smoking a total of approximately 33 years.  She has smoked less than a pack daily during this time. She currently uses smokless, vapor can/pouches   Vaping Use    Vaping status: Former    Quit date: 2023    Substances: Nicotine    Devices: Pre-filled or refillable cartridge   Substance and Sexual Activity    Alcohol use: Yes     Comment: Socially    Drug use: Never    Sexual activity: Defer        Past Medical History:   Diagnosis Date    Anemia, unspecified     Asymptomatic menopausal state     Chronic obstructive pulmonary disease with (acute) exacerbation     Essential (primary) hypertension     High cholesterol 2021    Low back pain     Major depressive disorder, single episode, moderate     Nicotine dependence, unspecified, uncomplicated     Osteopenia     Other intervertebral disc degeneration, lumbar region     Other long term (current) drug therapy     Solitary pulmonary nodule     Vitamin D deficiency, unspecified         Immunization History   Administered Date(s) Administered    Arexvy (RSV, Adults 60+ yrs) 2024    COVID-19 (MODERNA) BIVALENT 12+YRS 09/10/2022    COVID-19 (MODERNA) Monovalent Original Booster 2022    COVID-19 (PFIZER) Purple Cap Monovalent 2020, 2021, 2021    Fluzone High Dose =>65 Years (Vaxcare ONLY) 10/01/2021, 2022    Hepatitis A 2018    Influenza Quad Vaccine (Inpatient) 2013    Influenza, Unspecified 10/01/2020     Pneumococcal Conjugate 13-Valent (PCV13) 07/05/2018    Pneumococcal Conjugate 20-Valent (PCV20) 08/31/2023    Pneumococcal Polysaccharide (PPSV23) 03/25/2013, 02/13/2017    Shingrix 09/10/2022    Tdap 04/23/2024       Allergies   Allergen Reactions    Sulfa Antibiotics Unknown - Low Severity    Aspirin Rash    Atorvastatin Other (See Comments)     Hair loss    Naproxen Other (See Comments)     Urinary tract infection          Current Outpatient Medications:     albuterol (ACCUNEB) 0.63 MG/3ML nebulizer solution, Take 3 mL by nebulization Every 4 (Four) Hours As Needed for Wheezing or Shortness of Air., Disp: 1 each, Rfl: 9    amLODIPine (NORVASC) 5 MG tablet, Take 1 tablet by mouth 2 (Two) Times a Day., Disp: 180 tablet, Rfl: 3    Budeson-Glycopyrrol-Formoterol (Breztri Aerosphere) 160-9-4.8 MCG/ACT aerosol inhaler, Inhale 2 puffs 2 (Two) Times a Day., Disp: 1 each, Rfl: 5    budesonide (PULMICORT) 0.5 MG/2ML nebulizer solution, Take 2 mL by nebulization Daily., Disp: 180 mL, Rfl: 1    cetirizine (zyrTEC) 10 MG tablet, Take 1 tablet by mouth Daily., Disp: 90 tablet, Rfl: 3    colestipol (COLESTID) 1 g tablet, Take 1 tablet by mouth Daily., Disp: 90 tablet, Rfl: 1    cyclobenzaprine (FLEXERIL) 5 MG tablet, TAKE 1 TABLET BY MOUTH TWICE DAILY AS NEEDED FOR MUSCLE SPASMS, Disp: 60 tablet, Rfl: 1    ferrous sulfate 325 (65 FE) MG EC tablet, Take 1 tablet by mouth Daily With Breakfast., Disp: 90 tablet, Rfl: 1    fluticasone (FLONASE) 50 MCG/ACT nasal spray, 2 sprays into the nostril(s) as directed by provider Daily., Disp: 18 mL, Rfl: 5    hydroCHLOROthiazide (HYDRODIURIL) 25 MG tablet, Take 1 tablet by mouth Daily., Disp: 90 tablet, Rfl: 3    lidocaine (LIDODERM) 5 %, Place 2 patches on the skin as directed by provider Daily., Disp: 60 patch, Rfl: 2    metoprolol succinate XL (Toprol XL) 50 MG 24 hr tablet, Take 1 tablet by mouth 2 (Two) Times a Day., Disp: 180 tablet, Rfl: 3    mirtazapine (REMERON) 30 MG tablet, Take  "1 tablet by mouth Every Night., Disp: 90 tablet, Rfl: 3    montelukast (SINGULAIR) 10 MG tablet, Take 1 tablet by mouth Every Night., Disp: 90 tablet, Rfl: 3    multivitamin with minerals tablet tablet, 1 tablet Daily., Disp: , Rfl:     O2 (OXYGEN), Inhale 2 L/min 1 (One) Time., Disp: , Rfl:     pantoprazole (PROTONIX) 40 MG EC tablet, Take 1 tablet by mouth Daily., Disp: 90 tablet, Rfl: 3    pregabalin (LYRICA) 150 MG capsule, TAKE 1 CAPSULE BY MOUTH TWICE DAILY, Disp: 60 capsule, Rfl: 5     Objective     Vital Signs:   BP 92/73 (BP Location: Right arm, Patient Position: Sitting, Cuff Size: Adult)   Pulse 80   Temp 98 °F (36.7 °C) (Oral)   Resp 18   Ht 162.6 cm (64\")   Wt 104 kg (230 lb)   SpO2 95% Comment: 2L PD  BMI 39.48 kg/m²     Physical Exam  Vitals:    06/10/24 1542   BP: 92/73   Pulse: 80   Resp: 18   Temp: 98 °F (36.7 °C)   SpO2: 95%       General: Alert, NAD  HEENT:  EOMI, no sinus tenderness  Neck:  Supple, no thyromegaly,  no JVD  Lymph: no cervical, supraclavicular lymphadenopathy bilaterally  Chest:  clear to auscultation bilaterally, no wheezing or crackles; no work of breathing noted on 3L NC  CV: RRR, no M/G/R, pulses 2+, equal.  Abd:  Soft, NT, ND, +BS, obese  EXT:  no clubbing, no cyanosis, no edema b/l  Neuro:  A&Ox3, CN grossly intact, no focal deficits  Skin: No rashes or lesions noted       Result Review :   I have personally reviewed patient's labs and images.          Assessment and Plan      Patient Active Problem List   Diagnosis    Allergic rhinitis    Anxiety    Sleep apnea    Cervical spondylosis without myelopathy    Congestive heart failure    High cholesterol    Hypercalcemia    Essential hypertension    Lumbar radiculopathy    Fibromyalgia    Chronic obstructive pulmonary disease    Chronic nonintractable headache    Frequency of micturition    Other long term (current) drug therapy    Syncope and collapse    Bilateral sacroiliitis    Weakness    Chronic respiratory failure " with hypoxia    Chronic right-sided low back pain with right-sided sciatica    Chronic superficial gastritis    Diastolic dysfunction    Postlaminectomy syndrome    Dyspnea    Acute exacerbation of chronic obstructive pulmonary disease (COPD)    Acute-on-chronic respiratory failure    COPD exacerbation    CKD (chronic kidney disease) stage 2, GFR 60-89 ml/min       Impression and Plan:    COPD  Chronic hypoxic respiratory failure requiring oxygen, 2L NC  Previous tobacco user, half pack per day for over 40 years, quit 01/2023  Heart failure with preserved EF, EF 65% with grade 1 diastolic dysfunction  Former smoker, quit 2023    -Continue 2L nasal cannula; informed patient that she can wean her oxygen down as long as her SpO2 is greater than 90%.  -CT angio done 4/8/2024 showed no evidence of PE.  Mediastinal hilar lymphadenopathy noted that are likely reactive.  Will consider repeating chest CT sometime in the future.  -PFTs show mild obstructive lung disease.  FEV1 72% predicted  -Continue Breztri 2 puffs twice daily  -Continue as needed albuterol with as needed nebulizers      Vaccination status: Patient is up-to-date with her COVID, pneumonia, and influenza vaccination.   Medications personally reviewed.    Follow Up   No follow-ups on file.  Patient was given instructions and counseling regarding her condition or for health maintenance advice. Please see specific information pulled into the AVS if appropriate.

## 2024-06-14 DIAGNOSIS — M62.838 MUSCLE SPASM: ICD-10-CM

## 2024-06-14 RX ORDER — CYCLOBENZAPRINE HCL 5 MG
5 TABLET ORAL 2 TIMES DAILY PRN
Qty: 60 TABLET | Refills: 1 | Status: SHIPPED | OUTPATIENT
Start: 2024-06-14

## 2024-06-17 ENCOUNTER — TELEPHONE (OUTPATIENT)
Dept: FAMILY MEDICINE CLINIC | Age: 74
End: 2024-06-17
Payer: MEDICARE

## 2024-06-17 DIAGNOSIS — J44.1 COPD EXACERBATION: Primary | ICD-10-CM

## 2024-06-17 RX ORDER — PREDNISONE 20 MG/1
TABLET ORAL
Qty: 10 TABLET | Refills: 0 | Status: SHIPPED | OUTPATIENT
Start: 2024-06-17

## 2024-06-17 NOTE — TELEPHONE ENCOUNTER
Pt c/o flare up of COPD w/wheezing, especially when she goes outside. She is using neb and inhaler. Would like to know if you can send in rx for prednisone. Please advise.

## 2024-06-21 ENCOUNTER — OFFICE VISIT (OUTPATIENT)
Dept: FAMILY MEDICINE CLINIC | Age: 74
End: 2024-06-21
Payer: MEDICARE

## 2024-06-21 ENCOUNTER — LAB (OUTPATIENT)
Dept: LAB | Facility: HOSPITAL | Age: 74
End: 2024-06-21
Payer: MEDICARE

## 2024-06-21 ENCOUNTER — HOSPITAL ENCOUNTER (OUTPATIENT)
Dept: GENERAL RADIOLOGY | Facility: HOSPITAL | Age: 74
Discharge: HOME | End: 2024-06-21
Payer: MEDICARE

## 2024-06-21 VITALS
WEIGHT: 238.6 LBS | HEIGHT: 64 IN | HEART RATE: 80 BPM | DIASTOLIC BLOOD PRESSURE: 84 MMHG | BODY MASS INDEX: 40.74 KG/M2 | SYSTOLIC BLOOD PRESSURE: 126 MMHG | TEMPERATURE: 98.3 F | OXYGEN SATURATION: 91 %

## 2024-06-21 DIAGNOSIS — M54.41 CHRONIC RIGHT-SIDED LOW BACK PAIN WITH RIGHT-SIDED SCIATICA: ICD-10-CM

## 2024-06-21 DIAGNOSIS — E78.00 HIGH CHOLESTEROL: ICD-10-CM

## 2024-06-21 DIAGNOSIS — J44.9 CHRONIC OBSTRUCTIVE PULMONARY DISEASE, UNSPECIFIED COPD TYPE: ICD-10-CM

## 2024-06-21 DIAGNOSIS — R06.02 SHORTNESS OF BREATH: ICD-10-CM

## 2024-06-21 DIAGNOSIS — I50.32 CHRONIC DIASTOLIC CONGESTIVE HEART FAILURE: ICD-10-CM

## 2024-06-21 DIAGNOSIS — R73.9 HYPERGLYCEMIA: ICD-10-CM

## 2024-06-21 DIAGNOSIS — R06.02 SHORTNESS OF BREATH: Primary | ICD-10-CM

## 2024-06-21 DIAGNOSIS — R53.83 FATIGUE, UNSPECIFIED TYPE: ICD-10-CM

## 2024-06-21 DIAGNOSIS — R29.898 WEAKNESS OF BOTH LOWER EXTREMITIES: ICD-10-CM

## 2024-06-21 DIAGNOSIS — G89.29 CHRONIC RIGHT-SIDED LOW BACK PAIN WITH RIGHT-SIDED SCIATICA: ICD-10-CM

## 2024-06-21 LAB
ALBUMIN SERPL-MCNC: 4.1 G/DL (ref 3.5–5.2)
ALBUMIN/GLOB SERPL: 1.2 G/DL
ALP SERPL-CCNC: 115 U/L (ref 39–117)
ALT SERPL W P-5'-P-CCNC: 41 U/L (ref 1–33)
ANION GAP SERPL CALCULATED.3IONS-SCNC: 11.1 MMOL/L (ref 5–15)
AST SERPL-CCNC: 50 U/L (ref 1–32)
BASOPHILS # BLD AUTO: 0.02 10*3/MM3 (ref 0–0.2)
BASOPHILS NFR BLD AUTO: 0.2 % (ref 0–1.5)
BILIRUB SERPL-MCNC: 0.3 MG/DL (ref 0–1.2)
BUN SERPL-MCNC: 16 MG/DL (ref 8–23)
BUN/CREAT SERPL: 15.8 (ref 7–25)
CALCIUM SPEC-SCNC: 10.1 MG/DL (ref 8.6–10.5)
CHLORIDE SERPL-SCNC: 100 MMOL/L (ref 98–107)
CHOLEST SERPL-MCNC: 198 MG/DL (ref 0–200)
CO2 SERPL-SCNC: 28.9 MMOL/L (ref 22–29)
CREAT SERPL-MCNC: 1.01 MG/DL (ref 0.57–1)
DEPRECATED RDW RBC AUTO: 58.6 FL (ref 37–54)
EGFRCR SERPLBLD CKD-EPI 2021: 59.3 ML/MIN/1.73
EOSINOPHIL # BLD AUTO: 0.01 10*3/MM3 (ref 0–0.4)
EOSINOPHIL NFR BLD AUTO: 0.1 % (ref 0.3–6.2)
ERYTHROCYTE [DISTWIDTH] IN BLOOD BY AUTOMATED COUNT: 17.2 % (ref 12.3–15.4)
GLOBULIN UR ELPH-MCNC: 3.5 GM/DL
GLUCOSE SERPL-MCNC: 119 MG/DL (ref 65–99)
HBA1C MFR BLD: 6.1 % (ref 4.8–5.6)
HCT VFR BLD AUTO: 45.2 % (ref 34–46.6)
HDLC SERPL-MCNC: 74 MG/DL (ref 40–60)
HGB BLD-MCNC: 13.9 G/DL (ref 12–15.9)
IMM GRANULOCYTES # BLD AUTO: 0.12 10*3/MM3 (ref 0–0.05)
IMM GRANULOCYTES NFR BLD AUTO: 1 % (ref 0–0.5)
LDLC SERPL CALC-MCNC: 102 MG/DL (ref 0–100)
LDLC/HDLC SERPL: 1.34 {RATIO}
LYMPHOCYTES # BLD AUTO: 1.27 10*3/MM3 (ref 0.7–3.1)
LYMPHOCYTES NFR BLD AUTO: 10.3 % (ref 19.6–45.3)
MCH RBC QN AUTO: 27.9 PG (ref 26.6–33)
MCHC RBC AUTO-ENTMCNC: 30.8 G/DL (ref 31.5–35.7)
MCV RBC AUTO: 90.8 FL (ref 79–97)
MONOCYTES # BLD AUTO: 0.35 10*3/MM3 (ref 0.1–0.9)
MONOCYTES NFR BLD AUTO: 2.8 % (ref 5–12)
NEUTROPHILS NFR BLD AUTO: 10.58 10*3/MM3 (ref 1.7–7)
NEUTROPHILS NFR BLD AUTO: 85.6 % (ref 42.7–76)
NT-PROBNP SERPL-MCNC: 190 PG/ML (ref 0–900)
PLATELET # BLD AUTO: 273 10*3/MM3 (ref 140–450)
PMV BLD AUTO: 11.8 FL (ref 6–12)
POTASSIUM SERPL-SCNC: 4.6 MMOL/L (ref 3.5–5.2)
PROT SERPL-MCNC: 7.6 G/DL (ref 6–8.5)
RBC # BLD AUTO: 4.98 10*6/MM3 (ref 3.77–5.28)
SODIUM SERPL-SCNC: 140 MMOL/L (ref 136–145)
TRIGL SERPL-MCNC: 124 MG/DL (ref 0–150)
TSH SERPL DL<=0.05 MIU/L-ACNC: 0.35 UIU/ML (ref 0.27–4.2)
VLDLC SERPL-MCNC: 22 MG/DL (ref 5–40)
WBC NRBC COR # BLD AUTO: 12.35 10*3/MM3 (ref 3.4–10.8)

## 2024-06-21 PROCEDURE — 36415 COLL VENOUS BLD VENIPUNCTURE: CPT

## 2024-06-21 PROCEDURE — 83880 ASSAY OF NATRIURETIC PEPTIDE: CPT

## 2024-06-21 PROCEDURE — 84443 ASSAY THYROID STIM HORMONE: CPT

## 2024-06-21 PROCEDURE — 85025 COMPLETE CBC W/AUTO DIFF WBC: CPT

## 2024-06-21 PROCEDURE — 83036 HEMOGLOBIN GLYCOSYLATED A1C: CPT

## 2024-06-21 PROCEDURE — 99214 OFFICE O/P EST MOD 30 MIN: CPT | Performed by: FAMILY MEDICINE

## 2024-06-21 PROCEDURE — 3079F DIAST BP 80-89 MM HG: CPT | Performed by: FAMILY MEDICINE

## 2024-06-21 PROCEDURE — 93000 ELECTROCARDIOGRAM COMPLETE: CPT | Performed by: FAMILY MEDICINE

## 2024-06-21 PROCEDURE — 80061 LIPID PANEL: CPT

## 2024-06-21 PROCEDURE — 3074F SYST BP LT 130 MM HG: CPT | Performed by: FAMILY MEDICINE

## 2024-06-21 PROCEDURE — 1125F AMNT PAIN NOTED PAIN PRSNT: CPT | Performed by: FAMILY MEDICINE

## 2024-06-21 PROCEDURE — 1160F RVW MEDS BY RX/DR IN RCRD: CPT | Performed by: FAMILY MEDICINE

## 2024-06-21 PROCEDURE — 80053 COMPREHEN METABOLIC PANEL: CPT

## 2024-06-21 PROCEDURE — 1159F MED LIST DOCD IN RCRD: CPT | Performed by: FAMILY MEDICINE

## 2024-06-21 PROCEDURE — 71046 X-RAY EXAM CHEST 2 VIEWS: CPT

## 2024-06-21 RX ORDER — FUROSEMIDE 40 MG/1
40 TABLET ORAL DAILY
Qty: 30 TABLET | Refills: 1 | Status: SHIPPED | OUTPATIENT
Start: 2024-06-21

## 2024-06-21 NOTE — Clinical Note
Need an order for rollator walker to [facility] for her back pain diagnosis, COPD, shortness of breath, leg weakness.  Thanks.

## 2024-06-21 NOTE — PROGRESS NOTES
Eileen Kaufman presents to [facility].    Chief Complaint:  Follow up on COPD, fatigue, leg weakness    Subjective   History of Present Illness:  Eileen Alvarez is being seen today for follow-up on her care.  She called earlier this week with concerns about her breathing.  She felt that she was having some flaring of COPD.  She noted increased wheezing with chest tightness.  Her breathing is doing better, but she feels sluggish.      She also says that she has ongoing difficulty with chronic pain and weakness in the legs.  She would like to get a rollator walker if possible.  She says that she has some difficulty using a standard walker.  She also has a need to be able to sit down quickly on occasion due to her breathing.    Review of Systems:  Review of Systems   Constitutional:  Negative for chills and fever.   Respiratory:  Positive for cough and shortness of breath.    Cardiovascular:  Negative for chest pain and palpitations.   Gastrointestinal:  Negative for abdominal pain, nausea and vomiting.      Objective   Medical History:  Past Medical History:    Anemia, unspecified    Asymptomatic menopausal state    Chronic obstructive pulmonary disease with (acute) exacerbation    Essential (primary) hypertension    High cholesterol    Low back pain    Major depressive disorder, single episode, moderate    Nicotine dependence, unspecified, uncomplicated    Osteopenia    Other intervertebral disc degeneration, lumbar region    Other long term (current) drug therapy    Solitary pulmonary nodule    Vitamin D deficiency, unspecified     Past Surgical History:    CARDIAC CATHETERIZATION    CARPAL TUNNEL RELEASE    CHOLECYSTECTOMY    COLONOSCOPY    Normal    EPIDURAL    Procedure: LUMBAR/SACRAL TRANSFORAMINAL EPIDURAL Right L3-4 and RIght L5-S1 56765, [id number];  Surgeon: Shandra Thomas MD;  [place] [facility];  Service: Pain Management;  Laterality: Right;    HYSTERECTOMY    Total    KNEE  ARTHROSCOPY    NECK SURGERY      Family History   Problem Relation Age of Onset    Cerebral aneurysm Mother         cause of death    Heart attack Father         cause of death    Esophageal cancer Brother      Social History     Tobacco Use    Smoking status: Former     Current packs/day: 0.00     Average packs/day: 1 pack/day for 50.0 years (50.0 ttl pk-yrs)     Types: Cigarettes     Start date:      Quit date: 2023     Years since quittin.4     Passive exposure: Past    Smokeless tobacco: Never    Tobacco comments:     Eileen Alvarez was 23 when she started smoking.  She did stop smoking for some time - about 13 years.  She has been smoking a total of approximately 33 years.  She has smoked less than a pack daily during this time. She currently uses smokless, vapor can/pouches   Substance Use Topics    Alcohol use: Yes     Comment: Socially       Health Maintenance Due   Topic Date Due    DXA SCAN  2022    ZOSTER VACCINE (2 of 2) 2022    LIPID PANEL  2024        Immunization History   Administered Date(s) Administered    Arexvy (RSV, Adults 60+ yrs) 2024    COVID-19 (MODERNA) BIVALENT 12+YRS 09/10/2022    COVID-19 (MODERNA) Monovalent Original Booster 2022    COVID-19 (PFIZER) Purple Cap Monovalent 2020, 2021, 2021    Fluzone High Dose =>65 Years ([employer] ONLY) 10/01/2021, 2022    Hepatitis A 2018    Influenza Quad Vaccine (Inpatient) 2013    Influenza, Unspecified 10/01/2020    Pneumococcal Conjugate 13-Valent (PCV13) 2018    Pneumococcal Conjugate 20-Valent (PCV20) 2023    Pneumococcal Polysaccharide (PPSV23) 2013, 2017    Shingrix 09/10/2022    Tdap 2024       Allergies   Allergen Reactions    Sulfa Antibiotics Unknown - Low Severity    Aspirin Rash    Atorvastatin Other (See Comments)     Hair loss    Naproxen Other (See Comments)     Urinary tract infection        Medications:  Current Outpatient Medications  "on File Prior to Visit   Medication Sig    albuterol (ACCUNEB) 0.63 MG/3ML nebulizer solution Take 3 mL by nebulization Every 4 (Four) Hours As Needed for Wheezing or Shortness of Air.    amLODIPine (NORVASC) 5 MG tablet Take 1 tablet by mouth 2 (Two) Times a Day.    Budeson-Glycopyrrol-Formoterol (Breztri Aerosphere) 160-9-4.8 MCG/ACT aerosol inhaler Inhale 2 puffs 2 (Two) Times a Day.    budesonide (PULMICORT) 0.5 MG/2ML nebulizer solution Take 2 mL by nebulization Daily.    cetirizine (zyrTEC) 10 MG tablet Take 1 tablet by mouth Daily.    colestipol (COLESTID) 1 g tablet Take 1 tablet by mouth Daily.    cyclobenzaprine (FLEXERIL) 5 MG tablet TAKE 1 TABLET BY MOUTH TWICE DAILY AS NEEDED FOR MUSCLE SPASMS    ferrous sulfate 325 (65 FE) MG EC tablet Take 1 tablet by mouth Daily With Breakfast.    fluticasone (FLONASE) 50 MCG/ACT nasal spray 2 sprays into the nostril(s) as directed by provider Daily.    hydroCHLOROthiazide (HYDRODIURIL) 25 MG tablet Take 1 tablet by mouth Daily.    lidocaine (LIDODERM) 5 % Place 2 patches on the skin as directed by provider Daily.    metoprolol succinate XL (Toprol XL) 50 MG 24 hr tablet Take 1 tablet by mouth 2 (Two) Times a Day.    mirtazapine (REMERON) 30 MG tablet Take 1 tablet by mouth Every Night.    montelukast (SINGULAIR) 10 MG tablet Take 1 tablet by mouth Every Night.    multivitamin with minerals tablet tablet 1 tablet Daily.    O2 (OXYGEN) Inhale 2 L/min 1 (One) Time.    pantoprazole (PROTONIX) 40 MG EC tablet Take 1 tablet by mouth Daily.    predniSONE (DELTASONE) 20 MG tablet Take 2 tablets by oral route once daily    pregabalin (LYRICA) 150 MG capsule TAKE 1 CAPSULE BY MOUTH TWICE DAILY     No current facility-administered medications on file prior to visit.       Vital Signs:   /84   Pulse 80   Temp 98.3 °F (36.8 °C) (Oral)   Ht 162.6 cm (64.02\")   Wt 108 kg (238 lb 9.6 oz)   SpO2 91% Comment: 2L o2  BMI 40.94 kg/m²       Physical Exam:  Physical " Exam  Vitals reviewed.   Constitutional:       General: She is not in acute distress.     Appearance: She is not ill-appearing.   Eyes:      Pupils: Pupils are equal, round, and reactive to light.   Neck:      Comments: No thyromegaly  Cardiovascular:      Rate and Rhythm: Normal rate and regular rhythm.   Pulmonary:      Effort: Pulmonary effort is normal.      Breath sounds: Normal breath sounds.   Abdominal:      General: There is no distension.      Palpations: Abdomen is soft.      Tenderness: There is no abdominal tenderness.   Musculoskeletal:      Cervical back: Neck supple.   Lymphadenopathy:      Cervical: No cervical adenopathy.   Skin:     Findings: No lesion or rash.   Neurological:      Mental Status: She is alert.     Result Review   The following data was reviewed by Artur Quevedo MD on 06/21/2024.  Lab Results   Component Value Date    WBC 11.63 (H) 04/11/2024    HGB 11.9 (L) 04/11/2024    HCT 39.6 04/11/2024    MCV 85.9 04/11/2024     04/11/2024     Lab Results   Component Value Date    GLUCOSE 192 (H) 04/13/2024    BUN 26 (H) 04/13/2024    CREATININE 0.98 04/13/2024     04/13/2024    K 4.3 04/13/2024     04/13/2024    CO2 22.2 04/13/2024    CALCIUM 9.9 04/13/2024    PROTEINTOT 7.6 04/08/2024    ALBUMIN 4.0 04/08/2024    ALT 13 04/08/2024    AST 13 04/08/2024    ALKPHOS 109 04/08/2024    BILITOT 0.2 04/08/2024    EGFR 61.5 04/13/2024    GLOB 3.6 04/08/2024    AGRATIO 1.1 04/08/2024    BCR 26.5 (H) 04/13/2024    ANIONGAP 13.8 04/13/2024      Lab Results   Component Value Date    CHOL 187 03/08/2023    CHLPL 200 11/14/2019    TRIG 99 03/08/2023    HDL 61 (H) 03/08/2023     (H) 03/08/2023     Lab Results   Component Value Date    TSH 2.909 09/07/2023     Lab Results   Component Value Date    HGBA1C 5.40 03/08/2023     ECG 12 Lead    Date/Time: 6/21/2024 2:51 PM  Performed by: Artur Quevedo MD    Authorized by: Artur Quevedo MD  Comparison:  compared with previous ECG from 4/9/2024  Similar to previous ECG  Comparison to previous ECG: Tachycardia has resolved  Rhythm: sinus rhythm  Rate: normal  BPM: 70  Conduction: conduction normal  Q waves: III    ST Segments: ST segments normal  T Waves: T waves normal  QRS axis: normal  Other: no other findings    Clinical impression: normal ECG  Clinical impression comment: This is an essentially normal EKG.  When compared to most recent tracing, there is been no obvious change.         Assessment and Plan:   Today, we have reviewed her care.  I am concerned by how she is feeling.  She has not seen significant improvement with a few days of oral prednisone.  She may have a few crackles at the lung bases as well, and her weight is up.  We will move forward with additional evaluation as noted.  My plan will be to reach out to her later today regarding the EKG and chest x-ray findings.  The chest x-ray has been ordered stat.  If she has significant worsening with her breathing over the weekend she may need to go to the ER.    Diagnoses and all orders for this visit:    1. Shortness of breath (Primary)  -     CBC Auto Differential; Future  -     BNP; Future  -     ECG 12 Lead  -     XR Chest 2 View; Future    2. Chronic diastolic congestive heart failure  -     BNP; Future  -     ECG 12 Lead  -     XR Chest 2 View; Future    3. Chronic obstructive pulmonary disease, unspecified COPD type  -     XR Chest 2 View; Future    4. Fatigue, unspecified type  -     TSH; Future    5. Chronic right-sided low back pain with right-sided sciatica  Comments:  As above.    6. Weakness of both lower extremities  Comments:  As above.    7. High cholesterol  -     Comprehensive Metabolic Panel; Future  -     Lipid Panel; Future    8. Hyperglycemia  -     Hemoglobin A1c; Future    Follow Up  Return in about 4 months (around 10/31/2024) for Recheck, Next scheduled follow up.  Patient was given instructions and counseling regarding her  condition or for health maintenance advice. Please see specific information pulled into the AVS if appropriate.

## 2024-06-22 RX ORDER — AMOXICILLIN AND CLAVULANATE POTASSIUM 875; 125 MG/1; MG/1
1 TABLET, FILM COATED ORAL 2 TIMES DAILY
Qty: 20 TABLET | Refills: 0 | Status: SHIPPED | OUTPATIENT
Start: 2024-06-22

## 2024-06-27 ENCOUNTER — OFFICE VISIT (OUTPATIENT)
Dept: FAMILY MEDICINE CLINIC | Age: 74
End: 2024-06-27
Payer: MEDICARE

## 2024-06-27 VITALS
SYSTOLIC BLOOD PRESSURE: 112 MMHG | BODY MASS INDEX: 40.12 KG/M2 | OXYGEN SATURATION: 97 % | DIASTOLIC BLOOD PRESSURE: 70 MMHG | HEIGHT: 64 IN | HEART RATE: 102 BPM | WEIGHT: 235 LBS

## 2024-06-27 DIAGNOSIS — J96.11 CHRONIC RESPIRATORY FAILURE WITH HYPOXIA: ICD-10-CM

## 2024-06-27 DIAGNOSIS — J44.9 CHRONIC OBSTRUCTIVE PULMONARY DISEASE, UNSPECIFIED COPD TYPE: Primary | ICD-10-CM

## 2024-06-27 PROCEDURE — 3078F DIAST BP <80 MM HG: CPT | Performed by: FAMILY MEDICINE

## 2024-06-27 PROCEDURE — 1159F MED LIST DOCD IN RCRD: CPT | Performed by: FAMILY MEDICINE

## 2024-06-27 PROCEDURE — 3074F SYST BP LT 130 MM HG: CPT | Performed by: FAMILY MEDICINE

## 2024-06-27 PROCEDURE — 1160F RVW MEDS BY RX/DR IN RCRD: CPT | Performed by: FAMILY MEDICINE

## 2024-06-27 PROCEDURE — 99213 OFFICE O/P EST LOW 20 MIN: CPT | Performed by: FAMILY MEDICINE

## 2024-06-27 PROCEDURE — 1125F AMNT PAIN NOTED PAIN PRSNT: CPT | Performed by: FAMILY MEDICINE

## 2024-06-27 NOTE — PROGRESS NOTES
Eileen Kaufman presents to [facility].    Chief Complaint:  Follow up on COPD exacerbation    Subjective   History of Present Illness:  Eileen Alvarez is being seen today for follow-up on her care.  Please see her visit and tests from last week.  She has seen some improvement in how she feels over the last week.  She was placed on Augmentin due to concerns about possible early pneumonia.  She is still on the antibiotic.  She had already just about completed a few days of prednisone.  She does continue to have cough and some congestion.    I had also recommended a change to 40 mg of furosemide on a daily basis.  She apparently has not started that prescription yet though.  Her weight is down slightly compared to her most recent visit though.    Review of Systems:  Review of Systems   Constitutional:  Negative for chills and fever.   Respiratory:  Positive for cough and shortness of breath.    Cardiovascular:  Negative for chest pain and palpitations.   Gastrointestinal:  Negative for abdominal pain, nausea and vomiting.      Objective   Medical History:  Past Medical History:    Anemia, unspecified    Asymptomatic menopausal state    Chronic obstructive pulmonary disease with (acute) exacerbation    Essential (primary) hypertension    High cholesterol    Low back pain    Major depressive disorder, single episode, moderate    Nicotine dependence, unspecified, uncomplicated    Osteopenia    Other intervertebral disc degeneration, lumbar region    Other long term (current) drug therapy    Solitary pulmonary nodule    Vitamin D deficiency, unspecified     Past Surgical History:    CARDIAC CATHETERIZATION    CARPAL TUNNEL RELEASE    CHOLECYSTECTOMY    COLONOSCOPY    Normal    EPIDURAL    Procedure: LUMBAR/SACRAL TRANSFORAMINAL EPIDURAL Right L3-4 and RIght L5-S1 24728, 35915;  Surgeon: Shandra Thomas MD;  [place] [facility] [place];  Service: Pain Management;  Laterality: Right;    HYSTERECTOMY     Total    KNEE ARTHROSCOPY    NECK SURGERY      Family History   Problem Relation Age of Onset    Cerebral aneurysm Mother         cause of death    Heart attack Father         cause of death    Esophageal cancer Brother      Social History     Tobacco Use    Smoking status: Former     Current packs/day: 0.00     Average packs/day: 1 pack/day for 50.0 years (50.0 ttl pk-yrs)     Types: Cigarettes     Start date:      Quit date: 2023     Years since quittin.4     Passive exposure: Past    Smokeless tobacco: Never    Tobacco comments:     Eileen Alvarez was 23 when she started smoking.  She did stop smoking for some time - about 13 years.  She has been smoking a total of approximately 33 years.  She has smoked less than a pack daily during this time. She currently uses smokless, vapor can/pouches   Substance Use Topics    Alcohol use: Yes     Comment: Socially       Health Maintenance Due   Topic Date Due    DXA SCAN  2022    ZOSTER VACCINE (2 of 2) 2022        Immunization History   Administered Date(s) Administered    Arexvy (RSV, Adults 60+ yrs) 2024    COVID-19 (MODERNA) BIVALENT 12+YRS 09/10/2022    COVID-19 (MODERNA) Monovalent Original Booster 2022    COVID-19 (PFIZER) Purple Cap Monovalent 2020, 2021, 2021    Fluzone High Dose =>65 Years ([employer] ONLY) 10/01/2021, 2022    Hepatitis A 2018    Influenza Quad Vaccine (Inpatient) 2013    Influenza, Unspecified 10/01/2020    Pneumococcal Conjugate 13-Valent (PCV13) 2018    Pneumococcal Conjugate 20-Valent (PCV20) 2023    Pneumococcal Polysaccharide (PPSV23) 2013, 2017    Shingrix 09/10/2022    Tdap 2024       Allergies   Allergen Reactions    Sulfa Antibiotics Unknown - Low Severity    Aspirin Rash    Atorvastatin Other (See Comments)     Hair loss    Naproxen Other (See Comments)     Urinary tract infection        Medications:  Current Outpatient Medications on File Prior  to Visit   Medication Sig    albuterol (ACCUNEB) 0.63 MG/3ML nebulizer solution Take 3 mL by nebulization Every 4 (Four) Hours As Needed for Wheezing or Shortness of Air.    amLODIPine (NORVASC) 5 MG tablet Take 1 tablet by mouth 2 (Two) Times a Day.    amoxicillin-clavulanate (AUGMENTIN) 875-125 MG per tablet Take 1 tablet by mouth 2 (Two) Times a Day.    Budeson-Glycopyrrol-Formoterol (Breztri Aerosphere) 160-9-4.8 MCG/ACT aerosol inhaler Inhale 2 puffs 2 (Two) Times a Day.    budesonide (PULMICORT) 0.5 MG/2ML nebulizer solution Take 2 mL by nebulization Daily.    cetirizine (zyrTEC) 10 MG tablet Take 1 tablet by mouth Daily.    colestipol (COLESTID) 1 g tablet Take 1 tablet by mouth Daily.    cyclobenzaprine (FLEXERIL) 5 MG tablet TAKE 1 TABLET BY MOUTH TWICE DAILY AS NEEDED FOR MUSCLE SPASMS    ferrous sulfate 325 (65 FE) MG EC tablet Take 1 tablet by mouth Daily With Breakfast.    fluticasone (FLONASE) 50 MCG/ACT nasal spray 2 sprays into the nostril(s) as directed by provider Daily.    furosemide (LASIX) 40 MG tablet Take 1 tablet by mouth Daily.    lidocaine (LIDODERM) 5 % Place 2 patches on the skin as directed by provider Daily.    metoprolol succinate XL (Toprol XL) 50 MG 24 hr tablet Take 1 tablet by mouth 2 (Two) Times a Day.    mirtazapine (REMERON) 30 MG tablet Take 1 tablet by mouth Every Night.    montelukast (SINGULAIR) 10 MG tablet Take 1 tablet by mouth Every Night.    multivitamin with minerals tablet tablet 1 tablet Daily.    O2 (OXYGEN) Inhale 2 L/min 1 (One) Time.    pantoprazole (PROTONIX) 40 MG EC tablet Take 1 tablet by mouth Daily.    pregabalin (LYRICA) 150 MG capsule TAKE 1 CAPSULE BY MOUTH TWICE DAILY    [DISCONTINUED] predniSONE (DELTASONE) 20 MG tablet Take 2 tablets by oral route once daily (Patient not taking: Reported on 6/27/2024)     No current facility-administered medications on file prior to visit.       Vital Signs:   /70 (BP Location: Right arm, Patient Position:  "Sitting)   Pulse 102   Ht 162.6 cm (64.02\")   Wt 107 kg (235 lb)   SpO2 97% Comment: room air  BMI 40.31 kg/m²       Physical Exam:  Physical Exam  Vitals reviewed.   Constitutional:       General: She is not in acute distress.     Appearance: She is obese. She is not ill-appearing.   Eyes:      Pupils: Pupils are equal, round, and reactive to light.   Neck:      Comments: No thyromegaly  Cardiovascular:      Rate and Rhythm: Normal rate and regular rhythm.   Pulmonary:      Effort: Pulmonary effort is normal.      Comments: Decreased breath sounds at bases.  Abdominal:      General: There is no distension.      Palpations: Abdomen is soft.      Tenderness: There is no abdominal tenderness.   Musculoskeletal:      Cervical back: Neck supple.      Right lower leg: Edema (Trace) present.      Left lower leg: Edema present.   Lymphadenopathy:      Cervical: No cervical adenopathy.   Skin:     Findings: No lesion or rash.   Neurological:      Mental Status: She is alert.     Result Review   The following data was reviewed by Artur Quevedo MD on 06/27/2024.  Lab Results   Component Value Date    WBC 12.35 (H) 06/21/2024    HGB 13.9 06/21/2024    HCT 45.2 06/21/2024    MCV 90.8 06/21/2024     06/21/2024     Lab Results   Component Value Date    GLUCOSE 119 (H) 06/21/2024    BUN 16 06/21/2024    CREATININE 1.01 (H) 06/21/2024     06/21/2024    K 4.6 06/21/2024     06/21/2024    CO2 28.9 06/21/2024    CALCIUM 10.1 06/21/2024    PROTEINTOT 7.6 06/21/2024    ALBUMIN 4.1 06/21/2024    ALT 41 (H) 06/21/2024    AST 50 (H) 06/21/2024    ALKPHOS 115 06/21/2024    BILITOT 0.3 06/21/2024    EGFR 59.3 (L) 06/21/2024    GLOB 3.5 06/21/2024    AGRATIO 1.2 06/21/2024    BCR 15.8 06/21/2024    ANIONGAP 11.1 06/21/2024      Lab Results   Component Value Date    CHOL 198 06/21/2024    CHLPL 200 11/14/2019    TRIG 124 06/21/2024    HDL 74 (H) 06/21/2024     (H) 06/21/2024     Lab Results   Component " Value Date    TSH 0.355 06/21/2024     Lab Results   Component Value Date    HGBA1C 6.10 (H) 06/21/2024          Assessment and Plan:   Today, we have reviewed her care.  She has seen some improvement in how she feels over the last week with the addition of the antibiotic.  Today has been harder for her in part because of the hot weather.  For now, I have recommended she move forward with completing the antibiotic as prescribed.  Apparently, she has not started on the higher dose of furosemide yet.  I have asked her to go ahead and pick that up.  She is scheduled to have a CT chest next month, and I have asked her to keep that appointment.  Otherwise, she will plan to follow-up with me later in the year as scheduled.    Diagnoses and all orders for this visit:    1. Chronic obstructive pulmonary disease, unspecified COPD type (Primary)  Comments:  As above.    2. Chronic respiratory failure with hypoxia  Comments:  As above.    Follow Up  Return in about 18 weeks (around 10/31/2024) for Recheck, Next scheduled follow up.  Patient was given instructions and counseling regarding her condition or for health maintenance advice. Please see specific information pulled into the AVS if appropriate.

## 2024-06-28 DIAGNOSIS — J34.89 RHINORRHEA: ICD-10-CM

## 2024-06-28 RX ORDER — CETIRIZINE HYDROCHLORIDE 10 MG/1
10 TABLET ORAL DAILY
Qty: 90 TABLET | Refills: 3 | Status: SHIPPED | OUTPATIENT
Start: 2024-06-28

## 2024-07-02 ENCOUNTER — OUTSIDE FACILITY SERVICE (OUTPATIENT)
Dept: FAMILY MEDICINE CLINIC | Age: 74
End: 2024-07-02
Payer: MEDICARE

## 2024-07-10 ENCOUNTER — HOSPITAL ENCOUNTER (OUTPATIENT)
Dept: SLEEP MEDICINE | Facility: HOSPITAL | Age: 74
Discharge: HOME | End: 2024-07-10
Payer: MEDICARE

## 2024-07-10 DIAGNOSIS — G47.33 OSA (OBSTRUCTIVE SLEEP APNEA): ICD-10-CM

## 2024-07-10 PROCEDURE — 95810 POLYSOM 6/> YRS 4/> PARAM: CPT

## 2024-07-17 ENCOUNTER — TELEPHONE (OUTPATIENT)
Dept: FAMILY MEDICINE CLINIC | Age: 74
End: 2024-07-17
Payer: MEDICARE

## 2024-07-17 DIAGNOSIS — Z12.31 ENCOUNTER FOR SCREENING MAMMOGRAM FOR MALIGNANT NEOPLASM OF BREAST: Primary | ICD-10-CM

## 2024-07-17 NOTE — TELEPHONE ENCOUNTER
----- Message from Kristi COX sent at 7/17/2023 12:36 PM EDT -----  TICKLE for screening mammogram in 1 year.

## 2024-07-23 ENCOUNTER — HOSPITAL ENCOUNTER (OUTPATIENT)
Dept: CT IMAGING | Facility: HOSPITAL | Age: 74
Discharge: HOME | End: 2024-07-23
Payer: MEDICARE

## 2024-07-23 DIAGNOSIS — R59.0 THORACIC LYMPHADENOPATHY: ICD-10-CM

## 2024-07-23 LAB
CREAT BLDA-MCNC: 1.3 MG/DL (ref 0.6–1.3)
EGFRCR SERPLBLD CKD-EPI 2021: 43.8 ML/MIN/1.73

## 2024-07-23 PROCEDURE — 71260 CT THORAX DX C+: CPT

## 2024-07-23 PROCEDURE — 25510000001 IOPAMIDOL PER 1 ML

## 2024-07-23 PROCEDURE — 82565 ASSAY OF CREATININE: CPT

## 2024-07-23 RX ADMIN — IOPAMIDOL 100 ML: 755 INJECTION, SOLUTION INTRAVENOUS at 14:23

## 2024-07-25 ENCOUNTER — TELEPHONE (OUTPATIENT)
Dept: FAMILY MEDICINE CLINIC | Age: 74
End: 2024-07-25
Payer: MEDICARE

## 2024-07-25 DIAGNOSIS — G47.33 OSA (OBSTRUCTIVE SLEEP APNEA): Primary | ICD-10-CM

## 2024-07-25 NOTE — TELEPHONE ENCOUNTER
[facility] OT states she saw pt today and O2 sat was 62%, they increased flow to 4L and now running 87-90%. Pt is c/o some chest tightness and is coughing yellow sputum. Please advise.

## 2024-07-30 DIAGNOSIS — J44.9 CHRONIC OBSTRUCTIVE PULMONARY DISEASE, UNSPECIFIED COPD TYPE: ICD-10-CM

## 2024-07-30 NOTE — TELEPHONE ENCOUNTER
Pt states she doesn't use the albuterol nebulizer everyday, states if she is out, she takes albuterol inhaler with her, so she does use it, requesting refill.

## 2024-07-31 ENCOUNTER — OFFICE VISIT (OUTPATIENT)
Dept: CARDIOLOGY | Facility: CLINIC | Age: 74
End: 2024-07-31
Payer: MEDICARE

## 2024-07-31 VITALS
WEIGHT: 238 LBS | SYSTOLIC BLOOD PRESSURE: 108 MMHG | HEART RATE: 78 BPM | BODY MASS INDEX: 40.63 KG/M2 | HEIGHT: 64 IN | DIASTOLIC BLOOD PRESSURE: 61 MMHG

## 2024-07-31 DIAGNOSIS — I10 ESSENTIAL HYPERTENSION: ICD-10-CM

## 2024-07-31 DIAGNOSIS — I50.32 CHRONIC DIASTOLIC CONGESTIVE HEART FAILURE: Primary | ICD-10-CM

## 2024-07-31 RX ORDER — FUROSEMIDE 40 MG/1
40 TABLET ORAL DAILY
Qty: 90 TABLET | Refills: 2 | Status: SHIPPED | OUTPATIENT
Start: 2024-07-31

## 2024-07-31 RX ORDER — ALBUTEROL SULFATE 90 UG/1
2 AEROSOL, METERED RESPIRATORY (INHALATION) EVERY 6 HOURS PRN
Qty: 34 G | Refills: 3 | Status: SHIPPED | OUTPATIENT
Start: 2024-07-31

## 2024-07-31 NOTE — ASSESSMENT & PLAN NOTE
Shortness of breath is currently stable, recent ER visit for increasing shortness of breath with COPD exacerbation.  Completed antibiotics.  We will continue Lasix 40 mg daily.  Prescriptions refilled for 90 days.

## 2024-07-31 NOTE — PROGRESS NOTES
CARDIOLOGY FOLLOW-UP PROGRESS NOTE        Chief Complaint  Shortness of Breath and Follow-up (Was in ER at [facility]  )    Subjective            LisaKim Kaufman presents to [facility]  History of Present Illness    Ms. Kaufman is here for a follow-up visit.  She was last seen in cardiology office in May, 2024.  She had a visit to emergency room last week with increasing shortness of breath.  Per patient, she was told to have bronchitis and COPD exacerbation.  She was discharged home without admission.  Diuretics were changed to Lasix from PCP office recently due to increased swelling.  Overall she feels fine today and at her baseline.  No chest pain.  She gets increasing shortness of breath with activity.  She is currently on 1 L/minute of oxygen chronically.    Past History:    (1) Hypertension. (2) Negative for diabetes mellitus or coronary artery disease.  COPD, chronic hypoxia on home oxygen    Medical History:  Past Medical History:   Diagnosis Date    Anemia, unspecified     Asymptomatic menopausal state     Chronic obstructive pulmonary disease with (acute) exacerbation     Essential (primary) hypertension     High cholesterol 06/17/2021    Low back pain     Major depressive disorder, single episode, moderate     Nicotine dependence, unspecified, uncomplicated     Osteopenia     Other intervertebral disc degeneration, lumbar region     Other long term (current) drug therapy     Solitary pulmonary nodule     Vitamin D deficiency, unspecified        Surgical History: has a past surgical history that includes Knee arthroscopy (Left); Cholecystectomy; Hysterectomy (1980s); Carpal tunnel release; Neck surgery (01/21/2015); Cardiac catheterization (07/25/2012); Colonoscopy (08/24/2022); and Epidural (Right, 10/25/2023).     Family History: family history includes Cerebral aneurysm in her mother; Esophageal cancer in her brother; Heart attack in her father.     Social History: reports that she  quit smoking about 18 months ago. Her smoking use included cigarettes. She started smoking about 51 years ago. She has a 50 pack-year smoking history. She has been exposed to tobacco smoke. She has never used smokeless tobacco. She reports current alcohol use. She reports that she does not use drugs.    Allergies: Sulfa antibiotics, Aspirin, Atorvastatin, and Naproxen    Current Outpatient Medications on File Prior to Visit   Medication Sig    albuterol (ACCUNEB) 0.63 MG/3ML nebulizer solution Take 3 mL by nebulization Every 4 (Four) Hours As Needed for Wheezing or Shortness of Air.    albuterol sulfate  (90 Base) MCG/ACT inhaler Inhale 2 puffs Every 6 (Six) Hours As Needed for Wheezing.    amLODIPine (NORVASC) 5 MG tablet Take 1 tablet by mouth 2 (Two) Times a Day.    Budeson-Glycopyrrol-Formoterol (Breztri Aerosphere) 160-9-4.8 MCG/ACT aerosol inhaler Inhale 2 puffs 2 (Two) Times a Day.    budesonide (PULMICORT) 0.5 MG/2ML nebulizer solution Take 2 mL by nebulization Daily.    cetirizine (zyrTEC) 10 MG tablet Take 1 tablet by mouth Daily.    colestipol (COLESTID) 1 g tablet Take 1 tablet by mouth Daily.    cyclobenzaprine (FLEXERIL) 5 MG tablet TAKE 1 TABLET BY MOUTH TWICE DAILY AS NEEDED FOR MUSCLE SPASMS    ferrous sulfate 325 (65 FE) MG EC tablet Take 1 tablet by mouth Daily With Breakfast.    fluticasone (FLONASE) 50 MCG/ACT nasal spray 2 sprays into the nostril(s) as directed by provider Daily.    lidocaine (LIDODERM) 5 % Place 2 patches on the skin as directed by provider Daily.    metoprolol succinate XL (Toprol XL) 50 MG 24 hr tablet Take 1 tablet by mouth 2 (Two) Times a Day.    mirtazapine (REMERON) 30 MG tablet Take 1 tablet by mouth Every Night.    montelukast (SINGULAIR) 10 MG tablet Take 1 tablet by mouth Every Night.    multivitamin with minerals tablet tablet 1 tablet Daily.    O2 (OXYGEN) Inhale 2 L/min 1 (One) Time.    pantoprazole (PROTONIX) 40 MG EC tablet Take 1 tablet by mouth Daily.  "   pregabalin (LYRICA) 150 MG capsule TAKE 1 CAPSULE BY MOUTH TWICE DAILY    urosemide (LASIX) 40 MG tablet Take 1 tablet by mouth Daily.       Review of Systems   Respiratory:  Positive for cough and shortness of breath. Negative for wheezing.    Cardiovascular:  Negative for chest pain, palpitations and leg swelling.   Gastrointestinal:  Negative for nausea and vomiting.   Neurological:  Negative for dizziness and syncope.        Objective     /61   Pulse 78   Ht 162.6 cm (64\")   Wt 108 kg (238 lb)   BMI 40.85 kg/m²       Physical Exam    General : Alert, awake, no acute distress, on oxygen by nasal cannula  Neck : Supple, no carotid bruit, no jugular venous distention  CVS : Regular rate and rhythm, no murmur, rubs or gallops  Lungs: Clear to auscultation bilaterally, no crackles or rhonchi  Abdomen: Soft, nontender, bowel sounds heard in all 4 quadrants  Extremities: Warm, well-perfused, 1+ edema LLE, trace edema RLE    Result Review :     The following data was reviewed by: Simon Mao MD on 07/31/2024:    CMP          4/13/2024    14:59 6/21/2024    15:19 7/23/2024    13:59   CMP   Glucose 192  119     BUN 26  16     Creatinine 0.98  1.01  1.30    EGFR 61.5  59.3  43.8    Sodium 139  140     Potassium 4.3  4.6     Chloride 103  100     Calcium 9.9  10.1     Total Protein  7.6     Albumin  4.1     Globulin  3.5     Total Bilirubin  0.3     Alkaline Phosphatase  115     AST (SGOT)  50     ALT (SGPT)  41     Albumin/Globulin Ratio  1.2     BUN/Creatinine Ratio 26.5  15.8     Anion Gap 13.8  11.1       CBC          4/10/2024    05:26 4/11/2024    06:24 6/21/2024    15:19   CBC   WBC 11.35  11.63  12.35    RBC 4.36  4.61  4.98    Hemoglobin 11.4  11.9  13.9    Hematocrit 36.7  39.6  45.2    MCV 84.2  85.9  90.8    MCH 26.1  25.8  27.9    MCHC 31.1  30.1  30.8    RDW 15.9  15.8  17.2    Platelets 333  365  273      TSH          9/7/2023    09:55 6/21/2024    15:19   TSH   TSH 2.909     0.355       " Details          This result is from an external source.             Lipid Panel          6/21/2024    15:19   Lipid Panel   Total Cholesterol 198    Triglycerides 124    HDL Cholesterol 74    VLDL Cholesterol 22    LDL Cholesterol  102    LDL/HDL Ratio 1.34           Data reviewed: Cardiology studies        Results for orders placed during the hospital encounter of 04/08/24    Adult Transthoracic Echo Complete W/ Cont if Necessary Per Protocol    Interpretation Summary    Left ventricular systolic function is normal. Calculated left ventricular EF = 64.9%    Left ventricular wall thickness is consistent with borderline concentric hypertrophy.    Left ventricular diastolic function is consistent with (grade I) impaired relaxation.    Estimated right ventricular systolic pressure from tricuspid regurgitation is mildly elevated (35-45 mmHg). Calculated right ventricular systolic pressure from tricuspid regurgitation is 35 mmHg.    Mild pulmonary hypertension is present.                   Assessment and Plan        Diagnoses and all orders for this visit:    1. Essential hypertension (Primary)  Assessment & Plan:  Blood pressure well-controlled.  Continue metoprolol with Lasix.      2. Chronic diastolic congestive heart failure  Assessment & Plan:  Shortness of breath is currently stable, recent ER visit for increasing shortness of breath with COPD exacerbation.  Completed antibiotics.  We will continue Lasix 40 mg daily.  Prescriptions refilled for 90 days.    Orders:  -     furosemide (LASIX) 40 MG tablet; Take 1 tablet by mouth Daily.  Dispense: 90 tablet; Refill: 2              Follow Up     Return in about 6 months (around 1/31/2025) for Next scheduled follow up. Cancel the appointment in November .    Patient was given instructions and counseling regarding her condition or for health maintenance advice. Please see specific information pulled into the AVS if appropriate.

## 2024-08-05 ENCOUNTER — TELEPHONE (OUTPATIENT)
Dept: FAMILY MEDICINE CLINIC | Age: 74
End: 2024-08-05
Payer: MEDICARE

## 2024-08-05 DIAGNOSIS — L60.3 ONYCHODYSTROPHY: ICD-10-CM

## 2024-08-05 DIAGNOSIS — B35.1 ONYCHOMYCOSIS: Primary | ICD-10-CM

## 2024-08-05 NOTE — TELEPHONE ENCOUNTER
Caller: Eileen Kaufman    Relationship: Self    Best call back number: [phone]    What is the medical concern/diagnosis: TOE NAIL REMOVED AND NAILS TRIMMED     What specialty or service is being requested: PODIATRY     What is the provider, practice or medical service name:     [facility]  [address] [place] [address] [address], [place] [address]   [phone]        Any additional details: PATIENT HAS SPOKEN WITH THI OFFICE AND SHE CAN GET AN APPOINTMENT AS SOON AS THE REFERRAL IS RECEIVED  PLEASE NOTIFY WHEN REFERRAL IS SENT CAN POST IN MY CHART

## 2024-08-07 ENCOUNTER — TELEPHONE (OUTPATIENT)
Dept: FAMILY MEDICINE CLINIC | Age: 74
End: 2024-08-07
Payer: MEDICARE

## 2024-08-07 DIAGNOSIS — J44.9 CHRONIC OBSTRUCTIVE PULMONARY DISEASE, UNSPECIFIED COPD TYPE: Primary | ICD-10-CM

## 2024-08-07 NOTE — TELEPHONE ENCOUNTER
Per [facility], they can no longer see pt due to them being bought out and new company does not accept pts insurance. If pt still needs home health, a new referral order needs to be placed with different company. Please advise.

## 2024-08-13 DIAGNOSIS — K52.9 CHRONIC DIARRHEA: ICD-10-CM

## 2024-08-13 RX ORDER — MONTELUKAST SODIUM 4 MG/1
1 TABLET, CHEWABLE ORAL DAILY
Qty: 90 TABLET | Refills: 1 | Status: SHIPPED | OUTPATIENT
Start: 2024-08-13

## 2024-08-21 ENCOUNTER — TELEPHONE (OUTPATIENT)
Dept: PULMONOLOGY | Facility: CLINIC | Age: 74
End: 2024-08-21
Payer: MEDICARE

## 2024-08-21 NOTE — TELEPHONE ENCOUNTER
Patient called in and stated that she was at her Endourology appt and her oxygen went down to 86 just sitting in the chair and they increased her to 4L. She states that it went back up too 100% but when she walks she gets dizzy and it drops to below 86. She was told by her endocrinology doctor to call her pulmonologist. She told me everything and I suggested that she go to the ER. She didn't want to do that so I made her an appointment for tomorrow 08/22/2024 with Patricia Jerez. I told her if she felt worse she needed to go to the ER.

## 2024-08-22 ENCOUNTER — OFFICE VISIT (OUTPATIENT)
Dept: PULMONOLOGY | Facility: CLINIC | Age: 74
End: 2024-08-22
Payer: MEDICARE

## 2024-08-22 ENCOUNTER — HOSPITAL ENCOUNTER (OUTPATIENT)
Dept: GENERAL RADIOLOGY | Facility: HOSPITAL | Age: 74
Discharge: HOME | End: 2024-08-22
Admitting: NURSE PRACTITIONER
Payer: MEDICARE

## 2024-08-22 VITALS
HEIGHT: 64 IN | DIASTOLIC BLOOD PRESSURE: 74 MMHG | WEIGHT: 238 LBS | SYSTOLIC BLOOD PRESSURE: 109 MMHG | TEMPERATURE: 97.1 F | RESPIRATION RATE: 18 BRPM | HEART RATE: 87 BPM | OXYGEN SATURATION: 92 % | BODY MASS INDEX: 40.63 KG/M2

## 2024-08-22 DIAGNOSIS — F17.201 TOBACCO ABUSE, IN REMISSION: ICD-10-CM

## 2024-08-22 DIAGNOSIS — J96.11 CHRONIC RESPIRATORY FAILURE WITH HYPOXIA: ICD-10-CM

## 2024-08-22 DIAGNOSIS — D50.0 IRON DEFICIENCY ANEMIA DUE TO CHRONIC BLOOD LOSS: ICD-10-CM

## 2024-08-22 DIAGNOSIS — R06.09 DYSPNEA ON EXERTION: ICD-10-CM

## 2024-08-22 DIAGNOSIS — J98.11 ATELECTASIS: ICD-10-CM

## 2024-08-22 DIAGNOSIS — J44.9 CHRONIC OBSTRUCTIVE PULMONARY DISEASE, UNSPECIFIED COPD TYPE: ICD-10-CM

## 2024-08-22 DIAGNOSIS — N18.2 CKD (CHRONIC KIDNEY DISEASE) STAGE 2, GFR 60-89 ML/MIN: ICD-10-CM

## 2024-08-22 DIAGNOSIS — R06.09 DYSPNEA ON EXERTION: Primary | ICD-10-CM

## 2024-08-22 PROCEDURE — 99214 OFFICE O/P EST MOD 30 MIN: CPT | Performed by: NURSE PRACTITIONER

## 2024-08-22 PROCEDURE — 3078F DIAST BP <80 MM HG: CPT | Performed by: NURSE PRACTITIONER

## 2024-08-22 PROCEDURE — 1159F MED LIST DOCD IN RCRD: CPT | Performed by: NURSE PRACTITIONER

## 2024-08-22 PROCEDURE — 71046 X-RAY EXAM CHEST 2 VIEWS: CPT

## 2024-08-22 PROCEDURE — 1160F RVW MEDS BY RX/DR IN RCRD: CPT | Performed by: NURSE PRACTITIONER

## 2024-08-22 PROCEDURE — 3074F SYST BP LT 130 MM HG: CPT | Performed by: NURSE PRACTITIONER

## 2024-08-22 RX ORDER — DICYCLOMINE HCL 20 MG
TABLET ORAL
COMMUNITY
Start: 2024-08-12

## 2024-08-22 NOTE — PROGRESS NOTES
Primary Care Provider  Artur Quevedo MD   Referring Provider  No ref. provider found    Patient Complaint  Shortness of Breath (Last two days ) and Fatigue      Subjective       History of Presenting Illness  Eileen Kaufman is a pleasant 74 y.o. female who presents to [facility] for appointment for shortness of air. Patient last saw Dr. Crowe 6/10/2024. She has diagnoses of COPD, chronic hypoxic respiratory failure on supplemental oxygen, and anemia.  At last visit she had asked about weaning down her oxygen.  She is currently on O2 at 2 L pulsed dose Inogene machine.  She had previously been on 2 L of home O2 via nasal cannula continuous flow.  At time of visit patient is currently on O2 at 4 L via nasal cannula.  Her sats are at 92%.  Patient was in the hematology office yesterday and they wanted her to go to the hospital emergency room to be evaluated due to very short of breath, low 02 at 86%, and fatigue but patient declined. They increased her oxygen to 4 L and she bounced back up to 100%.  She states she gets dizzy when she walks and that is when her oxygen will drop.  In addition, she was seen in the ED at [facility] 7/25/2024 for COPD exacerbation. She was treated with steroid injection, Duo Nebulizer treatment, CXR which revealed no acute findings. She was prescribed Doxycycline and prednisone for her symptoms.     I explained to patient that her anemia can contribute to shortness of air but also that her Inogene machine may not be keeping up with her demand for oxygen.  That she may need to be switched over to continuous O2 via tanks.    At present patient patient denies coughing, wheezing, headaches, chest pain, weight loss or hemoptysis. Patient denies fevers, chills and night sweats. Eileen Kaufman is able to perform ADLs without difficulties.    I have personally reviewed the review of systems, past family, social, medical and surgical  histories; and agree with their findings.      Review of Systems   Constitutional: Negative.    HENT: Negative.     Respiratory:  Positive for shortness of breath. Negative for cough, wheezing and stridor.    Cardiovascular: Negative.    Musculoskeletal: Negative.    Neurological: Negative.    Psychiatric/Behavioral: Negative.           Family History   Problem Relation Age of Onset    Cerebral aneurysm Mother         cause of death    Heart attack Father         cause of death    Esophageal cancer Brother         Social History     Socioeconomic History    Marital status: Single    Number of children: 2   Tobacco Use    Smoking status: Former     Current packs/day: 0.00     Average packs/day: 1 pack/day for 50.0 years (50.0 ttl pk-yrs)     Types: Cigarettes     Start date:      Quit date: 2023     Years since quittin.5     Passive exposure: Past    Smokeless tobacco: Never    Tobacco comments:     Eileen Alvarez was 23 when she started smoking.  She did stop smoking for some time - about 13 years.  She has been smoking a total of approximately 33 years.  She has smoked less than a pack daily during this time. She currently uses smokless, vapor can/pouches   Vaping Use    Vaping status: Former    Quit date: 2023    Substances: Nicotine    Devices: Pre-filled or refillable cartridge   Substance and Sexual Activity    Alcohol use: Yes     Comment: Socially    Drug use: Never    Sexual activity: Defer        Past Medical History:   Diagnosis Date    Anemia, unspecified     Asymptomatic menopausal state     Chronic obstructive pulmonary disease with (acute) exacerbation     Essential (primary) hypertension     High cholesterol 2021    Low back pain     Major depressive disorder, single episode, moderate     Nicotine dependence, unspecified, uncomplicated     Osteopenia     Other intervertebral disc degeneration, lumbar region     Other long term (current) drug therapy     Solitary pulmonary nodule      Vitamin D deficiency, unspecified         Immunization History   Administered Date(s) Administered    Arexvy (RSV, Adults 60+ yrs) 04/23/2024    COVID-19 (MODERNA) BIVALENT 12+YRS 09/10/2022    COVID-19 (MODERNA) Monovalent Original Booster 04/14/2022    COVID-19 (PFIZER) Purple Cap Monovalent 12/29/2020, 01/29/2021, 09/30/2021    Fluzone  >6mos 09/20/2013    Fluzone High-Dose 65+YRS 10/01/2021, 11/01/2022    Hepatitis A 07/07/2018    Influenza, Unspecified 10/01/2020    Pneumococcal Conjugate 13-Valent (PCV13) 07/05/2018    Pneumococcal Conjugate 20-Valent (PCV20) 08/31/2023    Pneumococcal Polysaccharide (PPSV23) 03/25/2013, 02/13/2017    Shingrix 09/10/2022    Tdap 04/23/2024       Allergies   Allergen Reactions    Sulfa Antibiotics Unknown - Low Severity    Aspirin Rash    Atorvastatin Other (See Comments)     Hair loss    Naproxen Other (See Comments)     Urinary tract infection          Current Outpatient Medications:     dicyclomine (BENTYL) 20 MG tablet, , Disp: , Rfl:     albuterol (ACCUNEB) 0.63 MG/3ML nebulizer solution, Take 3 mL by nebulization Every 4 (Four) Hours As Needed for Wheezing or Shortness of Air., Disp: 1 each, Rfl: 9    albuterol sulfate  (90 Base) MCG/ACT inhaler, Inhale 2 puffs Every 6 (Six) Hours As Needed for Wheezing., Disp: 34 g, Rfl: 3    amLODIPine (NORVASC) 5 MG tablet, Take 1 tablet by mouth 2 (Two) Times a Day., Disp: 180 tablet, Rfl: 3    Budeson-Glycopyrrol-Formoterol (Breztri Aerosphere) 160-9-4.8 MCG/ACT aerosol inhaler, Inhale 2 puffs 2 (Two) Times a Day., Disp: 1 each, Rfl: 5    budesonide (PULMICORT) 0.5 MG/2ML nebulizer solution, Take 2 mL by nebulization Daily., Disp: 180 mL, Rfl: 1    cetirizine (zyrTEC) 10 MG tablet, Take 1 tablet by mouth Daily., Disp: 90 tablet, Rfl: 3    colestipol (COLESTID) 1 g tablet, TAKE 1 TABLET BY MOUTH DAILY, Disp: 90 tablet, Rfl: 1    cyclobenzaprine (FLEXERIL) 5 MG tablet, TAKE 1 TABLET BY MOUTH TWICE DAILY AS NEEDED FOR MUSCLE  "SPASMS, Disp: 60 tablet, Rfl: 1    ferrous sulfate 325 (65 FE) MG EC tablet, Take 1 tablet by mouth Daily With Breakfast., Disp: 90 tablet, Rfl: 1    fluticasone (FLONASE) 50 MCG/ACT nasal spray, 2 sprays into the nostril(s) as directed by provider Daily., Disp: 18 mL, Rfl: 5    furosemide (LASIX) 40 MG tablet, Take 1 tablet by mouth Daily., Disp: 90 tablet, Rfl: 2    lidocaine (LIDODERM) 5 %, Place 2 patches on the skin as directed by provider Daily., Disp: 60 patch, Rfl: 2    metoprolol succinate XL (Toprol XL) 50 MG 24 hr tablet, Take 1 tablet by mouth 2 (Two) Times a Day., Disp: 180 tablet, Rfl: 3    mirtazapine (REMERON) 30 MG tablet, Take 1 tablet by mouth Every Night., Disp: 90 tablet, Rfl: 3    montelukast (SINGULAIR) 10 MG tablet, Take 1 tablet by mouth Every Night., Disp: 90 tablet, Rfl: 3    multivitamin with minerals tablet tablet, 1 tablet Daily., Disp: , Rfl:     O2 (OXYGEN), Inhale 2 L/min 1 (One) Time., Disp: , Rfl:     pantoprazole (PROTONIX) 40 MG EC tablet, Take 1 tablet by mouth Daily., Disp: 90 tablet, Rfl: 3    pregabalin (LYRICA) 150 MG capsule, TAKE 1 CAPSULE BY MOUTH TWICE DAILY, Disp: 60 capsule, Rfl: 5         Vital Signs   /74 (BP Location: Left arm, Patient Position: Sitting, Cuff Size: Large Adult)   Pulse 87   Temp 97.1 °F (36.2 °C)   Resp 18   Ht 162.6 cm (64\")   Wt 108 kg (238 lb)   SpO2 92% Comment: 4 liters  BMI 40.85 kg/m²       Objective     Physical Exam  Vitals reviewed.   Constitutional:       Appearance: Normal appearance.   HENT:      Head: Normocephalic and atraumatic.      Nose: Nose normal.      Mouth/Throat:      Mouth: Mucous membranes are moist.      Pharynx: Oropharynx is clear.   Eyes:      Extraocular Movements: Extraocular movements intact.      Conjunctiva/sclera: Conjunctivae normal.      Pupils: Pupils are equal, round, and reactive to light.   Cardiovascular:      Rate and Rhythm: Normal rate and regular rhythm.      Pulses: Normal pulses.      " Heart sounds: Normal heart sounds.   Pulmonary:      Effort: Pulmonary effort is normal. No respiratory distress.      Breath sounds: Examination of the right-lower field reveals decreased breath sounds. Examination of the left-lower field reveals decreased breath sounds. Decreased breath sounds present. No wheezing, rhonchi or rales.   Abdominal:      General: Bowel sounds are normal.   Musculoskeletal:         General: Normal range of motion.      Cervical back: Normal range of motion and neck supple.   Skin:     General: Skin is warm and dry.   Neurological:      Mental Status: She is alert and oriented to person, place, and time.   Psychiatric:         Behavior: Behavior normal.         Results Review  I have personally reviewed the prior office notes, hospital records, labs, and diagnostics.  XR chest 2 views  Order: [id number]  Impression    No dense consolidation. Findings are compatible with acute exacerbation  of COPD/emphysema or bronchitis/questionable bronchopneumonia. Correlate  clinically.        Images personally reviewed, interpreted and dictated by TERRI Moody.            Narrative    CLINICAL INDICATION:    SHORTNESS OF BREATH    EXAMINATION TECHNIQUE:  XR CHEST PA AND LATERAL    COMPARISON:  Radiographs 1/18/2024. CT chest 9/21/2023.    FINDINGS:  Mild bronchial wall thickening. Prominent interstitial markings.  Emphysema/COPD. Bibasilar peribronchovascular interstitial prominence  and basilar atelectasis. No dense consolidation. No pneumothorax or  effusion. Mild cardiomegaly. Aortic atherosclerosis and tortuosity.  Prior cholecystectomy.  Exam End: 07/25/24 12:37    Specimen Collected: 07/25/24 13:12 Last Resulted: 07/25/24 13:47   Received From: [employer]  Result Received: 07/25/24 15:04   Results  Complete PFT - Pre & Post Bronchodilator (Order [id number])  Order-Level Documents:    Scan on 9/27/2023 by Janet Crowe MD: PULMONARY FUNCTION TEST, [facility], 09/27/2023          Author: -- Service: -- Author Type: --   Filed: Date of Service: Creation Time:   Status: (Other)      Pulmonary Function Test Interpretation  Eileen Kaufman  [id number]     09/28/23  08:39 EDT     Spirometry  Spirometry demonstrates mild airway obstruction.     Post Bronchodilator  Following the inhalation of a bronchodilator, there is no significant change in airway obstruction. This does not necessrily mean that chronic bronchodilator therapy may not be useful.     MVV  N/A     Lung Volume  Lung volumes are consistent with air trapping.     Diffusion  The diffusing capacity for carbon monoxide, a reflection of alveolar-capillary gas transport, is substantially reduced.     Study Comparison  N/A     Further Work-Up  N/A     Impression:  Mild obstruction seen on spirometry.  FEV1 72% predicted.  No significant response to bronchodilator.  Normal lung volume with evidence of air trapping.  Severe reduction in DLCO.     Conclusion:  Mild structural lung disease with evidence of air trapping and reduction in DLCO consistent with COPD/emphysema.     Study date: 9/27/23      CBC with Auto Diff  Order: [id number]  Component  Ref Range & Units 10 d ago   WBC  4.0 - 10.5 K/µL 13.3 High    RBC  4.20 - 5.40 M/µL 4.78   Hemoglobin  12.5 - 16.0 GM/DL 13.3   Hematocrit  37.0 - 47.0 % 41.5   MCV  78 - 100 fL 87   MCH  27.0 - 31.0 pg 27.8   MCHC  32.0 - 36.0 GM/DL 32.0   RDW  11.5 - 14.0 % 15.6 High    Platelets  150 - 400 K/CU    MPV  6.0 - 9.5 fL 10.9 High    Nucleated Red Blood Cell  0 - 0.2 % 0.0   % Neutros  41 - 80 % 76   Lymphocyte %  15 - 48 % 15   % Monos  0 - 12 % 8   % Eos  0 - 7 % 0   % Baso  0 - 2 % 1   # Neutros  1.56 - 6.13 K/µL 10.11 High    # Lymphs  K/µL 1.93   # Monos  0.24 - 0.86 K/µL 1.03 High    # Eos  0.04 - 0.54 K/µL 0.05   # Baso  0.01 - 0.08 K/µL 0.07   Immature Granulocytes-Relative  0.00 - 0.60 % 0.50   # IG  0.00 - 0.05 K/uL 0.06 High    Resulting Agency [facility]    Narrative  Performed by [facility]  When CBC w/ Auto Diff is ordered the lab will add a Manual Differential as a quality check at no additional charge if:  Lymphocytes greater than seventy five percent with normal or increased WBC  Monocytes greater than Fifteen percent  Basophil greater than four percent  Bands >10% or several immature myeloids are seen on scan  Blast? Flag noted  Atypical Lymph flag noted    Specimen Collected: 08/12/24 12:32    Performed by: [facility] Last Resulted: 08/12/24 12:38   Received From: [employer]  Result Received: 08/22/24 14:03     Assessment         Patient Active Problem List   Diagnosis    Allergic rhinitis    Anxiety    Sleep apnea    Cervical spondylosis without myelopathy    Congestive heart failure    High cholesterol    Hypercalcemia    Essential hypertension    Lumbar radiculopathy    Fibromyalgia    Chronic obstructive pulmonary disease    Chronic nonintractable headache    Frequency of micturition    Other long term (current) drug therapy    Syncope and collapse    Bilateral sacroiliitis    Weakness    Chronic respiratory failure with hypoxia    Chronic right-sided low back pain with right-sided sciatica    Chronic superficial gastritis    Diastolic dysfunction    Postlaminectomy syndrome    Dyspnea    Acute exacerbation of chronic obstructive pulmonary disease (COPD)    Acute-on-chronic respiratory failure    COPD exacerbation    CKD (chronic kidney disease) stage 2, GFR 60-89 ml/min        Plan     Diagnoses and all orders for this visit:    1. Dyspnea on exertion (Primary)  -     XR Chest 2 View; Future    2. Atelectasis  -     XR Chest 2 View; Future    3. Chronic respiratory failure with hypoxia  -     XR Chest 2 View; Future    4. Chronic obstructive pulmonary disease, unspecified COPD type  -     XR Chest 2 View; Future    5. CKD (chronic kidney disease) stage 2, GFR 60-89 ml/min    6. Iron  deficiency anemia due to chronic blood loss    7. Tobacco abuse, in remission       8.  I explained to patient that she may need to be on continuous oxygen instead of using an Inogen machine.  We will get a chest x-ray at this time to see if the there may be another acute issue regarding her shortness of air.  Otherwise patient will follow back up at her neck scheduled appointment.  I have advised patient if her symptoms persist or worsen she is to go to the emergency room for further evaluation.      Smoking status:  reports that she quit smoking about 19 months ago. Her smoking use included cigarettes. She started smoking about 51 years ago. She has a 50 pack-year smoking history. She has been exposed to tobacco smoke. She has never used smokeless tobacco.  Vaccination status: Reviewed  Immunization History   Administered Date(s) Administered    Arexvy (RSV, Adults 60+ yrs) 04/23/2024    COVID-19 (MODERNA) BIVALENT 12+YRS 09/10/2022    COVID-19 (MODERNA) Monovalent Original Booster 04/14/2022    COVID-19 (PFIZER) Purple Cap Monovalent 12/29/2020, 01/29/2021, 09/30/2021    Fluzone  >6mos 09/20/2013    Fluzone High-Dose 65+YRS 10/01/2021, 11/01/2022    Hepatitis A 07/07/2018    Influenza, Unspecified 10/01/2020    Pneumococcal Conjugate 13-Valent (PCV13) 07/05/2018    Pneumococcal Conjugate 20-Valent (PCV20) 08/31/2023    Pneumococcal Polysaccharide (PPSV23) 03/25/2013, 02/13/2017    Shingrix 09/10/2022    Tdap 04/23/2024      Medications personally reviewed    Follow Up  Return for Next scheduled follow up.    Patient was given instructions and counseling regarding her condition or for health maintenance advice. Please see specific information pulled into the AVS if appropriate.     I spent 27 minutes caring for Eileen Kaufman on this date of service. This time includes time spent by me in the following activities:preparing for the visit, reviewing tests, obtaining and/or reviewing a separately obtained history,  performing a medically appropriate examination and/or evaluation, counseling and educating the patient/family/caregiver, ordering medications, tests, or procedures, documenting information in the medical record, independently interpreting results and communicating that information with the patient/family/caregiver and answered questions family members, discuss medications.

## 2024-08-23 ENCOUNTER — TELEPHONE (OUTPATIENT)
Dept: FAMILY MEDICINE CLINIC | Age: 74
End: 2024-08-23
Payer: MEDICARE

## 2024-08-23 DIAGNOSIS — F32.A CHRONIC DEPRESSION: Primary | ICD-10-CM

## 2024-08-23 RX ORDER — DULOXETIN HYDROCHLORIDE 30 MG/1
30 CAPSULE, DELAYED RELEASE ORAL DAILY
Qty: 30 CAPSULE | Refills: 1 | Status: SHIPPED | OUTPATIENT
Start: 2024-08-23

## 2024-08-23 NOTE — TELEPHONE ENCOUNTER
Noted.  Please reach out to Eileen Alvarez. I would recommend she STOP mirtazapine at this time.  I have sent a prescription for generic Cymbalta, a different kind of antidepressant, to [facility] just now.  I am hoping that it will be more effective for her.  Confirm that she is not having suicidal thoughts.  Please TICKLE to call her in 3 weeks to see if we should continue duloxetine or possibly increase the dose of it.  Thanks.

## 2024-08-23 NOTE — TELEPHONE ENCOUNTER
Caller: JACK    Relationship to patient: Other/A    Best call back number: [phone]    Patient is needing: JACK FROM [facility] CALLED AND IS REQUESTING A PRESCRIPTION FOR DEPRESSION AND ANXIETY BE SENT IN FOR PATIENT.

## 2024-09-05 ENCOUNTER — TELEPHONE (OUTPATIENT)
Dept: FAMILY MEDICINE CLINIC | Age: 74
End: 2024-09-05
Payer: MEDICARE

## 2024-09-05 DIAGNOSIS — J44.1 COPD EXACERBATION: Primary | ICD-10-CM

## 2024-09-05 DIAGNOSIS — M62.838 MUSCLE SPASM: ICD-10-CM

## 2024-09-05 RX ORDER — CYCLOBENZAPRINE HCL 5 MG
5 TABLET ORAL 2 TIMES DAILY PRN
Qty: 60 TABLET | Refills: 1 | Status: SHIPPED | OUTPATIENT
Start: 2024-09-05

## 2024-09-05 RX ORDER — PREDNISONE 20 MG/1
TABLET ORAL
Qty: 10 TABLET | Refills: 0 | Status: SHIPPED | OUTPATIENT
Start: 2024-09-05

## 2024-09-05 NOTE — TELEPHONE ENCOUNTER
Pt states she had productive yellow cough and has some increased shortness of breath. She would like to know if you think she needs atbx and steroid sent to pharmacy. Please advise.

## 2024-09-05 NOTE — TELEPHONE ENCOUNTER
I have sent prescriptions for Augmentin and a prednisone burst to [facility].  I would recommend she start on these as soon as possible.  If she feels that she is getting in distress, she may go to the ER if needed.  If her symptoms are not improving over the weekend, I would recommend she be seen early next week as a precaution.  Thanks.

## 2024-09-10 ENCOUNTER — HOSPITAL ENCOUNTER (OUTPATIENT)
Dept: SLEEP MEDICINE | Facility: HOSPITAL | Age: 74
Discharge: HOME | End: 2024-09-10
Payer: MEDICARE

## 2024-09-10 DIAGNOSIS — G47.33 OSA (OBSTRUCTIVE SLEEP APNEA): ICD-10-CM

## 2024-09-10 PROCEDURE — 95811 POLYSOM 6/>YRS CPAP 4/> PARM: CPT

## 2024-09-13 ENCOUNTER — TELEPHONE (OUTPATIENT)
Dept: FAMILY MEDICINE CLINIC | Age: 74
End: 2024-09-13
Payer: MEDICARE

## 2024-09-13 DIAGNOSIS — F32.A CHRONIC DEPRESSION: ICD-10-CM

## 2024-09-13 RX ORDER — DULOXETIN HYDROCHLORIDE 60 MG/1
60 CAPSULE, DELAYED RELEASE ORAL DAILY
Qty: 90 CAPSULE | Refills: 1 | Status: SHIPPED | OUTPATIENT
Start: 2024-09-13

## 2024-09-13 NOTE — TELEPHONE ENCOUNTER
Pt states she is doing well with med, she has noted some improvement, but would like to go ahead and increase dose.

## 2024-09-13 NOTE — TELEPHONE ENCOUNTER
----- Message from Kristi COX sent at 8/23/2024  1:59 PM EDT -----   TICKLE to call her in 3 weeks to see if we should continue duloxetine or possibly increase the dose of it.  Thanks.

## 2024-09-13 NOTE — TELEPHONE ENCOUNTER
Noted.  I have increased the dose to 60 mg daily and sent a longer-term prescription to [facility] for her.  Let me know if she has other concerns.  Thanks.

## 2024-09-17 ENCOUNTER — OUTSIDE FACILITY SERVICE (OUTPATIENT)
Dept: FAMILY MEDICINE CLINIC | Age: 74
End: 2024-09-17
Payer: MEDICARE

## 2024-09-17 ENCOUNTER — TELEPHONE (OUTPATIENT)
Dept: PULMONOLOGY | Facility: CLINIC | Age: 74
End: 2024-09-17

## 2024-09-17 ENCOUNTER — OFFICE VISIT (OUTPATIENT)
Dept: PULMONOLOGY | Facility: CLINIC | Age: 74
End: 2024-09-17
Payer: MEDICARE

## 2024-09-17 VITALS
WEIGHT: 235 LBS | SYSTOLIC BLOOD PRESSURE: 103 MMHG | HEIGHT: 64 IN | DIASTOLIC BLOOD PRESSURE: 69 MMHG | BODY MASS INDEX: 40.12 KG/M2 | TEMPERATURE: 97.5 F | HEART RATE: 84 BPM | OXYGEN SATURATION: 89 % | RESPIRATION RATE: 18 BRPM

## 2024-09-17 DIAGNOSIS — J96.11 CHRONIC RESPIRATORY FAILURE WITH HYPOXIA: ICD-10-CM

## 2024-09-17 DIAGNOSIS — G47.33 OBSTRUCTIVE SLEEP APNEA SYNDROME: ICD-10-CM

## 2024-09-17 DIAGNOSIS — Z23 INFLUENZA VACCINE NEEDED: ICD-10-CM

## 2024-09-17 DIAGNOSIS — J44.9 CHRONIC OBSTRUCTIVE PULMONARY DISEASE, UNSPECIFIED COPD TYPE: Primary | ICD-10-CM

## 2024-09-17 NOTE — TELEPHONE ENCOUNTER
Caller: Mitch Guidry    Relationship to patient: Emergency Contact    Best call back number: [phone]     Patient is needing: PT FELL AFTER VISIT DATING 9/17/24. PT HAS BEEN EXPERIENCING SIMILAR OCCURENCES FOR AROUND 6 MONTHS AND BELIEVES THAT IT IS DIRECTLY RELATED TO DROPS ON OXYGEN LEVELS. PLEASE REVIEW AND ADVISE.

## 2024-09-18 DIAGNOSIS — J96.11 CHRONIC RESPIRATORY FAILURE WITH HYPOXIA: Primary | ICD-10-CM

## 2024-09-18 DIAGNOSIS — R06.83 SNORING: ICD-10-CM

## 2024-09-18 DIAGNOSIS — G47.33 OSA (OBSTRUCTIVE SLEEP APNEA): Primary | ICD-10-CM

## 2024-09-18 DIAGNOSIS — J96.11 CHRONIC RESPIRATORY FAILURE WITH HYPOXIA: ICD-10-CM

## 2024-09-18 DIAGNOSIS — I50.32 CHRONIC DIASTOLIC CONGESTIVE HEART FAILURE: ICD-10-CM

## 2024-09-18 PROCEDURE — 95811 POLYSOM 6/>YRS CPAP 4/> PARM: CPT | Performed by: INTERNAL MEDICINE

## 2024-09-18 NOTE — TELEPHONE ENCOUNTER
Spoke with the emergency contact Mitch Guidry and had the patient scheduled for an appointment to see allison next Tuesday.

## 2024-09-24 ENCOUNTER — OFFICE VISIT (OUTPATIENT)
Dept: PULMONOLOGY | Facility: CLINIC | Age: 74
End: 2024-09-24
Payer: MEDICARE

## 2024-09-24 VITALS
BODY MASS INDEX: 40.34 KG/M2 | TEMPERATURE: 98.3 F | SYSTOLIC BLOOD PRESSURE: 98 MMHG | OXYGEN SATURATION: 93 % | HEART RATE: 74 BPM | HEIGHT: 64 IN | RESPIRATION RATE: 18 BRPM | DIASTOLIC BLOOD PRESSURE: 61 MMHG

## 2024-09-24 DIAGNOSIS — J44.9 CHRONIC OBSTRUCTIVE PULMONARY DISEASE, UNSPECIFIED COPD TYPE: ICD-10-CM

## 2024-09-24 DIAGNOSIS — J96.11 CHRONIC RESPIRATORY FAILURE WITH HYPOXIA: Primary | ICD-10-CM

## 2024-09-24 DIAGNOSIS — R06.09 DYSPNEA ON EXERTION: ICD-10-CM

## 2024-09-24 DIAGNOSIS — R59.0 THORACIC LYMPHADENOPATHY: ICD-10-CM

## 2024-09-24 DIAGNOSIS — Z87.891 PERSONAL HISTORY OF NICOTINE DEPENDENCE: ICD-10-CM

## 2024-09-24 DIAGNOSIS — E66.9 OBESITY (BMI 30-39.9): ICD-10-CM

## 2024-09-24 DIAGNOSIS — F17.201 TOBACCO ABUSE, IN REMISSION: ICD-10-CM

## 2024-09-24 DIAGNOSIS — N18.2 CKD (CHRONIC KIDNEY DISEASE) STAGE 2, GFR 60-89 ML/MIN: ICD-10-CM

## 2024-09-24 DIAGNOSIS — I51.89 DIASTOLIC DYSFUNCTION: ICD-10-CM

## 2024-09-24 DIAGNOSIS — G47.33 OSA (OBSTRUCTIVE SLEEP APNEA): ICD-10-CM

## 2024-09-24 PROCEDURE — 1159F MED LIST DOCD IN RCRD: CPT

## 2024-09-24 PROCEDURE — 3078F DIAST BP <80 MM HG: CPT

## 2024-09-24 PROCEDURE — 3074F SYST BP LT 130 MM HG: CPT

## 2024-09-24 PROCEDURE — 1160F RVW MEDS BY RX/DR IN RCRD: CPT

## 2024-09-24 PROCEDURE — 99214 OFFICE O/P EST MOD 30 MIN: CPT

## 2024-09-24 RX ORDER — BUDESONIDE, GLYCOPYRROLATE, AND FORMOTEROL FUMARATE 160; 9; 4.8 UG/1; UG/1; UG/1
2 AEROSOL, METERED RESPIRATORY (INHALATION) 2 TIMES DAILY
Qty: 2 EACH | Refills: 0 | COMMUNITY
Start: 2024-09-24

## 2024-10-02 ENCOUNTER — TELEPHONE (OUTPATIENT)
Dept: FAMILY MEDICINE CLINIC | Age: 74
End: 2024-10-02
Payer: MEDICARE

## 2024-10-02 ENCOUNTER — HOSPITAL ENCOUNTER (OUTPATIENT)
Dept: GENERAL RADIOLOGY | Facility: HOSPITAL | Age: 74
Discharge: HOME | End: 2024-10-02
Payer: MEDICARE

## 2024-10-02 ENCOUNTER — OFFICE VISIT (OUTPATIENT)
Dept: FAMILY MEDICINE CLINIC | Age: 74
End: 2024-10-02
Payer: MEDICARE

## 2024-10-02 VITALS
DIASTOLIC BLOOD PRESSURE: 73 MMHG | HEART RATE: 68 BPM | HEIGHT: 64 IN | SYSTOLIC BLOOD PRESSURE: 105 MMHG | OXYGEN SATURATION: 93 % | WEIGHT: 236 LBS | TEMPERATURE: 98 F | BODY MASS INDEX: 40.29 KG/M2

## 2024-10-02 DIAGNOSIS — K29.30 CHRONIC SUPERFICIAL GASTRITIS WITHOUT BLEEDING: ICD-10-CM

## 2024-10-02 DIAGNOSIS — M53.3 PAIN IN THE COCCYX: Primary | ICD-10-CM

## 2024-10-02 PROCEDURE — 1160F RVW MEDS BY RX/DR IN RCRD: CPT

## 2024-10-02 PROCEDURE — 1125F AMNT PAIN NOTED PAIN PRSNT: CPT

## 2024-10-02 PROCEDURE — 3078F DIAST BP <80 MM HG: CPT

## 2024-10-02 PROCEDURE — 99213 OFFICE O/P EST LOW 20 MIN: CPT

## 2024-10-02 PROCEDURE — 3074F SYST BP LT 130 MM HG: CPT

## 2024-10-02 PROCEDURE — 1159F MED LIST DOCD IN RCRD: CPT

## 2024-10-02 PROCEDURE — 72220 X-RAY EXAM SACRUM TAILBONE: CPT

## 2024-10-02 NOTE — PROGRESS NOTES
Subjective     CHIEF COMPLAINT    Chief Complaint   Patient presents with    Fall     Pain near tailbone x 2 weeks       History of Present Illness  Patient is a 74-year-old female, presenting to the clinic today with her son with complaints of pain located to her coccyx.  She reports that she fell onto her bottom on 9-.  She states that she was trying to take her oxygen off and just fell backwards.  She has had tailbone pain since then.  She describes it as burning, aching, stabbing and sharp.  She has not noticed any bruising.  She states that she will take some ibuprofen which does help for only about 30 minutes. Denies any low back pain. No changes to bowel or bladder function. No numbness, tingling, weakness.       Review of Systems   Constitutional:  Negative for chills and fever.   Gastrointestinal:  Negative for constipation, diarrhea, nausea and vomiting.   Musculoskeletal:  Positive for arthralgias.   Neurological:  Negative for weakness and numbness.            Past Medical History:   Diagnosis Date    Anemia, unspecified     Asymptomatic menopausal state     Chronic obstructive pulmonary disease with (acute) exacerbation     Essential (primary) hypertension     High cholesterol 06/17/2021    Low back pain     Major depressive disorder, single episode, moderate     Nicotine dependence, unspecified, uncomplicated     Osteopenia     Other intervertebral disc degeneration, lumbar region     Other long term (current) drug therapy     Solitary pulmonary nodule     Vitamin D deficiency, unspecified             Past Surgical History:   Procedure Laterality Date    CARDIAC CATHETERIZATION  07/25/2012    CARPAL TUNNEL RELEASE      CHOLECYSTECTOMY      COLONOSCOPY  08/24/2022    Normal    EPIDURAL Right 10/25/2023    Procedure: LUMBAR/SACRAL TRANSFORAMINAL EPIDURAL Right L3-4 and RIght L5-S1 03249, 21535;  Surgeon: Shandra Thomas MD;  Location: [facility] [place] MAIN OR;  Service: Pain Management;  Laterality: Right;     HYSTERECTOMY  1980s    Total    KNEE ARTHROSCOPY Left     NECK SURGERY  2015            Family History   Problem Relation Age of Onset    Cerebral aneurysm Mother         cause of death    Heart attack Father         cause of death    Esophageal cancer Brother             Social History     Socioeconomic History    Marital status: Single    Number of children: 2   Tobacco Use    Smoking status: Former     Current packs/day: 0.00     Average packs/day: 1 pack/day for 50.0 years (50.0 ttl pk-yrs)     Types: Cigarettes     Start date:      Quit date: 2023     Years since quittin.7     Passive exposure: Past    Smokeless tobacco: Never    Tobacco comments:     Eileen Alvarez was 23 when she started smoking.  She did stop smoking for some time - about 13 years.  She has been smoking a total of approximately 33 years.  She has smoked less than a pack daily during this time. She currently uses smokless, vapor can/pouches   Vaping Use    Vaping status: Former    Start date: 2022    Quit date: 2023    Substances: Nicotine    Devices: Pre-filled or refillable cartridge   Substance and Sexual Activity    Alcohol use: Yes     Comment: Socially    Drug use: Never    Sexual activity: Defer            Allergies   Allergen Reactions    Sulfa Antibiotics Unknown - Low Severity    Aspirin Rash    Atorvastatin Other (See Comments)     Hair loss    Naproxen Other (See Comments)     Urinary tract infection            Current Outpatient Medications on File Prior to Visit   Medication Sig Dispense Refill    albuterol (ACCUNEB) 0.63 MG/3ML nebulizer solution Take 3 mL by nebulization Every 4 (Four) Hours As Needed for Wheezing or Shortness of Air. 1 each 9    albuterol sulfate  (90 Base) MCG/ACT inhaler Inhale 2 puffs Every 6 (Six) Hours As Needed for Wheezing. 34 g 3    amLODIPine (NORVASC) 5 MG tablet Take 1 tablet by mouth 2 (Two) Times a Day. 180 tablet 3    Budeson-Glycopyrrol-Formoterol (Breztri  "Aerosphere) 160-9-4.8 MCG/ACT aerosol inhaler Inhale 2 puffs 2 (Two) Times a Day. 2 each 0    budesonide (PULMICORT) 0.5 MG/2ML nebulizer solution Take 2 mL by nebulization Daily. 180 mL 1    cetirizine (zyrTEC) 10 MG tablet Take 1 tablet by mouth Daily. 90 tablet 3    colestipol (COLESTID) 1 g tablet TAKE 1 TABLET BY MOUTH DAILY 90 tablet 1    cyclobenzaprine (FLEXERIL) 5 MG tablet TAKE 1 TABLET BY MOUTH TWICE DAILY AS NEEDED FOR MUSCLE SPASMS 60 tablet 1    dicyclomine (BENTYL) 20 MG tablet       DULoxetine (CYMBALTA) 60 MG capsule Take 1 capsule by mouth Daily. 90 capsule 1    ferrous sulfate 325 (65 FE) MG EC tablet Take 1 tablet by mouth Daily With Breakfast. 90 tablet 1    fluticasone (FLONASE) 50 MCG/ACT nasal spray 2 sprays into the nostril(s) as directed by provider Daily. 18 mL 5    furosemide (LASIX) 40 MG tablet Take 1 tablet by mouth Daily. 90 tablet 2    lidocaine (LIDODERM) 5 % Place 2 patches on the skin as directed by provider Daily. 60 patch 2    metoprolol succinate XL (Toprol XL) 50 MG 24 hr tablet Take 1 tablet by mouth 2 (Two) Times a Day. 180 tablet 3    montelukast (SINGULAIR) 10 MG tablet Take 1 tablet by mouth Every Night. 90 tablet 3    multivitamin with minerals tablet tablet 1 tablet Daily.      O2 (OXYGEN) Inhale 4 L/min 1 (One) Time.      pantoprazole (PROTONIX) 40 MG EC tablet Take 1 tablet by mouth Daily. 90 tablet 3    pregabalin (LYRICA) 150 MG capsule TAKE 1 CAPSULE BY MOUTH TWICE DAILY 60 capsule 5    [DISCONTINUED] Budeson-Glycopyrrol-Formoterol (Breztri Aerosphere) 160-9-4.8 MCG/ACT aerosol inhaler Inhale 2 puffs 2 (Two) Times a Day. (Patient not taking: Reported on 10/2/2024) 1 each 5     No current facility-administered medications on file prior to visit.     /73 (BP Location: Left arm, Patient Position: Sitting, Cuff Size: Large Adult)   Pulse 68   Temp 98 °F (36.7 °C) (Oral)   Ht 162.6 cm (64.02\")   Wt 107 kg (236 lb)   SpO2 93% Comment: o2 @ 4L  BMI 40.49 kg/m² "       Objective     Physical Exam  Vitals and nursing note reviewed.   Constitutional:       General: She is not in acute distress.     Appearance: Normal appearance. She is not ill-appearing.   Pulmonary:      Comments: On supplemental oxygen per nasal cannula   Musculoskeletal:      Comments: Tenderness to palpation coccyx area.  No bruising or swelling noted.  No wounds.   Skin:     General: Skin is warm and dry.   Neurological:      General: No focal deficit present.      Mental Status: She is alert and oriented to person, place, and time.   Psychiatric:         Mood and Affect: Mood and affect normal.         Behavior: Behavior normal.            XR Sacrum & Coccyx (In Office)    Result Date: 10/2/2024  XR SACRUM AND COCCYX Date of Exam: 10/2/2024 8:31 AM EDT Indication: coccyx pain, fall on 9/16/2024 Comparison: None available. Findings: Diffuse bone demineralization. Degenerative change of the sacroiliac joints. Degenerative changes of the hips. Poor visualization of the distal sacrum and coccyx on the lateral view due to demineralization and overlapping tissues. Limited visualization of the distal sacrum and coccyx on AP view due to overlying bowel. No obvious displaced fracture is seen. Degenerative changes of the lower lumbar spine.     Impression: Poor visualization of the distal sacrum and coccyx on both AP and lateral views. Given this limitation, no evidence of an obvious displaced fracture. Electronically Signed: Nico Granda MD  10/2/2024 8:47 AM EDT  Workstation ID: [id number]           Assessment & Plan  Pain in the coccyx  Xray shows no evidence of obvious displaced fractures, but study is limited. Will schedule patient a close follow up with PCP. Discussed use of donut pillow, rest, ice/heat/Tylenol. Avoid NSAIDs due to history of chronic superficial gastritis. Return and ER precautions discussed.   Chronic superficial gastritis without bleeding      Orders Placed This Encounter   Procedures     XR Sacrum & Coccyx (In Office)          Follow up:  Return in about 1 week (around 10/9/2024) for Recheck with PCP.  Patient was given instructions and counseling regarding her condition or for health maintenance advice. Please see specific information pulled into the AVS if appropriate.

## 2024-10-04 ENCOUNTER — TELEPHONE (OUTPATIENT)
Dept: SLEEP MEDICINE | Facility: HOSPITAL | Age: 74
End: 2024-10-04
Payer: MEDICARE

## 2024-10-04 ENCOUNTER — TELEPHONE (OUTPATIENT)
Dept: PULMONOLOGY | Facility: CLINIC | Age: 74
End: 2024-10-04
Payer: MEDICARE

## 2024-10-04 DIAGNOSIS — J44.9 CHRONIC OBSTRUCTIVE PULMONARY DISEASE, UNSPECIFIED COPD TYPE: ICD-10-CM

## 2024-10-04 DIAGNOSIS — J96.11 CHRONIC RESPIRATORY FAILURE WITH HYPOXIA: Primary | ICD-10-CM

## 2024-10-04 NOTE — TELEPHONE ENCOUNTER
Patient called and stated that she was supposed to be getting a CPAP machine and she has not heard anything as of yet.    She is also needing a new RX for her nebulizer supplies be sent to [facility] in [place]. Please call patient

## 2024-10-04 NOTE — TELEPHONE ENCOUNTER
Sleep medicine/Dr. Zarate is now managing patient's BiPAP as they are fine-tuning her settings per their last note.  Dr. Zarate placed the order 9/18/2024.  Also, I will put in nebulizer supply order.  Thank you.

## 2024-10-08 ENCOUNTER — HOSPITAL ENCOUNTER (OUTPATIENT)
Dept: CARDIOLOGY | Facility: HOSPITAL | Age: 74
Discharge: HOME | End: 2024-10-08
Payer: MEDICARE

## 2024-10-08 DIAGNOSIS — R06.09 DYSPNEA ON EXERTION: ICD-10-CM

## 2024-10-08 DIAGNOSIS — J44.9 CHRONIC OBSTRUCTIVE PULMONARY DISEASE, UNSPECIFIED COPD TYPE: ICD-10-CM

## 2024-10-08 DIAGNOSIS — J96.11 CHRONIC RESPIRATORY FAILURE WITH HYPOXIA: ICD-10-CM

## 2024-10-08 PROCEDURE — 93306 TTE W/DOPPLER COMPLETE: CPT

## 2024-10-10 ENCOUNTER — OFFICE VISIT (OUTPATIENT)
Dept: FAMILY MEDICINE CLINIC | Age: 74
End: 2024-10-10
Payer: MEDICARE

## 2024-10-10 VITALS
BODY MASS INDEX: 40.15 KG/M2 | DIASTOLIC BLOOD PRESSURE: 57 MMHG | WEIGHT: 235.2 LBS | OXYGEN SATURATION: 97 % | HEART RATE: 77 BPM | TEMPERATURE: 97.8 F | HEIGHT: 64 IN | SYSTOLIC BLOOD PRESSURE: 103 MMHG

## 2024-10-10 DIAGNOSIS — M54.16 LUMBAR RADICULOPATHY: ICD-10-CM

## 2024-10-10 DIAGNOSIS — K21.9 GASTROESOPHAGEAL REFLUX DISEASE, UNSPECIFIED WHETHER ESOPHAGITIS PRESENT: ICD-10-CM

## 2024-10-10 DIAGNOSIS — J34.89 RHINORRHEA: ICD-10-CM

## 2024-10-10 DIAGNOSIS — M96.1 POSTLAMINECTOMY SYNDROME: ICD-10-CM

## 2024-10-10 DIAGNOSIS — Z00.00 PHYSICAL EXAM: Primary | ICD-10-CM

## 2024-10-10 DIAGNOSIS — M47.816 LUMBAR FACET ARTHROPATHY: ICD-10-CM

## 2024-10-10 DIAGNOSIS — I10 ESSENTIAL HYPERTENSION: ICD-10-CM

## 2024-10-10 DIAGNOSIS — M62.838 MUSCLE SPASM: ICD-10-CM

## 2024-10-10 PROCEDURE — 3078F DIAST BP <80 MM HG: CPT | Performed by: FAMILY MEDICINE

## 2024-10-10 PROCEDURE — G0439 PPPS, SUBSEQ VISIT: HCPCS | Performed by: FAMILY MEDICINE

## 2024-10-10 PROCEDURE — 1160F RVW MEDS BY RX/DR IN RCRD: CPT | Performed by: FAMILY MEDICINE

## 2024-10-10 PROCEDURE — 3074F SYST BP LT 130 MM HG: CPT | Performed by: FAMILY MEDICINE

## 2024-10-10 PROCEDURE — 1170F FXNL STATUS ASSESSED: CPT | Performed by: FAMILY MEDICINE

## 2024-10-10 PROCEDURE — 1125F AMNT PAIN NOTED PAIN PRSNT: CPT | Performed by: FAMILY MEDICINE

## 2024-10-10 PROCEDURE — 1159F MED LIST DOCD IN RCRD: CPT | Performed by: FAMILY MEDICINE

## 2024-10-10 RX ORDER — PANTOPRAZOLE SODIUM 40 MG/1
40 TABLET, DELAYED RELEASE ORAL DAILY
Qty: 90 TABLET | Refills: 3 | Status: SHIPPED | OUTPATIENT
Start: 2024-10-10

## 2024-10-10 RX ORDER — MONTELUKAST SODIUM 10 MG/1
10 TABLET ORAL NIGHTLY
Qty: 90 TABLET | Refills: 3 | Status: SHIPPED | OUTPATIENT
Start: 2024-10-10

## 2024-10-10 RX ORDER — METOPROLOL SUCCINATE 50 MG/1
50 TABLET, EXTENDED RELEASE ORAL 2 TIMES DAILY
Qty: 180 TABLET | Refills: 3 | Status: SHIPPED | OUTPATIENT
Start: 2024-10-10

## 2024-10-10 NOTE — Clinical Note
Good morning, Dr. Thomas.  Thank you for your help with our patients.  Ms. Kaufman was in to see me recently and requested that move ahead with refilling Lyrica for her.  Because of her health problems, travel to [place] has become difficult.  She has a scheduled follow-up visit with you on 10/24/2024.  I will ask my staff to confirm, but I suspect she will want to cancel that appointment.  If you have any concerns regarding this plan, please let me know.  Again, thanks for your help.

## 2024-10-10 NOTE — PROGRESS NOTES
The ABCs of the Annual Wellness Visit  Subsequent Medicare Wellness Visit    Subjective    Eileen Kaufman is a 74 y.o. patient who presents for a Subsequent Medicare Wellness Visit.    The following portions of the patient's history were reviewed and updated as appropriate: allergies, current medications, past family history, past medical history, past social history, past surgical history, and problem list.    Compared to one year ago, the patient feels her physical health is worse.    Compared to one year ago, the patient feels her mental health is the same.    Recent Hospitalizations:  She was admitted within the past 365 days at [facility].     Current Medical Providers:  Patient Care Team:  Artur Quevedo MD as PCP - General (Family Medicine)  Neisha Milton APRN as Nurse Practitioner (Nurse Practitioner)  Madelin Slaughter APRN as Nurse Practitioner (Nurse Practitioner)  Gabriela Vaca APRN as Nurse Practitioner (Pulmonary Disease)  Janet Crowe MD as Consulting Physician (Pulmonary Disease)  Patricia Jerez APRN as Nurse Practitioner (Pulmonary Disease)    Outpatient Medications Prior to Visit   Medication Sig Dispense Refill    albuterol (ACCUNEB) 0.63 MG/3ML nebulizer solution Take 3 mL by nebulization Every 4 (Four) Hours As Needed for Wheezing or Shortness of Air. 1 each 9    albuterol sulfate  (90 Base) MCG/ACT inhaler Inhale 2 puffs Every 6 (Six) Hours As Needed for Wheezing. 34 g 3    amLODIPine (NORVASC) 5 MG tablet Take 1 tablet by mouth 2 (Two) Times a Day. 180 tablet 3    Budeson-Glycopyrrol-Formoterol (Breztri Aerosphere) 160-9-4.8 MCG/ACT aerosol inhaler Inhale 2 puffs 2 (Two) Times a Day. 2 each 0    budesonide (PULMICORT) 0.5 MG/2ML nebulizer solution Take 2 mL by nebulization Daily. 180 mL 1    cetirizine (zyrTEC) 10 MG tablet Take 1 tablet by mouth Daily. 90 tablet 3    colestipol (COLESTID) 1 g tablet TAKE 1 TABLET BY MOUTH DAILY 90 tablet 1    dicyclomine (BENTYL) 20 MG  tablet       DULoxetine (CYMBALTA) 60 MG capsule Take 1 capsule by mouth Daily. 90 capsule 1    fluticasone (FLONASE) 50 MCG/ACT nasal spray 2 sprays into the nostril(s) as directed by provider Daily. 18 mL 5    furosemide (LASIX) 40 MG tablet Take 1 tablet by mouth Daily. 90 tablet 2    lidocaine (LIDODERM) 5 % Place 2 patches on the skin as directed by provider Daily. 60 patch 2    multivitamin with minerals tablet tablet 1 tablet Daily.      O2 (OXYGEN) Inhale 4 L/min 1 (One) Time.      cyclobenzaprine (FLEXERIL) 5 MG tablet TAKE 1 TABLET BY MOUTH TWICE DAILY AS NEEDED FOR MUSCLE SPASMS 60 tablet 1    ferrous sulfate 325 (65 FE) MG EC tablet Take 1 tablet by mouth Daily With Breakfast. 90 tablet 1    metoprolol succinate XL (Toprol XL) 50 MG 24 hr tablet Take 1 tablet by mouth 2 (Two) Times a Day. 180 tablet 3    montelukast (SINGULAIR) 10 MG tablet Take 1 tablet by mouth Every Night. 90 tablet 3    pantoprazole (PROTONIX) 40 MG EC tablet Take 1 tablet by mouth Daily. 90 tablet 3    pregabalin (LYRICA) 150 MG capsule TAKE 1 CAPSULE BY MOUTH TWICE DAILY 60 capsule 5     No facility-administered medications prior to visit.       No opioid medication identified on active medication list. I have reviewed chart for other potential  high risk medication/s and harmful drug interactions in the elderly.      Aspirin is not on active medication list.  Aspirin use is not indicated based on review of current medical condition/s. Risk of harm outweighs potential benefits.      Patient Active Problem List   Diagnosis    Allergic rhinitis    Anxiety    Sleep apnea    Cervical spondylosis without myelopathy    Congestive heart failure    High cholesterol    Hypercalcemia    Essential hypertension    Lumbar radiculopathy    Fibromyalgia    Chronic obstructive pulmonary disease    Chronic nonintractable headache    Frequency of micturition    Other long term (current) drug therapy    Syncope and collapse    Bilateral sacroiliitis  "   Weakness    Chronic respiratory failure with hypoxia    Chronic right-sided low back pain with right-sided sciatica    Chronic superficial gastritis    Diastolic dysfunction    Postlaminectomy syndrome    Dyspnea    Acute exacerbation of chronic obstructive pulmonary disease (COPD)    Acute-on-chronic respiratory failure    COPD exacerbation    CKD (chronic kidney disease) stage 2, GFR 60-89 ml/min    Influenza vaccine needed     Advance Care Planning  Advance Directive is not on file.  ACP discussion was held with the patient during this visit. Patient does not have an advance directive, information provided.  Her son, Mitch, would make decisions if needed.     Objective    Vitals:    10/10/24 1300   BP: 103/57   BP Location: Left arm   Patient Position: Sitting   Pulse: 77   Temp: 97.8 °F (36.6 °C)   TempSrc: Oral   SpO2: 97%  Comment: 4 liters   Weight: 107 kg (235 lb 3.2 oz)   Height: 162.6 cm (64.02\")     Estimated body mass index is 40.35 kg/m² as calculated from the following:    Height as of this encounter: 162.6 cm (64.02\").    Weight as of this encounter: 107 kg (235 lb 3.2 oz).    Class 3 Severe Obesity (BMI >=40). Obesity-related health conditions include the following: obstructive sleep apnea, hypertension, GERD, and osteoarthritis. Obesity is unchanged. BMI is is above average; BMI management plan is completed. We discussed portion control.    Does the patient have evidence of cognitive impairment? No        HEALTH RISK ASSESSMENT    Smoking Status:  Social History     Tobacco Use   Smoking Status Former    Current packs/day: 0.00    Average packs/day: 1 pack/day for 50.0 years (50.0 ttl pk-yrs)    Types: Cigarettes    Start date:     Quit date: 2023    Years since quittin.7    Passive exposure: Past   Smokeless Tobacco Never   Tobacco Comments    Eileen Alvarez was 23 when she started smoking.  She did stop smoking for some time - about 13 years.  She has been smoking a total of approximately " 33 years.  She has smoked less than a pack daily during this time. She currently uses smokless, vapor can/pouches     Alcohol Consumption:  Social History     Substance and Sexual Activity   Alcohol Use Yes    Comment: Socially     Fall Risk Screen:    JONATHAN Fall Risk Assessment was completed, and patient is at HIGH risk for falls. Assessment completed on:10/10/2024    Depression Screening:      10/10/2024     1:40 PM   PHQ-2/PHQ-9 Depression Screening   Retired Little Interest or Pleasure in Doing Things 0-->not at all   Retired Feeling Down, Depressed or Hopeless 0-->not at all   Retired PHQ-9: Brief Depression Severity Measure Score 0       Health Habits and Functional and Cognitive Screening:      10/10/2024     1:00 PM   Functional & Cognitive Status   Do you have difficulty preparing food and eating? Yes   Do you have difficulty bathing yourself, getting dressed or grooming yourself? Yes   Do you have difficulty using the toilet? No   Do you have difficulty moving around from place to place? Yes   Do you have trouble with steps or getting out of a bed or a chair? Yes   Current Diet Well Balanced Diet   Dental Exam Up to date   Eye Exam Up to date   Exercise (times per week) 2 times per week   Current Exercises Include Other        Exercise Comment stretches   Do you need help using the phone?  No   Are you deaf or do you have serious difficulty hearing?  No   Do you need help to go to places out of walking distance? No   Do you need help shopping? No   Do you need help preparing meals?  Yes   Do you need help with housework?  Yes   Do you need help with laundry? Yes   Do you need help taking your medications? No   Do you need help managing money? No   Do you ever drive or ride in a car without wearing a seat belt? No   Have you felt unusual stress, anger or loneliness in the last month? Yes   Who do you live with? Child   If you need help, do you have trouble finding someone available to you? No   Have you  been bothered in the last four weeks by sexual problems? No   Do you have difficulty concentrating, remembering or making decisions? Yes       Age-appropriate Screening Schedule:  Refer to the list below for future screening recommendations based on patient's age, sex and/or medical conditions. Orders for these recommended tests are listed in the plan section. The patient has been provided with a written plan.    Health Maintenance   Topic Date Due    DXA SCAN  08/25/2022    ZOSTER VACCINE (2 of 2) 11/05/2022    COVID-19 Vaccine (6 - 2023-24 season) 12/07/2024 (Originally 9/1/2024)    LIPID PANEL  06/21/2025    MAMMOGRAM  07/17/2025    LUNG CANCER SCREENING  07/23/2025    ANNUAL WELLNESS VISIT  10/10/2025    BMI FOLLOWUP  10/10/2025    COLORECTAL CANCER SCREENING  08/24/2032    TDAP/TD VACCINES (2 - Td or Tdap) 04/23/2034    HEPATITIS C SCREENING  Completed    INFLUENZA VACCINE  Completed    Pneumococcal Vaccine 65+  Completed          CMS Preventative Services Quick Reference  Risk Factors Identified During Encounter  Depression/Dysphoria: Current medication is effective, no change recommended  Immunizations Discussed/Encouraged: Shingrix  The above risks/problems have been discussed with the patient.  Pertinent information has been shared with the patient in the After Visit Summary.  An After Visit Summary and PPPS were made available to the patient.    Follow Up:   Next Medicare Wellness visit to be scheduled in 1 year.     Additional E&M Note during same encounter follows:  Patient has multiple medical problems which are significant and separately identifiable that require additional work above and beyond the Medicare Wellness Visit.       Chief Complaint:  Chronic pain, lumbar radiculopathy, hypertension, GERD, chronic rhinorrhea     Subjective  History of Present Illness:  In addition to the Medicare wellness exam, Eileen Alvarez is also being seen for evaluation of chronic pain.  She has lumbar degenerative disc  "disease and has had previous surgery on the lower back.  She has been seeing pain management in [place], but she would like for me to prescribe Lyrica for her.  She states that this medication is of some benefit for her, and she would like to continue it for now.  She denies abuse, diversion, or doctor shopping.  Kevyn is reviewed and consistent.      She also has multiple other health issues for which we are monitoring and she sees his specialists.  Her blood pressure is well-controlled today.  She does continue to take metoprolol for this.    She remains on pantoprazole for reflux and is doing well with this currently.  She denies any acute concern.    Regarding chronic rhinorrhea, she continues to take generic Singulair.  This is of some benefit for her.    Review of Systems:  Review of Systems   Constitutional:  Negative for chills and fever.   Respiratory:  Positive for shortness of breath. Negative for cough.    Cardiovascular:  Negative for chest pain and palpitations.   Gastrointestinal:  Negative for abdominal pain, nausea and vomiting.      Objective   Vital Signs:  /57 (BP Location: Left arm, Patient Position: Sitting)   Pulse 77   Temp 97.8 °F (36.6 °C) (Oral)   Ht 162.6 cm (64.02\")   Wt 107 kg (235 lb 3.2 oz)   SpO2 97% Comment: 4 liters  BMI 40.35 kg/m²     Physical Exam  Vitals and nursing note reviewed.   Constitutional:       General: She is not in acute distress.     Appearance: She is obese. She is not ill-appearing.   HENT:      Right Ear: Tympanic membrane and ear canal normal.      Left Ear: Tympanic membrane and ear canal normal.      Ears:      Comments: Hearing is normal in the right ear with forced whisper.  It is decreased in the left ear though.     Mouth/Throat:      Mouth: Mucous membranes are moist.      Comments: Pharynx appears normal  Eyes:      Extraocular Movements: Extraocular movements intact.      Pupils: Pupils are equal, round, and reactive to light.      " Comments: Binocular vision is 20/20 with correction.   Neck:      Thyroid: No thyromegaly.   Cardiovascular:      Rate and Rhythm: Normal rate and regular rhythm.      Heart sounds: No murmur heard.  Pulmonary:      Effort: Pulmonary effort is normal.      Comments: Decreased breath sounds are present.  Abdominal:      General: There is no distension.      Palpations: Abdomen is soft. There is no mass.      Tenderness: There is no abdominal tenderness.   Musculoskeletal:         General: Tenderness (Mild discomfort in the tailbone area.) present.      Cervical back: Normal range of motion.      Right lower leg: Edema (Trace bilateral) present.      Left lower leg: Edema present.   Skin:     Findings: No lesion or rash.   Neurological:      General: No focal deficit present.      Mental Status: She is oriented to person, place, and time.      Cranial Nerves: No cranial nerve deficit.   Psychiatric:         Mood and Affect: Mood normal.       The following data was reviewed by Artur Quevedo MD on 10/10/2024.  Lab Results   Component Value Date    WBC 12.35 (H) 06/21/2024    HGB 13.9 06/21/2024    HCT 45.2 06/21/2024    MCV 90.8 06/21/2024     06/21/2024     Lab Results   Component Value Date    GLUCOSE 119 (H) 06/21/2024    BUN 16 06/21/2024    CREATININE 1.30 07/23/2024     06/21/2024    K 4.6 06/21/2024     06/21/2024    CO2 28.9 06/21/2024    CALCIUM 10.1 06/21/2024    PROTEINTOT 7.6 06/21/2024    ALBUMIN 4.1 06/21/2024    ALT 41 (H) 06/21/2024    AST 50 (H) 06/21/2024    ALKPHOS 115 06/21/2024    BILITOT 0.3 06/21/2024    EGFR 43.8 (L) 07/23/2024    GLOB 3.5 06/21/2024    AGRATIO 1.2 06/21/2024    BCR 15.8 06/21/2024    ANIONGAP 11.1 06/21/2024      Lab Results   Component Value Date    CHOL 198 06/21/2024    CHLPL 200 11/14/2019    TRIG 124 06/21/2024    HDL 74 (H) 06/21/2024     (H) 06/21/2024     Lab Results   Component Value Date    TSH 0.355 06/21/2024     Lab Results    Component Value Date    HGBA1C 6.10 (H) 06/21/2024             Assessment and Plan:   Today, we have reviewed her care. Eileen Alvarez continues to have ongoing difficulty related to her chronic health problems.  There is no acute issue on exam today though.  Regarding the Medicare wellness exam, she is due for mammogram which has been scheduled.  She is up-to-date on CT chest.      Regarding her usual care, we have reviewed her request regarding generic Lyrica.  It does seem to be of benefit, and the travel burden to [place] has become significant for her.  We will move ahead with refilling this medication for her presently.  She has a scheduled appointment upcoming with Dr. Thomas.  I think it is reasonable to cancel that, but I will reach out to Dr. Thomas's office regarding this.  We will obtain the patient's consent to continue Lyrica.  Her other problems seem relatively stable currently, and we will refill medications as noted.  Tentative follow-up with me will be again in about 6 months, sooner if needed.    Diagnoses and all orders for this visit:    1. Physical exam (Primary)    2. Lumbar facet arthropathy  -     pregabalin (LYRICA) 150 MG capsule; Take 1 capsule by mouth 2 (Two) Times a Day.  Dispense: 60 capsule; Refill: 5    3. Postlaminectomy syndrome  -     pregabalin (LYRICA) 150 MG capsule; Take 1 capsule by mouth 2 (Two) Times a Day.  Dispense: 60 capsule; Refill: 5    4. Lumbar radiculopathy  -     pregabalin (LYRICA) 150 MG capsule; Take 1 capsule by mouth 2 (Two) Times a Day.  Dispense: 60 capsule; Refill: 5    5. Essential hypertension  -     metoprolol succinate XL (Toprol XL) 50 MG 24 hr tablet; Take 1 tablet by mouth 2 (Two) Times a Day.  Dispense: 180 tablet; Refill: 3    6. Gastroesophageal reflux disease, unspecified whether esophagitis present  -     pantoprazole (PROTONIX) 40 MG EC tablet; Take 1 tablet by mouth Daily.  Dispense: 90 tablet; Refill: 3    7. Rhinorrhea  -     montelukast  (SINGULAIR) 10 MG tablet; Take 1 tablet by mouth Every Night.  Dispense: 90 tablet; Refill: 3    8. Muscle spasm  -     cyclobenzaprine (FLEXERIL) 5 MG tablet; Take 1 tablet by mouth 2 (Two) Times a Day As Needed for Muscle Spasms.  Dispense: 60 tablet; Refill: 1       Follow Up  Return in about 6 months (around 4/10/2025) for Recheck, Next scheduled follow up.  Patient was given instructions and counseling regarding her condition or for health maintenance advice. Please see specific information pulled into the AVS if appropriate.

## 2024-10-11 ENCOUNTER — PATIENT OUTREACH (OUTPATIENT)
Dept: CASE MANAGEMENT | Facility: OTHER | Age: 74
End: 2024-10-11
Payer: MEDICARE

## 2024-10-11 DIAGNOSIS — D64.9 ANEMIA, UNSPECIFIED TYPE: ICD-10-CM

## 2024-10-11 DIAGNOSIS — N95.9 MENOPAUSAL AND POSTMENOPAUSAL DISORDER: Primary | ICD-10-CM

## 2024-10-11 RX ORDER — FERROUS SULFATE 325(65) MG
325 TABLET, DELAYED RELEASE (ENTERIC COATED) ORAL
Qty: 90 TABLET | Refills: 1 | Status: SHIPPED | OUTPATIENT
Start: 2024-10-11

## 2024-10-11 NOTE — OUTREACH NOTE
AMBULATORY CASE MANAGEMENT NOTE    Names and Relationships of Patient/Support Persons:  -     Reached out to patient, PCP sent chart for possible referral.    42 minutes on the phone with patient.      Reviewed each specialists and discussed what each were managing.  She is very knowledgeable what each provider is managing and what prescriptions are for what condition.     She did discuss that she would like to quit going to pain management, only because it was so far away and would like for the PCP to manage the scripts ( lyrica) - will send chart to him to discuss.    We did review any outstanding care gaps and she was agreeable to have bone density on same day as mammogram.  Will try to coordinate.    Discussed transportation and her son takes her to most of her appointments.  Informed her pulmonology can be made in [place].    Discussed meal preparation and with her oxygen, she does have difficulty preparing her own meals.    We called [facility] together and she does not qualify for any meals due to that she lives with her daughter and son and they are available to assist with meals.   She would like assistance to help with housekeeping, she was placed on the waiting list.     We did discuss her frequent ER visits, she too would like to decrease the visits.  Mostly it is for SOA or CP.  Advised her to call office first before going to ER.     Kim ABDULLAHI  Ambulatory Case Management    10/11/2024, 14:21 EDT

## 2024-10-12 RX ORDER — PREGABALIN 150 MG/1
150 CAPSULE ORAL 2 TIMES DAILY
Qty: 60 CAPSULE | Refills: 5 | Status: SHIPPED | OUTPATIENT
Start: 2024-10-12

## 2024-10-12 RX ORDER — CYCLOBENZAPRINE HCL 5 MG
5 TABLET ORAL 2 TIMES DAILY PRN
Qty: 60 TABLET | Refills: 1 | Status: SHIPPED | OUTPATIENT
Start: 2024-10-12

## 2024-10-12 NOTE — PROGRESS NOTES
Also, when you speak with Eileen Alvarez, let her know that I am not able to prescribe narcotic pain medications to her.  Lyrica is not a narcotic though, and I do feel comfortable prescribing it.  See if she would like us to cancel the 10/24/2024 appointment with Dr. Thomas.  I have messaged Dr. Thomas separately to make sure she is comfortable with this plan.  Thanks.

## 2024-10-12 NOTE — ADDENDUM NOTE
Addended by: KAYCEE SIBLEY on: 10/12/2024 08:24 AM     Modules accepted: Orders, Level of Service

## 2024-10-12 NOTE — PROGRESS NOTES
Pod A, I have addressed this in the recent visit note.  Please obtain her verbal consent for me to prescribe pregabalin.  I have also sent refills on cyclobenzaprine when needed.  She should be aware that both of these medications can cause sleepiness or dizziness.  I would recommend limited use of cyclobenzaprine if possible because of this.  There is some risk of addiction, impairment, and possibly overdose with pregabalin, but it is usually tolerated well by patients.  Let me know if she has specific concerns.  Thanks.

## 2024-10-13 LAB
AORTIC DIMENSIONLESS INDEX: 0.89 (DI)
BH CV ECHO MEAS - AO MAX PG: 8 MMHG
BH CV ECHO MEAS - AO MEAN PG: 4 MMHG
BH CV ECHO MEAS - AO V2 MAX: 141 CM/SEC
BH CV ECHO MEAS - AO V2 VTI: 28.4 CM
BH CV ECHO MEAS - EDV(MOD-SP2): 90.2 ML
BH CV ECHO MEAS - EDV(MOD-SP4): 71 ML
BH CV ECHO MEAS - EF(MOD-BP): 61 %
BH CV ECHO MEAS - EF(MOD-SP2): 63.7 %
BH CV ECHO MEAS - EF(MOD-SP4): 63.4 %
BH CV ECHO MEAS - ESV(MOD-SP2): 32.7 ML
BH CV ECHO MEAS - ESV(MOD-SP4): 26 ML
BH CV ECHO MEAS - LA DIMENSION: 3.3 CM
BH CV ECHO MEAS - LAT PEAK E' VEL: 11 CM/SEC
BH CV ECHO MEAS - LV MAX PG: 6.5 MMHG
BH CV ECHO MEAS - LV MEAN PG: 3 MMHG
BH CV ECHO MEAS - LV V1 MAX: 127 CM/SEC
BH CV ECHO MEAS - LV V1 VTI: 25.3 CM
BH CV ECHO MEAS - MED PEAK E' VEL: 7.8 CM/SEC
BH CV ECHO MEAS - MV A MAX VEL: 92.2 CM/SEC
BH CV ECHO MEAS - MV DEC SLOPE: 584 CM/SEC2
BH CV ECHO MEAS - MV DEC TIME: 0.2 SEC
BH CV ECHO MEAS - MV E MAX VEL: 110 CM/SEC
BH CV ECHO MEAS - MV E/A: 1.19
BH CV ECHO MEAS - MV MEAN PG: 3 MMHG
BH CV ECHO MEAS - MV P1/2T: 62.2 MSEC
BH CV ECHO MEAS - MV V2 VTI: 33 CM
BH CV ECHO MEAS - MVA(P1/2T): 3.5 CM2
BH CV ECHO MEAS - PULM A REVS DUR: 0.15 SEC
BH CV ECHO MEAS - PULM A REVS VEL: 51.6 CM/SEC
BH CV ECHO MEAS - PULM DIAS VEL: 54 CM/SEC
BH CV ECHO MEAS - PULM S/D: 0.87
BH CV ECHO MEAS - PULM SYS VEL: 46.9 CM/SEC
BH CV ECHO MEAS - RAP SYSTOLE: 10 MMHG
BH CV ECHO MEAS - RVDD: 3.3 CM
BH CV ECHO MEAS - RVSP: 54.6 MMHG
BH CV ECHO MEAS - SV(MOD-SP2): 57.5 ML
BH CV ECHO MEAS - SV(MOD-SP4): 45 ML
BH CV ECHO MEAS - TAPSE (>1.6): 1.65 CM
BH CV ECHO MEAS - TR MAX PG: 44.6 MMHG
BH CV ECHO MEAS - TR MAX VEL: 334 CM/SEC
BH CV ECHO MEASUREMENTS AVERAGE E/E' RATIO: 11.7
BH CV XLRA - TDI S': 10 CM/SEC
IVRT: 55 MS
LEFT ATRIUM VOLUME INDEX: 25.8 ML/M2

## 2024-11-08 ENCOUNTER — OUTSIDE FACILITY SERVICE (OUTPATIENT)
Dept: FAMILY MEDICINE CLINIC | Age: 74
End: 2024-11-08
Payer: MEDICARE

## 2024-11-20 ENCOUNTER — LAB (OUTPATIENT)
Dept: LAB | Facility: HOSPITAL | Age: 74
End: 2024-11-20
Payer: MEDICARE

## 2024-11-20 ENCOUNTER — OFFICE VISIT (OUTPATIENT)
Dept: FAMILY MEDICINE CLINIC | Age: 74
End: 2024-11-20
Payer: MEDICARE

## 2024-11-20 ENCOUNTER — HOSPITAL ENCOUNTER (OUTPATIENT)
Dept: ULTRASOUND IMAGING | Facility: HOSPITAL | Age: 74
Discharge: HOME | End: 2024-11-20
Payer: MEDICARE

## 2024-11-20 ENCOUNTER — HOSPITAL ENCOUNTER (OUTPATIENT)
Dept: GENERAL RADIOLOGY | Facility: HOSPITAL | Age: 74
Discharge: HOME | End: 2024-11-20
Payer: MEDICARE

## 2024-11-20 VITALS
TEMPERATURE: 98.1 F | BODY MASS INDEX: 40.46 KG/M2 | HEART RATE: 102 BPM | DIASTOLIC BLOOD PRESSURE: 69 MMHG | SYSTOLIC BLOOD PRESSURE: 100 MMHG | HEIGHT: 64 IN | WEIGHT: 237 LBS | OXYGEN SATURATION: 97 %

## 2024-11-20 DIAGNOSIS — K52.9 CHRONIC DIARRHEA: ICD-10-CM

## 2024-11-20 DIAGNOSIS — M79.672 ACUTE PAIN OF LEFT FOOT: ICD-10-CM

## 2024-11-20 DIAGNOSIS — N30.00 ACUTE CYSTITIS WITHOUT HEMATURIA: Primary | ICD-10-CM

## 2024-11-20 DIAGNOSIS — I10 ESSENTIAL HYPERTENSION: ICD-10-CM

## 2024-11-20 LAB
BILIRUB BLD-MCNC: NEGATIVE MG/DL
CLARITY, POC: ABNORMAL
COLOR UR: ABNORMAL
EXPIRATION DATE: ABNORMAL
GLUCOSE UR STRIP-MCNC: NEGATIVE MG/DL
KETONES UR QL: NEGATIVE
LEUKOCYTE EST, POC: ABNORMAL
Lab: ABNORMAL
NITRITE UR-MCNC: NEGATIVE MG/ML
PH UR: 6.5 [PH] (ref 5–8)
PROT UR STRIP-MCNC: NEGATIVE MG/DL
RBC # UR STRIP: NEGATIVE /UL
SP GR UR: 1.01 (ref 1–1.03)
UROBILINOGEN UR QL: ABNORMAL

## 2024-11-20 PROCEDURE — 73630 X-RAY EXAM OF FOOT: CPT

## 2024-11-20 PROCEDURE — 93971 EXTREMITY STUDY: CPT

## 2024-11-20 RX ORDER — METOPROLOL SUCCINATE 50 MG/1
50 TABLET, EXTENDED RELEASE ORAL 2 TIMES DAILY
Qty: 180 TABLET | Refills: 3 | Status: SHIPPED | OUTPATIENT
Start: 2024-11-20

## 2024-11-20 RX ORDER — MONTELUKAST SODIUM 4 MG/1
1 TABLET, CHEWABLE ORAL DAILY
Qty: 90 TABLET | Refills: 1 | Status: SHIPPED | OUTPATIENT
Start: 2024-11-20

## 2024-11-20 NOTE — PROGRESS NOTES
"Chief Complaint  Urinary Tract Infection (Low back pain; urine dark w/ odor ) and Leg Pain (Lt leg swollen/tight down to ankle; Pt fell last week )    Subjective      Eileen Kaufman is a 74 y.o. female who presents to [facility]     Patient Care Team:  Artur Quevedo MD as PCP - General (Family Medicine)  Neisha Milton APRN as Nurse Practitioner (Nurse Practitioner)  Madelin Slaughter APRN as Nurse Practitioner (Nurse Practitioner)  Gabriela Vaca APRN as Nurse Practitioner (Pulmonary Disease)  Janet Crowe MD as Consulting Physician (Pulmonary Disease)  Patricia Jerez APRN as Nurse Practitioner (Pulmonary Disease)  Patient presents to same-day clinic with chief complaint of 1 week history of dysuria low back pain dark foul-smelling urine.  She started increasing her intake of water over the last 2 days and has noticed improvement.  Urinalysis in the clinic does show positive leukocyte esterase although small.  She also complains of a fall that she had last week that has resulted in pain and worsening swelling of her left foot and ankle up to the calf.  She reports a tightness in her calf that she has not felt before.  She denies any previous history of clotting.  She did have an ankle fracture that was repaired and she currently is undergoing occupational and physical therapy.  She also complains of some redness and warmth around the area of swelling of her left leg.    Review of Systems  Full review of systems obtained and pertinent positives states in above HPI.  Otherwise all others reviewed and are negative.    Objective   Vital Signs:   Vitals:    11/20/24 0951   BP: 100/69   BP Location: Right arm   Patient Position: Sitting   Cuff Size: Adult   Pulse: 102   Temp: 98.1 °F (36.7 °C)   TempSrc: Temporal   SpO2: 97%  Comment: on 4L of O2   Weight: 108 kg (237 lb)   Height: 162.6 cm (64.02\")     Body mass index is 40.66 kg/m².    Wt Readings from Last 3 Encounters: "   11/20/24 108 kg (237 lb)   10/10/24 107 kg (235 lb 3.2 oz)   10/02/24 107 kg (236 lb)     BP Readings from Last 3 Encounters:   11/20/24 100/69   10/10/24 103/57   10/02/24 105/73       Health Maintenance   Topic Date Due    DXA SCAN  08/25/2022    ZOSTER VACCINE (2 of 2) 11/05/2022    COVID-19 Vaccine (6 - 2024-25 season) 12/07/2024 (Originally 9/1/2024)    LIPID PANEL  06/21/2025    MAMMOGRAM  07/17/2025    LUNG CANCER SCREENING  07/23/2025    ANNUAL WELLNESS VISIT  10/10/2025    BMI FOLLOWUP  10/10/2025    COLORECTAL CANCER SCREENING  08/24/2032    TDAP/TD VACCINES (2 - Td or Tdap) 04/23/2034    HEPATITIS C SCREENING  Completed    INFLUENZA VACCINE  Completed    Pneumococcal Vaccine 65+  Completed       Physical Exam   GEN:NAD, well nourished, accompanied by family member  HEENT:NCAT, PERRL, EOMI, OP clear, MMM  NECK:supple, no JVD, no carotid bruits  CVA:RRR s1, s2 no m/r/g  CHEST:CTA B no wheezes or rhonchi  EXT:no c/c left LE with edema of the left ankle extending midway up the left calf, surrounding skin intact with warmth, 2+ pitting.  NEURO:CN II-XII grossly nonfocal, no evidence of asterixes or tremor  PSYCH: appropriate mood and affect  :deferred      Result Review   The following data was reviewed by: Susan Gonzalez MD on 11/20/2024:  [x]  Tests & Results  []  Hospitalization/Emergency Department/Urgent Care  []  Internal/External Consultant Notes    Procedures          ASSESSMENT/PLAN  Diagnoses and all orders for this visit:    1. Acute cystitis without hematuria (Primary)  Comments:  augmentin x 5 days  urine culture and urine microscopy added on.  Orders:  -     POCT urinalysis dipstick, automated  -     Urine Culture - Urine, Urine, Clean Catch  -     Urinalysis With Microscopic - Urine, Clean Catch    2. Acute pain of left foot  -     Cancel: XR Foot 3+ View Left; Future  -     US Venous Doppler Lower Extremity Left (duplex)  -     XR Foot 3+ View Left    3. Essential hypertension  -      metoprolol succinate XL (Toprol XL) 50 MG 24 hr tablet; Take 1 tablet by mouth 2 (Two) Times a Day.  Dispense: 180 tablet; Refill: 3    4. Chronic diarrhea  -     colestipol (COLESTID) 1 g tablet; Take 1 tablet by mouth Daily.  Dispense: 90 tablet; Refill: 1    Other orders  -     amoxicillin-clavulanate (AUGMENTIN) 875-125 MG per tablet; Take 1 tablet by mouth 2 (Two) Times a Day for 5 days.  Dispense: 10 tablet; Refill: 0                      FOLLOW UP  No follow-ups on file.  Patient was given instructions and counseling regarding her condition or for health maintenance advice. Please see specific information pulled into the AVS if appropriate.       Susan Gonzalez MD  11/20/24  10:08 EST

## 2024-11-21 ENCOUNTER — TELEPHONE (OUTPATIENT)
Dept: FAMILY MEDICINE CLINIC | Age: 74
End: 2024-11-21
Payer: MEDICARE

## 2024-11-21 NOTE — TELEPHONE ENCOUNTER
Caller: Eileen Kaufman    Relationship: Self    Best call back number: [phone]    What test was performed: X RAY, ULTRASOUND, UA     When was the test performed: 11.20.24    Where was the test performed: [facility]

## 2024-11-21 NOTE — TELEPHONE ENCOUNTER
PATIENT CALLED TO CHECK THE STATUS OF THIS REQUEST.    SHE HAS BEEN WAITING ALL DAY FOR A CALL.  IF IT HAS NOT BEEN DONE PLEASE FORWARD THIS TO TANESHA CANCINO

## 2024-11-26 ENCOUNTER — HOSPITAL ENCOUNTER (OUTPATIENT)
Dept: RESPIRATORY THERAPY | Facility: HOSPITAL | Age: 74
Discharge: HOME | End: 2024-11-26
Payer: MEDICARE

## 2024-11-26 ENCOUNTER — APPOINTMENT (OUTPATIENT)
Dept: RESPIRATORY THERAPY | Facility: HOSPITAL | Age: 74
End: 2024-11-26
Payer: MEDICARE

## 2024-11-26 DIAGNOSIS — R06.09 DYSPNEA ON EXERTION: ICD-10-CM

## 2024-11-26 DIAGNOSIS — J96.11 CHRONIC RESPIRATORY FAILURE WITH HYPOXIA: ICD-10-CM

## 2024-11-26 DIAGNOSIS — J44.9 CHRONIC OBSTRUCTIVE PULMONARY DISEASE, UNSPECIFIED COPD TYPE: ICD-10-CM

## 2024-11-26 PROCEDURE — 94060 EVALUATION OF WHEEZING: CPT

## 2024-11-26 PROCEDURE — 94726 PLETHYSMOGRAPHY LUNG VOLUMES: CPT

## 2024-11-26 PROCEDURE — 94729 DIFFUSING CAPACITY: CPT

## 2024-11-26 RX ORDER — ALBUTEROL SULFATE 0.83 MG/ML
2.5 SOLUTION RESPIRATORY (INHALATION) ONCE
Status: COMPLETED | OUTPATIENT
Start: 2024-11-26 | End: 2024-11-26

## 2024-11-26 RX ADMIN — ALBUTEROL SULFATE 2.5 MG: 2.5 SOLUTION RESPIRATORY (INHALATION) at 11:10

## 2024-11-27 ENCOUNTER — HOSPITAL ENCOUNTER (OUTPATIENT)
Dept: MAMMOGRAPHY | Facility: HOSPITAL | Age: 74
Discharge: HOME | End: 2024-11-27
Payer: MEDICARE

## 2024-11-27 ENCOUNTER — HOSPITAL ENCOUNTER (OUTPATIENT)
Dept: BONE DENSITY | Facility: HOSPITAL | Age: 74
Discharge: HOME | End: 2024-11-27
Payer: MEDICARE

## 2024-11-27 DIAGNOSIS — Z12.31 ENCOUNTER FOR SCREENING MAMMOGRAM FOR MALIGNANT NEOPLASM OF BREAST: ICD-10-CM

## 2024-11-27 DIAGNOSIS — N95.9 MENOPAUSAL AND POSTMENOPAUSAL DISORDER: ICD-10-CM

## 2024-11-27 PROCEDURE — 77080 DXA BONE DENSITY AXIAL: CPT

## 2024-11-27 PROCEDURE — 77067 SCR MAMMO BI INCL CAD: CPT

## 2024-11-27 PROCEDURE — 77063 BREAST TOMOSYNTHESIS BI: CPT

## 2024-12-06 ENCOUNTER — PATIENT MESSAGE (OUTPATIENT)
Dept: FAMILY MEDICINE CLINIC | Age: 74
End: 2024-12-06
Payer: MEDICARE

## 2024-12-06 ENCOUNTER — TELEPHONE (OUTPATIENT)
Dept: FAMILY MEDICINE CLINIC | Age: 74
End: 2024-12-06
Payer: MEDICARE

## 2024-12-06 NOTE — TELEPHONE ENCOUNTER
You ordered a urine culture on pt on 11/20 and it wasn't done.  I feel like we discussed this, but dont see documentation.  Is it ok to cancel the order?

## 2024-12-09 NOTE — TELEPHONE ENCOUNTER
Unable to cancel order due to it stating that pt has arrived, but is still listed as active, needs to be collected.

## 2024-12-11 ENCOUNTER — OFFICE VISIT (OUTPATIENT)
Dept: SLEEP MEDICINE | Facility: HOSPITAL | Age: 74
End: 2024-12-11
Payer: MEDICARE

## 2024-12-11 VITALS
OXYGEN SATURATION: 97 % | WEIGHT: 237 LBS | SYSTOLIC BLOOD PRESSURE: 94 MMHG | DIASTOLIC BLOOD PRESSURE: 70 MMHG | HEART RATE: 84 BPM | BODY MASS INDEX: 40.46 KG/M2 | HEIGHT: 64 IN

## 2024-12-11 DIAGNOSIS — J44.9 CHRONIC OBSTRUCTIVE PULMONARY DISEASE, UNSPECIFIED COPD TYPE: ICD-10-CM

## 2024-12-11 DIAGNOSIS — J96.11 CHRONIC RESPIRATORY FAILURE WITH HYPOXIA: ICD-10-CM

## 2024-12-11 DIAGNOSIS — I10 ESSENTIAL HYPERTENSION: ICD-10-CM

## 2024-12-11 DIAGNOSIS — G47.33 OSA TREATED WITH BIPAP: Primary | ICD-10-CM

## 2024-12-11 PROCEDURE — 99203 OFFICE O/P NEW LOW 30 MIN: CPT | Performed by: INTERNAL MEDICINE

## 2024-12-11 PROCEDURE — G0463 HOSPITAL OUTPT CLINIC VISIT: HCPCS

## 2024-12-11 NOTE — PROGRESS NOTES
"  [facility]  Sleep Medicine   [address] [place] [id number]  Phone: [phone]  Fax: [phone]      Eilene Kaufman  [id number]   1950  74 y.o.  female      PCP:Artur Quevedo MD    Type of service: Initial New Patient Office Visit  Date of service: 12/11/2024    Chief Complaint   Patient presents with    Sleep Apnea    Obesity    Chronic hypoxic respiratory failure       History of present illness;  Eileen Kaufman 74 y.o.  is a new patient for me and was seen today for management of obstructive sleep apnea with chronic hypoxic respiratory failure.  She had a polysomnography which showed mild sleep apnea with AHI of 10/h but she had significant hypoxia.  She has chronic hypoxic respiratory failure due to COPD and she is on home oxygen at 4 L.  Underwent a in-lab titration and she is on a BiPAP with oxygen.  Patient reports that she is feeling much better after starting using the BiPAP and oxygen.    Patient gives the following sleep history.  Sleep schedule:  Bedtime: 9 PM  Wake time: 4:30 AM      Past medical history: (Relevant to sleep medicine)  COPD  Hypoxic respiratory failure  Hypertension  Sleep apnea on BiPAP    Medications are reviewed by me and documented in the encounter  Allergies reviewed and documented in encounter    Social history:  Do you drive a commercial vehicle:  No   Shift work:  No   Tobacco use:  No   Alcohol use:  0 per week  Caffeinated drinks: 0        REVIEW OF SYSTEMS.  Full review of systems available on the intake form which is scanned in the media tab.  The relevant positive are noted below  Daytime excessive sleepiness with [place] Sleepiness Scale :Total score: 13   Snoring resolved  Shortness of breath  Uses oxygen      Physical exam:  Vitals:    12/11/24 1100   BP: 94/70   Pulse: 84   SpO2: 97%   Weight: 108 kg (237 lb)   Height: 162.6 cm (64.02\")    Body mass index is 40.66 kg/m². Neck Circumference: 13 inches.  Patient is on " oxygen at 4 L  Nose: no nasal septal defects or deviation and the nasal passages are clear, no nasal polyps,  NECK:Neck Circumference: 13 inches, trachea is in the midline, thyroid not enlarged  EXTREMITES: No cyanosis, clubbing  NEUROLOGICAL SYSTEM: Oriented x 3, no gross motor defects, gait normal      Labs reviewed.  TSH Results:  TSH          6/21/2024    15:19   TSH   TSH 0.355       Most Recent A1C          6/21/2024    15:19   HGBA1C Most Recent   Hemoglobin A1C 6.10        The Smart card downloaded on 12/11/2024 has been independently reviewed by me and discussed the data with the patient. It shows the following..  Compliance;100 %  > 4 hr use, 45%  Average use of the device 3 hours and 40-minute per night  Residual AHI: 3.9 /hr (Optimal < 5/hr, Good <10/hr, Adequate reduce by 75% from baseline)  Mask type: Fullface mask  Device: [employer]Med  DME: [employer]  Patient also uses oxygen at 4 L 24/7      Assessment and plan  Obstructive sleep apnea ( G 47.33).  The symptoms of sleep apnea have improved with the device and the treatment.  Patient's compliance with the device is excellent for treatment of sleep apnea.  I have independently reviewed the smart card down load and discussed with the patient the download data and encouarged the patient to continue to use the device.The residual AHI is acceptable. The device is benefiting the patient and the device is medically necessary. Without proper control of sleep apnea and good compliance there is a increased risk for hypertension, diabetes mellitus and nonrestorative sleep with hypersomnia which can increase risk for motor vehicle accidents.  Untreated sleep apnea is also a risk factor for development of atrial fibrillation, pulmonary hypertension and stroke.The patient is also instructed to get the supplies from the [facility] and and change them on a regular basis.  A prescription for supplies has been sent to the [facility].  I have also discussed the good sleep  hygiene habits and adequate amount of sleep needed for good health.   Daytime excessive sleepiness .  It was assessed with [place] Sleepiness Scale of Total score: 13.  There are many causes for daytime excessive sleepiness including sleep depression, shiftwork syndrome, depression and other medical disorders including heart, kidney and liver failure.  The most serious cause of excessive sleepiness is due to neurological conditions like narcolepsy/cataplexy.  But the most common cause of excessive sleepiness is due to sleep apnea with frequent awakenings during sleep time.  I have discussed safety of driving and to remain vigilant while driving.  Obesity 3, with BMI Body mass index is 40.66 kg/m².. I have discussed the relationship between weight and sleep apnea.There is direct correlation between weight and severity of sleep apnea.  Weight reduction is encouraged, as it is going to reduce the severity of sleep apnea. I have also discussed with the patient diet and exercise to achieve ideal body weight   COPD  Chronic hypoxic respiratory failure on 4 L of oxygen,   Return in about 6 months (around 6/11/2025) for with smart card down load..  Patient's questions were answered.      12/11/2024  Jose Angel Zarate MD  Sleep Medicine  Medical Director  [facility]: [facility] sleep centers

## 2024-12-16 NOTE — PROGRESS NOTES
Primary Care Provider  Artur Quevedo MD   Referring Provider  No ref. provider found      Patient Complaint  COPD, Allergic Rhinitis, Follow-up, Shortness of Breath, and Results (PFT)      Subjective          Eileen Kaufman presents to [facility]      History of Presenting Illness  Eileen Kaufman is a 74 y.o. female patient of Dr. Crowe with chronic hypoxic respiratory failure, COPD, ANGÉLICA, CKD, diastolic heart failure, and tobacco use in remission, here for 3 month follow-up.    Patient states she is doing okay since her last visit, here today to go over repeat PFT results.  We discussed that her PFTs showed worsening spirometry, but all other parameters similar to previous.  She denies using any antibiotics or steroids for her lungs recently, no fevers or chills, no ER visits or hospitalizations for her breathing since she was last seen.  Patient's last hospitalization for her breathing was at [facility] in April 2024 for COPD exacerbation and acute on chronic respiratory failure.  Patient feels her breathing has gotten worse over the past year or so, especially after having COVID.  Her oxygen saturation has been dropping into the 80s with activity.  She continues to use Breztri twice a day as well as albuterol as needed.  She wears 2 L supplemental oxygen at night and as needed during the day.  Patient has worn a CPAP in the past but does not tolerate the constant pressure well.  She recently had a sleep study which showed that she needed BiPAP, following up with sleep medicine for fine-tuning.  Patient feels good for a few hours after she first gets up, likely from using BiPAP now.  She is a former smoker, quit about 2 years ago, 50 pack years.  Patient denies any hemoptysis, swollen lymph nodes, weight loss, or night sweats.  Overall, patient is doing well and has no additional concerns at this time.  Patient is able to perform ADLs without  difficulty.  I have personally reviewed the review of systems, past family, social, medical and surgical histories; and agree with their findings.      Review of Systems    Review of Systems   Constitutional:  Negative for activity change, chills, fatigue, fever, unexpected weight gain and unexpected weight loss.   HENT:  Negative for congestion, ear discharge, ear pain, mouth sores, postnasal drip, rhinorrhea, sinus pressure, sore throat, swollen glands and trouble swallowing.    Eyes:  Negative for blurred vision, pain, discharge, itching and visual disturbance.   Respiratory:  Positive for shortness of breath (with exertion). Negative for apnea, cough, chest tightness, wheezing and stridor.    Cardiovascular:  Negative for chest pain, palpitations and leg swelling.   Gastrointestinal:  Negative for abdominal distention, abdominal pain, constipation, diarrhea, nausea, vomiting, GERD and indigestion.   Musculoskeletal:  Negative for arthralgias, joint swelling and myalgias.   Skin:  Negative for color change.   Neurological:  Negative for dizziness, weakness, light-headedness and headache.      Sleep: Negative for Excessive daytime sleepiness  Negative for morning headaches  Negative for Snoring      Family History   Problem Relation Age of Onset    Cerebral aneurysm Mother         cause of death    Heart attack Father         cause of death    Esophageal cancer Brother         Social History     Socioeconomic History    Marital status: Single    Number of children: 2   Tobacco Use    Smoking status: Former     Current packs/day: 0.00     Average packs/day: 1 pack/day for 50.0 years (50.0 ttl pk-yrs)     Types: Cigarettes     Start date:      Quit date: 2023     Years since quittin.9     Passive exposure: Past    Smokeless tobacco: Never    Tobacco comments:     Eileen Alvarez was 23 when she started smoking.  She did stop smoking for some time - about 13 years.  She has been smoking a total of approximately  33 years.  She has smoked less than a pack daily during this time. She currently uses smokless, vapor can/pouches   Vaping Use    Vaping status: Former    Start date: 12/1/2022    Quit date: 1/1/2023   Substance and Sexual Activity    Alcohol use: Yes     Comment: Socially    Drug use: Never    Sexual activity: Defer        Past Medical History:   Diagnosis Date    Anemia, unspecified     Asymptomatic menopausal state     Chronic obstructive pulmonary disease with (acute) exacerbation     Essential (primary) hypertension     High cholesterol 06/17/2021    Low back pain     Major depressive disorder, single episode, moderate     Nicotine dependence, unspecified, uncomplicated     Osteopenia     Other intervertebral disc degeneration, lumbar region     Other long term (current) drug therapy     Solitary pulmonary nodule     Vitamin D deficiency, unspecified         Immunization History   Administered Date(s) Administered    Arexvy (RSV, Adults 60+ yrs) 04/23/2024    COVID-19 (MODERNA) BIVALENT 12+YRS 09/10/2022    COVID-19 (MODERNA) Monovalent Original Booster 04/14/2022    COVID-19 (PFIZER) Purple Cap Monovalent 12/29/2020, 01/29/2021, 09/30/2021    Fluzone  >6mos 09/20/2013    Fluzone High-Dose 65+YRS 10/01/2021, 11/01/2022, 09/17/2024    Hepatitis A 07/07/2018    Influenza, Unspecified 10/01/2020    Pneumococcal Conjugate 13-Valent (PCV13) 07/05/2018    Pneumococcal Conjugate 20-Valent (PCV20) 08/31/2023    Pneumococcal Polysaccharide (PPSV23) 03/25/2013, 02/13/2017    Shingrix 09/10/2022    Tdap 04/23/2024       Allergies   Allergen Reactions    Sulfa Antibiotics Unknown - Low Severity    Aspirin Rash    Atorvastatin Other (See Comments)     Hair loss    Naproxen Other (See Comments)     Urinary tract infection          Current Outpatient Medications:     albuterol (ACCUNEB) 0.63 MG/3ML nebulizer solution, Take 3 mL by nebulization Every 4 (Four) Hours As Needed for Wheezing or Shortness of Air., Disp: 1 each,  Rfl: 9    albuterol sulfate  (90 Base) MCG/ACT inhaler, Inhale 2 puffs Every 6 (Six) Hours As Needed for Wheezing., Disp: 34 g, Rfl: 3    amLODIPine (NORVASC) 5 MG tablet, Take 1 tablet by mouth 2 (Two) Times a Day., Disp: 180 tablet, Rfl: 3    Budeson-Glycopyrrol-Formoterol (Breztri Aerosphere) 160-9-4.8 MCG/ACT aerosol inhaler, Inhale 2 puffs 2 (Two) Times a Day., Disp: 2 each, Rfl: 0    budesonide (PULMICORT) 0.5 MG/2ML nebulizer solution, Take 2 mL by nebulization Daily., Disp: 180 mL, Rfl: 1    cetirizine (zyrTEC) 10 MG tablet, Take 1 tablet by mouth Daily., Disp: 90 tablet, Rfl: 3    colestipol (COLESTID) 1 g tablet, Take 1 tablet by mouth Daily., Disp: 90 tablet, Rfl: 1    cyclobenzaprine (FLEXERIL) 5 MG tablet, Take 1 tablet by mouth 2 (Two) Times a Day As Needed for Muscle Spasms., Disp: 60 tablet, Rfl: 1    dicyclomine (BENTYL) 20 MG tablet, , Disp: , Rfl:     DULoxetine (CYMBALTA) 60 MG capsule, Take 1 capsule by mouth Daily., Disp: 90 capsule, Rfl: 1    ferrous sulfate 325 (65 FE) MG EC tablet, Take 1 tablet by mouth Daily With Breakfast., Disp: 90 tablet, Rfl: 1    fluticasone (FLONASE) 50 MCG/ACT nasal spray, 2 sprays into the nostril(s) as directed by provider Daily., Disp: 18 mL, Rfl: 5    furosemide (LASIX) 40 MG tablet, Take 1 tablet by mouth Daily., Disp: 90 tablet, Rfl: 2    lidocaine (LIDODERM) 5 %, Place 2 patches on the skin as directed by provider Daily., Disp: 60 patch, Rfl: 2    metoprolol succinate XL (Toprol XL) 50 MG 24 hr tablet, Take 1 tablet by mouth 2 (Two) Times a Day., Disp: 180 tablet, Rfl: 3    montelukast (SINGULAIR) 10 MG tablet, Take 1 tablet by mouth Every Night., Disp: 90 tablet, Rfl: 3    multivitamin with minerals tablet tablet, 1 tablet Daily., Disp: , Rfl:     O2 (OXYGEN), Inhale 4 L/min 1 (One) Time., Disp: , Rfl:     pantoprazole (PROTONIX) 40 MG EC tablet, Take 1 tablet by mouth Daily., Disp: 90 tablet, Rfl: 3    pregabalin (LYRICA) 150 MG capsule, Take 1  "capsule by mouth 2 (Two) Times a Day., Disp: 60 capsule, Rfl: 5     Objective     Vital Signs:   /64 (BP Location: Left arm, Patient Position: Sitting, Cuff Size: Large Adult)   Pulse 80   Temp 97.3 °F (36.3 °C) (Tympanic)   Resp 20   Ht 162.6 cm (64.02\")   Wt 107 kg (236 lb 9.6 oz)   SpO2 93% Comment: 4L CONT  BMI 40.59 kg/m²     Physical Exam  Constitutional:       General: She is not in acute distress.     Appearance: Normal appearance. She is obese. She is not ill-appearing.   HENT:      Right Ear: Tympanic membrane and ear canal normal.      Left Ear: Tympanic membrane and ear canal normal.      Nose: Nose normal.      Mouth/Throat:      Mouth: Mucous membranes are moist.      Pharynx: Oropharynx is clear.   Eyes:      Extraocular Movements: Extraocular movements intact.      Conjunctiva/sclera: Conjunctivae normal.      Pupils: Pupils are equal, round, and reactive to light.   Cardiovascular:      Rate and Rhythm: Normal rate and regular rhythm.      Pulses: Normal pulses.      Heart sounds: Normal heart sounds.   Pulmonary:      Effort: Pulmonary effort is normal. No respiratory distress.      Breath sounds: No stridor. No wheezing, rhonchi or rales.      Comments: Diminished  Abdominal:      General: Bowel sounds are normal.      Palpations: Abdomen is soft.   Musculoskeletal:         General: No swelling. Normal range of motion.      Cervical back: Normal range of motion and neck supple.      Right lower leg: No edema.      Left lower leg: No edema.   Skin:     General: Skin is warm and dry.   Neurological:      General: No focal deficit present.      Mental Status: She is alert and oriented to person, place, and time.      Motor: No weakness.   Psychiatric:         Mood and Affect: Mood normal.         Behavior: Behavior normal.       Result Review :   I have personally reviewed patient's labs and images.  I also reviewed my last progress note 9/24/2024.       Diagnoses and all orders for this " visit:    1. Chronic respiratory failure with hypoxia (Primary)  -     Overnight Sleep Oximetry Study; Future    2. Chronic obstructive pulmonary disease, unspecified COPD type    3. Thoracic lymphadenopathy    4. Dyspnea on exertion  -     Overnight Sleep Oximetry Study; Future    5. ANGÉLICA (obstructive sleep apnea)  -     Overnight Sleep Oximetry Study; Future    6. Diastolic dysfunction    7. CKD (chronic kidney disease) stage 2, GFR 60-89 ml/min    8. Tobacco abuse, in remission    9. Obesity (BMI 30-39.9)      Impression and Plan    -Repeat PFTs 11/26/2024 showed moderately severe obstructive defect, FEV1 54% of predicted.  There was borderline significant response to bronchodilator, 10% improvement in FEV1.  Normal lung volumes, DLCO severely reduced 26% of predicted.  DLCO changes similar to previous PFTs, spirometry worse.  -Echocardiogram 4/10/2024 EF 64.9%, left ventricular diastolic function consistent with grade 1 impaired relaxation.  Estimated RVSP mildly elevated 35 to 45 mmHg.  -CTA chest 7/23/2024 negative for pulmonary embolism.  Mild mediastinal and hilar adenopathy with stable, likely reactive in nature per report.  Emphysema noted.  Dependent airspace consolidation in the mid and lower lung fields, likely atelectasis.  Recommend continuing with annual low-dose screening CTs.  -Patient is enrolled in annual lung cancer screening program, LDCT has been ordered for July 2025  -In lab sleep study 9/16/2024 showed the presence of ANGÉLICA, trial of auto BiPAP ordered by sleep medicine.  Per Dr. Zarate's note, patient still needs fine-tuning on BiPAP and will follow-up with sleep medicine.  -Overnight oximetry ordered today for patient to do while wearing BiPAP with oxygen bled in, she is worried that her oxygen is not getting to her during the night.  -Continue wearing 4 L supplemental oxygen to keep O2 saturation between 88 to 92%.  Walk test last visit demonstrated significant hypoxia with activity.   Patient does not typically walk more than a few feet at a time at home, okay to continue using 4 L.  -Continue using Breztri 2 puffs twice daily, reminded patient to rinse mouth after each use  -Continue using albuterol inhaler or albuterol nebulizer treatments as needed  -Continue taking Singulair and Zyrtec for seasonal allergies  -Continue following up with Dr. Mao with cardiology for management of diastolic heart failure  -Follow-up with Dr. Crowe or myself in 3 months, may return sooner if needed    Smoking status: Reviewed  Vaccination status: Patient reports she is up-to-date with her flu, pneumonia, and Covid vaccines.  Patient is advised to continue to follow CDC recommendations such as social distancing wearing a mask and washing hands for at least 20 seconds.  Medications personally reviewed    Follow Up   No follow-ups on file.  Patient was given instructions and counseling regarding her condition or for health maintenance advice. Please see specific information pulled into the AVS if appropriate.

## 2024-12-17 ENCOUNTER — OFFICE VISIT (OUTPATIENT)
Dept: PULMONOLOGY | Facility: CLINIC | Age: 74
End: 2024-12-17
Payer: MEDICARE

## 2024-12-17 VITALS
TEMPERATURE: 97.3 F | SYSTOLIC BLOOD PRESSURE: 115 MMHG | HEIGHT: 64 IN | WEIGHT: 236.6 LBS | DIASTOLIC BLOOD PRESSURE: 64 MMHG | HEART RATE: 80 BPM | OXYGEN SATURATION: 93 % | RESPIRATION RATE: 20 BRPM | BODY MASS INDEX: 40.39 KG/M2

## 2024-12-17 DIAGNOSIS — F17.201 TOBACCO ABUSE, IN REMISSION: ICD-10-CM

## 2024-12-17 DIAGNOSIS — G47.33 OSA (OBSTRUCTIVE SLEEP APNEA): ICD-10-CM

## 2024-12-17 DIAGNOSIS — E66.9 OBESITY (BMI 30-39.9): ICD-10-CM

## 2024-12-17 DIAGNOSIS — R06.09 DYSPNEA ON EXERTION: ICD-10-CM

## 2024-12-17 DIAGNOSIS — I51.89 DIASTOLIC DYSFUNCTION: ICD-10-CM

## 2024-12-17 DIAGNOSIS — J44.9 CHRONIC OBSTRUCTIVE PULMONARY DISEASE, UNSPECIFIED COPD TYPE: ICD-10-CM

## 2024-12-17 DIAGNOSIS — J96.11 CHRONIC RESPIRATORY FAILURE WITH HYPOXIA: Primary | ICD-10-CM

## 2024-12-17 DIAGNOSIS — N18.2 CKD (CHRONIC KIDNEY DISEASE) STAGE 2, GFR 60-89 ML/MIN: ICD-10-CM

## 2024-12-17 DIAGNOSIS — R59.0 THORACIC LYMPHADENOPATHY: ICD-10-CM

## 2024-12-18 ENCOUNTER — TELEPHONE (OUTPATIENT)
Dept: PULMONOLOGY | Facility: CLINIC | Age: 74
End: 2024-12-18
Payer: MEDICARE

## 2024-12-18 NOTE — TELEPHONE ENCOUNTER
Reg reached out to me about her sleep study; they did not see that it needs to be with BiPap with O2 bled in. Do you care to resend that order with that information?

## 2025-01-31 DIAGNOSIS — M62.838 MUSCLE SPASM: ICD-10-CM

## 2025-01-31 RX ORDER — CYCLOBENZAPRINE HCL 5 MG
5 TABLET ORAL 2 TIMES DAILY PRN
Qty: 60 TABLET | Refills: 1 | Status: SHIPPED | OUTPATIENT
Start: 2025-01-31

## 2025-02-13 ENCOUNTER — TELEPHONE (OUTPATIENT)
Dept: FAMILY MEDICINE CLINIC | Age: 75
End: 2025-02-13
Payer: MEDICARE

## 2025-02-13 DIAGNOSIS — J44.1 COPD EXACERBATION: Primary | ICD-10-CM

## 2025-02-13 RX ORDER — PREDNISONE 20 MG/1
TABLET ORAL
Qty: 10 TABLET | Refills: 0 | Status: SHIPPED | OUTPATIENT
Start: 2025-02-13

## 2025-02-13 NOTE — TELEPHONE ENCOUNTER
Noted.  I have sent a few days of prednisone to [facility] just now.  If she develops fever or has increasing shortness of breath, she should consider going to the emergency department.  If she does not see improvement over the next few days, then she should be seen here for additional guidance.  Thanks.

## 2025-02-13 NOTE — TELEPHONE ENCOUNTER
Caller: Eileen Kaufman    Relationship: Self    Best call back number: [phone]     What medication are you requesting: STEROIDS    What are your current symptoms: TO BREAK UP CHEST CONGESTION TO HELP HER BREATH A LITTLE BETTER    How long have you been experiencing symptoms: 3 DAYS     Have you had these symptoms before:    [x] Yes  [] No    Have you been treated for these symptoms before:   [x] Yes  [] No    If a prescription is needed, what is your preferred pharmacy and phone number: [id number] - [place], [place] - [address] AT [facility] OF [address] &  - [phone]  - [phone] FX     Additional notes:  PATIENT STATES THAT SHE HAS COPD AND HAS BEEN DOING HER BREATHING TREATMENTS AND HOME REMEDIES.. BUT WAKES UP WITH WHEEZING    DOES NOT WANT TO COME IN FOR APPOINTMENT DUE TO TOO MUCH SICKNESS AROUND RIGHT NOW     PLEASE ADVISE

## 2025-02-21 ENCOUNTER — OFFICE VISIT (OUTPATIENT)
Dept: FAMILY MEDICINE CLINIC | Age: 75
End: 2025-02-21
Payer: MEDICARE

## 2025-02-21 VITALS
SYSTOLIC BLOOD PRESSURE: 110 MMHG | HEART RATE: 89 BPM | OXYGEN SATURATION: 99 % | HEIGHT: 64 IN | BODY MASS INDEX: 39.5 KG/M2 | DIASTOLIC BLOOD PRESSURE: 59 MMHG | TEMPERATURE: 98.4 F | WEIGHT: 231.4 LBS

## 2025-02-21 DIAGNOSIS — J44.1 COPD WITH EXACERBATION: Primary | ICD-10-CM

## 2025-02-21 RX ORDER — TRIAMCINOLONE ACETONIDE 40 MG/ML
40 INJECTION, SUSPENSION INTRA-ARTICULAR; INTRAMUSCULAR ONCE
Status: COMPLETED | OUTPATIENT
Start: 2025-02-21 | End: 2025-02-21

## 2025-02-21 RX ORDER — AZITHROMYCIN 250 MG/1
TABLET, FILM COATED ORAL
Qty: 6 TABLET | Refills: 0 | Status: SHIPPED | OUTPATIENT
Start: 2025-02-21

## 2025-02-21 RX ORDER — PREDNISONE 10 MG/1
30 TABLET ORAL DAILY
Qty: 15 TABLET | Refills: 0 | Status: SHIPPED | OUTPATIENT
Start: 2025-02-21 | End: 2025-02-26

## 2025-02-21 RX ADMIN — TRIAMCINOLONE ACETONIDE 40 MG: 40 INJECTION, SUSPENSION INTRA-ARTICULAR; INTRAMUSCULAR at 11:42

## 2025-02-21 NOTE — PROGRESS NOTES
Chief Complaint  Eileen Kaufman presents to [facility] for Cough    Subjective          History of Present Illness    Eileen Alvarez is here today with c/o coughing, sneezing, and wheezing. Started 8 days ago. Daughter was diagnosed with flu yesterday. Medical history significant for COPD and diastolic dysfunction. Has been using her albuterol nebulizer. She was prescribed a short course of prednisone by her PCP which she has completed.     Review of Systems      Allergies   Allergen Reactions    Sulfa Antibiotics Unknown - Low Severity    Aspirin Rash    Atorvastatin Other (See Comments)     Hair loss    Naproxen Other (See Comments)     Urinary tract infection      Past Medical History:   Diagnosis Date    Anemia, unspecified     Asymptomatic menopausal state     Chronic obstructive pulmonary disease with (acute) exacerbation     Essential (primary) hypertension     High cholesterol 06/17/2021    Low back pain     Major depressive disorder, single episode, moderate     Nicotine dependence, unspecified, uncomplicated     Osteopenia     Other intervertebral disc degeneration, lumbar region     Other long term (current) drug therapy     Solitary pulmonary nodule     Vitamin D deficiency, unspecified      Current Outpatient Medications   Medication Sig Dispense Refill    albuterol (ACCUNEB) 0.63 MG/3ML nebulizer solution Take 3 mL by nebulization Every 4 (Four) Hours As Needed for Wheezing or Shortness of Air. 1 each 9    albuterol sulfate  (90 Base) MCG/ACT inhaler Inhale 2 puffs Every 6 (Six) Hours As Needed for Wheezing. 34 g 3    amLODIPine (NORVASC) 5 MG tablet Take 1 tablet by mouth 2 (Two) Times a Day. 180 tablet 3    Budeson-Glycopyrrol-Formoterol (Breztri Aerosphere) 160-9-4.8 MCG/ACT aerosol inhaler Inhale 2 puffs 2 (Two) Times a Day. 2 each 0    budesonide (PULMICORT) 0.5 MG/2ML nebulizer solution Take 2 mL by nebulization Daily. 180 mL 1    cetirizine (zyrTEC) 10 MG tablet  Take 1 tablet by mouth Daily. 90 tablet 3    colestipol (COLESTID) 1 g tablet Take 1 tablet by mouth Daily. 90 tablet 1    cyclobenzaprine (FLEXERIL) 5 MG tablet Take 1 tablet by mouth 2 (Two) Times a Day As Needed for Muscle Spasms. 60 tablet 1    dicyclomine (BENTYL) 20 MG tablet       DULoxetine (CYMBALTA) 60 MG capsule Take 1 capsule by mouth Daily. 90 capsule 1    ferrous sulfate 325 (65 FE) MG EC tablet Take 1 tablet by mouth Daily With Breakfast. 90 tablet 1    fluticasone (FLONASE) 50 MCG/ACT nasal spray 2 sprays into the nostril(s) as directed by provider Daily. 18 mL 5    furosemide (LASIX) 40 MG tablet Take 1 tablet by mouth Daily. 90 tablet 2    lidocaine (LIDODERM) 5 % Place 2 patches on the skin as directed by provider Daily. 60 patch 2    metoprolol succinate XL (Toprol XL) 50 MG 24 hr tablet Take 1 tablet by mouth 2 (Two) Times a Day. 180 tablet 3    montelukast (SINGULAIR) 10 MG tablet Take 1 tablet by mouth Every Night. 90 tablet 3    multivitamin with minerals tablet tablet 1 tablet Daily.      O2 (OXYGEN) Inhale 4 L/min 1 (One) Time.      pantoprazole (PROTONIX) 40 MG EC tablet Take 1 tablet by mouth Daily. 90 tablet 3    predniSONE (DELTASONE) 20 MG tablet Take 2 tablets by oral route once daily 10 tablet 0    pregabalin (LYRICA) 150 MG capsule Take 1 capsule by mouth 2 (Two) Times a Day. 60 capsule 5    azithromycin (Zithromax Z-Raghav) 250 MG tablet Take 2 tablets by mouth on day 1, then 1 tablet daily on days 2-5 6 tablet 0    predniSONE (DELTASONE) 10 MG tablet Take 3 tablets by mouth Daily for 5 days. 15 tablet 0     No current facility-administered medications for this visit.     Past Surgical History:   Procedure Laterality Date    CARDIAC CATHETERIZATION  07/25/2012    CARPAL TUNNEL RELEASE      CHOLECYSTECTOMY      COLONOSCOPY  08/24/2022    Normal    EPIDURAL Right 10/25/2023    Procedure: LUMBAR/SACRAL TRANSFORAMINAL EPIDURAL Right L3-4 and RIght L5-S1 64593, 49109;  Surgeon: William  Shandra ZHU MD;  [place] [facility] [place]  Service: Pain Management;  Laterality: Right;    HYSTERECTOMY  1980s    Total    KNEE ARTHROSCOPY Left     NECK SURGERY  2015      Social History     Tobacco Use    Smoking status: Former     Current packs/day: 0.00     Average packs/day: 1 pack/day for 50.0 years (50.0 ttl pk-yrs)     Types: Cigarettes     Start date:      Quit date: 2023     Years since quittin.0     Passive exposure: Past    Smokeless tobacco: Never    Tobacco comments:     Eileen Alvarez was 23 when she started smoking.  She did stop smoking for some time - about 13 years.  She has been smoking a total of approximately 33 years.  She has smoked less than a pack daily during this time. She currently uses smokless, vapor can/pouches   Vaping Use    Vaping status: Former    Start date: 2022    Quit date: 2023   Substance Use Topics    Alcohol use: Yes     Comment: Socially    Drug use: Never     Family History   Problem Relation Age of Onset    Cerebral aneurysm Mother         cause of death    Heart attack Father         cause of death    Esophageal cancer Brother      Health Maintenance Due   Topic Date Due    ZOSTER VACCINE (2 of 2) 2022      Immunization History   Administered Date(s) Administered    Arexvy (RSV, Adults 60+ yrs) 2024    COVID-19 (MODERNA) BIVALENT 12+YRS 09/10/2022    COVID-19 (MODERNA) Monovalent Original Booster 2022    COVID-19 (PFIZER) Purple Cap Monovalent 2020, 2021, 2021    Fluzone  >6mos 2013    Fluzone High-Dose 65+YRS 10/01/2021, 2022, 2024    Hepatitis A 2018    Influenza, Unspecified 10/01/2020    Pneumococcal Conjugate 13-Valent (PCV13) 2018    Pneumococcal Conjugate 20-Valent (PCV20) 2023    Pneumococcal Polysaccharide (PPSV23) 2013, 2017    Shingrix 09/10/2022    Tdap 2024        Objective     Vitals:    25 1051   BP: 110/59   Pulse: 89   Temp: 98.4 °F (36.9  "°C)   TempSrc: Oral   SpO2: 99%   Weight: 105 kg (231 lb 6.4 oz)   Height: 162.6 cm (64.02\")     Body mass index is 39.69 kg/m².                No results found.    Physical Exam  Vitals reviewed.   Constitutional:       General: She is not in acute distress.     Appearance: Normal appearance. She is well-developed.   HENT:      Head: Normocephalic and atraumatic.      Right Ear: Tympanic membrane and ear canal normal.      Left Ear: Tympanic membrane and ear canal normal.      Mouth/Throat:      Mouth: Mucous membranes are moist.      Comments: Uvula midline  Eyes:      Extraocular Movements: Extraocular movements intact.      Pupils: Pupils are equal, round, and reactive to light.   Cardiovascular:      Rate and Rhythm: Normal rate and regular rhythm.   Pulmonary:      Effort: Pulmonary effort is normal.      Breath sounds: Wheezing present.   Neurological:      Mental Status: She is alert and oriented to person, place, and time.   Psychiatric:         Mood and Affect: Mood and affect normal.           Result Review :     The following data was reviewed by: ESTEFANI Torres on 02/21/2025:          POCT SARS-CoV-2 Antigen REYNOLD + Flu (02/21/2025 10:54)                 Assessment and Plan      Assessment & Plan  COPD with exacerbation    Covid and flu testing negative. Will treat for COPD exacerbation. Will extend course of oral steroids and give steroid injection in clinic today. Will also prescribe antibiotics with her history. Continue nebulizer treatments. Follow up for persistent or worsening symptoms.   Orders:    POCT SARS-CoV-2 Antigen REYNOLD + Flu    triamcinolone acetonide (KENALOG-40) injection 40 mg    predniSONE (DELTASONE) 10 MG tablet; Take 3 tablets by mouth Daily for 5 days.    azithromycin (Zithromax Z-Raghav) 250 MG tablet; Take 2 tablets by mouth on day 1, then 1 tablet daily on days 2-5              Follow Up     Return for As needed for persistent or worsening symptoms.             "

## 2025-03-06 ENCOUNTER — OFFICE VISIT (OUTPATIENT)
Age: 75
End: 2025-03-06
Payer: MEDICARE

## 2025-03-06 VITALS
HEIGHT: 64 IN | HEART RATE: 100 BPM | BODY MASS INDEX: 39.95 KG/M2 | DIASTOLIC BLOOD PRESSURE: 82 MMHG | WEIGHT: 234 LBS | SYSTOLIC BLOOD PRESSURE: 139 MMHG

## 2025-03-06 DIAGNOSIS — I10 ESSENTIAL HYPERTENSION: ICD-10-CM

## 2025-03-06 DIAGNOSIS — I50.32 CHRONIC DIASTOLIC CONGESTIVE HEART FAILURE: Primary | ICD-10-CM

## 2025-03-06 NOTE — ASSESSMENT & PLAN NOTE
Stable, shortness of breath is at her baseline and she appears euvolemic.  Continue current dose of furosemide and beta-blocker.

## 2025-03-06 NOTE — PROGRESS NOTES
Chief Complaint  6 month follow up , Congestive Heart Failure, Hypertension, and Shortness of Breath    Subjective        History of Present Illness  Eileen Kaufman presents to [facility]   Ms. Kaufman is a 74-year-old female coming in today for routine cardiac follow-up.  Overall she reports she is at her baseline, she has chronic shortness of breath related to underlying respiratory disease, and is at baseline on oxygen.  She has no complaints of chest pain or pressure.  Reports generally blood pressure is fairly well-controlled, but has had some higher readings recently.        Past History:     (1) Hypertension. (2) Negative for diabetes mellitus or coronary artery disease.  COPD, chronic hypoxia on home oxygen       Past Medical History:   Diagnosis Date    Anemia, unspecified     Asymptomatic menopausal state     Chronic obstructive pulmonary disease with (acute) exacerbation     Essential (primary) hypertension     High cholesterol 06/17/2021    Low back pain     Major depressive disorder, single episode, moderate     Nicotine dependence, unspecified, uncomplicated     Osteopenia     Other intervertebral disc degeneration, lumbar region     Other long term (current) drug therapy     Solitary pulmonary nodule     Vitamin D deficiency, unspecified        Allergies   Allergen Reactions    Sulfa Antibiotics Unknown - Low Severity    Aspirin Rash    Atorvastatin Other (See Comments)     Hair loss    Naproxen Other (See Comments)     Urinary tract infection        Past Surgical History:   Procedure Laterality Date    CARDIAC CATHETERIZATION  07/25/2012    CARPAL TUNNEL RELEASE      CHOLECYSTECTOMY      COLONOSCOPY  08/24/2022    Normal    EPIDURAL Right 10/25/2023    Procedure: LUMBAR/SACRAL TRANSFORAMINAL EPIDURAL Right L3-4 and RIght L5-S1 45859, 53445;  Surgeon: Shandra Thomas MD;  [place] [facility] [place]  Service: Pain Management;  Laterality: Right;    HYSTERECTOMY  1980s    Total     KNEE ARTHROSCOPY Left     NECK SURGERY  01/21/2015        Social History  She  reports that she quit smoking about 2 years ago. Her smoking use included cigarettes. She started smoking about 52 years ago. She has a 50 pack-year smoking history. She has been exposed to tobacco smoke. She has never used smokeless tobacco. She reports current alcohol use. She reports that she does not use drugs.    Family History  Her family history includes Cerebral aneurysm in her mother; Esophageal cancer in her brother; Heart attack in her father.       Current Outpatient Medications on File Prior to Visit   Medication Sig    albuterol (ACCUNEB) 0.63 MG/3ML nebulizer solution Take 3 mL by nebulization Every 4 (Four) Hours As Needed for Wheezing or Shortness of Air.    albuterol sulfate  (90 Base) MCG/ACT inhaler Inhale 2 puffs Every 6 (Six) Hours As Needed for Wheezing.    amLODIPine (NORVASC) 5 MG tablet Take 1 tablet by mouth 2 (Two) Times a Day.    azithromycin (Zithromax Z-Raghav) 250 MG tablet Take 2 tablets by mouth on day 1, then 1 tablet daily on days 2-5    Budeson-Glycopyrrol-Formoterol (Breztri Aerosphere) 160-9-4.8 MCG/ACT aerosol inhaler Inhale 2 puffs 2 (Two) Times a Day.    budesonide (PULMICORT) 0.5 MG/2ML nebulizer solution Take 2 mL by nebulization Daily.    cetirizine (zyrTEC) 10 MG tablet Take 1 tablet by mouth Daily.    colestipol (COLESTID) 1 g tablet Take 1 tablet by mouth Daily.    cyclobenzaprine (FLEXERIL) 5 MG tablet Take 1 tablet by mouth 2 (Two) Times a Day As Needed for Muscle Spasms.    dicyclomine (BENTYL) 20 MG tablet     DULoxetine (CYMBALTA) 60 MG capsule Take 1 capsule by mouth Daily.    ferrous sulfate 325 (65 FE) MG EC tablet Take 1 tablet by mouth Daily With Breakfast.    fluticasone (FLONASE) 50 MCG/ACT nasal spray 2 sprays into the nostril(s) as directed by provider Daily.    furosemide (LASIX) 40 MG tablet Take 1 tablet by mouth Daily.    lidocaine (LIDODERM) 5 % Place 2 patches on  "the skin as directed by provider Daily.    metoprolol succinate XL (Toprol XL) 50 MG 24 hr tablet Take 1 tablet by mouth 2 (Two) Times a Day.    montelukast (SINGULAIR) 10 MG tablet Take 1 tablet by mouth Every Night.    multivitamin with minerals tablet tablet 1 tablet Daily.    O2 (OXYGEN) Inhale 4 L/min 1 (One) Time.    pantoprazole (PROTONIX) 40 MG EC tablet Take 1 tablet by mouth Daily.    predniSONE (DELTASONE) 20 MG tablet Take 2 tablets by oral route once daily    pregabalin (LYRICA) 150 MG capsule Take 1 capsule by mouth 2 (Two) Times a Day.     No current facility-administered medications on file prior to visit.         Review of Systems   Constitutional:  Negative for fatigue.   Respiratory:  Positive for cough and shortness of breath. Negative for chest tightness.    Cardiovascular:  Negative for chest pain, palpitations and leg swelling.   Gastrointestinal:  Negative for nausea and vomiting.   Neurological:  Negative for dizziness and syncope.        Objective   Vitals:    03/06/25 0940 03/06/25 0942   BP: 142/83 139/82   Pulse: 111 100   Weight: 106 kg (234 lb)    Height: 162.6 cm (64.02\")          Physical Exam  General : Alert, awake, no acute distress, wearing nasal cannula oxygen  Neck : Supple, no carotid bruit, no jugular venous distention  CVS : Regular rate and rhythm, no murmur, no rubs or gallops  Lungs: Clear to auscultation bilaterally, with prolonged expiration, no crackles or rhonchi  Abdomen: Soft, nontender, bowel sounds active  Extremities: Warm, well-perfused, no pedal edema      Result Review     The following data was reviewed by ESTEFANI Wallace  proBNP   Date Value Ref Range Status   06/21/2024 190.0 0.0 - 900.0 pg/mL Final     CMP          4/13/2024    14:59 6/21/2024    15:19 7/23/2024    13:59   CMP   Glucose 192  119     BUN 26  16     Creatinine 0.98  1.01  1.30    EGFR 61.5  59.3  43.8    Sodium 139  140     Potassium 4.3  4.6     Chloride 103  100     Calcium 9.9  10.1  " "   Total Protein  7.6     Albumin  4.1     Globulin  3.5     Total Bilirubin  0.3     Alkaline Phosphatase  115     AST (SGOT)  50     ALT (SGPT)  41     Albumin/Globulin Ratio  1.2     BUN/Creatinine Ratio 26.5  15.8     Anion Gap 13.8  11.1       CBC w/diff          4/10/2024    05:26 4/11/2024    06:24 6/21/2024    15:19   CBC w/Diff   WBC 11.35  11.63  12.35    RBC 4.36  4.61  4.98    Hemoglobin 11.4  11.9  13.9    Hematocrit 36.7  39.6  45.2    MCV 84.2  85.9  90.8    MCH 26.1  25.8  27.9    MCHC 31.1  30.1  30.8    RDW 15.9  15.8  17.2    Platelets 333  365  273    Neutrophil Rel %  67.9  85.6    Immature Granulocyte Rel %  0.7  1.0    Lymphocyte Rel %  20.2  10.3    Monocyte Rel %  10.2  2.8    Eosinophil Rel %  0.7  0.1    Basophil Rel %  0.3  0.2       Lab Results   Component Value Date    TSH 0.355 06/21/2024      Lab Results   Component Value Date    FREET4 0.80 09/07/2023      No results found for: \"DDIMERQUANT\"  Magnesium   Date Value Ref Range Status   04/09/2024 2.2 1.6 - 2.4 mg/dL Final      No results found for: \"DIGOXIN\"   Lab Results   Component Value Date    TROPONINT <6 04/09/2024           Lipid Panel          6/21/2024    15:19   Lipid Panel   Total Cholesterol 198    Triglycerides 124    HDL Cholesterol 74    VLDL Cholesterol 22    LDL Cholesterol  102    LDL/HDL Ratio 1.34          Results for orders placed during the hospital encounter of 10/08/24    Adult Transthoracic Echo Complete w/ Color, Spectral and Contrast if necessary per protocol    Interpretation Summary  Technically difficult study.  All structures are not well-visualized.    Grossly normal chamber sizes.  LV thickness could not be accurately assessed.  All wall segments are not well-visualized.  Systolic function is preserved.  LVEF is greater than 55%.  Diastolic function is normal.  Valves are not well-visualized.  Via Doppler criteria there is no aortic stenosis or regurgitation.  Trace MR and trace TR are noted.  No " pericardial effusion is noted.  IVC has normal dimensions.  Estimated right atrial pressure 0 to 5 mmHg.    Compared to echocardiogram from 4/10/2024 systolic function appears to be preserved.  Diastolic function appears to be normal             Assessment and Plan   Diagnoses and all orders for this visit:    1. Chronic diastolic congestive heart failure (Primary)  Assessment & Plan:  Stable, shortness of breath is at her baseline and she appears euvolemic.  Continue current dose of furosemide and beta-blocker.        2. Essential hypertension  Assessment & Plan:  She reports some intermittent higher readings at home, reasonably well-controlled in the office today.  For now we will continue current regiment with amlodipine, metoprolol and furosemide, and she will submit a BP log for the next 1 to 2 weeks, if she is consistently elevated we can uptitrate dose of amlodipine.                  Follow Up   Return in about 9 months (around 12/6/2025) for with Dr. Mao.    Patient was given instructions and counseling regarding her condition or for health maintenance advice. Please see specific information pulled into the AVS if appropriate.     Signed,  ESTEFANI Wallace  03/06/2025     Dictated Utilizing Dragon Dictation: Please note that portions of this note were completed with a voice recognition program.  Part of this note may be an electronic transcription/translation of spoken language to printed text using the Dragon Dictation System.

## 2025-03-06 NOTE — ASSESSMENT & PLAN NOTE
She reports some intermittent higher readings at home, reasonably well-controlled in the office today.  For now we will continue current regiment with amlodipine, metoprolol and furosemide, and she will submit a BP log for the next 1 to 2 weeks, if she is consistently elevated we can uptitrate dose of amlodipine.

## 2025-03-11 DIAGNOSIS — I10 ESSENTIAL HYPERTENSION: ICD-10-CM

## 2025-03-11 RX ORDER — AMLODIPINE BESYLATE 5 MG/1
5 TABLET ORAL 2 TIMES DAILY
Qty: 180 TABLET | Refills: 0 | Status: SHIPPED | OUTPATIENT
Start: 2025-03-11

## 2025-03-18 NOTE — PROGRESS NOTES
Primary Care Provider  Artur Quevedo MD   Referring Provider  No ref. provider found      Patient Complaint  COPD, Follow-up (3 month), and Cough      Subjective          Eileen Kaufman presents to [facility] PULMONARY & CRITICAL CARE MEDICINE      History of Presenting Illness  Eileen Kaufman is a 74 y.o. female patient of Dr. Crowe with chronic hypoxic respiratory failure, COPD, ANGÉLICA, CKD, diastolic heart failure, and tobacco use in remission, here for 3 month follow-up.    Patient states she is doing okay since her last visit, since she was last seen she had an overnight oximetry test done, which showed that even with her BiPAP and 3 L supplemental oxygen, she was still spending a significant amount of time with O2 saturation less than 89%.  She denies using any antibiotics or steroids for her lungs recently, no fevers or chills, no ER visits or hospitalizations for her breathing since she was last seen.  Patient's last hospitalization for her breathing was at [facility] in April 2024 for COPD exacerbation and acute on chronic respiratory failure.  Patient feels her breathing has gotten worse over the past year or so, especially after having COVID.  Her oxygen saturation has been dropping into the 80s with activity.  Patient has worn a CPAP in the past but does not tolerate the constant pressure well.  She recently had a sleep study which showed that she needed BiPAP, following up with sleep medicine for fine-tuning.  Patient feels good for a few hours after she first gets up, likely from using BiPAP now.  She is a former smoker, quit about 2 years ago, 50 pack years.  Patient denies any hemoptysis, swollen lymph nodes, weight loss, or night sweats.  Overall, patient is doing well and has no additional concerns at this time.  Patient is able to perform ADLs without difficulty.  I have personally reviewed the review of systems, past family, social, medical and surgical  histories; and agree with their findings.    Pulmonary Meds  Breztri  Albuterol inhaler  Albuterol nebs    Pulmonary equipment  Oxygen therapy--has POC  BiPAP--managed by sleep medicine, currently fine-tuning      Review of Systems    Review of Systems   Constitutional:  Negative for activity change, chills, fatigue, fever, unexpected weight gain and unexpected weight loss.   HENT:  Negative for congestion, ear discharge, ear pain, mouth sores, postnasal drip, rhinorrhea, sinus pressure, sore throat, swollen glands and trouble swallowing.    Eyes:  Negative for blurred vision, pain, discharge, itching and visual disturbance.   Respiratory:  Positive for shortness of breath (with exertion). Negative for apnea, cough, chest tightness, wheezing and stridor.    Cardiovascular:  Negative for chest pain, palpitations and leg swelling.   Gastrointestinal:  Negative for abdominal distention, abdominal pain, constipation, diarrhea, nausea, vomiting, GERD and indigestion.   Musculoskeletal:  Negative for arthralgias, joint swelling and myalgias.   Skin:  Negative for color change.   Neurological:  Negative for dizziness, weakness, light-headedness and headache.      Sleep: Negative for Excessive daytime sleepiness  Negative for morning headaches  Negative for Snoring      Family History   Problem Relation Age of Onset    Cerebral aneurysm Mother         cause of death    Heart attack Father         cause of death    Esophageal cancer Brother         Social History     Socioeconomic History    Marital status: Single    Number of children: 2   Tobacco Use    Smoking status: Former     Current packs/day: 0.00     Average packs/day: 1 pack/day for 50.0 years (50.0 ttl pk-yrs)     Types: Cigarettes     Start date:      Quit date: 2023     Years since quittin.1     Passive exposure: Past    Smokeless tobacco: Never    Tobacco comments:     Eileen Alvarez was 23 when she started smoking.  She did stop smoking for some time -  about 13 years.  She has been smoking a total of approximately 33 years.  She has smoked less than a pack daily during this time. She currently uses smokless, vapor can/pouches   Vaping Use    Vaping status: Former    Start date: 12/1/2022    Quit date: 1/1/2023   Substance and Sexual Activity    Alcohol use: Yes     Comment: Socially    Drug use: Never    Sexual activity: Defer        Past Medical History:   Diagnosis Date    Anemia, unspecified     Asymptomatic menopausal state     Chronic obstructive pulmonary disease with (acute) exacerbation     Essential (primary) hypertension     High cholesterol 06/17/2021    Low back pain     Major depressive disorder, single episode, moderate     Nicotine dependence, unspecified, uncomplicated     Osteopenia     Other intervertebral disc degeneration, lumbar region     Other long term (current) drug therapy     Solitary pulmonary nodule     Vitamin D deficiency, unspecified         Immunization History   Administered Date(s) Administered    Arexvy (RSV, Adults 60+ yrs) 04/23/2024    COVID-19 (MODERNA) BIVALENT 12+YRS 09/10/2022    COVID-19 (MODERNA) Monovalent Original Booster 04/14/2022    COVID-19 (PFIZER) Purple Cap Monovalent 12/29/2020, 01/29/2021, 09/30/2021    Fluzone  >6mos 09/20/2013    Fluzone High-Dose 65+YRS 10/01/2021, 11/01/2022, 09/17/2024    Hepatitis A 07/07/2018    Influenza, Unspecified 10/01/2020    Pneumococcal Conjugate 13-Valent (PCV13) 07/05/2018    Pneumococcal Conjugate 20-Valent (PCV20) 08/31/2023    Pneumococcal Polysaccharide (PPSV23) 03/25/2013, 02/13/2017    Shingrix 09/10/2022    Tdap 04/23/2024       Allergies   Allergen Reactions    Sulfa Antibiotics Unknown - Low Severity    Aspirin Rash    Atorvastatin Other (See Comments)     Hair loss    Naproxen Other (See Comments)     Urinary tract infection          Current Outpatient Medications:     albuterol (ACCUNEB) 0.63 MG/3ML nebulizer solution, Take 3 mL by nebulization Every 4 (Four) Hours  As Needed for Wheezing or Shortness of Air., Disp: 1 each, Rfl: 9    albuterol sulfate  (90 Base) MCG/ACT inhaler, Inhale 2 puffs Every 6 (Six) Hours As Needed for Wheezing., Disp: 34 g, Rfl: 3    amLODIPine (NORVASC) 5 MG tablet, TAKE 1 TABLET BY MOUTH TWICE DAILY, Disp: 180 tablet, Rfl: 0    Budeson-Glycopyrrol-Formoterol (Breztri Aerosphere) 160-9-4.8 MCG/ACT aerosol inhaler, Inhale 2 puffs 2 (Two) Times a Day., Disp: 2 each, Rfl: 0    budesonide (PULMICORT) 0.5 MG/2ML nebulizer solution, Take 2 mL by nebulization Daily., Disp: 180 mL, Rfl: 1    cetirizine (zyrTEC) 10 MG tablet, Take 1 tablet by mouth Daily., Disp: 90 tablet, Rfl: 3    colestipol (COLESTID) 1 g tablet, Take 1 tablet by mouth Daily., Disp: 90 tablet, Rfl: 1    cyclobenzaprine (FLEXERIL) 5 MG tablet, Take 1 tablet by mouth 2 (Two) Times a Day As Needed for Muscle Spasms., Disp: 60 tablet, Rfl: 1    dicyclomine (BENTYL) 20 MG tablet, , Disp: , Rfl:     DULoxetine (CYMBALTA) 60 MG capsule, Take 1 capsule by mouth Daily., Disp: 90 capsule, Rfl: 1    ferrous sulfate 325 (65 FE) MG EC tablet, Take 1 tablet by mouth Daily With Breakfast., Disp: 90 tablet, Rfl: 1    fluticasone (FLONASE) 50 MCG/ACT nasal spray, 2 sprays into the nostril(s) as directed by provider Daily., Disp: 18 mL, Rfl: 5    furosemide (LASIX) 40 MG tablet, Take 1 tablet by mouth Daily., Disp: 90 tablet, Rfl: 2    lidocaine (LIDODERM) 5 %, Place 2 patches on the skin as directed by provider Daily., Disp: 60 patch, Rfl: 2    metoprolol succinate XL (Toprol XL) 50 MG 24 hr tablet, Take 1 tablet by mouth 2 (Two) Times a Day., Disp: 180 tablet, Rfl: 3    montelukast (SINGULAIR) 10 MG tablet, Take 1 tablet by mouth Every Night., Disp: 90 tablet, Rfl: 3    multivitamin with minerals tablet tablet, 1 tablet Daily., Disp: , Rfl:     O2 (OXYGEN), Inhale 4 L/min 1 (One) Time., Disp: , Rfl:     pantoprazole (PROTONIX) 40 MG EC tablet, Take 1 tablet by mouth Daily., Disp: 90 tablet, Rfl: 3    " pregabalin (LYRICA) 150 MG capsule, Take 1 capsule by mouth 2 (Two) Times a Day., Disp: 60 capsule, Rfl: 5    azithromycin (Zithromax Z-Raghav) 250 MG tablet, Take 2 tablets by mouth on day 1, then 1 tablet daily on days 2-5, Disp: 6 tablet, Rfl: 0    predniSONE (DELTASONE) 20 MG tablet, Take 2 tablets by oral route once daily, Disp: 10 tablet, Rfl: 0     Objective     Vital Signs:   /79 (BP Location: Left arm, Patient Position: Sitting, Cuff Size: Adult)   Pulse 79   Temp 96.8 °F (36 °C) (Tympanic)   Resp 20   Ht 162.6 cm (64.02\")   Wt 104 kg (229 lb)   SpO2 97% Comment: room air  BMI 39.28 kg/m²     Physical Exam  Constitutional:       General: She is not in acute distress.     Appearance: Normal appearance. She is obese. She is not ill-appearing.   HENT:      Right Ear: Tympanic membrane and ear canal normal.      Left Ear: Tympanic membrane and ear canal normal.      Nose: Nose normal.      Mouth/Throat:      Mouth: Mucous membranes are moist.      Pharynx: Oropharynx is clear.   Eyes:      Extraocular Movements: Extraocular movements intact.      Conjunctiva/sclera: Conjunctivae normal.      Pupils: Pupils are equal, round, and reactive to light.   Cardiovascular:      Rate and Rhythm: Normal rate and regular rhythm.      Pulses: Normal pulses.      Heart sounds: Normal heart sounds.   Pulmonary:      Effort: Pulmonary effort is normal. No respiratory distress.      Breath sounds: No stridor. No wheezing, rhonchi or rales.      Comments: Diminished  Abdominal:      General: Bowel sounds are normal.      Palpations: Abdomen is soft.   Musculoskeletal:         General: No swelling. Normal range of motion.      Cervical back: Normal range of motion and neck supple.      Right lower leg: No edema.      Left lower leg: No edema.   Skin:     General: Skin is warm and dry.   Neurological:      General: No focal deficit present.      Mental Status: She is alert and oriented to person, place, and time.      " Motor: No weakness.   Psychiatric:         Mood and Affect: Mood normal.         Behavior: Behavior normal.       Result Review :   I have personally reviewed patient's labs and images.  I also reviewed my last progress note 12/17/2024.    -Repeat PFTs 11/26/2024 showed moderately severe obstructive defect, FEV1 54% of predicted.  There was borderline significant response to bronchodilator, 10% improvement in FEV1.  Normal lung volumes, DLCO severely reduced 26% of predicted.  DLCO changes similar to previous PFTs, spirometry worse.  -Echocardiogram 4/10/2024 EF 64.9%, left ventricular diastolic function consistent with grade 1 impaired relaxation.  Estimated RVSP mildly elevated 35 to 45 mmHg.  -CTA chest 7/23/2024 negative for pulmonary embolism.  Mild mediastinal and hilar adenopathy with stable, likely reactive in nature per report.  Emphysema noted.  Dependent airspace consolidation in the mid and lower lung fields, likely atelectasis.  Recommend continuing with annual low-dose screening CTs.  -Patient is enrolled in annual lung cancer screening program, LDCT scheduled for 7/21/2025  -In lab sleep study 9/16/2024 showed the presence of ANGÉLICA, trial of auto BiPAP ordered by sleep medicine.  Per Dr. Zarate's note, patient still needs fine-tuning on BiPAP and will follow-up with sleep medicine.  -Overnight oximetry 1/29/2025, done on 3 L with BiPAP, O2 saturation less than 88% for 2 hours 28 minutes       Diagnoses and all orders for this visit:    1. Chronic respiratory failure with hypoxia (Primary)    2. Chronic obstructive pulmonary disease, unspecified COPD type    3. Thoracic lymphadenopathy    4. Dyspnea on exertion    5. ANGÉLICA (obstructive sleep apnea)    6. Diastolic dysfunction    7. CKD (chronic kidney disease) stage 2, GFR 60-89 ml/min    8. Tobacco abuse, in remission    9. Personal history of nicotine dependence    10. Obesity (BMI 30-39.9)        Impression and Plan    -Continue wearing 4 L  supplemental oxygen to keep O2 saturation between 88 to 92%.  Walk test last visit demonstrated significant hypoxia with activity.  Patient does not typically walk more than a few feet at a time at home, okay to continue using 4 L.  -Continue wearing BiPAP nightly with 4 L supplemental oxygen bled in  -Continue using Breztri 2 puffs twice daily, reminded patient to rinse mouth after each use  -Continue using albuterol inhaler or albuterol nebulizer treatments as needed  -Continue taking Singulair and Zyrtec for seasonal allergies  -Continue following up with Dr. Mao with cardiology for management of diastolic heart failure  -Follow-up in [place] in 4 months, may return sooner if needed    Smoking status: Reviewed  Vaccination status: Patient reports she is up-to-date with her flu, pneumonia, and Covid vaccines.  Patient is advised to continue to follow CDC recommendations such as social distancing wearing a mask and washing hands for at least 20 seconds.  Medications personally reviewed    Follow Up   No follow-ups on file.  Patient was given instructions and counseling regarding her condition or for health maintenance advice. Please see specific information pulled into the AVS if appropriate.

## 2025-03-20 ENCOUNTER — OFFICE VISIT (OUTPATIENT)
Dept: PULMONOLOGY | Facility: CLINIC | Age: 75
End: 2025-03-20
Payer: MEDICARE

## 2025-03-20 VITALS
DIASTOLIC BLOOD PRESSURE: 79 MMHG | OXYGEN SATURATION: 97 % | HEART RATE: 79 BPM | SYSTOLIC BLOOD PRESSURE: 123 MMHG | WEIGHT: 229 LBS | TEMPERATURE: 96.8 F | BODY MASS INDEX: 39.09 KG/M2 | HEIGHT: 64 IN | RESPIRATION RATE: 20 BRPM

## 2025-03-20 DIAGNOSIS — R59.0 THORACIC LYMPHADENOPATHY: ICD-10-CM

## 2025-03-20 DIAGNOSIS — G47.33 OSA (OBSTRUCTIVE SLEEP APNEA): ICD-10-CM

## 2025-03-20 DIAGNOSIS — Z87.891 PERSONAL HISTORY OF NICOTINE DEPENDENCE: ICD-10-CM

## 2025-03-20 DIAGNOSIS — N18.2 CKD (CHRONIC KIDNEY DISEASE) STAGE 2, GFR 60-89 ML/MIN: ICD-10-CM

## 2025-03-20 DIAGNOSIS — F17.201 TOBACCO ABUSE, IN REMISSION: ICD-10-CM

## 2025-03-20 DIAGNOSIS — E66.9 OBESITY (BMI 30-39.9): ICD-10-CM

## 2025-03-20 DIAGNOSIS — I51.89 DIASTOLIC DYSFUNCTION: ICD-10-CM

## 2025-03-20 DIAGNOSIS — J44.9 CHRONIC OBSTRUCTIVE PULMONARY DISEASE, UNSPECIFIED COPD TYPE: ICD-10-CM

## 2025-03-20 DIAGNOSIS — J96.11 CHRONIC RESPIRATORY FAILURE WITH HYPOXIA: Primary | ICD-10-CM

## 2025-03-20 DIAGNOSIS — R06.09 DYSPNEA ON EXERTION: ICD-10-CM

## 2025-04-01 DIAGNOSIS — F32.A CHRONIC DEPRESSION: ICD-10-CM

## 2025-04-01 RX ORDER — DULOXETIN HYDROCHLORIDE 60 MG/1
60 CAPSULE, DELAYED RELEASE ORAL DAILY
Qty: 90 CAPSULE | Refills: 1 | OUTPATIENT
Start: 2025-04-01

## 2025-04-01 RX ORDER — DULOXETIN HYDROCHLORIDE 60 MG/1
60 CAPSULE, DELAYED RELEASE ORAL DAILY
Qty: 90 CAPSULE | Refills: 1 | Status: SHIPPED | OUTPATIENT
Start: 2025-04-01 | End: 2025-04-07 | Stop reason: SDUPTHER

## 2025-04-07 ENCOUNTER — OFFICE VISIT (OUTPATIENT)
Dept: FAMILY MEDICINE CLINIC | Age: 75
End: 2025-04-07
Payer: MEDICARE

## 2025-04-07 ENCOUNTER — RESULTS FOLLOW-UP (OUTPATIENT)
Dept: FAMILY MEDICINE CLINIC | Age: 75
End: 2025-04-07

## 2025-04-07 VITALS
DIASTOLIC BLOOD PRESSURE: 54 MMHG | BODY MASS INDEX: 39.71 KG/M2 | TEMPERATURE: 98 F | HEIGHT: 64 IN | WEIGHT: 232.6 LBS | SYSTOLIC BLOOD PRESSURE: 109 MMHG | HEART RATE: 70 BPM | OXYGEN SATURATION: 95 %

## 2025-04-07 DIAGNOSIS — K21.9 GASTROESOPHAGEAL REFLUX DISEASE, UNSPECIFIED WHETHER ESOPHAGITIS PRESENT: ICD-10-CM

## 2025-04-07 DIAGNOSIS — I10 ESSENTIAL HYPERTENSION: Primary | ICD-10-CM

## 2025-04-07 DIAGNOSIS — M54.16 LUMBAR RADICULOPATHY: ICD-10-CM

## 2025-04-07 DIAGNOSIS — Z79.899 OTHER LONG TERM (CURRENT) DRUG THERAPY: ICD-10-CM

## 2025-04-07 DIAGNOSIS — F32.A CHRONIC DEPRESSION: ICD-10-CM

## 2025-04-07 DIAGNOSIS — M47.816 LUMBAR FACET ARTHROPATHY: ICD-10-CM

## 2025-04-07 DIAGNOSIS — J34.89 RHINORRHEA: ICD-10-CM

## 2025-04-07 DIAGNOSIS — J30.9 ALLERGIC RHINITIS, UNSPECIFIED SEASONALITY, UNSPECIFIED TRIGGER: ICD-10-CM

## 2025-04-07 DIAGNOSIS — M96.1 POSTLAMINECTOMY SYNDROME: ICD-10-CM

## 2025-04-07 DIAGNOSIS — K52.9 CHRONIC DIARRHEA: ICD-10-CM

## 2025-04-07 DIAGNOSIS — M62.838 MUSCLE SPASM: ICD-10-CM

## 2025-04-07 DIAGNOSIS — R00.0 TACHYCARDIA: ICD-10-CM

## 2025-04-07 LAB
AMPHET+METHAMPHET UR QL: NEGATIVE
AMPHETAMINES UR QL: NEGATIVE
BARBITURATES UR QL SCN: NEGATIVE
BENZODIAZ UR QL SCN: NEGATIVE
BUPRENORPHINE SERPL-MCNC: NEGATIVE NG/ML
CANNABINOIDS SERPL QL: NEGATIVE
COCAINE UR QL: NEGATIVE
EXPIRATION DATE: NORMAL
Lab: NORMAL
MDMA UR QL SCN: NEGATIVE
METHADONE UR QL SCN: NEGATIVE
MORPHINE/OPIATES SCREEN, URINE: NEGATIVE
OXYCODONE UR QL SCN: NEGATIVE
PCP UR QL SCN: NEGATIVE

## 2025-04-07 RX ORDER — PREGABALIN 150 MG/1
150 CAPSULE ORAL 2 TIMES DAILY
Qty: 60 CAPSULE | Refills: 5 | Status: SHIPPED | OUTPATIENT
Start: 2025-04-07

## 2025-04-07 RX ORDER — CYCLOBENZAPRINE HCL 10 MG
10 TABLET ORAL 2 TIMES DAILY PRN
Qty: 60 TABLET | Refills: 2 | Status: SHIPPED | OUTPATIENT
Start: 2025-04-07

## 2025-04-07 RX ORDER — METOPROLOL SUCCINATE 50 MG/1
50 TABLET, EXTENDED RELEASE ORAL 2 TIMES DAILY
Qty: 180 TABLET | Refills: 3 | Status: SHIPPED | OUTPATIENT
Start: 2025-04-07

## 2025-04-07 RX ORDER — MONTELUKAST SODIUM 10 MG/1
10 TABLET ORAL NIGHTLY
Qty: 90 TABLET | Refills: 3 | Status: SHIPPED | OUTPATIENT
Start: 2025-04-07

## 2025-04-07 RX ORDER — PANTOPRAZOLE SODIUM 40 MG/1
40 TABLET, DELAYED RELEASE ORAL DAILY
Qty: 90 TABLET | Refills: 3 | Status: SHIPPED | OUTPATIENT
Start: 2025-04-07

## 2025-04-07 RX ORDER — CETIRIZINE HYDROCHLORIDE 10 MG/1
10 TABLET ORAL DAILY
Qty: 90 TABLET | Refills: 3 | Status: SHIPPED | OUTPATIENT
Start: 2025-04-07

## 2025-04-07 RX ORDER — DULOXETIN HYDROCHLORIDE 60 MG/1
60 CAPSULE, DELAYED RELEASE ORAL DAILY
Qty: 90 CAPSULE | Refills: 3 | Status: SHIPPED | OUTPATIENT
Start: 2025-04-07

## 2025-04-07 RX ORDER — FLUTICASONE PROPIONATE 50 MCG
2 SPRAY, SUSPENSION (ML) NASAL DAILY
Qty: 48 G | Refills: 3 | Status: SHIPPED | OUTPATIENT
Start: 2025-04-07

## 2025-04-07 RX ORDER — AMLODIPINE BESYLATE 5 MG/1
5 TABLET ORAL 2 TIMES DAILY
Qty: 180 TABLET | Refills: 3 | Status: SHIPPED | OUTPATIENT
Start: 2025-04-07

## 2025-04-07 RX ORDER — COLESTIPOL HYDROCHLORIDE 1 G/1
1 TABLET ORAL DAILY
Qty: 90 TABLET | Refills: 3 | Status: SHIPPED | OUTPATIENT
Start: 2025-04-07

## 2025-04-07 NOTE — LETTER
Eileen Kaufman  [address] [id number]    April 18, 2025     Good afternoon, Eileen Alvarez.  The heart monitor result is basically normal.  There is no worrisome irregular heartbeat present.  Very rare premature atrial and ventricular complexes are present.  This is a common finding.  There is no specific change in your care or medications that I would recommend based on this result.  If you are having ongoing symptoms of concern, please reach out for additional guidance.  Let me know if you have other concerns.     Artur Quevedo MD  [facility]  Resulted Orders   POC Medline 12 Panel Urine Drug Screen   Result Value Ref Range    Amphetamine Screen, Urine Negative Negative    Barbiturates Screen, Urine Negative Negative    Buprenorphine, Screen, Urine Negative Negative    Benzodiazepine Screen, Urine Negative Negative    Cocaine Screen, Urine Negative Negative    MDMA (ECSTASY) Negative Negative    Methamphetamine, Ur Negative Negative    Morphine/Opiates Screen, Urine Negative Negative    Methadone Screen, Urine Negative Negative    Oxycodone Screen, Urine Negative Negative    Phencyclidine (PCP), Urine Negative Negative    THC, Screen, Urine Negative Negative    Lot Number [id number]     Expiration Date 11-7-26    Holter monitor - 48 hour   Result Value Ref Range    Heart rate (average) 72     Heart rate minimum 55     Heart rate maximum 90     Narrative    •  Essentially normal 24-hour Holter monitor study  •  Underlying rhythm is normal sinus rhythm with an average heart rate of   72 bpm.  •  Very rare isolated PACs and PVCs noted.  •  There were no arrhythmias or long pauses.  •  Patient did not report any symptoms during study.  •  The interpretation is based on approximately 18 hours of data.  The   rest could not be used due to significant artifact.

## 2025-04-07 NOTE — PROGRESS NOTES
Eileen Kaufman presents to [facility].    Chief Complaint:  Chronic pain, low back pain, blood pressure, GERD    Subjective   History of Present Illness:  Eileen Alvarez is also being seen for evaluation of chronic pain.  She has lumbar degenerative disc disease and has had previous surgery on the lower back.  She has quite a bit of nerve pain in her feet, and the medication is helpful for this.  She would like to continue it.  She denies abuse, diversion, or doctor shopping.  Kevyn is reviewed and consistent.       Regarding hypertension, her blood pressure is in a good range today.  She remains on multiple treatments for her blood pressure.     She remains on pantoprazole for reflux and is doing well with this currently.  She denies any acute concern.    Review of Systems:  Review of Systems   Constitutional:  Negative for chills and fever.   Respiratory:  Positive for shortness of breath. Negative for cough.    Cardiovascular:  Positive for chest pain (every now and then my chest feels tight). Negative for palpitations.   Gastrointestinal:  Negative for abdominal pain, nausea and vomiting.      Objective   Medical History:  Past Medical History:    Anemia, unspecified    Asymptomatic menopausal state    Chronic obstructive pulmonary disease with (acute) exacerbation    Essential (primary) hypertension    High cholesterol    Low back pain    Major depressive disorder, single episode, moderate    Nicotine dependence, unspecified, uncomplicated    Osteopenia    Other intervertebral disc degeneration, lumbar region    Other long term (current) drug therapy    Solitary pulmonary nodule    Vitamin D deficiency, unspecified     Past Surgical History:    CARDIAC CATHETERIZATION    CARPAL TUNNEL RELEASE    CHOLECYSTECTOMY    COLONOSCOPY    Normal    EPIDURAL    Procedure: LUMBAR/SACRAL TRANSFORAMINAL EPIDURAL Right L3-4 and RIght L5-S1 09831, 71877;  Surgeon: Shandra Thomas MD;  [place] [facility] [place]  Service: Pain Management;  Laterality: Right;    HYSTERECTOMY    Total    KNEE ARTHROSCOPY    NECK SURGERY      Family History   Problem Relation Age of Onset    Cerebral aneurysm Mother         cause of death    Heart attack Father         cause of death    Esophageal cancer Brother      Social History     Tobacco Use    Smoking status: Former     Current packs/day: 0.00     Average packs/day: 1 pack/day for 50.0 years (50.0 ttl pk-yrs)     Types: Cigarettes     Start date:      Quit date: 2023     Years since quittin.2     Passive exposure: Past    Smokeless tobacco: Never    Tobacco comments:     Eileen Alvarez was 23 when she started smoking.  She did stop smoking for some time - about 13 years.  She has been smoking a total of approximately 33 years.  She has smoked less than a pack daily during this time. She currently uses smokless, vapor can/pouches   Substance Use Topics    Alcohol use: Yes     Comment: Socially       Health Maintenance Due   Topic Date Due    ZOSTER VACCINE (2 of 2) 2022        Immunization History   Administered Date(s) Administered    Arexvy (RSV, Adults 60+ yrs) 2024    COVID-19 (MODERNA) BIVALENT 12+YRS 09/10/2022    COVID-19 (MODERNA) Monovalent Original Booster 2022    COVID-19 (PFIZER) Purple Cap Monovalent 2020, 2021, 2021    Fluzone  >6mos 2013    Fluzone High-Dose 65+YRS 10/01/2021, 2022, 2024    Hepatitis A 2018    Influenza, Unspecified 10/01/2020    Pneumococcal Conjugate 13-Valent (PCV13) 2018    Pneumococcal Conjugate 20-Valent (PCV20) 2023    Pneumococcal Polysaccharide (PPSV23) 2013, 2017    Shingrix 09/10/2022    Tdap 2024       Allergies   Allergen Reactions    Sulfa Antibiotics Unknown - Low Severity    Aspirin Rash    Atorvastatin Other (See Comments)     Hair loss    Naproxen Other (See Comments)     Urinary tract infection        Medications:    Current Outpatient  Medications:     albuterol (ACCUNEB) 0.63 MG/3ML nebulizer solution, Take 3 mL by nebulization Every 4 (Four) Hours As Needed for Wheezing or Shortness of Air., Disp: 1 each, Rfl: 9    albuterol sulfate  (90 Base) MCG/ACT inhaler, Inhale 2 puffs Every 6 (Six) Hours As Needed for Wheezing., Disp: 34 g, Rfl: 3    amLODIPine (NORVASC) 5 MG tablet, Take 1 tablet by mouth 2 (Two) Times a Day., Disp: 180 tablet, Rfl: 3    Budeson-Glycopyrrol-Formoterol (Breztri Aerosphere) 160-9-4.8 MCG/ACT aerosol inhaler, Inhale 2 puffs 2 (Two) Times a Day., Disp: 2 each, Rfl: 0    budesonide (PULMICORT) 0.5 MG/2ML nebulizer solution, Take 2 mL by nebulization Daily., Disp: 180 mL, Rfl: 1    cetirizine (zyrTEC) 10 MG tablet, Take 1 tablet by mouth Daily., Disp: 90 tablet, Rfl: 3    colestipol (COLESTID) 1 g tablet, Take 1 tablet by mouth Daily., Disp: 90 tablet, Rfl: 3    cyclobenzaprine (FLEXERIL) 10 MG tablet, Take 1 tablet by mouth 2 (Two) Times a Day As Needed for Muscle Spasms., Disp: 60 tablet, Rfl: 2    dicyclomine (BENTYL) 20 MG tablet, , Disp: , Rfl:     DULoxetine (CYMBALTA) 60 MG capsule, Take 1 capsule by mouth Daily., Disp: 90 capsule, Rfl: 3    ferrous sulfate 325 (65 FE) MG EC tablet, Take 1 tablet by mouth Daily With Breakfast., Disp: 90 tablet, Rfl: 1    fluticasone (FLONASE) 50 MCG/ACT nasal spray, Administer 2 sprays into the nostril(s) as directed by provider Daily., Disp: 48 g, Rfl: 3    furosemide (LASIX) 40 MG tablet, Take 1 tablet by mouth Daily., Disp: 90 tablet, Rfl: 2    lidocaine (LIDODERM) 5 %, Place 2 patches on the skin as directed by provider Daily., Disp: 60 patch, Rfl: 2    metoprolol succinate XL (Toprol XL) 50 MG 24 hr tablet, Take 1 tablet by mouth 2 (Two) Times a Day., Disp: 180 tablet, Rfl: 3    montelukast (SINGULAIR) 10 MG tablet, Take 1 tablet by mouth Every Night., Disp: 90 tablet, Rfl: 3    multivitamin with minerals tablet tablet, 1 tablet Daily., Disp: , Rfl:     O2 (OXYGEN), Inhale 4  "L/min 1 (One) Time., Disp: , Rfl:     pantoprazole (PROTONIX) 40 MG EC tablet, Take 1 tablet by mouth Daily., Disp: 90 tablet, Rfl: 3    pregabalin (LYRICA) 150 MG capsule, Take 1 capsule by mouth 2 (Two) Times a Day., Disp: 60 capsule, Rfl: 5    Vital Signs:   /54 (BP Location: Left arm, Patient Position: Sitting)   Pulse 70   Temp 98 °F (36.7 °C) (Oral)   Ht 162.6 cm (64.02\")   Wt 106 kg (232 lb 9.6 oz)   SpO2 95% Comment: 5 liters  BMI 39.91 kg/m²       Physical Exam:  Physical Exam  Vitals reviewed.   Constitutional:       General: She is not in acute distress.     Appearance: She is not ill-appearing.   Eyes:      Pupils: Pupils are equal, round, and reactive to light.   Neck:      Comments: No thyromegaly  Cardiovascular:      Rate and Rhythm: Normal rate and regular rhythm.   Pulmonary:      Effort: Pulmonary effort is normal.      Breath sounds: Normal breath sounds.   Abdominal:      General: There is no distension.      Palpations: Abdomen is soft.      Tenderness: There is no abdominal tenderness.   Musculoskeletal:      Cervical back: Neck supple.   Lymphadenopathy:      Cervical: No cervical adenopathy.   Skin:     Findings: No lesion or rash.   Neurological:      Mental Status: She is alert.     Result Review   The following data was reviewed by Artur Quevedo MD on 04/07/2025.  Lab Results   Component Value Date    WBC 12.35 (H) 06/21/2024    HGB 13.9 06/21/2024    HCT 45.2 06/21/2024    MCV 90.8 06/21/2024     06/21/2024     Lab Results   Component Value Date    GLUCOSE 119 (H) 06/21/2024    BUN 16 06/21/2024    CREATININE 1.30 07/23/2024     06/21/2024    K 4.6 06/21/2024     06/21/2024    CALCIUM 10.1 06/21/2024    PROTEINTOT 7.6 06/21/2024    ALBUMIN 4.1 06/21/2024    ALT 41 (H) 06/21/2024    AST 50 (H) 06/21/2024    ALKPHOS 115 06/21/2024    BILITOT 0.3 06/21/2024    GLOB 3.5 06/21/2024    AGRATIO 1.2 06/21/2024    BCR 15.8 06/21/2024    ANIONGAP 11.1 " 06/21/2024    EGFR 43.8 (L) 07/23/2024     Lab Results   Component Value Date    CHOL 198 06/21/2024    CHLPL 200 11/14/2019    TRIG 124 06/21/2024    HDL 74 (H) 06/21/2024     (H) 06/21/2024     Lab Results   Component Value Date    TSH 0.355 06/21/2024     Lab Results   Component Value Date    HGBA1C 6.10 (H) 06/21/2024     CBC w/ Auto Diff (02/25/2025 13:55)  COMPREHENSIVE METABOLIC PANEL (02/25/2025 13:55)  Vitamin B12 (02/25/2025 13:55)  Folate, Serum (02/25/2025 13:55)  FERRITIN (02/25/2025 13:55)  Iron and TIBC (02/25/2025 13:55)  Soluble Transferrin Receptor (SENDOUT) (02/25/2025 13:55)         Assessment and Plan:   Today, we have reviewed her care.  Overall, Eileen Alvarez seems well.  She is having an issue with intermittent palpitations, and we will move forward with a Holter monitor as a precaution.  I did review recent blood work which was done by hematology, and it looked okay.  I am not advising any particular changes for now.  Pregabalin continues to be of benefit for her, and it will be refilled.  We will move forward with urine drug screen today.  Tentative follow-up with me will be in about 6 months, sooner if needed.    Diagnoses and all orders for this visit:    1. Essential hypertension (Primary)  -     amLODIPine (NORVASC) 5 MG tablet; Take 1 tablet by mouth 2 (Two) Times a Day.  Dispense: 180 tablet; Refill: 3  -     metoprolol succinate XL (Toprol XL) 50 MG 24 hr tablet; Take 1 tablet by mouth 2 (Two) Times a Day.  Dispense: 180 tablet; Refill: 3    2. Rhinorrhea  -     cetirizine (zyrTEC) 10 MG tablet; Take 1 tablet by mouth Daily.  Dispense: 90 tablet; Refill: 3  -     montelukast (SINGULAIR) 10 MG tablet; Take 1 tablet by mouth Every Night.  Dispense: 90 tablet; Refill: 3    3. Chronic diarrhea  -     colestipol (COLESTID) 1 g tablet; Take 1 tablet by mouth Daily.  Dispense: 90 tablet; Refill: 3    4. Muscle spasm  -     cyclobenzaprine (FLEXERIL) 10 MG tablet; Take 1 tablet by mouth 2  (Two) Times a Day As Needed for Muscle Spasms.  Dispense: 60 tablet; Refill: 2    5. Chronic depression  -     DULoxetine (CYMBALTA) 60 MG capsule; Take 1 capsule by mouth Daily.  Dispense: 90 capsule; Refill: 3    6. Allergic rhinitis, unspecified seasonality, unspecified trigger  -     fluticasone (FLONASE) 50 MCG/ACT nasal spray; Administer 2 sprays into the nostril(s) as directed by provider Daily.  Dispense: 48 g; Refill: 3    7. Gastroesophageal reflux disease, unspecified whether esophagitis present  -     pantoprazole (PROTONIX) 40 MG EC tablet; Take 1 tablet by mouth Daily.  Dispense: 90 tablet; Refill: 3    8. Lumbar facet arthropathy  -     pregabalin (LYRICA) 150 MG capsule; Take 1 capsule by mouth 2 (Two) Times a Day.  Dispense: 60 capsule; Refill: 5    9. Postlaminectomy syndrome  -     pregabalin (LYRICA) 150 MG capsule; Take 1 capsule by mouth 2 (Two) Times a Day.  Dispense: 60 capsule; Refill: 5    10. Lumbar radiculopathy  -     pregabalin (LYRICA) 150 MG capsule; Take 1 capsule by mouth 2 (Two) Times a Day.  Dispense: 60 capsule; Refill: 5    11. Other long term (current) drug therapy  -     POC Medline 12 Panel Urine Drug Screen    12. Tachycardia  -     Holter monitor - 48 hour; Future    Follow Up  Return in about 27 weeks (around 10/13/2025) for Recheck, Medicare Wellness.  Patient was given instructions and counseling regarding her condition or for health maintenance advice. Please see specific information pulled into the AVS if appropriate.

## 2025-04-15 NOTE — TELEPHONE ENCOUNTER
----- Message from Kristi Canchola LPN sent at 7/5/2022 10:51 AM EDT -----  TICKLE for repeat UA with micro in 2 weeks     atorvastatin (LIPITOR) 10 MG tablet 90 tablet 4 4/15/2025 -- No   Sig - Route: Take 1 tablet (10 mg) by mouth daily. - Oral   Sent to pharmacy as: Atorvastatin Calcium 10 MG Oral Tablet (LIPITOR)   Class: E-Prescribe   Notes to Pharmacy: Prescription renewal, patient will call for refills.   Order: [id number]   E-Prescribing Status: Receipt confirmed by pharmacy (4/15/2025  2:42 PM CDT)     [facility] - [place], [place] - [address]     Prescription sent to [facility] today, while at office visit. Pharmacy notified. Genesis Moon, Refill RN .............. 4/15/2025  4:20 PM

## 2025-04-25 ENCOUNTER — TELEPHONE (OUTPATIENT)
Dept: FAMILY MEDICINE CLINIC | Age: 75
End: 2025-04-25
Payer: MEDICARE

## 2025-04-25 DIAGNOSIS — J44.1 COPD WITH EXACERBATION: Primary | ICD-10-CM

## 2025-04-25 RX ORDER — PREDNISONE 20 MG/1
TABLET ORAL
Qty: 13 TABLET | Refills: 0 | Status: SHIPPED | OUTPATIENT
Start: 2025-04-25

## 2025-04-25 NOTE — TELEPHONE ENCOUNTER
Caller: Eileen Kaufman    Relationship: Self    Best call back number: [phone]     What medication are you requesting: STEROID    What are your current symptoms: WHEEZING    How long have you been experiencing symptoms: THIS MORNING    Have you had these symptoms before:    [x] Yes  [] No    Have you been treated for these symptoms before:   [x] Yes  [] No    If a prescription is needed, what is your preferred pharmacy and phone number: [facility] [employer] [id number] - [place], [place] - [address] AT [facility] [address]  - [phone]  - [phone] FX     Additional notes: PLEASE CALL AND ADVISE

## 2025-04-25 NOTE — TELEPHONE ENCOUNTER
Please let Eileen Alvarez know that I have sent a prescription for a steroid taper to [facility] just now.  If she has worsening symptoms over the weekend, she should go to the emergency department as a precaution.  I would recommend scheduling a follow-up appointment next week as a precaution.  Thanks.

## 2025-05-02 ENCOUNTER — OFFICE VISIT (OUTPATIENT)
Dept: FAMILY MEDICINE CLINIC | Age: 75
End: 2025-05-02
Payer: MEDICARE

## 2025-05-02 VITALS
WEIGHT: 237.4 LBS | DIASTOLIC BLOOD PRESSURE: 73 MMHG | HEART RATE: 82 BPM | SYSTOLIC BLOOD PRESSURE: 112 MMHG | BODY MASS INDEX: 40.53 KG/M2 | OXYGEN SATURATION: 96 % | TEMPERATURE: 98.5 F | HEIGHT: 64 IN

## 2025-05-02 DIAGNOSIS — J44.1 COPD WITH EXACERBATION: Primary | ICD-10-CM

## 2025-05-02 NOTE — PROGRESS NOTES
Eileen Kaufman presents to [facility].    Chief Complaint:  Presumed COPD exacerbation    Subjective   History of Present Illness:  Eileen Alvarez is being seen today for follow-up on her care.  Please see the telephone note from 4/25/2025.  She called in having shortness of breath and likely COPD exacerbation.  She was treated with a prednisone taper, and she says that her breathing is doing better than it was last week.  She does have severe COPD and remains on supplemental oxygen continuously for it.    Review of Systems:  Review of Systems   Constitutional:  Negative for chills and fever.   Respiratory:  Positive for cough and shortness of breath (when I stand up).    Cardiovascular:  Negative for chest pain and palpitations.   Gastrointestinal:  Negative for abdominal pain, nausea and vomiting.      Objective   Medical History:  Past Medical History:    Anemia, unspecified    Asymptomatic menopausal state    Chronic obstructive pulmonary disease with (acute) exacerbation    Essential (primary) hypertension    High cholesterol    Low back pain    Major depressive disorder, single episode, moderate    Nicotine dependence, unspecified, uncomplicated    Osteopenia    Other intervertebral disc degeneration, lumbar region    Other long term (current) drug therapy    Solitary pulmonary nodule    Vitamin D deficiency, unspecified     Past Surgical History:    CARDIAC CATHETERIZATION    CARPAL TUNNEL RELEASE    CHOLECYSTECTOMY    COLONOSCOPY    Normal    EPIDURAL    Procedure: LUMBAR/SACRAL TRANSFORAMINAL EPIDURAL Right L3-4 and RIght L5-S1 33039, 01121;  Surgeon: Shandra Thomas MD;  [place] [facility] [place] [facility];  Service: Pain Management;  Laterality: Right;    HYSTERECTOMY    Total    KNEE ARTHROSCOPY    NECK SURGERY      Family History   Problem Relation Age of Onset    Cerebral aneurysm Mother         cause of death    Heart attack Father         cause of death    Esophageal cancer Brother       Social History     Tobacco Use    Smoking status: Former     Current packs/day: 0.00     Average packs/day: 1 pack/day for 50.0 years (50.0 ttl pk-yrs)     Types: Cigarettes     Start date:      Quit date: 2023     Years since quittin.2     Passive exposure: Past    Smokeless tobacco: Never    Tobacco comments:     Eileen Alvarez was 23 when she started smoking.  She did stop smoking for some time - about 13 years.  She has been smoking a total of approximately 33 years.  She has smoked less than a pack daily during this time. She currently uses smokless, vapor can/pouches   Substance Use Topics    Alcohol use: Yes     Comment: Socially       Health Maintenance Due   Topic Date Due    ZOSTER VACCINE (2 of 2) 2022    LUNG CANCER SCREENING  2025        Immunization History   Administered Date(s) Administered    Arexvy (RSV, Adults 60+ yrs) 2024    COVID-19 (MODERNA) BIVALENT 12+YRS 09/10/2022    COVID-19 (MODERNA) Monovalent Original Booster 2022    COVID-19 (PFIZER) Purple Cap Monovalent 2020, 2021, 2021    Fluzone  >6mos 2013    Fluzone High-Dose 65+YRS 10/01/2021, 2022, 2024    Hepatitis A 2018    Influenza, Unspecified 10/01/2020    Pneumococcal Conjugate 13-Valent (PCV13) 2018    Pneumococcal Conjugate 20-Valent (PCV20) 2023    Pneumococcal Polysaccharide (PPSV23) 2013, 2017    Shingrix 09/10/2022    Tdap 2024       Allergies   Allergen Reactions    Sulfa Antibiotics Unknown - Low Severity    Aspirin Rash    Atorvastatin Other (See Comments)     Hair loss    Naproxen Other (See Comments)     Urinary tract infection        Medications:    Current Outpatient Medications:     albuterol (ACCUNEB) 0.63 MG/3ML nebulizer solution, Take 3 mL by nebulization Every 4 (Four) Hours As Needed for Wheezing or Shortness of Air., Disp: 1 each, Rfl: 9    albuterol sulfate  (90 Base) MCG/ACT inhaler, Inhale 2 puffs Every  6 (Six) Hours As Needed for Wheezing., Disp: 34 g, Rfl: 3    amLODIPine (NORVASC) 5 MG tablet, Take 1 tablet by mouth 2 (Two) Times a Day., Disp: 180 tablet, Rfl: 3    Budeson-Glycopyrrol-Formoterol (Breztri Aerosphere) 160-9-4.8 MCG/ACT aerosol inhaler, Inhale 2 puffs 2 (Two) Times a Day., Disp: 2 each, Rfl: 0    budesonide (PULMICORT) 0.5 MG/2ML nebulizer solution, Take 2 mL by nebulization Daily., Disp: 180 mL, Rfl: 1    cetirizine (zyrTEC) 10 MG tablet, Take 1 tablet by mouth Daily., Disp: 90 tablet, Rfl: 3    colestipol (COLESTID) 1 g tablet, Take 1 tablet by mouth Daily., Disp: 90 tablet, Rfl: 3    cyclobenzaprine (FLEXERIL) 10 MG tablet, Take 1 tablet by mouth 2 (Two) Times a Day As Needed for Muscle Spasms., Disp: 60 tablet, Rfl: 2    dicyclomine (BENTYL) 20 MG tablet, , Disp: , Rfl:     DULoxetine (CYMBALTA) 60 MG capsule, Take 1 capsule by mouth Daily., Disp: 90 capsule, Rfl: 3    ferrous sulfate 325 (65 FE) MG EC tablet, Take 1 tablet by mouth Daily With Breakfast., Disp: 90 tablet, Rfl: 1    fluticasone (FLONASE) 50 MCG/ACT nasal spray, Administer 2 sprays into the nostril(s) as directed by provider Daily., Disp: 48 g, Rfl: 3    furosemide (LASIX) 40 MG tablet, Take 1 tablet by mouth Daily., Disp: 90 tablet, Rfl: 2    lidocaine (LIDODERM) 5 %, Place 2 patches on the skin as directed by provider Daily., Disp: 60 patch, Rfl: 2    metoprolol succinate XL (Toprol XL) 50 MG 24 hr tablet, Take 1 tablet by mouth 2 (Two) Times a Day., Disp: 180 tablet, Rfl: 3    montelukast (SINGULAIR) 10 MG tablet, Take 1 tablet by mouth Every Night., Disp: 90 tablet, Rfl: 3    multivitamin with minerals tablet tablet, 1 tablet Daily., Disp: , Rfl:     O2 (OXYGEN), Inhale 4 L/min 1 (One) Time., Disp: , Rfl:     pantoprazole (PROTONIX) 40 MG EC tablet, Take 1 tablet by mouth Daily., Disp: 90 tablet, Rfl: 3    pregabalin (LYRICA) 150 MG capsule, Take 1 capsule by mouth 2 (Two) Times a Day., Disp: 60 capsule, Rfl: 5    Vital  "Signs:   /73 (BP Location: Right arm, Patient Position: Sitting)   Pulse 82   Temp 98.5 °F (36.9 °C) (Oral)   Ht 162.6 cm (64.02\")   Wt 108 kg (237 lb 6.4 oz)   SpO2 96% Comment: 4 liters  BMI 40.73 kg/m²       Physical Exam:  Physical Exam  Vitals reviewed.   Constitutional:       General: She is not in acute distress.     Appearance: She is not ill-appearing.   Eyes:      Pupils: Pupils are equal, round, and reactive to light.   Neck:      Comments: No thyromegaly  Cardiovascular:      Rate and Rhythm: Normal rate and regular rhythm.   Pulmonary:      Effort: Pulmonary effort is normal.      Breath sounds: No wheezing (Decreased breath sounds are present).   Abdominal:      General: There is no distension.      Palpations: Abdomen is soft.      Tenderness: There is no abdominal tenderness.   Musculoskeletal:      Cervical back: Neck supple.      Right lower leg: Edema present.      Left lower leg: Edema present.   Lymphadenopathy:      Cervical: No cervical adenopathy.   Skin:     Findings: No lesion or rash.   Neurological:      Mental Status: She is alert.     Result Review   The following data was reviewed by Artur Quevedo MD on 05/02/2025.  Lab Results   Component Value Date    WBC 12.35 (H) 06/21/2024    HGB 13.9 06/21/2024    HCT 45.2 06/21/2024    MCV 90.8 06/21/2024     06/21/2024     Lab Results   Component Value Date    GLUCOSE 119 (H) 06/21/2024    BUN 16 06/21/2024    CREATININE 1.30 07/23/2024     06/21/2024    K 4.6 06/21/2024     06/21/2024    CALCIUM 10.1 06/21/2024    PROTEINTOT 7.6 06/21/2024    ALBUMIN 4.1 06/21/2024    ALT 41 (H) 06/21/2024    AST 50 (H) 06/21/2024    ALKPHOS 115 06/21/2024    BILITOT 0.3 06/21/2024    GLOB 3.5 06/21/2024    AGRATIO 1.2 06/21/2024    BCR 15.8 06/21/2024    ANIONGAP 11.1 06/21/2024    EGFR 43.8 (L) 07/23/2024     Lab Results   Component Value Date    CHOL 198 06/21/2024    CHLPL 200 11/14/2019    TRIG 124 06/21/2024    HDL " 74 (H) 06/21/2024     (H) 06/21/2024     Lab Results   Component Value Date    TSH 0.355 06/21/2024     Lab Results   Component Value Date    HGBA1C 6.10 (H) 06/21/2024          Assessment and Plan:   Today, we have reviewed her care.  Eileen Alvarez seems well today.  I would not recommend any specific change for the near term.  She is scheduled to have blood work done with [facility] later this month, and I will plan to review those labs.  We will plan to see her back in October as scheduled, sooner if needed.    Diagnoses and all orders for this visit:    1. COPD with exacerbation (Primary)    Follow Up  Return in about 5 months (around 10/13/2025) for Recheck, Next scheduled follow up.  Patient was given instructions and counseling regarding her condition or for health maintenance advice. Please see specific information pulled into the AVS if appropriate.

## 2025-06-11 ENCOUNTER — RESULTS FOLLOW-UP (OUTPATIENT)
Dept: FAMILY MEDICINE CLINIC | Age: 75
End: 2025-06-11

## 2025-06-11 ENCOUNTER — OFFICE VISIT (OUTPATIENT)
Dept: FAMILY MEDICINE CLINIC | Age: 75
End: 2025-06-11
Payer: MEDICARE

## 2025-06-11 ENCOUNTER — OFFICE VISIT (OUTPATIENT)
Dept: SLEEP MEDICINE | Facility: HOSPITAL | Age: 75
End: 2025-06-11
Payer: MEDICARE

## 2025-06-11 VITALS
TEMPERATURE: 98.1 F | OXYGEN SATURATION: 91 % | DIASTOLIC BLOOD PRESSURE: 64 MMHG | HEART RATE: 74 BPM | BODY MASS INDEX: 40.6 KG/M2 | WEIGHT: 237.8 LBS | HEIGHT: 64 IN | SYSTOLIC BLOOD PRESSURE: 112 MMHG

## 2025-06-11 VITALS
OXYGEN SATURATION: 97 % | SYSTOLIC BLOOD PRESSURE: 115 MMHG | WEIGHT: 236.8 LBS | HEIGHT: 64 IN | HEART RATE: 71 BPM | DIASTOLIC BLOOD PRESSURE: 74 MMHG | BODY MASS INDEX: 40.43 KG/M2

## 2025-06-11 DIAGNOSIS — G47.33 OSA TREATED WITH BIPAP: Primary | ICD-10-CM

## 2025-06-11 DIAGNOSIS — J96.11 CHRONIC RESPIRATORY FAILURE WITH HYPOXIA: ICD-10-CM

## 2025-06-11 DIAGNOSIS — R06.02 SHORTNESS OF BREATH: ICD-10-CM

## 2025-06-11 DIAGNOSIS — J44.9 CHRONIC OBSTRUCTIVE PULMONARY DISEASE, UNSPECIFIED COPD TYPE: ICD-10-CM

## 2025-06-11 DIAGNOSIS — J44.1 COPD WITH EXACERBATION: Primary | ICD-10-CM

## 2025-06-11 LAB
EXPIRATION DATE: NORMAL
FLUAV AG UPPER RESP QL IA.RAPID: NOT DETECTED
FLUBV AG UPPER RESP QL IA.RAPID: NOT DETECTED
INTERNAL CONTROL: NORMAL
Lab: NORMAL
SARS-COV-2 AG UPPER RESP QL IA.RAPID: NOT DETECTED
SARS-COV-2 RNA RESP QL NAA+PROBE: NOT DETECTED

## 2025-06-11 PROCEDURE — 3074F SYST BP LT 130 MM HG: CPT | Performed by: INTERNAL MEDICINE

## 2025-06-11 PROCEDURE — 87635 SARS-COV-2 COVID-19 AMP PRB: CPT

## 2025-06-11 PROCEDURE — 99214 OFFICE O/P EST MOD 30 MIN: CPT | Performed by: INTERNAL MEDICINE

## 2025-06-11 PROCEDURE — 1160F RVW MEDS BY RX/DR IN RCRD: CPT | Performed by: INTERNAL MEDICINE

## 2025-06-11 PROCEDURE — 1159F MED LIST DOCD IN RCRD: CPT | Performed by: INTERNAL MEDICINE

## 2025-06-11 PROCEDURE — 3078F DIAST BP <80 MM HG: CPT | Performed by: INTERNAL MEDICINE

## 2025-06-11 PROCEDURE — G0463 HOSPITAL OUTPT CLINIC VISIT: HCPCS

## 2025-06-11 RX ORDER — AZITHROMYCIN 250 MG/1
TABLET, FILM COATED ORAL
Qty: 6 TABLET | Refills: 0 | Status: SHIPPED | OUTPATIENT
Start: 2025-06-11

## 2025-06-11 RX ORDER — PREDNISONE 20 MG/1
40 TABLET ORAL DAILY
Qty: 10 TABLET | Refills: 0 | Status: SHIPPED | OUTPATIENT
Start: 2025-06-11 | End: 2025-06-16

## 2025-06-11 NOTE — PROGRESS NOTES
"  [facility]  Sleep Medicine   [address] [place] [address]  [place]   [place] [address]  Phone: [phone]  Fax: [phone]      SLEEP CLINIC FOLLOW UP PROGRESS NOTE.    Eileen Kaufman  [id number]   1950  75 y.o.  female      PCP: Artur Quevedo MD      Date of visit: 6/11/2025    Chief Complaint   Patient presents with    Sleep Apnea    Obesity    Chronic hypoxic respiratory failure       HPI:  This is a 75 y.o. years old patient is here for the management of obstructive sleep apnea.  Sleep apnea is mild in severity with a AHI of 10/hr. Patient is using positive airway pressure therapy with auto BiPAP along with oxygen.  And the symptoms of sleep apnea have improved significantly on the therapy. Normally patient goes to bed at 9 PM and wakes up at 8-10 AM .  The patient wakes up 3 time(s) during the night and has no problem going back to sleep.  Feels refreshed after waking up.  He also has history of COPD and chronic hypoxic respiratory failure uses oxygen 24/7.    Medications and allergies are reviewed by me and documented in the encounter.     SOCIAL (habits pertaining to sleep medicine)  History tobacco use:No   History of alcohol use: 0 per week  Caffeine use: 3     REVIEW OF SYSTEMS:   Pertaining positive symptoms are:  [place] Sleepiness Scale :Total score: 12   Shortness of breath with exertion  Fatigue      PHYSICAL EXAMINATION:  CONSTITUTIONAL:  Vitals:    06/11/25 1300   BP: 115/74   Pulse: 71   SpO2: 97%   Weight: 107 kg (236 lb 12.8 oz)   Height: 162.6 cm (64.02\")    Body mass index is 40.63 kg/m².   NOSE: nasal passages are clear, No deformities noted   RESP SYSTEM: Not in any respiratory distress, no chest deformities noted,   CARDIOVASULAR: No edema noted  NEURO: Oriented x 3, gait normal,  Mood and affect appeared appropriate      Data reviewed:  The Smart card downloaded on 6/11/2025 has been reviewed independently by me for compliance and discussed the data with the " patient.   Compliance; 90%  More than 4 hr use, 70%  Average use of the device 6 hours per night  Residual AHI: 1.5 /hr (Optimal < 5/hr, Good <10/hr, Adequate reduce by 75% from baseline)  Mask type: Fullface mask  Oxygen 4 L 24/7  Device: ResMed auto BiPAP  DME: [facility] [facility]           ASSESSMENT AND PLAN:  Obstructive sleep apnea ( G 47.33).  The symptoms of sleep apnea have improved with the device and the treatment.  Patient's compliance with the device is excellent for treatment of sleep apnea.  I have independently reviewed the smart card down load and discussed with the patient the download data and encouarged the patient to continue to use the device.The residual AHI is acceptable. The device is benefiting the patient and the device is medically necessary.  Without proper control of sleep apnea and good compliance there is a increased risk for hypertension, diabetes mellitus and nonrestorative sleep with hypersomnia which can increase risk for motor vehicle accidents.  Untreated sleep apnea is also a risk factor for development of atrial fibrillation, pulmonary hypertension, insulin resistance and stroke. The patient is also instructed to get the supplies from the [facility] and and change them on a regular basis.  A prescription for supplies has been sent to the [facility].  I have also discussed the good sleep hygiene habits and adequate amount of sleep needed for good health.  Chronic hypoxic respiratory failure.  Continue oxygen  COPD  Obesity  3 with BMI is Body mass index is 40.63 kg/m².. I have discuss the relationship between the weight and sleep apnea. The benefit of weight loss in reducing severity of sleep apnea was discussed. Discussed diet and exercise with the patient to achieve ideal BMI.  Hypertension.    Return in about 1 year (around 6/11/2026) for with smart card down load. . Patient's questions were answered.    6/11/2025  Jose Angel Zarate MD  Sleep Medicine.  Medical Director,    [facility] [place] [facility].

## 2025-06-11 NOTE — PROGRESS NOTES
Subjective     CHIEF COMPLAINT    Chief Complaint   Patient presents with    Wheezing     Onset yesterday.     Shortness of Breath       History of Present Illness:  Eileen Kaufman is a 75 y.o. female who presents to [facility] with acute complaint of cough, wheezing and shortness of breath.  Has a history significant for COPD and chronic respiratory failure on 4 L per nasal cannula continuously.  She states that she woke up this morning with symptoms.  Cough is productive of purulent sputum.  She has been on steroids about a month ago for COPD exacerbation.  Denies any fevers or chills.  No known exposure to any illnesses.  Oxygen saturation has been running around 91% at home.  Follows with pulmonology.  Recent visit was in March of this year next visit is in August.    Review of Systems   Constitutional:  Negative for chills and fever.   HENT:  Negative for congestion, rhinorrhea and sore throat.    Respiratory:  Positive for cough, shortness of breath and wheezing.    Cardiovascular:  Negative for chest pain.   Gastrointestinal:  Negative for nausea and vomiting.   Musculoskeletal:  Negative for myalgias.   Neurological:  Negative for headaches.         Past Medical History:   Diagnosis Date    Anemia, unspecified     Asymptomatic menopausal state     Chronic obstructive pulmonary disease with (acute) exacerbation     Essential (primary) hypertension     High cholesterol 06/17/2021    Low back pain     Major depressive disorder, single episode, moderate     Nicotine dependence, unspecified, uncomplicated     Osteopenia     Other intervertebral disc degeneration, lumbar region     Other long term (current) drug therapy     Solitary pulmonary nodule     Vitamin D deficiency, unspecified          Past Surgical History:   Procedure Laterality Date    CARDIAC CATHETERIZATION  07/25/2012    CARPAL TUNNEL RELEASE      CHOLECYSTECTOMY      COLONOSCOPY  08/24/2022    Normal    EPIDURAL  Right 10/25/2023    Procedure: LUMBAR/SACRAL TRANSFORAMINAL EPIDURAL Right L3-4 and RIght L5-S1 29903, 60579;  Surgeon: Shandra Thomas MD;  Location: [facility] MAIN OR;  Service: Pain Management;  Laterality: Right;    HYSTERECTOMY  1980s    Total    KNEE ARTHROSCOPY Left     NECK SURGERY  2015         Family History   Problem Relation Age of Onset    Cerebral aneurysm Mother         cause of death    Heart attack Father         cause of death    Esophageal cancer Brother          Social History     Socioeconomic History    Marital status: Single    Number of children: 2   Tobacco Use    Smoking status: Former     Current packs/day: 0.00     Average packs/day: 1 pack/day for 50.0 years (50.0 ttl pk-yrs)     Types: Cigarettes     Start date:      Quit date: 2023     Years since quittin.3     Passive exposure: Past    Smokeless tobacco: Never    Tobacco comments:     Eileen Alvarez was 23 when she started smoking.  She did stop smoking for some time - about 13 years.  She has been smoking a total of approximately 33 years.  She has smoked less than a pack daily during this time. She currently uses smokless, vapor can/pouches   Vaping Use    Vaping status: Former    Start date: 2022    Quit date: 2023   Substance and Sexual Activity    Alcohol use: Yes     Comment: Socially    Drug use: Never    Sexual activity: Defer         Allergies   Allergen Reactions    Sulfa Antibiotics Unknown - Low Severity    Aspirin Rash    Atorvastatin Other (See Comments)     Hair loss    Naproxen Other (See Comments)     Urinary tract infection          Current Outpatient Medications on File Prior to Visit   Medication Sig Dispense Refill    albuterol (ACCUNEB) 0.63 MG/3ML nebulizer solution Take 3 mL by nebulization Every 4 (Four) Hours As Needed for Wheezing or Shortness of Air. 1 each 9    albuterol sulfate  (90 Base) MCG/ACT inhaler Inhale 2 puffs Every 6 (Six) Hours As Needed for Wheezing. 34 g 3     "amLODIPine (NORVASC) 5 MG tablet Take 1 tablet by mouth 2 (Two) Times a Day. 180 tablet 3    Budeson-Glycopyrrol-Formoterol (Breztri Aerosphere) 160-9-4.8 MCG/ACT aerosol inhaler Inhale 2 puffs 2 (Two) Times a Day. 2 each 0    budesonide (PULMICORT) 0.5 MG/2ML nebulizer solution Take 2 mL by nebulization Daily. 180 mL 1    cetirizine (zyrTEC) 10 MG tablet Take 1 tablet by mouth Daily. 90 tablet 3    colestipol (COLESTID) 1 g tablet Take 1 tablet by mouth Daily. 90 tablet 3    cyclobenzaprine (FLEXERIL) 10 MG tablet Take 1 tablet by mouth 2 (Two) Times a Day As Needed for Muscle Spasms. 60 tablet 2    dicyclomine (BENTYL) 20 MG tablet       DULoxetine (CYMBALTA) 60 MG capsule Take 1 capsule by mouth Daily. 90 capsule 3    fluticasone (FLONASE) 50 MCG/ACT nasal spray Administer 2 sprays into the nostril(s) as directed by provider Daily. 48 g 3    furosemide (LASIX) 40 MG tablet Take 1 tablet by mouth Daily. 90 tablet 2    lidocaine (LIDODERM) 5 % Place 2 patches on the skin as directed by provider Daily. 60 patch 2    metoprolol succinate XL (Toprol XL) 50 MG 24 hr tablet Take 1 tablet by mouth 2 (Two) Times a Day. 180 tablet 3    montelukast (SINGULAIR) 10 MG tablet Take 1 tablet by mouth Every Night. 90 tablet 3    multivitamin with minerals tablet tablet 1 tablet Daily.      O2 (OXYGEN) Inhale 4 L/min 1 (One) Time.      pantoprazole (PROTONIX) 40 MG EC tablet Take 1 tablet by mouth Daily. 90 tablet 3    pregabalin (LYRICA) 150 MG capsule Take 1 capsule by mouth 2 (Two) Times a Day. 60 capsule 5    ferrous sulfate 325 (65 FE) MG EC tablet Take 1 tablet by mouth Daily With Breakfast. (Patient not taking: Reported on 6/11/2025) 90 tablet 1     No current facility-administered medications on file prior to visit.     /64   Pulse 74   Temp 98.1 °F (36.7 °C) (Oral)   Ht 162.6 cm (64.02\")   Wt 108 kg (237 lb 12.8 oz)   SpO2 91% Comment: 4L o2  BMI 40.80 kg/m²     Objective     Physical Exam  Vitals and nursing " note reviewed.   Constitutional:       General: She is not in acute distress.     Appearance: Normal appearance. She is not ill-appearing.   HENT:      Head: Normocephalic.      Right Ear: Tympanic membrane, ear canal and external ear normal.      Left Ear: Tympanic membrane, ear canal and external ear normal.      Nose: Nose normal.      Right Sinus: No maxillary sinus tenderness or frontal sinus tenderness.      Left Sinus: No maxillary sinus tenderness or frontal sinus tenderness.      Mouth/Throat:      Lips: Pink.      Mouth: Mucous membranes are moist.      Pharynx: Oropharynx is clear. Uvula midline. No pharyngeal swelling, oropharyngeal exudate, posterior oropharyngeal erythema or uvula swelling.   Eyes:      Pupils: Pupils are equal, round, and reactive to light.   Cardiovascular:      Rate and Rhythm: Normal rate and regular rhythm.      Heart sounds: Normal heart sounds. No murmur heard.  Pulmonary:      Effort: Pulmonary effort is normal. No accessory muscle usage or respiratory distress.      Breath sounds: Wheezing present. No rhonchi.   Musculoskeletal:      Cervical back: Normal range of motion.   Lymphadenopathy:      Cervical: No cervical adenopathy.   Skin:     General: Skin is warm and dry.   Neurological:      General: No focal deficit present.      Mental Status: She is alert and oriented to person, place, and time.   Psychiatric:         Mood and Affect: Mood and affect normal.         Behavior: Behavior normal.          Lab Results (last 24 hours)       Procedure Component Value Units Date/Time    POCT SARS-CoV-2 Antigen REYNOLD + Flu [[id number]] Collected: 06/11/25 1557    Specimen: Swab Updated: 06/11/25 1558     SARS Antigen Not Detected     Influenza A Antigen REYNOLD Not Detected     Influenza B Antigen REYNOLD Not Detected     Internal Control Passed     Lot Number [id number]     Expiration Date 1-17-26    COVID-19,CEPHEID/ANASTACIO,COR/TELLY/PAD/DOMINIC/LAG/ANNEMARIE IN-HOUSE,NP SWAB IN TRANSPORT MEDIA 1 HR TAT,  RT-PCR - Swab, Nasopharynx [[id number]] Collected: 06/11/25 1605    Specimen: Swab from Nasopharynx Updated: 06/11/25 1611            Assessment & Plan  COPD with exacerbation  Patient negative for COVID and flu on rapid testing today.  Exam and history consistent with COPD exacerbation.  Will treat with steroids and antibiotics.  Checking COVID PCR as a precaution but will start the antibiotics and steroids today as the PCR will not return until tomorrow. Continue with routine inhalers and rescue inhalers/nebulizer.  Also advised patient to contact pulmonology for closer follow-up as she has required steroids twice in the last month.  Advised on strict ER precautions.     Orders:    azithromycin (Zithromax Z-Raghav) 250 MG tablet; Take 2 tablets by mouth on day 1, then 1 tablet daily on days 2-5    predniSONE (DELTASONE) 20 MG tablet; Take 2 tablets by mouth Daily for 5 days.    Shortness of breath    Orders:    POCT SARS-CoV-2 Antigen REYNOLD + Flu    COVID-19,CEPHEID/ANASTACIO,COR/TELLY/PAD/DOMINIC/LAG/ANNEMARIE IN-HOUSE,NP SWAB IN TRANSPORT MEDIA 1 HR TAT, RT-PCR - Swab, Nasopharynx; Future    Chronic respiratory failure with hypoxia                Follow up:  Return if symptoms worsen or fail to improve.  Patient was given instructions and counseling regarding her condition or for health maintenance advice. Please see specific information pulled into the AVS if appropriate.

## 2025-06-11 NOTE — ASSESSMENT & PLAN NOTE
Orders:    POCT SARS-CoV-2 Antigen REYNOLD + Flu    COVID-19,CEPHEID/ANASTACIO,COR/TELLY/PAD/DOMINIC/LAG/ANNEMARIE IN-HOUSE,NP SWAB IN TRANSPORT MEDIA 1 HR TAT, RT-PCR - Swab, Nasopharynx; Future

## 2025-06-20 ENCOUNTER — TELEPHONE (OUTPATIENT)
Dept: PULMONOLOGY | Facility: CLINIC | Age: 75
End: 2025-06-20

## 2025-06-20 NOTE — TELEPHONE ENCOUNTER
Provider: JOAQUIN    Caller: LUCIA ASIF    Relationship to Patient: Emergency Contact    Phone Number: [phone]     Reason for Call: PATIENT SWITCHED INSURANCE AND NEEDS OXYGEN ORDER SWITCHED TO [facility] OR [facility]. PLEASE CALL TO ADVISE

## 2025-06-24 ENCOUNTER — OFFICE VISIT (OUTPATIENT)
Dept: PULMONOLOGY | Facility: CLINIC | Age: 75
End: 2025-06-24
Payer: MEDICARE

## 2025-06-24 VITALS
WEIGHT: 237 LBS | HEART RATE: 80 BPM | DIASTOLIC BLOOD PRESSURE: 81 MMHG | SYSTOLIC BLOOD PRESSURE: 119 MMHG | OXYGEN SATURATION: 94 % | HEIGHT: 64 IN | TEMPERATURE: 98.6 F | RESPIRATION RATE: 18 BRPM | BODY MASS INDEX: 40.46 KG/M2

## 2025-06-24 DIAGNOSIS — J96.11 CHRONIC RESPIRATORY FAILURE WITH HYPOXIA: Primary | ICD-10-CM

## 2025-06-24 DIAGNOSIS — R59.0 THORACIC LYMPHADENOPATHY: ICD-10-CM

## 2025-06-24 DIAGNOSIS — I51.89 DIASTOLIC DYSFUNCTION: ICD-10-CM

## 2025-06-24 DIAGNOSIS — Z87.891 PERSONAL HISTORY OF NICOTINE DEPENDENCE: ICD-10-CM

## 2025-06-24 DIAGNOSIS — N18.2 CKD (CHRONIC KIDNEY DISEASE) STAGE 2, GFR 60-89 ML/MIN: ICD-10-CM

## 2025-06-24 DIAGNOSIS — R06.09 DYSPNEA ON EXERTION: ICD-10-CM

## 2025-06-24 DIAGNOSIS — J44.9 CHRONIC OBSTRUCTIVE PULMONARY DISEASE, UNSPECIFIED COPD TYPE: ICD-10-CM

## 2025-06-24 DIAGNOSIS — G47.33 OSA (OBSTRUCTIVE SLEEP APNEA): ICD-10-CM

## 2025-06-24 DIAGNOSIS — F17.201 TOBACCO ABUSE, IN REMISSION: ICD-10-CM

## 2025-06-24 DIAGNOSIS — E66.9 OBESITY (BMI 30-39.9): ICD-10-CM

## 2025-06-24 PROCEDURE — 99214 OFFICE O/P EST MOD 30 MIN: CPT

## 2025-06-24 PROCEDURE — 1159F MED LIST DOCD IN RCRD: CPT

## 2025-06-24 PROCEDURE — 3079F DIAST BP 80-89 MM HG: CPT

## 2025-06-24 PROCEDURE — 3074F SYST BP LT 130 MM HG: CPT

## 2025-06-24 PROCEDURE — 1160F RVW MEDS BY RX/DR IN RCRD: CPT

## 2025-06-24 RX ORDER — ALBUTEROL SULFATE 90 UG/1
2 INHALANT RESPIRATORY (INHALATION) EVERY 4 HOURS PRN
Qty: 54 G | Refills: 3 | Status: SHIPPED | OUTPATIENT
Start: 2025-06-24

## 2025-06-24 RX ORDER — BUDESONIDE, GLYCOPYRROLATE, AND FORMOTEROL FUMARATE 160; 9; 4.8 UG/1; UG/1; UG/1
2 AEROSOL, METERED RESPIRATORY (INHALATION) 2 TIMES DAILY
Qty: 3 EACH | Refills: 3 | Status: SHIPPED | OUTPATIENT
Start: 2025-06-24

## 2025-06-24 NOTE — PROGRESS NOTES
Primary Care Provider  Artur Quevedo MD   Referring Provider  No ref. provider found      Patient Complaint  Follow-up (6 minute walk/ O2 order and verbiage) and Shortness of Breath      Subjective          Eileen Kaufman presents to [facility]      Eileen Kaufman is a 75 y.o. female patient of Dr. Crowe with chronic hypoxic respiratory failure, COPD, ANGÉLICA, CKD, diastolic heart failure, and tobacco use in remission, here for follow-up.    Patient states she is doing okay since her last visit, here today to repeat 6-minute walk test and for oxygen recertification as she is switching DME companies.  She denies using any antibiotics or steroids for her lungs recently, no fevers or chills, no ER visits or hospitalizations for her breathing since she was last seen.  Patient's last hospitalization for her breathing was at [facility] in April 2024 for COPD exacerbation and acute on chronic respiratory failure.  Patient feels her breathing has gotten worse over the past year or so, especially after having COVID.  Her oxygen saturation has been dropping into the 80s with activity.  Patient has worn a CPAP in the past but does not tolerate the constant pressure well.  She recently had a sleep study which showed that she needed BiPAP, following up with sleep medicine for fine-tuning.  Patient feels good for a few hours after she first gets up, likely from using BiPAP now.  She is a former smoker, quit about 2 years ago, 50 pack years.  Patient denies any hemoptysis, swollen lymph nodes, weight loss, or night sweats.  Overall, patient is doing well and has no additional concerns at this time.  Patient is able to perform ADLs without difficulty.  I have personally reviewed the review of systems, past family, social, medical and surgical histories; and agree with their findings.    Pulmonary Meds  Breztri  Albuterol inhaler  Albuterol  nebs  Singulair    Pulmonary equipment  Oxygen therapy--has POC  BiPAP--managed by sleep medicine, currently fine-tuning        Family History   Problem Relation Age of Onset    Cerebral aneurysm Mother         cause of death    Heart attack Father         cause of death    Esophageal cancer Brother         Social History     Socioeconomic History    Marital status: Single    Number of children: 2   Tobacco Use    Smoking status: Former     Current packs/day: 0.00     Average packs/day: 1 pack/day for 50.0 years (50.0 ttl pk-yrs)     Types: Cigarettes     Start date:      Quit date: 2023     Years since quittin.4     Passive exposure: Past    Smokeless tobacco: Never    Tobacco comments:     Eileen Alvarez was 23 when she started smoking.  She did stop smoking for some time - about 13 years.  She has been smoking a total of approximately 33 years.  She has smoked less than a pack daily during this time. She currently uses smokless, vapor can/pouches   Vaping Use    Vaping status: Former    Start date: 2022    Quit date: 2023   Substance and Sexual Activity    Alcohol use: Yes     Comment: Socially    Drug use: Never    Sexual activity: Defer        Past Medical History:   Diagnosis Date    Anemia, unspecified     Asymptomatic menopausal state     Chronic obstructive pulmonary disease with (acute) exacerbation     Essential (primary) hypertension     High cholesterol 2021    Low back pain     Major depressive disorder, single episode, moderate     Nicotine dependence, unspecified, uncomplicated     Osteopenia     Other intervertebral disc degeneration, lumbar region     Other long term (current) drug therapy     Solitary pulmonary nodule     Vitamin D deficiency, unspecified         Immunization History   Administered Date(s) Administered    Arexvy (RSV, Adults 60+ yrs) 2024    COVID-19 (MODERNA) BIVALENT 12+YRS 09/10/2022    COVID-19 (MODERNA) Monovalent Original Booster 2022     COVID-19 (PFIZER) Purple Cap Monovalent 12/29/2020, 01/29/2021, 09/30/2021    Fluzone  >6mos 09/20/2013    Fluzone High-Dose 65+YRS 10/01/2021, 11/01/2022, 09/17/2024    Hepatitis A 07/07/2018    Influenza, Unspecified 10/01/2020    Pneumococcal Conjugate 13-Valent (PCV13) 07/05/2018    Pneumococcal Conjugate 20-Valent (PCV20) 08/31/2023    Pneumococcal Polysaccharide (PPSV23) 03/25/2013, 02/13/2017    Shingrix 09/10/2022    Tdap 04/23/2024       Allergies   Allergen Reactions    Sulfa Antibiotics Unknown - Low Severity    Aspirin Rash    Atorvastatin Other (See Comments)     Hair loss    Naproxen Other (See Comments)     Urinary tract infection          Current Outpatient Medications:     albuterol (ACCUNEB) 0.63 MG/3ML nebulizer solution, Take 3 mL by nebulization Every 4 (Four) Hours As Needed for Wheezing or Shortness of Air., Disp: 1 each, Rfl: 9    albuterol sulfate  (90 Base) MCG/ACT inhaler, Inhale 2 puffs Every 4 (Four) Hours As Needed for Wheezing or Shortness of Air., Disp: 54 g, Rfl: 3    amLODIPine (NORVASC) 5 MG tablet, Take 1 tablet by mouth 2 (Two) Times a Day., Disp: 180 tablet, Rfl: 3    Budeson-Glycopyrrol-Formoterol (Breztri Aerosphere) 160-9-4.8 MCG/ACT aerosol inhaler, Inhale 2 puffs 2 (Two) Times a Day., Disp: 3 each, Rfl: 3    cetirizine (zyrTEC) 10 MG tablet, Take 1 tablet by mouth Daily., Disp: 90 tablet, Rfl: 3    colestipol (COLESTID) 1 g tablet, Take 1 tablet by mouth Daily., Disp: 90 tablet, Rfl: 3    cyclobenzaprine (FLEXERIL) 10 MG tablet, Take 1 tablet by mouth 2 (Two) Times a Day As Needed for Muscle Spasms., Disp: 60 tablet, Rfl: 2    dicyclomine (BENTYL) 20 MG tablet, , Disp: , Rfl:     DULoxetine (CYMBALTA) 60 MG capsule, Take 1 capsule by mouth Daily., Disp: 90 capsule, Rfl: 3    fluticasone (FLONASE) 50 MCG/ACT nasal spray, Administer 2 sprays into the nostril(s) as directed by provider Daily., Disp: 48 g, Rfl: 3    furosemide (LASIX) 40 MG tablet, Take 1 tablet by mouth  "Daily., Disp: 90 tablet, Rfl: 2    lidocaine (LIDODERM) 5 %, Place 2 patches on the skin as directed by provider Daily., Disp: 60 patch, Rfl: 2    metoprolol succinate XL (Toprol XL) 50 MG 24 hr tablet, Take 1 tablet by mouth 2 (Two) Times a Day., Disp: 180 tablet, Rfl: 3    montelukast (SINGULAIR) 10 MG tablet, Take 1 tablet by mouth Every Night., Disp: 90 tablet, Rfl: 3    multivitamin with minerals tablet tablet, 1 tablet Daily., Disp: , Rfl:     O2 (OXYGEN), Inhale 4 L/min 1 (One) Time., Disp: , Rfl:     pantoprazole (PROTONIX) 40 MG EC tablet, Take 1 tablet by mouth Daily., Disp: 90 tablet, Rfl: 3    pregabalin (LYRICA) 150 MG capsule, Take 1 capsule by mouth 2 (Two) Times a Day., Disp: 60 capsule, Rfl: 5    azithromycin (Zithromax Z-Raghav) 250 MG tablet, Take 2 tablets by mouth on day 1, then 1 tablet daily on days 2-5, Disp: 6 tablet, Rfl: 0    ferrous sulfate 325 (65 FE) MG EC tablet, Take 1 tablet by mouth Daily With Breakfast. (Patient not taking: Reported on 6/24/2025), Disp: 90 tablet, Rfl: 1     Objective     Vital Signs:   /81 (BP Location: Left arm, Patient Position: Sitting, Cuff Size: Adult)   Pulse 80   Temp 98.6 °F (37 °C) (Oral)   Resp 18   Ht 162.6 cm (64.02\")   Wt 108 kg (237 lb)   SpO2 94% Comment: 4L CONT  BMI 40.66 kg/m²     Physical Exam  Constitutional:       General: She is not in acute distress.     Appearance: Normal appearance. She is obese. She is not ill-appearing.   HENT:      Right Ear: Tympanic membrane and ear canal normal.      Left Ear: Tympanic membrane and ear canal normal.      Nose: Nose normal.      Mouth/Throat:      Mouth: Mucous membranes are moist.      Pharynx: Oropharynx is clear.   Eyes:      Extraocular Movements: Extraocular movements intact.      Conjunctiva/sclera: Conjunctivae normal.      Pupils: Pupils are equal, round, and reactive to light.   Cardiovascular:      Rate and Rhythm: Normal rate and regular rhythm.      Pulses: Normal pulses.      " Heart sounds: Normal heart sounds.   Pulmonary:      Effort: Pulmonary effort is normal. No respiratory distress.      Breath sounds: No stridor. No wheezing, rhonchi or rales.      Comments: Diminished  Abdominal:      General: Bowel sounds are normal.      Palpations: Abdomen is soft.   Musculoskeletal:         General: No swelling. Normal range of motion.      Cervical back: Normal range of motion and neck supple.      Right lower leg: No edema.      Left lower leg: No edema.   Skin:     General: Skin is warm and dry.   Neurological:      General: No focal deficit present.      Mental Status: She is alert and oriented to person, place, and time.      Motor: No weakness.   Psychiatric:         Mood and Affect: Mood normal.         Behavior: Behavior normal.       Result Review :   I have personally reviewed patient's labs and images.  I also reviewed my last progress note 3/20/2025.    -Repeat PFTs 11/26/2024 showed moderately severe obstructive defect, FEV1 54% of predicted.  There was borderline significant response to bronchodilator, 10% improvement in FEV1.  Normal lung volumes, DLCO severely reduced 26% of predicted.  DLCO changes similar to previous PFTs, spirometry worse.  -Echocardiogram 4/10/2024 EF 64.9%, left ventricular diastolic function consistent with grade 1 impaired relaxation.  Estimated RVSP mildly elevated 35 to 45 mmHg.  -CTA chest 7/23/2024 negative for pulmonary embolism.  Mild mediastinal and hilar adenopathy with stable, likely reactive in nature per report.  Emphysema noted.  Dependent airspace consolidation in the mid and lower lung fields, likely atelectasis.  Recommend continuing with annual low-dose screening CTs.  -Patient is enrolled in annual lung cancer screening program, LDCT scheduled for 7/21/2025  -In lab sleep study 9/16/2024 showed the presence of ANGÉLICA, trial of auto BiPAP ordered by sleep medicine.  Per Dr. Zarate's note, patient still needs fine-tuning on BiPAP and will  follow-up with sleep medicine.  -Overnight oximetry 1/29/2025, done on 3 L with BiPAP, O2 saturation less than 88% for 2 hours 28 minutes       Diagnoses and all orders for this visit:    1. Chronic respiratory failure with hypoxia (Primary)  -     6 Minute Walk Test; Future  -     albuterol sulfate  (90 Base) MCG/ACT inhaler; Inhale 2 puffs Every 4 (Four) Hours As Needed for Wheezing or Shortness of Air.  Dispense: 54 g; Refill: 3  -     Budeson-Glycopyrrol-Formoterol (Breztri Aerosphere) 160-9-4.8 MCG/ACT aerosol inhaler; Inhale 2 puffs 2 (Two) Times a Day.  Dispense: 3 each; Refill: 3  -     Oxygen Therapy    2. Chronic obstructive pulmonary disease, unspecified COPD type  -     albuterol sulfate  (90 Base) MCG/ACT inhaler; Inhale 2 puffs Every 4 (Four) Hours As Needed for Wheezing or Shortness of Air.  Dispense: 54 g; Refill: 3  -     Budeson-Glycopyrrol-Formoterol (Breztri Aerosphere) 160-9-4.8 MCG/ACT aerosol inhaler; Inhale 2 puffs 2 (Two) Times a Day.  Dispense: 3 each; Refill: 3    3. Thoracic lymphadenopathy    4. Dyspnea on exertion  -     albuterol sulfate  (90 Base) MCG/ACT inhaler; Inhale 2 puffs Every 4 (Four) Hours As Needed for Wheezing or Shortness of Air.  Dispense: 54 g; Refill: 3  -     Budeson-Glycopyrrol-Formoterol (Breztri Aerosphere) 160-9-4.8 MCG/ACT aerosol inhaler; Inhale 2 puffs 2 (Two) Times a Day.  Dispense: 3 each; Refill: 3    5. ANGÉLICA (obstructive sleep apnea)    6. Diastolic dysfunction    7. CKD (chronic kidney disease) stage 2, GFR 60-89 ml/min    8. Tobacco abuse, in remission    9. Personal history of nicotine dependence    10. Obesity (BMI 30-39.9)      Assessment & Plan    - New oxygen therapy order placed as patient is switching DME companies.  Instructed her to continue wearing 4 L supplemental oxygen continuously at rest, turn up to 7 L continuous with activity.  -Continue wearing BiPAP nightly with 4 L supplemental oxygen bled in  -Continue using  Breztri 2 puffs twice daily, reminded patient to rinse mouth after each use  -Continue using albuterol inhaler or albuterol nebulizer treatments as needed  -Continue taking Singulair and Zyrtec for seasonal allergies  -Continue following up with Dr. Mao with cardiology for management of diastolic heart failure  -Keep follow-up appointment with myself in [place] in August 2025    Smoking status: Reviewed  Vaccination status: Patient reports she is up-to-date with her flu, pneumonia, and Covid vaccines.  She has also had the RSV vaccine.  Patient is advised to continue to follow CDC recommendations such as social distancing wearing a mask and washing hands for at least 20 seconds.  Medications personally reviewed    Follow Up   No follow-ups on file.  Patient was given instructions and counseling regarding her condition or for health maintenance advice. Please see specific information pulled into the AVS if appropriate.

## 2025-07-16 ENCOUNTER — DOCUMENTATION (OUTPATIENT)
Dept: FAMILY MEDICINE CLINIC | Age: 75
End: 2025-07-16
Payer: MEDICARE

## 2025-07-30 ENCOUNTER — OFFICE VISIT (OUTPATIENT)
Dept: FAMILY MEDICINE CLINIC | Age: 75
End: 2025-07-30
Payer: MEDICARE

## 2025-07-30 VITALS
DIASTOLIC BLOOD PRESSURE: 83 MMHG | TEMPERATURE: 97.6 F | HEART RATE: 110 BPM | BODY MASS INDEX: 40.63 KG/M2 | SYSTOLIC BLOOD PRESSURE: 127 MMHG | OXYGEN SATURATION: 88 % | HEIGHT: 64 IN | WEIGHT: 238 LBS

## 2025-07-30 DIAGNOSIS — J44.1 COPD EXACERBATION: ICD-10-CM

## 2025-07-30 DIAGNOSIS — R05.1 ACUTE COUGH: Primary | ICD-10-CM

## 2025-07-30 RX ORDER — PREDNISONE 20 MG/1
20 TABLET ORAL 2 TIMES DAILY
Qty: 10 TABLET | Refills: 0 | Status: SHIPPED | OUTPATIENT
Start: 2025-07-30

## 2025-07-30 RX ORDER — AZITHROMYCIN 250 MG/1
TABLET, FILM COATED ORAL
Qty: 6 TABLET | Refills: 0 | Status: SHIPPED | OUTPATIENT
Start: 2025-07-30

## 2025-07-30 NOTE — PROGRESS NOTES
Chief Complaint  Cough, Shortness of Breath, and Ear Fullness    Subjective      Eileen Kaufman is a 75 y.o. female who presents to [facility]     History of Present Illness  The patient is a 75-year-old female who presents with shortness of breath and an elevated heart rate.    She began experiencing symptoms of shortness of breath and an elevated heart rate on 07/27/2025, which persisted through 07/28/2025. Her oxygen saturation levels have been fluctuating, dropping to the 60s before rising to 88 after increasing her oxygen supply from 4 to 5 liters. She has been under the care of a pulmonologist and has been struggling with her breathing. She also reports feeling dizzy and dehydrated, with a lack of circulation in her legs. She experienced body aches last night and was unable to perform her usual activities. She has been taking her blood pressure medication but reports feeling unsteady upon standing and requires assistance to rise from a seated position. She has been using an oxygen tank set at 5 liters, up from her usual 4 liters.      She also reports ear pain, a symptom she has not experienced in some time, and a sore throat that began on 07/27/2025. She has been taking a steroid daily and a diuretic for fluid retention.    She is accompanied by her daughter.    Living Condition: Lives with daughter      Patient Care Team:  Artur Quevedo MD as PCP - General (Family Medicine)  Neisha Milton APRN as Nurse Practitioner (Nurse Practitioner)  Madelin Slaughter APRN as Nurse Practitioner (Nurse Practitioner)  Gabriela Vaca APRN as Nurse Practitioner (Pulmonary Disease)  Janet Crowe MD as Consulting Physician (Pulmonary Disease)  Patricia Jerez APRN as Nurse Practitioner (Pulmonary Disease)    Objective   Vital Signs:   Vitals:    07/30/25 1658   BP: 127/83   BP Location: Left arm   Patient Position: Sitting   Pulse: 110   Temp: 97.6 °F (36.4 °C)   TempSrc: Oral   SpO2:  "(!) 88%  Comment: 5Liter intermittent   Weight: 108 kg (238 lb)   Height: 162.6 cm (64.02\")     Body mass index is 40.83 kg/m².    Wt Readings from Last 3 Encounters:   07/30/25 108 kg (238 lb)   06/24/25 108 kg (237 lb)   06/11/25 108 kg (237 lb 12.8 oz)     BP Readings from Last 3 Encounters:   07/30/25 127/83   06/24/25 119/81   06/11/25 112/64       Health Maintenance   Topic Date Due    LIPID PANEL  06/21/2025    LUNG CANCER SCREENING  07/23/2025    ANNUAL WELLNESS VISIT  10/10/2025    ZOSTER VACCINE (2 of 2) 12/08/2025 (Originally 11/5/2022)    COVID-19 Vaccine (6 - 2024-25 season) 12/11/2025 (Originally 9/1/2024)    INFLUENZA VACCINE  10/01/2025    MAMMOGRAM  11/27/2026    DXA SCAN  11/27/2026    COLORECTAL CANCER SCREENING  08/24/2032    TDAP/TD VACCINES (2 - Td or Tdap) 04/23/2034    HEPATITIS C SCREENING  Completed    Pneumococcal Vaccine 50+  Completed       Physical Exam  Constitutional:       General: She is in acute distress.      Appearance: She is ill-appearing.   Cardiovascular:      Rate and Rhythm: Tachycardia present.   Pulmonary:      Effort: Tachypnea present.      Breath sounds: Decreased air movement present. Examination of the right-upper field reveals decreased breath sounds and wheezing. Examination of the left-upper field reveals decreased breath sounds and wheezing. Examination of the right-lower field reveals decreased breath sounds and wheezing. Examination of the left-lower field reveals decreased breath sounds and wheezing. Decreased breath sounds and wheezing present.          Result Review   The following data was reviewed by: ESTEFANI Davis on 07/30/2025:  [x]  Tests & Results  []  Hospitalization/Emergency Department/Urgent Care  []  Internal/External Consultant Notes    Results  Labs   - Influenza A and B: Negative   - COVID-19 test: Negative      Procedures          ASSESSMENT/PLAN  Diagnoses and all orders for this visit:    1. Acute cough (Primary)  -     POCT SARS-CoV-2 " Antigen REYNOLD + Flu    2. COPD exacerbation  -     azithromycin (Zithromax Z-Raghav) 250 MG tablet; Take 2 tablets by mouth on day 1, then 1 tablet daily on days 2-5  Dispense: 6 tablet; Refill: 0  -     predniSONE (DELTASONE) 20 MG tablet; Take 1 tablet by mouth 2 (Two) Times a Day.  Dispense: 10 tablet; Refill: 0        Assessment & Plan  1. Shortness of breath.  - Reports experiencing shortness of breath since 07/27/2025, with oxygen levels dropping to 66% and 64%.  - Increased oxygen tank to 5 liters, but saturation remains at 88%.  - Advised to monitor breathing and heart rate closely; seek immediate medical attention at the emergency room if condition worsens.  - Labs ordered; influenza A and B, and COVID-19 tests are negative.    2. Elevated heart rate.  - Reports fast heart rate since 07/27/2025, persisting despite taking blood pressure medication.  - Symptoms of uncoordination and loss of balance suggest hypoxia.  - Advised to monitor heart rate closely; seek immediate medical attention at the emergency room if heart rate remains elevated or worsens.  - Labs ordered; influenza A and B, and COVID-19 tests are negative.    3. Wheezing.  - Significant wheezing and difficulty breathing noted.  - Influenza A and B, and COVID-19 tests are negative.  - Prescription for Zithromax and prednisone sent to pharmacy.  - Instructed to take prednisone 2 tablets twice daily for 5 days and follow instructions provided with the Zithromax pack.    4. Health maintenance.  - Advised to receive pneumonia vaccine once health improves.  - Labs ordered; influenza A and B, and COVID-19 tests are negative.        FOLLOW UP  No follow-ups on file.    Patient was strongly advised to go to the emergency department for further treatment. She did refuse at this time.  She stated she did not feel like she needed the emergency department, since she has had these exacerbations in the past, most recently being last month.  She did state in her  last visit here on 6/11/2025, she was prescribed antibiotics and steroids which helped at the last visit.  She wanted to try those again.  She states that she does live with her daughter, who helps take care of her.  She was advised to please tell her daughter if there is any increase of shortness of air, increased heart rate, loss of balance, change in level of consciousness, or increased lethargy. She was advised if any of these were to occur to seek medical attention immediately or go to the nearest emergency department.  Patient was given instructions and counseling regarding her condition or for health maintenance advice. Please see specific information pulled into the AVS if appropriate.     Patient or patient representative verbalized consent for the use of Ambient Listening during the visit with  ESTEFANI Davis for chart documentation. 7/30/2025  17:30 EDT      ESTEFANI Davis  07/30/25  17:31 EDT

## 2025-08-14 ENCOUNTER — CLINICAL SUPPORT (OUTPATIENT)
Dept: FAMILY MEDICINE CLINIC | Age: 75
End: 2025-08-14
Payer: MEDICARE

## 2025-08-14 ENCOUNTER — OFFICE VISIT (OUTPATIENT)
Dept: PULMONOLOGY | Facility: CLINIC | Age: 75
End: 2025-08-14
Payer: MEDICARE

## 2025-08-14 VITALS
HEART RATE: 77 BPM | BODY MASS INDEX: 40.29 KG/M2 | OXYGEN SATURATION: 96 % | HEIGHT: 64 IN | RESPIRATION RATE: 20 BRPM | SYSTOLIC BLOOD PRESSURE: 118 MMHG | WEIGHT: 236 LBS | TEMPERATURE: 98.2 F | DIASTOLIC BLOOD PRESSURE: 60 MMHG

## 2025-08-14 DIAGNOSIS — G47.33 OSA (OBSTRUCTIVE SLEEP APNEA): ICD-10-CM

## 2025-08-14 DIAGNOSIS — N18.2 CKD (CHRONIC KIDNEY DISEASE) STAGE 2, GFR 60-89 ML/MIN: ICD-10-CM

## 2025-08-14 DIAGNOSIS — R59.0 THORACIC LYMPHADENOPATHY: ICD-10-CM

## 2025-08-14 DIAGNOSIS — H61.21 IMPACTED CERUMEN OF RIGHT EAR: Primary | ICD-10-CM

## 2025-08-14 DIAGNOSIS — J96.11 CHRONIC RESPIRATORY FAILURE WITH HYPOXIA: Primary | ICD-10-CM

## 2025-08-14 DIAGNOSIS — I51.89 DIASTOLIC DYSFUNCTION: ICD-10-CM

## 2025-08-14 DIAGNOSIS — F17.201 TOBACCO ABUSE, IN REMISSION: ICD-10-CM

## 2025-08-14 DIAGNOSIS — R06.09 DYSPNEA ON EXERTION: ICD-10-CM

## 2025-08-14 DIAGNOSIS — E66.9 OBESITY (BMI 30-39.9): ICD-10-CM

## 2025-08-14 DIAGNOSIS — Z87.891 PERSONAL HISTORY OF NICOTINE DEPENDENCE: ICD-10-CM

## 2025-08-14 DIAGNOSIS — J44.9 CHRONIC OBSTRUCTIVE PULMONARY DISEASE, UNSPECIFIED COPD TYPE: ICD-10-CM

## 2025-08-14 PROCEDURE — 69209 REMOVE IMPACTED EAR WAX UNI: CPT | Performed by: FAMILY MEDICINE

## 2025-08-14 RX ORDER — ALBUTEROL SULFATE 0.63 MG/3ML
0.63 SOLUTION RESPIRATORY (INHALATION) EVERY 6 HOURS PRN
Qty: 360 ML | Refills: 5 | Status: SHIPPED | OUTPATIENT
Start: 2025-08-14

## 2025-08-14 RX ORDER — NEOMYCIN SULFATE, POLYMYXIN B SULFATE, HYDROCORTISONE 3.5; 10000; 1 MG/ML; [USP'U]/ML; MG/ML
3 SOLUTION/ DROPS AURICULAR (OTIC) 3 TIMES DAILY
Qty: 10 ML | Refills: 0 | Status: SHIPPED | OUTPATIENT
Start: 2025-08-14 | End: 2025-08-18

## 2025-08-18 ENCOUNTER — TELEPHONE (OUTPATIENT)
Dept: FAMILY MEDICINE CLINIC | Age: 75
End: 2025-08-18
Payer: MEDICARE

## 2025-08-18 RX ORDER — NEOMYCIN SULFATE, POLYMYXIN B SULFATE AND HYDROCORTISONE 10; 3.5; 1 MG/ML; MG/ML; [USP'U]/ML
3 SUSPENSION/ DROPS AURICULAR (OTIC) 3 TIMES DAILY
Qty: 10 ML | Refills: 1 | Status: SHIPPED | OUTPATIENT
Start: 2025-08-18

## 2025-08-21 ENCOUNTER — TELEPHONE (OUTPATIENT)
Dept: FAMILY MEDICINE CLINIC | Age: 75
End: 2025-08-21
Payer: MEDICARE

## 2025-08-21 DIAGNOSIS — J44.1 COPD EXACERBATION: Primary | ICD-10-CM

## 2025-08-21 DIAGNOSIS — M62.838 MUSCLE SPASM: ICD-10-CM

## 2025-08-22 RX ORDER — PREDNISONE 20 MG/1
TABLET ORAL
Qty: 14 TABLET | Refills: 0 | Status: SHIPPED | OUTPATIENT
Start: 2025-08-22 | End: 2025-09-03

## 2025-08-22 RX ORDER — BACLOFEN 10 MG/1
10 TABLET ORAL 2 TIMES DAILY PRN
Qty: 60 TABLET | Refills: 2 | Status: SHIPPED | OUTPATIENT
Start: 2025-08-22

## (undated) DEVICE — Device: Brand: PORTEX

## (undated) DEVICE — NDL SPINE 22G 5IN BLK

## (undated) DEVICE — GLV SURG TRIUMPH PF LTX 7 STRL

## (undated) DEVICE — EPIDURAL TRAY: Brand: MEDLINE INDUSTRIES, INC.